# Patient Record
Sex: MALE | Race: WHITE | HISPANIC OR LATINO | Employment: UNEMPLOYED | URBAN - METROPOLITAN AREA
[De-identification: names, ages, dates, MRNs, and addresses within clinical notes are randomized per-mention and may not be internally consistent; named-entity substitution may affect disease eponyms.]

---

## 2017-11-01 ENCOUNTER — APPOINTMENT (EMERGENCY)
Dept: CT IMAGING | Facility: HOSPITAL | Age: 42
End: 2017-11-01
Payer: OTHER GOVERNMENT

## 2017-11-01 ENCOUNTER — APPOINTMENT (EMERGENCY)
Dept: RADIOLOGY | Facility: HOSPITAL | Age: 42
End: 2017-11-01
Payer: OTHER GOVERNMENT

## 2017-11-01 ENCOUNTER — HOSPITAL ENCOUNTER (EMERGENCY)
Facility: HOSPITAL | Age: 42
End: 2017-11-02
Attending: EMERGENCY MEDICINE | Admitting: EMERGENCY MEDICINE
Payer: OTHER GOVERNMENT

## 2017-11-01 DIAGNOSIS — S37.012A HEMATOMA OF LEFT KIDNEY, INITIAL ENCOUNTER: ICD-10-CM

## 2017-11-01 DIAGNOSIS — D64.9 ANEMIA: ICD-10-CM

## 2017-11-01 DIAGNOSIS — R31.9 HEMATURIA: Primary | ICD-10-CM

## 2017-11-01 DIAGNOSIS — I95.9 HYPOTENSION: ICD-10-CM

## 2017-11-01 LAB
ALBUMIN SERPL BCP-MCNC: 3.5 G/DL (ref 3.5–5)
ALP SERPL-CCNC: 71 U/L (ref 46–116)
ALT SERPL W P-5'-P-CCNC: 15 U/L (ref 12–78)
ANION GAP SERPL CALCULATED.3IONS-SCNC: 10 MMOL/L (ref 4–13)
APTT PPP: 25 SECONDS (ref 23–35)
AST SERPL W P-5'-P-CCNC: 14 U/L (ref 5–45)
BACTERIA UR QL AUTO: ABNORMAL /HPF
BASOPHILS # BLD AUTO: 0.02 THOUSANDS/ΜL (ref 0–0.1)
BASOPHILS # BLD MANUAL: 0 THOUSAND/UL (ref 0–0.1)
BASOPHILS NFR BLD AUTO: 0 % (ref 0–1)
BASOPHILS NFR MAR MANUAL: 0 % (ref 0–1)
BILIRUB SERPL-MCNC: 0.64 MG/DL (ref 0.2–1)
BILIRUB UR QL STRIP: NEGATIVE
BUN SERPL-MCNC: 27 MG/DL (ref 5–25)
CALCIUM SERPL-MCNC: 8.5 MG/DL (ref 8.3–10.1)
CHLORIDE SERPL-SCNC: 110 MMOL/L (ref 100–108)
CK SERPL-CCNC: 82 U/L (ref 39–308)
CLARITY UR: ABNORMAL
CO2 SERPL-SCNC: 26 MMOL/L (ref 21–32)
COLOR UR: ABNORMAL
CREAT SERPL-MCNC: 3.02 MG/DL (ref 0.6–1.3)
EOSINOPHIL # BLD AUTO: 0.06 THOUSAND/ΜL (ref 0–0.61)
EOSINOPHIL # BLD MANUAL: 0 THOUSAND/UL (ref 0–0.4)
EOSINOPHIL NFR BLD AUTO: 0 % (ref 0–6)
EOSINOPHIL NFR BLD MANUAL: 0 % (ref 0–6)
ERYTHROCYTE [DISTWIDTH] IN BLOOD BY AUTOMATED COUNT: 13 % (ref 11.6–15.1)
ERYTHROCYTE [DISTWIDTH] IN BLOOD BY AUTOMATED COUNT: 13 % (ref 11.6–15.1)
GFR SERPL CREATININE-BSD FRML MDRD: 24 ML/MIN/1.73SQ M
GLUCOSE SERPL-MCNC: 191 MG/DL (ref 65–140)
GLUCOSE UR STRIP-MCNC: ABNORMAL MG/DL
HCT VFR BLD AUTO: 24.5 % (ref 36.5–49.3)
HCT VFR BLD AUTO: 31 % (ref 36.5–49.3)
HGB BLD-MCNC: 10.3 G/DL (ref 12–17)
HGB BLD-MCNC: 8.1 G/DL (ref 12–17)
HGB UR QL STRIP.AUTO: ABNORMAL
INR PPP: 1.05 (ref 0.86–1.16)
KETONES UR STRIP-MCNC: ABNORMAL MG/DL
LACTATE SERPL-SCNC: 3.1 MMOL/L (ref 0.5–2)
LEUKOCYTE ESTERASE UR QL STRIP: ABNORMAL
LYMPHOCYTES # BLD AUTO: 1.21 THOUSAND/UL (ref 0.6–4.47)
LYMPHOCYTES # BLD AUTO: 1.82 THOUSANDS/ΜL (ref 0.6–4.47)
LYMPHOCYTES # BLD AUTO: 6 % (ref 14–44)
LYMPHOCYTES NFR BLD AUTO: 12 % (ref 14–44)
MCH RBC QN AUTO: 30.3 PG (ref 26.8–34.3)
MCH RBC QN AUTO: 30.6 PG (ref 26.8–34.3)
MCHC RBC AUTO-ENTMCNC: 33.1 G/DL (ref 31.4–37.4)
MCHC RBC AUTO-ENTMCNC: 33.2 G/DL (ref 31.4–37.4)
MCV RBC AUTO: 92 FL (ref 82–98)
MCV RBC AUTO: 92 FL (ref 82–98)
MONOCYTES # BLD AUTO: 0 THOUSAND/UL (ref 0–1.22)
MONOCYTES # BLD AUTO: 0.73 THOUSAND/ΜL (ref 0.17–1.22)
MONOCYTES NFR BLD AUTO: 5 % (ref 4–12)
MONOCYTES NFR BLD: 0 % (ref 4–12)
NEUTROPHILS # BLD AUTO: 12.15 THOUSANDS/ΜL (ref 1.85–7.62)
NEUTROPHILS # BLD MANUAL: 18.97 THOUSAND/UL (ref 1.85–7.62)
NEUTS BAND NFR BLD MANUAL: 8 % (ref 0–8)
NEUTS SEG NFR BLD AUTO: 83 % (ref 43–75)
NEUTS SEG NFR BLD AUTO: 86 % (ref 43–75)
NITRITE UR QL STRIP: POSITIVE
NON-SQ EPI CELLS URNS QL MICRO: ABNORMAL /HPF
NRBC BLD AUTO-RTO: 0 /100 WBCS
NRBC BLD AUTO-RTO: 0 /100 WBCS
PH UR STRIP.AUTO: 8 [PH] (ref 4.5–8)
PLATELET # BLD AUTO: 212 THOUSANDS/UL (ref 149–390)
PLATELET # BLD AUTO: 231 THOUSANDS/UL (ref 149–390)
PLATELET BLD QL SMEAR: ADEQUATE
PMV BLD AUTO: 10.4 FL (ref 8.9–12.7)
PMV BLD AUTO: 10.4 FL (ref 8.9–12.7)
POTASSIUM SERPL-SCNC: 3.4 MMOL/L (ref 3.5–5.3)
PROT SERPL-MCNC: 6.2 G/DL (ref 6.4–8.2)
PROT UR STRIP-MCNC: >=300 MG/DL
PROTHROMBIN TIME: 13.7 SECONDS (ref 12.1–14.4)
RBC # BLD AUTO: 2.67 MILLION/UL (ref 3.88–5.62)
RBC # BLD AUTO: 3.37 MILLION/UL (ref 3.88–5.62)
RBC #/AREA URNS AUTO: ABNORMAL /HPF
RBC MORPH BLD: NORMAL
SODIUM SERPL-SCNC: 146 MMOL/L (ref 136–145)
SP GR UR STRIP.AUTO: 1.01 (ref 1–1.03)
SPECIMEN SOURCE: NORMAL
TOTAL CELLS COUNTED SPEC: 100
TROPONIN I BLD-MCNC: 0 NG/ML (ref 0–0.08)
UROBILINOGEN UR QL STRIP.AUTO: 1 E.U./DL
WBC # BLD AUTO: 14.78 THOUSAND/UL (ref 4.31–10.16)
WBC # BLD AUTO: 20.18 THOUSAND/UL (ref 4.31–10.16)
WBC #/AREA URNS AUTO: ABNORMAL /HPF

## 2017-11-01 PROCEDURE — 93005 ELECTROCARDIOGRAM TRACING: CPT | Performed by: EMERGENCY MEDICINE

## 2017-11-01 PROCEDURE — 85007 BL SMEAR W/DIFF WBC COUNT: CPT | Performed by: EMERGENCY MEDICINE

## 2017-11-01 PROCEDURE — 71250 CT THORAX DX C-: CPT

## 2017-11-01 PROCEDURE — 36415 COLL VENOUS BLD VENIPUNCTURE: CPT

## 2017-11-01 PROCEDURE — 74176 CT ABD & PELVIS W/O CONTRAST: CPT

## 2017-11-01 PROCEDURE — 85610 PROTHROMBIN TIME: CPT | Performed by: EMERGENCY MEDICINE

## 2017-11-01 PROCEDURE — 85027 COMPLETE CBC AUTOMATED: CPT | Performed by: EMERGENCY MEDICINE

## 2017-11-01 PROCEDURE — 96374 THER/PROPH/DIAG INJ IV PUSH: CPT

## 2017-11-01 PROCEDURE — 96365 THER/PROPH/DIAG IV INF INIT: CPT

## 2017-11-01 PROCEDURE — 86900 BLOOD TYPING SEROLOGIC ABO: CPT | Performed by: EMERGENCY MEDICINE

## 2017-11-01 PROCEDURE — 87040 BLOOD CULTURE FOR BACTERIA: CPT | Performed by: EMERGENCY MEDICINE

## 2017-11-01 PROCEDURE — 84484 ASSAY OF TROPONIN QUANT: CPT

## 2017-11-01 PROCEDURE — 86901 BLOOD TYPING SEROLOGIC RH(D): CPT | Performed by: EMERGENCY MEDICINE

## 2017-11-01 PROCEDURE — 83605 ASSAY OF LACTIC ACID: CPT | Performed by: EMERGENCY MEDICINE

## 2017-11-01 PROCEDURE — 96361 HYDRATE IV INFUSION ADD-ON: CPT

## 2017-11-01 PROCEDURE — 81001 URINALYSIS AUTO W/SCOPE: CPT | Performed by: EMERGENCY MEDICINE

## 2017-11-01 PROCEDURE — 80053 COMPREHEN METABOLIC PANEL: CPT | Performed by: EMERGENCY MEDICINE

## 2017-11-01 PROCEDURE — 86850 RBC ANTIBODY SCREEN: CPT | Performed by: EMERGENCY MEDICINE

## 2017-11-01 PROCEDURE — 85730 THROMBOPLASTIN TIME PARTIAL: CPT | Performed by: EMERGENCY MEDICINE

## 2017-11-01 PROCEDURE — 85025 COMPLETE CBC W/AUTO DIFF WBC: CPT | Performed by: EMERGENCY MEDICINE

## 2017-11-01 PROCEDURE — 36415 COLL VENOUS BLD VENIPUNCTURE: CPT | Performed by: EMERGENCY MEDICINE

## 2017-11-01 PROCEDURE — 96375 TX/PRO/DX INJ NEW DRUG ADDON: CPT

## 2017-11-01 PROCEDURE — 82550 ASSAY OF CK (CPK): CPT | Performed by: EMERGENCY MEDICINE

## 2017-11-01 RX ORDER — TAMSULOSIN HYDROCHLORIDE 0.4 MG/1
0.4 CAPSULE ORAL
COMMUNITY
End: 2017-12-22 | Stop reason: HOSPADM

## 2017-11-01 RX ORDER — MORPHINE SULFATE 2 MG/ML
2 INJECTION, SOLUTION INTRAMUSCULAR; INTRAVENOUS ONCE
Status: COMPLETED | OUTPATIENT
Start: 2017-11-01 | End: 2017-11-01

## 2017-11-01 RX ORDER — MORPHINE SULFATE 4 MG/ML
4 INJECTION, SOLUTION INTRAMUSCULAR; INTRAVENOUS ONCE
Status: COMPLETED | OUTPATIENT
Start: 2017-11-01 | End: 2017-11-01

## 2017-11-01 RX ADMIN — SODIUM CHLORIDE 1000 ML: 0.9 INJECTION, SOLUTION INTRAVENOUS at 23:49

## 2017-11-01 RX ADMIN — CEFEPIME 2000 MG: 2 INJECTION, POWDER, FOR SOLUTION INTRAMUSCULAR; INTRAVENOUS at 21:51

## 2017-11-01 RX ADMIN — SODIUM CHLORIDE 1000 ML: 0.9 INJECTION, SOLUTION INTRAVENOUS at 21:04

## 2017-11-01 RX ADMIN — SODIUM CHLORIDE 1000 ML: 0.9 INJECTION, SOLUTION INTRAVENOUS at 21:03

## 2017-11-01 RX ADMIN — MORPHINE SULFATE 4 MG: 4 INJECTION, SOLUTION INTRAMUSCULAR; INTRAVENOUS at 22:43

## 2017-11-01 RX ADMIN — MORPHINE SULFATE 2 MG: 2 INJECTION, SOLUTION INTRAMUSCULAR; INTRAVENOUS at 21:35

## 2017-11-02 ENCOUNTER — APPOINTMENT (INPATIENT)
Dept: RADIOLOGY | Facility: HOSPITAL | Age: 42
DRG: 659 | End: 2017-11-02
Payer: COMMERCIAL

## 2017-11-02 ENCOUNTER — APPOINTMENT (EMERGENCY)
Dept: RADIOLOGY | Facility: HOSPITAL | Age: 42
DRG: 659 | End: 2017-11-02
Payer: COMMERCIAL

## 2017-11-02 ENCOUNTER — ANESTHESIA EVENT (INPATIENT)
Dept: PERIOP | Facility: HOSPITAL | Age: 42
DRG: 659 | End: 2017-11-02
Payer: COMMERCIAL

## 2017-11-02 ENCOUNTER — ANESTHESIA (INPATIENT)
Dept: PERIOP | Facility: HOSPITAL | Age: 42
DRG: 659 | End: 2017-11-02
Payer: COMMERCIAL

## 2017-11-02 ENCOUNTER — HOSPITAL ENCOUNTER (INPATIENT)
Facility: HOSPITAL | Age: 42
LOS: 50 days | Discharge: HOME | DRG: 659 | End: 2017-12-22
Attending: SURGERY | Admitting: SURGERY
Payer: COMMERCIAL

## 2017-11-02 VITALS
DIASTOLIC BLOOD PRESSURE: 66 MMHG | HEART RATE: 92 BPM | TEMPERATURE: 99 F | RESPIRATION RATE: 16 BRPM | WEIGHT: 187.39 LBS | SYSTOLIC BLOOD PRESSURE: 131 MMHG | OXYGEN SATURATION: 96 %

## 2017-11-02 DIAGNOSIS — A41.9 SEVERE SEPSIS (HCC): ICD-10-CM

## 2017-11-02 DIAGNOSIS — H57.10 EYE PAIN: ICD-10-CM

## 2017-11-02 DIAGNOSIS — S37.032A LACERATION OF LEFT KIDNEY, INITIAL ENCOUNTER: Primary | ICD-10-CM

## 2017-11-02 DIAGNOSIS — R65.20 SEVERE SEPSIS (HCC): ICD-10-CM

## 2017-11-02 DIAGNOSIS — J90 PLEURAL EFFUSION ON LEFT: ICD-10-CM

## 2017-11-02 DIAGNOSIS — N13.39 OTHER HYDRONEPHROSIS: Chronic | ICD-10-CM

## 2017-11-02 DIAGNOSIS — Z90.5 HX OF UNILATERAL NEPHRECTOMY: ICD-10-CM

## 2017-11-02 LAB
ABO GROUP BLD: NORMAL
ABO GROUP BLD: NORMAL
ALBUMIN SERPL BCP-MCNC: 2.3 G/DL (ref 3.5–5)
ALBUMIN SERPL BCP-MCNC: 2.9 G/DL (ref 3.5–5)
ALP SERPL-CCNC: 43 U/L (ref 46–116)
ALP SERPL-CCNC: 54 U/L (ref 46–116)
ALT SERPL W P-5'-P-CCNC: 17 U/L (ref 12–78)
ALT SERPL W P-5'-P-CCNC: 23 U/L (ref 12–78)
ANION GAP BLD CALC-SCNC: 20 MMOL/L (ref 4–13)
ANION GAP SERPL CALCULATED.3IONS-SCNC: 12 MMOL/L (ref 4–13)
ANION GAP SERPL CALCULATED.3IONS-SCNC: 5 MMOL/L (ref 4–13)
ANION GAP SERPL CALCULATED.3IONS-SCNC: 6 MMOL/L (ref 4–13)
ANION GAP SERPL CALCULATED.3IONS-SCNC: 9 MMOL/L (ref 4–13)
APTT PPP: 27 SECONDS (ref 23–35)
ARTERIAL PATENCY WRIST A: NO
AST SERPL W P-5'-P-CCNC: 18 U/L (ref 5–45)
AST SERPL W P-5'-P-CCNC: 25 U/L (ref 5–45)
ATRIAL RATE: 68 BPM
BASE EXCESS BLDA CALC-SCNC: -10 MMOL/L (ref -2–3)
BASE EXCESS BLDA CALC-SCNC: -10 MMOL/L (ref -2–3)
BASE EXCESS BLDA CALC-SCNC: -11 MMOL/L (ref -2–3)
BASE EXCESS BLDA CALC-SCNC: -7 MMOL/L (ref -2–3)
BASE EXCESS BLDA CALC-SCNC: -7 MMOL/L (ref -2–3)
BASE EXCESS BLDA CALC-SCNC: -9.7 MMOL/L
BASE EXCESS BLDA CALC-SCNC: 0.9 MMOL/L
BASE EXCESS BLDA CALC-SCNC: 2.7 MMOL/L
BASOPHILS # BLD AUTO: 0 THOUSANDS/ΜL (ref 0–0.1)
BASOPHILS # BLD AUTO: 0.01 THOUSANDS/ΜL (ref 0–0.1)
BASOPHILS # BLD AUTO: 0.02 THOUSANDS/ΜL (ref 0–0.1)
BASOPHILS NFR BLD AUTO: 0 % (ref 0–1)
BILIRUB SERPL-MCNC: 1.3 MG/DL (ref 0.2–1)
BILIRUB SERPL-MCNC: 1.41 MG/DL (ref 0.2–1)
BLD GP AB SCN SERPL QL: NEGATIVE
BLD GP AB SCN SERPL QL: NEGATIVE
BODY TEMPERATURE: 100.4 DEGREES FEHRENHEIT
BODY TEMPERATURE: 98.2 DEGREES FEHRENHEIT
BODY TEMPERATURE: 98.3 DEGREES FEHRENHEIT
BUN BLD-MCNC: 24 MG/DL (ref 5–25)
BUN SERPL-MCNC: 21 MG/DL (ref 5–25)
BUN SERPL-MCNC: 21 MG/DL (ref 5–25)
BUN SERPL-MCNC: 22 MG/DL (ref 5–25)
BUN SERPL-MCNC: 26 MG/DL (ref 5–25)
CA-I BLD-SCNC: 0.97 MMOL/L (ref 1.12–1.32)
CA-I BLD-SCNC: 1.04 MMOL/L (ref 1.12–1.32)
CA-I BLD-SCNC: 1.06 MMOL/L (ref 1.12–1.32)
CA-I BLD-SCNC: 1.08 MMOL/L (ref 1.12–1.32)
CA-I BLD-SCNC: 1.08 MMOL/L (ref 1.12–1.32)
CA-I BLD-SCNC: 1.12 MMOL/L (ref 1.12–1.32)
CA-I BLD-SCNC: 1.14 MMOL/L (ref 1.12–1.32)
CA-I BLD-SCNC: 1.19 MMOL/L (ref 1.12–1.32)
CALCIUM SERPL-MCNC: 6.9 MG/DL (ref 8.3–10.1)
CALCIUM SERPL-MCNC: 7.8 MG/DL (ref 8.3–10.1)
CALCIUM SERPL-MCNC: 8 MG/DL (ref 8.3–10.1)
CALCIUM SERPL-MCNC: 8.7 MG/DL (ref 8.3–10.1)
CHLORIDE BLD-SCNC: 110 MMOL/L (ref 100–108)
CHLORIDE SERPL-SCNC: 114 MMOL/L (ref 100–108)
CHLORIDE SERPL-SCNC: 117 MMOL/L (ref 100–108)
CHLORIDE SERPL-SCNC: 120 MMOL/L (ref 100–108)
CHLORIDE SERPL-SCNC: 120 MMOL/L (ref 100–108)
CO2 SERPL-SCNC: 20 MMOL/L (ref 21–32)
CO2 SERPL-SCNC: 24 MMOL/L (ref 21–32)
CO2 SERPL-SCNC: 26 MMOL/L (ref 21–32)
CO2 SERPL-SCNC: 27 MMOL/L (ref 21–32)
CREAT BLD-MCNC: 2.8 MG/DL (ref 0.6–1.3)
CREAT SERPL-MCNC: 2.12 MG/DL (ref 0.6–1.3)
CREAT SERPL-MCNC: 2.18 MG/DL (ref 0.6–1.3)
CREAT SERPL-MCNC: 2.47 MG/DL (ref 0.6–1.3)
CREAT SERPL-MCNC: 2.91 MG/DL (ref 0.6–1.3)
EOSINOPHIL # BLD AUTO: 0 THOUSAND/ΜL (ref 0–0.61)
EOSINOPHIL # BLD AUTO: 0.01 THOUSAND/ΜL (ref 0–0.61)
EOSINOPHIL # BLD AUTO: 0.04 THOUSAND/ΜL (ref 0–0.61)
EOSINOPHIL NFR BLD AUTO: 0 % (ref 0–6)
EOSINOPHIL NFR BLD AUTO: 0 % (ref 0–6)
EOSINOPHIL NFR BLD AUTO: 1 % (ref 0–6)
ERYTHROCYTE [DISTWIDTH] IN BLOOD BY AUTOMATED COUNT: 12.9 % (ref 11.6–15.1)
ERYTHROCYTE [DISTWIDTH] IN BLOOD BY AUTOMATED COUNT: 13.9 % (ref 11.6–15.1)
ERYTHROCYTE [DISTWIDTH] IN BLOOD BY AUTOMATED COUNT: 14.7 % (ref 11.6–15.1)
ERYTHROCYTE [DISTWIDTH] IN BLOOD BY AUTOMATED COUNT: 14.7 % (ref 11.6–15.1)
GFR SERPL CREATININE-BSD FRML MDRD: 25 ML/MIN/1.73SQ M
GFR SERPL CREATININE-BSD FRML MDRD: 27 ML/MIN/1.73SQ M
GFR SERPL CREATININE-BSD FRML MDRD: 31 ML/MIN/1.73SQ M
GFR SERPL CREATININE-BSD FRML MDRD: 36 ML/MIN/1.73SQ M
GFR SERPL CREATININE-BSD FRML MDRD: 37 ML/MIN/1.73SQ M
GLUCOSE SERPL-MCNC: 168 MG/DL (ref 65–140)
GLUCOSE SERPL-MCNC: 179 MG/DL (ref 65–140)
GLUCOSE SERPL-MCNC: 184 MG/DL (ref 65–140)
GLUCOSE SERPL-MCNC: 197 MG/DL (ref 65–140)
GLUCOSE SERPL-MCNC: 197 MG/DL (ref 65–140)
GLUCOSE SERPL-MCNC: 198 MG/DL (ref 65–140)
GLUCOSE SERPL-MCNC: 203 MG/DL (ref 65–140)
GLUCOSE SERPL-MCNC: 204 MG/DL (ref 65–140)
GLUCOSE SERPL-MCNC: 89 MG/DL (ref 65–140)
GLUCOSE SERPL-MCNC: 97 MG/DL (ref 65–140)
HCO3 BLDA-SCNC: 16.8 MMOL/L (ref 22–28)
HCO3 BLDA-SCNC: 17 MMOL/L (ref 22–28)
HCO3 BLDA-SCNC: 17.1 MMOL/L (ref 22–28)
HCO3 BLDA-SCNC: 17.1 MMOL/L (ref 22–28)
HCO3 BLDA-SCNC: 19.5 MMOL/L (ref 24–30)
HCO3 BLDA-SCNC: 19.9 MMOL/L (ref 22–28)
HCO3 BLDA-SCNC: 25.1 MMOL/L (ref 22–28)
HCO3 BLDA-SCNC: 27 MMOL/L (ref 22–28)
HCT VFR BLD AUTO: 20.7 % (ref 36.5–49.3)
HCT VFR BLD AUTO: 24.9 % (ref 36.5–49.3)
HCT VFR BLD AUTO: 26.6 % (ref 36.5–49.3)
HCT VFR BLD AUTO: 29.9 % (ref 36.5–49.3)
HCT VFR BLD AUTO: 33.4 % (ref 36.5–49.3)
HCT VFR BLD CALC: 17 % (ref 36.5–49.3)
HCT VFR BLD CALC: 18 % (ref 36.5–49.3)
HCT VFR BLD CALC: 19 % (ref 36.5–49.3)
HCT VFR BLD CALC: 25 % (ref 36.5–49.3)
HCT VFR BLD CALC: 26 % (ref 36.5–49.3)
HCT VFR BLD CALC: 26 % (ref 36.5–49.3)
HGB BLD-MCNC: 10.3 G/DL (ref 12–17)
HGB BLD-MCNC: 11.3 G/DL (ref 12–17)
HGB BLD-MCNC: 6.9 G/DL (ref 12–17)
HGB BLD-MCNC: 8.9 G/DL (ref 12–17)
HGB BLD-MCNC: 9.3 G/DL (ref 12–17)
HGB BLDA-MCNC: 5.8 G/DL (ref 12–17)
HGB BLDA-MCNC: 6.1 G/DL (ref 12–17)
HGB BLDA-MCNC: 6.5 G/DL (ref 12–17)
HGB BLDA-MCNC: 8.5 G/DL (ref 12–17)
HGB BLDA-MCNC: 8.8 G/DL (ref 12–17)
HGB BLDA-MCNC: 8.8 G/DL (ref 12–17)
HOROWITZ INDEX BLDA+IHG-RTO: 40 MM[HG]
INR PPP: 1.33 (ref 0.86–1.16)
INR PPP: 1.37 (ref 0.86–1.16)
LACTATE SERPL-SCNC: 1.2 MMOL/L (ref 0.5–2)
LACTATE SERPL-SCNC: 1.3 MMOL/L (ref 0.5–2)
LACTATE SERPL-SCNC: 3.8 MMOL/L (ref 0.5–2)
LACTATE SERPL-SCNC: 7.1 MMOL/L (ref 0.5–2)
LYMPHOCYTES # BLD AUTO: 0.91 THOUSANDS/ΜL (ref 0.6–4.47)
LYMPHOCYTES # BLD AUTO: 1.04 THOUSANDS/ΜL (ref 0.6–4.47)
LYMPHOCYTES # BLD AUTO: 1.08 THOUSANDS/ΜL (ref 0.6–4.47)
LYMPHOCYTES NFR BLD AUTO: 15 % (ref 14–44)
LYMPHOCYTES NFR BLD AUTO: 5 % (ref 14–44)
LYMPHOCYTES NFR BLD AUTO: 9 % (ref 14–44)
MAGNESIUM SERPL-MCNC: 1.7 MG/DL (ref 1.6–2.6)
MCH RBC QN AUTO: 29.4 PG (ref 26.8–34.3)
MCH RBC QN AUTO: 29.6 PG (ref 26.8–34.3)
MCH RBC QN AUTO: 30.1 PG (ref 26.8–34.3)
MCH RBC QN AUTO: 30.7 PG (ref 26.8–34.3)
MCHC RBC AUTO-ENTMCNC: 33.3 G/DL (ref 31.4–37.4)
MCHC RBC AUTO-ENTMCNC: 33.8 G/DL (ref 31.4–37.4)
MCHC RBC AUTO-ENTMCNC: 35 G/DL (ref 31.4–37.4)
MCHC RBC AUTO-ENTMCNC: 35.7 G/DL (ref 31.4–37.4)
MCV RBC AUTO: 84 FL (ref 82–98)
MCV RBC AUTO: 84 FL (ref 82–98)
MCV RBC AUTO: 87 FL (ref 82–98)
MCV RBC AUTO: 92 FL (ref 82–98)
MONOCYTES # BLD AUTO: 0.54 THOUSAND/ΜL (ref 0.17–1.22)
MONOCYTES # BLD AUTO: 1.28 THOUSAND/ΜL (ref 0.17–1.22)
MONOCYTES # BLD AUTO: 1.33 THOUSAND/ΜL (ref 0.17–1.22)
MONOCYTES NFR BLD AUTO: 11 % (ref 4–12)
MONOCYTES NFR BLD AUTO: 7 % (ref 4–12)
MONOCYTES NFR BLD AUTO: 8 % (ref 4–12)
NEUTROPHILS # BLD AUTO: 17.12 THOUSANDS/ΜL (ref 1.85–7.62)
NEUTROPHILS # BLD AUTO: 5.53 THOUSANDS/ΜL (ref 1.85–7.62)
NEUTROPHILS # BLD AUTO: 9.41 THOUSANDS/ΜL (ref 1.85–7.62)
NEUTS SEG NFR BLD AUTO: 76 % (ref 43–75)
NEUTS SEG NFR BLD AUTO: 80 % (ref 43–75)
NEUTS SEG NFR BLD AUTO: 88 % (ref 43–75)
NRBC BLD AUTO-RTO: 0 /100 WBCS
O2 CT BLDA-SCNC: 13 ML/DL (ref 16–23)
O2 CT BLDA-SCNC: 13.8 ML/DL (ref 16–23)
O2 CT BLDA-SCNC: 16.8 ML/DL (ref 16–23)
OXYHGB MFR BLDA: 96.9 % (ref 94–97)
OXYHGB MFR BLDA: 97 % (ref 94–97)
OXYHGB MFR BLDA: 97.4 % (ref 94–97)
P AXIS: 48 DEGREES
PCO2 BLD: 18 MMOL/L (ref 21–32)
PCO2 BLD: 18 MMOL/L (ref 21–32)
PCO2 BLD: 19 MMOL/L (ref 21–32)
PCO2 BLD: 21 MMOL/L (ref 21–32)
PCO2 BLD: 39.9 MM HG (ref 36–44)
PCO2 BLD: 40.3 MM HG (ref 36–44)
PCO2 BLD: 42.2 MM HG (ref 42–50)
PCO2 BLD: 46.7 MM HG (ref 36–44)
PCO2 BLD: 50.2 MM HG (ref 36–44)
PCO2 BLDA: 38.3 MM HG (ref 36–44)
PCO2 BLDA: 39.3 MM HG (ref 36–44)
PCO2 BLDA: 40.8 MM HG (ref 36–44)
PCO2 TEMP ADJ BLDA: 38 MM HG (ref 36–44)
PCO2 TEMP ADJ BLDA: 42.6 MM HG (ref 36–44)
PEEP RESPIRATORY: 5 CM[H2O]
PH BLD: 7.14 [PH] (ref 7.35–7.45)
PH BLD: 7.24 [PH] (ref 7.35–7.45)
PH BLD: 7.27 [PH] (ref 7.3–7.4)
PH BLD: 7.42 [PH] (ref 7.35–7.45)
PH BLD: 7.44 [PH] (ref 7.35–7.45)
PH BLDA: 7.25 [PH] (ref 7.35–7.45)
PH BLDA: 7.43 [PH] (ref 7.35–7.45)
PH BLDA: 7.44 [PH] (ref 7.35–7.45)
PHOSPHATE SERPL-MCNC: 2.9 MG/DL (ref 2.7–4.5)
PHOSPHATE SERPL-MCNC: 4.4 MG/DL (ref 2.7–4.5)
PLATELET # BLD AUTO: 116 THOUSANDS/UL (ref 149–390)
PLATELET # BLD AUTO: 119 THOUSANDS/UL (ref 149–390)
PLATELET # BLD AUTO: 147 THOUSANDS/UL (ref 149–390)
PLATELET # BLD AUTO: 221 THOUSANDS/UL (ref 149–390)
PMV BLD AUTO: 10.1 FL (ref 8.9–12.7)
PMV BLD AUTO: 9.4 FL (ref 8.9–12.7)
PMV BLD AUTO: 9.5 FL (ref 8.9–12.7)
PMV BLD AUTO: 9.8 FL (ref 8.9–12.7)
PO2 BLD: 136.8 MM HG (ref 75–129)
PO2 BLD: 151.8 MM HG (ref 75–129)
PO2 BLD: 214 MM HG (ref 75–129)
PO2 BLD: 223 MM HG (ref 75–129)
PO2 BLD: 274 MM HG (ref 75–129)
PO2 BLD: 35 MM HG (ref 35–45)
PO2 BLD: 469 MM HG (ref 75–129)
PO2 BLDA: 130.5 MM HG (ref 75–129)
PO2 BLDA: 132.2 MM HG (ref 75–129)
PO2 BLDA: 153 MM HG (ref 75–129)
POTASSIUM BLD-SCNC: 3.5 MMOL/L (ref 3.5–5.3)
POTASSIUM BLD-SCNC: 3.7 MMOL/L (ref 3.5–5.3)
POTASSIUM BLD-SCNC: 3.7 MMOL/L (ref 3.5–5.3)
POTASSIUM BLD-SCNC: 3.8 MMOL/L (ref 3.5–5.3)
POTASSIUM BLD-SCNC: 4 MMOL/L (ref 3.5–5.3)
POTASSIUM BLD-SCNC: 4 MMOL/L (ref 3.5–5.3)
POTASSIUM SERPL-SCNC: 3.6 MMOL/L (ref 3.5–5.3)
POTASSIUM SERPL-SCNC: 3.7 MMOL/L (ref 3.5–5.3)
POTASSIUM SERPL-SCNC: 3.8 MMOL/L (ref 3.5–5.3)
POTASSIUM SERPL-SCNC: 3.9 MMOL/L (ref 3.5–5.3)
PR INTERVAL: 134 MS
PROT SERPL-MCNC: 4.6 G/DL (ref 6.4–8.2)
PROT SERPL-MCNC: 5.7 G/DL (ref 6.4–8.2)
PROTHROMBIN TIME: 16.6 SECONDS (ref 12.1–14.4)
PROTHROMBIN TIME: 16.9 SECONDS (ref 12.1–14.4)
QRS AXIS: 33 DEGREES
QRSD INTERVAL: 86 MS
QT INTERVAL: 402 MS
QTC INTERVAL: 427 MS
RBC # BLD AUTO: 2.25 MILLION/UL (ref 3.88–5.62)
RBC # BLD AUTO: 2.96 MILLION/UL (ref 3.88–5.62)
RBC # BLD AUTO: 3.16 MILLION/UL (ref 3.88–5.62)
RBC # BLD AUTO: 3.82 MILLION/UL (ref 3.88–5.62)
RH BLD: POSITIVE
RH BLD: POSITIVE
SAO2 % BLD FROM PO2: 100 % (ref 95–98)
SAO2 % BLD FROM PO2: 59 % (ref 95–98)
SODIUM BLD-SCNC: 146 MMOL/L (ref 136–145)
SODIUM BLD-SCNC: 147 MMOL/L (ref 136–145)
SODIUM BLD-SCNC: 147 MMOL/L (ref 136–145)
SODIUM BLD-SCNC: 148 MMOL/L (ref 136–145)
SODIUM SERPL-SCNC: 147 MMOL/L (ref 136–145)
SODIUM SERPL-SCNC: 149 MMOL/L (ref 136–145)
SODIUM SERPL-SCNC: 152 MMOL/L (ref 136–145)
SODIUM SERPL-SCNC: 152 MMOL/L (ref 136–145)
SPECIMEN EXPIRATION DATE: NORMAL
SPECIMEN EXPIRATION DATE: NORMAL
SPECIMEN SOURCE: ABNORMAL
T WAVE AXIS: 33 DEGREES
VENT AC: 14
VENT- AC: AC
VENTRICULAR RATE: 68 BPM
VT SETTING VENT: 500 ML
WBC # BLD AUTO: 11.8 THOUSAND/UL (ref 4.31–10.16)
WBC # BLD AUTO: 19.45 THOUSAND/UL (ref 4.31–10.16)
WBC # BLD AUTO: 7.18 THOUSAND/UL (ref 4.31–10.16)
WBC # BLD AUTO: 7.77 THOUSAND/UL (ref 4.31–10.16)

## 2017-11-02 PROCEDURE — 82947 ASSAY GLUCOSE BLOOD QUANT: CPT

## 2017-11-02 PROCEDURE — 83735 ASSAY OF MAGNESIUM: CPT | Performed by: SURGERY

## 2017-11-02 PROCEDURE — 82330 ASSAY OF CALCIUM: CPT

## 2017-11-02 PROCEDURE — 85014 HEMATOCRIT: CPT | Performed by: EMERGENCY MEDICINE

## 2017-11-02 PROCEDURE — 84295 ASSAY OF SERUM SODIUM: CPT

## 2017-11-02 PROCEDURE — P9016 RBC LEUKOCYTES REDUCED: HCPCS

## 2017-11-02 PROCEDURE — P9017 PLASMA 1 DONOR FRZ W/IN 8 HR: HCPCS

## 2017-11-02 PROCEDURE — P9035 PLATELET PHERES LEUKOREDUCED: HCPCS

## 2017-11-02 PROCEDURE — 94002 VENT MGMT INPAT INIT DAY: CPT

## 2017-11-02 PROCEDURE — 85025 COMPLETE CBC W/AUTO DIFF WBC: CPT | Performed by: SURGERY

## 2017-11-02 PROCEDURE — P9021 RED BLOOD CELLS UNIT: HCPCS

## 2017-11-02 PROCEDURE — 84100 ASSAY OF PHOSPHORUS: CPT | Performed by: PHYSICIAN ASSISTANT

## 2017-11-02 PROCEDURE — 86850 RBC ANTIBODY SCREEN: CPT | Performed by: SURGERY

## 2017-11-02 PROCEDURE — 86927 PLASMA FRESH FROZEN: CPT

## 2017-11-02 PROCEDURE — 85610 PROTHROMBIN TIME: CPT | Performed by: PHYSICIAN ASSISTANT

## 2017-11-02 PROCEDURE — 82805 BLOOD GASES W/O2 SATURATION: CPT | Performed by: SURGERY

## 2017-11-02 PROCEDURE — 85730 THROMBOPLASTIN TIME PARTIAL: CPT | Performed by: SURGERY

## 2017-11-02 PROCEDURE — 83605 ASSAY OF LACTIC ACID: CPT | Performed by: SURGERY

## 2017-11-02 PROCEDURE — 86920 COMPATIBILITY TEST SPIN: CPT

## 2017-11-02 PROCEDURE — 0TT10ZZ RESECTION OF LEFT KIDNEY, OPEN APPROACH: ICD-10-PCS | Performed by: SURGERY

## 2017-11-02 PROCEDURE — 80053 COMPREHEN METABOLIC PANEL: CPT | Performed by: PHYSICIAN ASSISTANT

## 2017-11-02 PROCEDURE — 85610 PROTHROMBIN TIME: CPT | Performed by: SURGERY

## 2017-11-02 PROCEDURE — 82805 BLOOD GASES W/O2 SATURATION: CPT | Performed by: PHYSICIAN ASSISTANT

## 2017-11-02 PROCEDURE — 36415 COLL VENOUS BLD VENIPUNCTURE: CPT | Performed by: SURGERY

## 2017-11-02 PROCEDURE — 80053 COMPREHEN METABOLIC PANEL: CPT | Performed by: SURGERY

## 2017-11-02 PROCEDURE — 99285 EMERGENCY DEPT VISIT HI MDM: CPT

## 2017-11-02 PROCEDURE — 86901 BLOOD TYPING SEROLOGIC RH(D): CPT | Performed by: SURGERY

## 2017-11-02 PROCEDURE — C9113 INJ PANTOPRAZOLE SODIUM, VIA: HCPCS | Performed by: SURGERY

## 2017-11-02 PROCEDURE — 85014 HEMATOCRIT: CPT

## 2017-11-02 PROCEDURE — 0TB70ZZ EXCISION OF LEFT URETER, OPEN APPROACH: ICD-10-PCS | Performed by: SURGERY

## 2017-11-02 PROCEDURE — 85027 COMPLETE CBC AUTOMATED: CPT | Performed by: PHYSICIAN ASSISTANT

## 2017-11-02 PROCEDURE — P9037 PLATE PHERES LEUKOREDU IRRAD: HCPCS

## 2017-11-02 PROCEDURE — 86900 BLOOD TYPING SEROLOGIC ABO: CPT | Performed by: SURGERY

## 2017-11-02 PROCEDURE — 82803 BLOOD GASES ANY COMBINATION: CPT

## 2017-11-02 PROCEDURE — 82330 ASSAY OF CALCIUM: CPT | Performed by: SURGERY

## 2017-11-02 PROCEDURE — 88307 TISSUE EXAM BY PATHOLOGIST: CPT | Performed by: SURGERY

## 2017-11-02 PROCEDURE — 71010 HB CHEST X-RAY 1 VIEW FRONTAL: CPT

## 2017-11-02 PROCEDURE — 94760 N-INVAS EAR/PLS OXIMETRY 1: CPT

## 2017-11-02 PROCEDURE — 80047 BASIC METABLC PNL IONIZED CA: CPT

## 2017-11-02 PROCEDURE — 80048 BASIC METABOLIC PNL TOTAL CA: CPT | Performed by: SURGERY

## 2017-11-02 PROCEDURE — 96374 THER/PROPH/DIAG INJ IV PUSH: CPT

## 2017-11-02 PROCEDURE — 85018 HEMOGLOBIN: CPT | Performed by: EMERGENCY MEDICINE

## 2017-11-02 PROCEDURE — 0TQ70ZZ REPAIR LEFT URETER, OPEN APPROACH: ICD-10-PCS | Performed by: SURGERY

## 2017-11-02 PROCEDURE — 83735 ASSAY OF MAGNESIUM: CPT | Performed by: PHYSICIAN ASSISTANT

## 2017-11-02 PROCEDURE — 5A1935Z RESPIRATORY VENTILATION, LESS THAN 24 CONSECUTIVE HOURS: ICD-10-PCS | Performed by: SURGERY

## 2017-11-02 PROCEDURE — 30233N1 TRANSFUSION OF NONAUTOLOGOUS RED BLOOD CELLS INTO PERIPHERAL VEIN, PERCUTANEOUS APPROACH: ICD-10-PCS | Performed by: SURGERY

## 2017-11-02 PROCEDURE — 84100 ASSAY OF PHOSPHORUS: CPT | Performed by: SURGERY

## 2017-11-02 PROCEDURE — 82330 ASSAY OF CALCIUM: CPT | Performed by: PHYSICIAN ASSISTANT

## 2017-11-02 PROCEDURE — 84132 ASSAY OF SERUM POTASSIUM: CPT

## 2017-11-02 RX ORDER — MIDAZOLAM HYDROCHLORIDE 1 MG/ML
INJECTION INTRAMUSCULAR; INTRAVENOUS AS NEEDED
Status: DISCONTINUED | OUTPATIENT
Start: 2017-11-02 | End: 2017-11-02 | Stop reason: SURG

## 2017-11-02 RX ORDER — FENTANYL CITRATE 50 UG/ML
INJECTION, SOLUTION INTRAMUSCULAR; INTRAVENOUS AS NEEDED
Status: DISCONTINUED | OUTPATIENT
Start: 2017-11-02 | End: 2017-11-02 | Stop reason: SURG

## 2017-11-02 RX ORDER — HEPARIN SODIUM 5000 [USP'U]/ML
5000 INJECTION, SOLUTION INTRAVENOUS; SUBCUTANEOUS EVERY 8 HOURS SCHEDULED
Status: CANCELLED | OUTPATIENT
Start: 2017-11-02

## 2017-11-02 RX ORDER — CEFAZOLIN SODIUM 1 G/3ML
INJECTION, POWDER, FOR SOLUTION INTRAMUSCULAR; INTRAVENOUS AS NEEDED
Status: DISCONTINUED | OUTPATIENT
Start: 2017-11-02 | End: 2017-11-02 | Stop reason: SURG

## 2017-11-02 RX ORDER — ACETAMINOPHEN 325 MG/1
975 TABLET ORAL EVERY 8 HOURS PRN
Status: DISCONTINUED | OUTPATIENT
Start: 2017-11-02 | End: 2017-11-02

## 2017-11-02 RX ORDER — FENTANYL CITRATE 50 UG/ML
100 INJECTION, SOLUTION INTRAMUSCULAR; INTRAVENOUS ONCE
Status: COMPLETED | OUTPATIENT
Start: 2017-11-02 | End: 2017-11-02

## 2017-11-02 RX ORDER — LABETALOL HYDROCHLORIDE 5 MG/ML
10 INJECTION, SOLUTION INTRAVENOUS EVERY 6 HOURS PRN
Status: DISCONTINUED | OUTPATIENT
Start: 2017-11-02 | End: 2017-11-02

## 2017-11-02 RX ORDER — HYDROMORPHONE HYDROCHLORIDE 2 MG/ML
INJECTION, SOLUTION INTRAMUSCULAR; INTRAVENOUS; SUBCUTANEOUS AS NEEDED
Status: DISCONTINUED | OUTPATIENT
Start: 2017-11-02 | End: 2017-11-02 | Stop reason: SURG

## 2017-11-02 RX ORDER — SODIUM CHLORIDE 9 MG/ML
INJECTION, SOLUTION INTRAVENOUS CONTINUOUS PRN
Status: DISCONTINUED | OUTPATIENT
Start: 2017-11-02 | End: 2017-11-02 | Stop reason: SURG

## 2017-11-02 RX ORDER — MAGNESIUM HYDROXIDE 1200 MG/15ML
LIQUID ORAL AS NEEDED
Status: DISCONTINUED | OUTPATIENT
Start: 2017-11-02 | End: 2017-11-02 | Stop reason: HOSPADM

## 2017-11-02 RX ORDER — PROPOFOL 10 MG/ML
5-50 INJECTION, EMULSION INTRAVENOUS
Status: DISCONTINUED | OUTPATIENT
Start: 2017-11-02 | End: 2017-11-02

## 2017-11-02 RX ORDER — NICOTINE 21 MG/24HR
1 PATCH, TRANSDERMAL 24 HOURS TRANSDERMAL DAILY
Status: DISCONTINUED | OUTPATIENT
Start: 2017-11-02 | End: 2017-11-03

## 2017-11-02 RX ORDER — LABETALOL HYDROCHLORIDE 5 MG/ML
10 INJECTION, SOLUTION INTRAVENOUS EVERY 6 HOURS PRN
Status: DISCONTINUED | OUTPATIENT
Start: 2017-11-02 | End: 2017-11-03

## 2017-11-02 RX ORDER — HYDRALAZINE HYDROCHLORIDE 20 MG/ML
10 INJECTION INTRAMUSCULAR; INTRAVENOUS EVERY 6 HOURS PRN
Status: DISCONTINUED | OUTPATIENT
Start: 2017-11-02 | End: 2017-11-30

## 2017-11-02 RX ORDER — FENTANYL CITRATE 50 UG/ML
INJECTION, SOLUTION INTRAMUSCULAR; INTRAVENOUS
Status: COMPLETED
Start: 2017-11-02 | End: 2017-11-02

## 2017-11-02 RX ORDER — ACETAMINOPHEN 325 MG/1
975 TABLET ORAL EVERY 8 HOURS
Status: DISCONTINUED | OUTPATIENT
Start: 2017-11-03 | End: 2017-12-23 | Stop reason: HOSPADM

## 2017-11-02 RX ORDER — SODIUM CHLORIDE, SODIUM LACTATE, POTASSIUM CHLORIDE, CALCIUM CHLORIDE 600; 310; 30; 20 MG/100ML; MG/100ML; MG/100ML; MG/100ML
125 INJECTION, SOLUTION INTRAVENOUS CONTINUOUS
Status: DISCONTINUED | OUTPATIENT
Start: 2017-11-02 | End: 2017-11-03

## 2017-11-02 RX ORDER — ROCURONIUM BROMIDE 10 MG/ML
INJECTION, SOLUTION INTRAVENOUS AS NEEDED
Status: DISCONTINUED | OUTPATIENT
Start: 2017-11-02 | End: 2017-11-02 | Stop reason: SURG

## 2017-11-02 RX ORDER — CHLORHEXIDINE GLUCONATE 0.12 MG/ML
15 RINSE ORAL EVERY 12 HOURS SCHEDULED
Status: DISCONTINUED | OUTPATIENT
Start: 2017-11-02 | End: 2017-11-03

## 2017-11-02 RX ORDER — SODIUM CHLORIDE, SODIUM GLUCONATE, SODIUM ACETATE, POTASSIUM CHLORIDE, MAGNESIUM CHLORIDE, SODIUM PHOSPHATE, DIBASIC, AND POTASSIUM PHOSPHATE .53; .5; .37; .037; .03; .012; .00082 G/100ML; G/100ML; G/100ML; G/100ML; G/100ML; G/100ML; G/100ML
1000 INJECTION, SOLUTION INTRAVENOUS ONCE
Status: COMPLETED | OUTPATIENT
Start: 2017-11-02 | End: 2017-11-02

## 2017-11-02 RX ORDER — PANTOPRAZOLE SODIUM 40 MG/1
40 INJECTION, POWDER, FOR SOLUTION INTRAVENOUS
Status: DISCONTINUED | OUTPATIENT
Start: 2017-11-02 | End: 2017-11-02

## 2017-11-02 RX ORDER — SODIUM CHLORIDE, SODIUM LACTATE, POTASSIUM CHLORIDE, CALCIUM CHLORIDE 600; 310; 30; 20 MG/100ML; MG/100ML; MG/100ML; MG/100ML
125 INJECTION, SOLUTION INTRAVENOUS CONTINUOUS
Status: DISCONTINUED | OUTPATIENT
Start: 2017-11-02 | End: 2017-11-02

## 2017-11-02 RX ORDER — MAGNESIUM SULFATE HEPTAHYDRATE 40 MG/ML
2 INJECTION, SOLUTION INTRAVENOUS ONCE
Status: COMPLETED | OUTPATIENT
Start: 2017-11-02 | End: 2017-11-02

## 2017-11-02 RX ORDER — HYDRALAZINE HYDROCHLORIDE 20 MG/ML
10 INJECTION INTRAMUSCULAR; INTRAVENOUS EVERY 6 HOURS PRN
Status: DISCONTINUED | OUTPATIENT
Start: 2017-11-02 | End: 2017-11-02

## 2017-11-02 RX ORDER — CHLORHEXIDINE GLUCONATE 0.12 MG/ML
15 RINSE ORAL EVERY 12 HOURS SCHEDULED
Status: DISCONTINUED | OUTPATIENT
Start: 2017-11-02 | End: 2017-11-02

## 2017-11-02 RX ORDER — SODIUM CHLORIDE, SODIUM GLUCONATE, SODIUM ACETATE, POTASSIUM CHLORIDE, MAGNESIUM CHLORIDE, SODIUM PHOSPHATE, DIBASIC, AND POTASSIUM PHOSPHATE .53; .5; .37; .037; .03; .012; .00082 G/100ML; G/100ML; G/100ML; G/100ML; G/100ML; G/100ML; G/100ML
175 INJECTION, SOLUTION INTRAVENOUS CONTINUOUS
Status: DISCONTINUED | OUTPATIENT
Start: 2017-11-02 | End: 2017-11-02

## 2017-11-02 RX ORDER — PROPOFOL 10 MG/ML
INJECTION, EMULSION INTRAVENOUS AS NEEDED
Status: DISCONTINUED | OUTPATIENT
Start: 2017-11-02 | End: 2017-11-02 | Stop reason: SURG

## 2017-11-02 RX ORDER — POTASSIUM CHLORIDE 14.9 MG/ML
20 INJECTION INTRAVENOUS
Status: COMPLETED | OUTPATIENT
Start: 2017-11-02 | End: 2017-11-03

## 2017-11-02 RX ORDER — LIDOCAINE HYDROCHLORIDE 10 MG/ML
INJECTION, SOLUTION INFILTRATION; PERINEURAL AS NEEDED
Status: DISCONTINUED | OUTPATIENT
Start: 2017-11-02 | End: 2017-11-02

## 2017-11-02 RX ORDER — ACETAMINOPHEN 650 MG/1
650 SUPPOSITORY RECTAL EVERY 4 HOURS PRN
Status: DISCONTINUED | OUTPATIENT
Start: 2017-11-02 | End: 2017-11-02

## 2017-11-02 RX ADMIN — HYDROMORPHONE HYDROCHLORIDE 1 MG: 1 INJECTION, SOLUTION INTRAMUSCULAR; INTRAVENOUS; SUBCUTANEOUS at 22:01

## 2017-11-02 RX ADMIN — FENTANYL CITRATE 100 MCG: 50 INJECTION INTRAMUSCULAR; INTRAVENOUS at 06:34

## 2017-11-02 RX ADMIN — PANTOPRAZOLE SODIUM 40 MG: 40 INJECTION, POWDER, FOR SOLUTION INTRAVENOUS at 09:12

## 2017-11-02 RX ADMIN — CEFAZOLIN 2000 MG: 1 INJECTION, POWDER, FOR SOLUTION INTRAVENOUS at 13:24

## 2017-11-02 RX ADMIN — FENTANYL CITRATE 100 MCG: 50 INJECTION, SOLUTION INTRAMUSCULAR; INTRAVENOUS at 07:45

## 2017-11-02 RX ADMIN — FENTANYL CITRATE 100 MCG: 50 INJECTION INTRAMUSCULAR; INTRAVENOUS at 07:45

## 2017-11-02 RX ADMIN — FENTANYL CITRATE 100 MCG: 50 INJECTION, SOLUTION INTRAMUSCULAR; INTRAVENOUS at 11:06

## 2017-11-02 RX ADMIN — MAGNESIUM SULFATE HEPTAHYDRATE 2 G: 40 INJECTION, SOLUTION INTRAVENOUS at 20:35

## 2017-11-02 RX ADMIN — FENTANYL CITRATE 100 MCG/HR: 50 INJECTION, SOLUTION INTRAMUSCULAR; INTRAVENOUS at 16:59

## 2017-11-02 RX ADMIN — SODIUM CHLORIDE: 0.9 INJECTION, SOLUTION INTRAVENOUS at 13:14

## 2017-11-02 RX ADMIN — PROPOFOL 50 MCG/KG/MIN: 10 INJECTION, EMULSION INTRAVENOUS at 07:19

## 2017-11-02 RX ADMIN — HYDROMORPHONE HYDROCHLORIDE 2 MG: 2 INJECTION, SOLUTION INTRAMUSCULAR; INTRAVENOUS; SUBCUTANEOUS at 14:06

## 2017-11-02 RX ADMIN — CHLORHEXIDINE GLUCONATE 15 ML: 1.2 RINSE ORAL at 09:11

## 2017-11-02 RX ADMIN — FENTANYL CITRATE 100 MCG: 50 INJECTION, SOLUTION INTRAMUSCULAR; INTRAVENOUS at 13:17

## 2017-11-02 RX ADMIN — SODIUM CHLORIDE, SODIUM LACTATE, POTASSIUM CHLORIDE, AND CALCIUM CHLORIDE 1000 ML: .6; .31; .03; .02 INJECTION, SOLUTION INTRAVENOUS at 05:57

## 2017-11-02 RX ADMIN — ACETAMINOPHEN 975 MG: 325 TABLET, FILM COATED ORAL at 18:24

## 2017-11-02 RX ADMIN — SODIUM CHLORIDE, SODIUM GLUCONATE, SODIUM ACETATE, POTASSIUM CHLORIDE, MAGNESIUM CHLORIDE, SODIUM PHOSPHATE, DIBASIC, AND POTASSIUM PHOSPHATE 175 ML/HR: .53; .5; .37; .037; .03; .012; .00082 INJECTION, SOLUTION INTRAVENOUS at 09:33

## 2017-11-02 RX ADMIN — PROPOFOL 50 MCG/KG/MIN: 10 INJECTION, EMULSION INTRAVENOUS at 09:53

## 2017-11-02 RX ADMIN — LABETALOL 20 MG/4 ML (5 MG/ML) INTRAVENOUS SYRINGE 10 MG: at 22:23

## 2017-11-02 RX ADMIN — FENTANYL CITRATE 100 MCG: 50 INJECTION, SOLUTION INTRAMUSCULAR; INTRAVENOUS at 06:34

## 2017-11-02 RX ADMIN — SODIUM CHLORIDE 2000 ML: 0.9 INJECTION, SOLUTION INTRAVENOUS at 00:55

## 2017-11-02 RX ADMIN — PROPOFOL 50 MCG/KG/MIN: 10 INJECTION, EMULSION INTRAVENOUS at 11:06

## 2017-11-02 RX ADMIN — SODIUM CHLORIDE, SODIUM LACTATE, POTASSIUM CHLORIDE, AND CALCIUM CHLORIDE 125 ML/HR: .6; .31; .03; .02 INJECTION, SOLUTION INTRAVENOUS at 04:09

## 2017-11-02 RX ADMIN — POTASSIUM CHLORIDE 20 MEQ: 200 INJECTION, SOLUTION INTRAVENOUS at 20:42

## 2017-11-02 RX ADMIN — FENTANYL CITRATE 100 MCG: 50 INJECTION, SOLUTION INTRAMUSCULAR; INTRAVENOUS at 04:26

## 2017-11-02 RX ADMIN — HYDRALAZINE HYDROCHLORIDE 10 MG: 20 INJECTION INTRAMUSCULAR; INTRAVENOUS at 18:24

## 2017-11-02 RX ADMIN — MIDAZOLAM HYDROCHLORIDE 2 MG: 1 INJECTION, SOLUTION INTRAMUSCULAR; INTRAVENOUS at 13:17

## 2017-11-02 RX ADMIN — ROCURONIUM BROMIDE 50 MG: 10 INJECTION, SOLUTION INTRAVENOUS at 13:24

## 2017-11-02 RX ADMIN — SODIUM CHLORIDE, SODIUM LACTATE, POTASSIUM CHLORIDE, AND CALCIUM CHLORIDE 125 ML/HR: .6; .31; .03; .02 INJECTION, SOLUTION INTRAVENOUS at 17:00

## 2017-11-02 RX ADMIN — SODIUM CHLORIDE, SODIUM GLUCONATE, SODIUM ACETATE, POTASSIUM CHLORIDE, MAGNESIUM CHLORIDE, SODIUM PHOSPHATE, DIBASIC, AND POTASSIUM PHOSPHATE 1000 ML: .53; .5; .37; .037; .03; .012; .00082 INJECTION, SOLUTION INTRAVENOUS at 09:33

## 2017-11-02 RX ADMIN — FENTANYL CITRATE 75 MCG/HR: 50 INJECTION, SOLUTION INTRAMUSCULAR; INTRAVENOUS at 04:27

## 2017-11-02 RX ADMIN — FENTANYL CITRATE 100 MCG: 50 INJECTION, SOLUTION INTRAMUSCULAR; INTRAVENOUS at 13:39

## 2017-11-02 RX ADMIN — FENTANYL CITRATE 100 MCG: 50 INJECTION INTRAMUSCULAR; INTRAVENOUS at 04:26

## 2017-11-02 RX ADMIN — PROPOFOL 40 MG: 10 INJECTION, EMULSION INTRAVENOUS at 13:18

## 2017-11-02 RX ADMIN — ROCURONIUM BROMIDE 50 MG: 10 INJECTION, SOLUTION INTRAVENOUS at 14:39

## 2017-11-02 RX ADMIN — CHLORHEXIDINE GLUCONATE 15 ML: 1.2 RINSE ORAL at 22:01

## 2017-11-02 RX ADMIN — POTASSIUM CHLORIDE 20 MEQ: 200 INJECTION, SOLUTION INTRAVENOUS at 22:17

## 2017-11-02 RX ADMIN — Medication 500 MG: at 13:24

## 2017-11-02 RX ADMIN — NICOTINE 1 PATCH: 14 PATCH, EXTENDED RELEASE TRANSDERMAL at 09:11

## 2017-11-02 NOTE — PROGRESS NOTES
Patient [facility] emergency room from [facility] with left flank pain and gross hematuria  Prior urologic history of bladder atony and severe bilateral hydroureteronephrosis, previously seen by our team in 2015  Denies acute trauma  ER CT noncontrast reviewed, official report not in yet  Preliminary review by myself demonstrates bilateral hydroureteronephrosis down to UVJ level, worse than prior 2015 films  Large urinary retention  Left kidney is heterogeneous/mottled and enlarged as compared to contralateral kidney  This is also a change from prior film  Large amount of hyperdense fluid in the collecting system, likely blood and clot given clinical history of hematuria  Unclear if active extravasation or fracture of kidney, given lack of contrast  No renal or adrenal masses on prior film to suggest spontaneous hemorrhage  Patient appears clinically stable with stable hemoglobin  Lab Results   Component Value Date    WBC 14 78 (H) 11/01/2017    HGB 10 3 (L) 11/01/2017    HCT 31 0 (L) 11/01/2017    MCV 92 11/01/2017     11/01/2017     Lab Results   Component Value Date    GLUCOSE 191 (H) 11/01/2017    CALCIUM 8 5 11/01/2017     (H) 11/01/2017    K 3 4 (L) 11/01/2017    CO2 26 11/01/2017     (H) 11/01/2017    BUN 27 (H) 11/01/2017    CREATININE 3 02 (H) 11/01/2017     Concerned about occult or unrecognized trauma and potential renal laceration  Given large amount of hematuria, would be concern for Grade IV laceration  Recommended to ER team transfer to [facility] for formal trauma evaluation  If no other associated injuries, would recommend ICU monitoring with bed rest, serial [id number] H/H, and resuscitative fluids to manage renal injury  Plan would be for IR angiography and possible embolization if patient were to clinically decompensate or require continued transfusions for declining hemoglobin  Would avoid intervention if patient remains stable      While in ER currently, large bore jiménez should be placed for bladder drainage and clearance of hematuria with CBI if necessary  Discussed case with Dr Shu Khan, urologist on-call for [facility]  Also discussed with on-call trauma attending at [facility]

## 2017-11-02 NOTE — OCCUPATIONAL THERAPY NOTE
Occupational Therapy Cancellation    Orders received  Chart reviewed  Pt currently intubated and pending OR today  Will continue to follow pt on caseload in order to initiate formal OT evaluation pending further medical stability       Belkis Beaver MS, OTR/L

## 2017-11-02 NOTE — RESPIRATORY THERAPY NOTE
RT Ventilator Management Note  Seth Jones 43 y o  male MRN: [id number]  Unit/Bed#: ICU 07 Encounter: [id number]             Physical Exam:   Assessment Type: Assess only  General Appearance: Sedated  Respiratory Pattern: Assisted  Chest Assessment: Chest expansion symmetrical  Bilateral Breath Sounds: Clear, Diminished  R Breath Sounds: Clear  L Breath Sounds: Clear  Cough: None  Subjective Data: I noted that the intubation and ETT was not documented by the OR, Pt has size ETT located at 23 at lip on the right      Resp Comments: pt cont to cindy current AC settings no distress noted pt has ascant amount of secretions suctioned no changes at this time will cont to monitor

## 2017-11-02 NOTE — RESPIRATORY THERAPY NOTE
RT Ventilator Management Note  Gisella You 43 y o  male MRN: [id number]  Unit/Bed#: ICU 07 Encounter: [id number]             Physical Exam:   Assessment Type: Assess only  General Appearance: Sedated  Respiratory Pattern: Assisted  Chest Assessment: Chest expansion symmetrical  Bilateral Breath Sounds: Clear, Diminished  R Breath Sounds: Clear  L Breath Sounds: Clear  Subjective Data: I noted that the intubation and ETT was not documented by the OR, Pt has size ETT located at 23 at lip on the right      Resp Comments: rec'd pt from OR and placed on [facility] settings pt cindy well no distress noted no changes at this time wll cont to monitor

## 2017-11-02 NOTE — PHYSICAL THERAPY NOTE
Physical Therapy Cancellation Note    PT CONSULT RECEIVED  PATIENT IS CURRENTLY OFF FLOOR IN OR  PT WILL CONTINUE TO FOLLOW AND PERFORM INITIAL EVALUATION AS APPROPRIATE      Neeraj Dean PT

## 2017-11-02 NOTE — PROGRESS NOTES
Patient has no primary respiratory concern patient was intubated due to OR course  Patient was placed on spontaneous breathing trial with pressure support of 5  Patient tolerated this well for approximately 1 hour  Patient able to take on tidal volumes of greater than 1 L  Patient able to give a thumbs up with both hands and left head up off of bed  Patient able to cough  Patient in the setting of myself and respiratory therapy being in the room was extubated  He was transitioned to nasal cannula and remained 100%  Normal work of breathing    Patient will remain on nasal cannula for now and will transition to room air as able

## 2017-11-02 NOTE — EMTALA/ACUTE CARE TRANSFER
[facility]   [address] [facility] [address]  Dept: [address] [place] [address] 1975                              MRN [id number]    I have been informed of my rights regarding examination, treatment, and transfer   by Dr Caro Crawford MD    Benefits: Other benefits (Include comment)_______________________ (Trauma)    Risks: Potential deterioration of medical condition      Consent for Transfer:  I acknowledge that my medical condition has been evaluated and explained to me by the emergency department physician or other qualified medical person and/or my attending physician, who has recommended that I be transferred to the service of  Accepting Physician: Dr Wolf Tsai at [address] [place] [address] Name, [address] : [facility]  The above potential benefits of such transfer, the potential risks associated with such transfer, and the probable risks of not being transferred have been explained to me, and I fully understand them  The doctor has explained that, in my case, the benefits of transfer outweigh the risks  I agree to be transferred  I authorize the performance of emergency medical procedures and treatments upon me in both transit and upon arrival at the receiving facility  Additionally, I authorize the release of any and all medical records to the receiving facility and request they be transported with me, if possible  I understand that the safest mode of transportation during a medical emergency is an ambulance and that the Hospital advocates the use of this mode of transport  Risks of traveling to the receiving facility by car, including absence of medical control, life sustaining equipment, such as oxygen, and medical personnel has been explained to me and I fully understand them      (MAK CORRECT BOX BELOW)  [ X ]  I consent to the stated transfer and to be transported by ambulance/helicopter  [  ]  I consent to the stated transfer, but refuse transportation by ambulance and accept full responsibility for my transportation by car  I understand the risks of non-ambulance transfers and I exonerate the Hospital and its staff from any deterioration in my condition that results from this refusal     X___________________________________________    DATE  17  TIME________  Signature of patient or legally responsible individual signing on patient behalf           RELATIONSHIP TO PATIENT_________________________          Provider Certification    NAME Stew Coleman                                        [facility] 1975                              MRN [id number]    A medical screening exam was performed on the above named patient  Based on the examination:    Condition Necessitating Transfer The primary encounter diagnosis was Hematuria  A diagnosis of Hematoma of left kidney, initial encounter was also pertinent to this visit      Patient Condition: The patient has been stabilized such that within reasonable medical probability, no material deterioration of the patient condition or the condition of the unborn child(tosin) is likely to result from the transfer    Reason for Transfer: Level of Care needed not available at this facility, Other (Include comment)____________________ (Trauma)    Transfer Requirements: [address] [place] [address]   · Space available and qualified personnel available for treatment as acknowledged by    · Agreed to accept transfer and to provide appropriate medical treatment as acknowledged by       Dr Laina Shaffer  · Appropriate medical records of the examination and treatment of the patient are provided at the time of transfer   [address] _______  · Transfer will be performed by qualified personnel from [facility]  and appropriate transfer equipment as required, including the use of necessary and appropriate life support measures  Provider Certification: I have examined the patient and explained the following risks and benefits of being transferred/refusing transfer to the patient/family:  General risk, such as traffic hazards, adverse weather conditions, rough terrain or turbulence, possible failure of equipment (including vehicle or aircraft), or consequences of actions of persons outside the control of the transport personnel      Based on these reasonable risks and benefits to the patient and/or the unborn child(tosin), and based upon the information available at the time of the patients examination, I certify that the medical benefits reasonably to be expected from the provision of appropriate medical treatments at another medical facility outweigh the increasing risks, if any, to the individuals medical condition, and in the case of labor to the unborn child, from effecting the transfer      X____________________________________________ DATE 11/01/17        TIME_______      ORIGINAL - SEND TO MEDICAL RECORDS   COPY - SEND WITH PATIENT DURING TRANSFER

## 2017-11-02 NOTE — CASE MANAGEMENT
Initial Clinical Review    Admission: Date/Time/Statement: 11/2/17 @ 0343     Orders Placed This Encounter   Procedures    Inpatient Admission     Standing Status:   Standing     Number of Occurrences:   1     Order Specific Question:   Admitting Physician     Answer:   Aundrea Link [[id number]]     Order Specific Question:   Level of Care     Answer:   Critical Care [15]     Order Specific Question:   Estimated length of stay     Answer:   More than 2 Midnights     Order Specific Question:   Certification     Answer:   I certify that inpatient services are medically necessary for this patient for a duration of greater than two midnights  See H&P and MD Progress Notes for additional information about the patient's course of treatment  ED: Date/Time/Mode of Arrival:   ED Arrival Information     Expected Arrival Acuity Means of Arrival Escorted By Service Admission Type    - 11/2/2017 00:43 Immediate Ambulance [facility] Surgery[facility] Tra[facility]    Arrival Complaint    Left flank pain, hematuria, chest pain          Chief Complaint:   Chief Complaint   Patient presents with    Flank Pain       History of Illness:  43 y o  male who presents as a level A trauma alert, transfer from [address] for evaluation of left-sided grade IV kidney injury with hypotension, severe left-sided abd/flank pain/CVA tenderness and pallor   Pt is an inmate, unknown mechanism of injury         ED Vital Signs:   ED Triage Vitals   Temperature Pulse Respirations Blood Pressure SpO2   11/02/17 0048 11/02/17 0048 11/02/17 0048 11/02/17 0048 11/02/17 0048   (!) 100 8 °F (38 2 °C) 102 16 109/60 92 %      Temp Source Heart Rate Source Patient Position - Orthostatic VS BP Location FiO2 (%)   11/02/17 0048 11/02/17 0048 11/02/17 0048 -- 11/02/17 0400   Tympanic Monitor Lying  40      Pain Score       11/02/17 0426       Worst Possible Pain        Wt Readings from Last 1 Encounters:   11/02/17 89 2 kg (196 lb 10 4 oz)       Vital Signs (abnormal): t 100 8---bp  109//88    Abnormal Labs/Diagnostic Test Results: inr 1 37--wbc 19 45--hgb 6 9/20 7--neuts 88  Na 147--cl 114--bun 26--cr 2 91--bs 203--ca 6 9  Ph 7 274--hc03 19 5--be -7  ED Treatment:   Medication Administration from 11/02/2017 0032 to 11/02/2017 0068       Date/Time Order Dose Route Action Action by Comments     11/02/2017 0055 sodium chloride 0 9 % bolus 2,000 mL Intravenous Given Tatum Watsno RN           Past Medical/Surgical History: Active Ambulatory Problems     Diagnosis Date Noted    No Active Ambulatory Problems     Resolved Ambulatory Problems     Diagnosis Date Noted    No Resolved Ambulatory Problems     Past Medical History:   Diagnosis Date    Enlarged prostate     UTI (urinary tract infection)        Admitting Diagnosis: Chest pain [R07 9]  Laceration of left kidney, initial encounter [S37 032A]    Age/Sex: 43 y o  male    Assessment/Plan: is a 42M who is currently incarcerated and with hx of chronic bilateral hydronephrosis who presented to OSH with left flank pain  He denied any trauma but was found to have severe bilateral hydronephrosis with left GIV renal injury with significant hyperdense fluid in the collection system  He was therefore transferred for trauma evaluation  He was hemodynamically stable at OSH but became hypotensive en route (18Q systolic)  His initial Hb was 10 3 at OSH but had declined to 6 5 in the trauma bay with a base deficit of -7  He was pale and diaphoretic with +FAST  Given these acute findings , he was taken emergently to the OR for exploratory laparotomy       Transfused 2U PRBC en route to Or and subsequently underwent ex-lap, L nephrectomy, temporary abdominal closure  See operative report  Attempts to place 3 way jiménez (18Fr) unsuccessful and a 16Fr coude was placed with drainage of mir blood  This began clearing up to clear yellow urine by the end of the case without clots noted   He was transfused a total of 7U PRBC (+2 pre-op), 6U FFP, 2U Plt and 260 mL cellsaver  EBL 2000 mL  He was subsequently admitted to the ICU for post-operative care    grade IV fracture of kidney  2  Hematuria  3  Hypotension  4  History of bladder atony and b/l hydroureteronephrosis  5  Incarcerated     Trauma Plan:  1  To OR for emergent ex-lap   Laceration of left kidney, initial encounter [Y85 061I]   Plan:  Taken to OR for emergent exploratory laparotomy, left nephrectomy, open abdomen, Abthera VAC placement  To OR today for second look        History of Present Illness        HPI:  Chaparrita Anaya is a 43 y o  male who presents as a level A trauma alert, transferred from [facility]  He is an inmate, and complained of left flank pain and hematuria, causing him to be taken to [facility] for evaluation  There he was found to have severe bilateral hydronephrosis and left sided hematoma  He was subsequently transferred to [facility] as a level A trauma  He was hypotensive en route, and hgb was declining  He was taken to the operating room for ex-lap, left nephrectomy, and left with an open abdomen and abthera VAC   He was transferred to the ICU post-operatively           Procedure(s) (LRB):  LAPAROTOMY EXPLORATORY (N/A)     For or 11/2--OR today for second look laparotomy, removal of packs, possible bowel resection, possible closure vs possible ABThera vac      Admission Orders:  Scheduled Meds:   chlorhexidine 15 mL Swish & Spit Q12H [address]   famotidine 20 mg Intravenous Daily   nicotine 1 patch Transdermal Daily     Continuous Infusions:   fentaNYL 100 mcg/hr Last Rate: 100 mcg/hr (11/02/17 0930)   multi-electrolyte 175 mL/hr Last Rate: 175 mL/hr (11/02/17 0933)   propofol 5-50 mcg/kg/min Last Rate: 50 mcg/kg/min (11/02/17 0953)   fentanyl x2      PRN Meds: sodium chloride   sunction  telm  venodynes  Ng to suction   abthera wound vac  Vented  prbc given

## 2017-11-02 NOTE — ED NOTES
Second attempt made to place jiménez catheter with 22Fr 3 way, unable to fully advance catheter, no urine return noted, pt tolerated procedure fairly well  Dr Saturnino Selby made aware        Debra Feliciano, RN  11/01/17 1241

## 2017-11-02 NOTE — PROGRESS NOTES
Progress Note - General Surgery  Myles Mancera 43 y o  male MRN: [id number]  Unit/Bed#: ICU 7-01 Encounter: [id number]    Assessment:  43y o -year-old male with chronic bilateral hydronephrosis w/ presented to OSH with flank pain  He is incarcerated  Patient denied trauma but was found to have severe bilateral hydronephrosis with left-sided intrarenal hematoma vs neoplasm  Sent to [facility] as trauma patient and became hypotensive en route to 42W systolic  Hgb was 10 3 but declined to 6 5 in trauma bay with a base deficit -7  Positive FAST  Taken to OR for ex lap and left nephrectomy with Emma Trevino after being found to have spontaneous rupture of left kidney and massive retroperitoneal hemorrhage  Plan:  - continue ICU  - OR today for second look laparotomy, removal of packs, possible bowel resection, possible closure vs possible ABThera ronit Cid MD PGY-4  6:20 AM  11/02/17      Subjective:  No acute events overnight  Objective:  Patient Vitals for the past 24 hrs:   BP Temp Temp src Pulse Resp SpO2 Height Weight   11/02/17 0530 - - - 74 14 100 % - -   11/02/17 0513 (!) 189/88 98 6 °F (37 °C) Oral 85 (!) 33 98 % - -   11/02/17 0500 - - - 66 17 100 % - -   11/02/17 0445 - - - 70 18 100 % - -   11/02/17 0430 - - - 70 20 100 % - -   11/02/17 0415 - - - 102 (!) 23 100 % - -   11/02/17 0400 - 98 2 °F (36 8 °C) Rectal 92 19 100 % 5' 6" (1 676 m) 89 2 kg (196 lb 10 4 oz)   11/02/17 0052 114/65 - - 98 16 100 % - -   11/02/17 0048 109/60 (!) 100 8 °F (38 2 °C) Tympanic 102 16 92 % - 85 kg (187 lb 6 3 oz)          Diet Orders            Start     Ordered    11/02/17 0403  Diet NPO  Diet effective now     Question Answer Comment   Diet Type NPO    RD to adjust diet per protocol?  No        11/02/17 0402        Intake/Output Summary (Last 24 hours) at 11/02/17 0620  Last data filed at 11/02/17 0550   Gross per 24 hour   Intake              700 ml   Output             2225 ml   Net            -1525 ml   UOP 2 3     Physical Exam:  General: intubated and sedated  Cardiovascular: RRR  Respiratory: breath sounds b/l a/c 14 500 40%  +5  Abdomen: soft, NT, ABThera Vac in place  : Thakur w/ bloody drainage  Extremities: no edema    Medications:    chlorhexidine 15 mL Swish & Spit Q12H [address]   fentanyl citrate (PF)      fentanyl citrate (PF) 100 mcg Intravenous Once   lactated ringers 1,000 mL Intravenous Once   nicotine 1 patch Transdermal Daily   pantoprazole 40 mg Intravenous Q24H KIKI     fentaNYL 75 mcg/hr Last Rate: 75 mcg/hr (11/02/17 0427)   lactated ringers 125 mL/hr Last Rate: 125 mL/hr (11/02/17 0409)   propofol 5-50 mcg/kg/min      sodium chloride  Code/Trauma/Sedation Med     Laboratory results:   CBC:   Lab Results   Component Value Date    WBC 11 80 (H) 11/02/2017    HGB 11 3 (L) 11/02/2017    HCT 33 4 (L) 11/02/2017    MCV 87 11/02/2017     (L) 11/02/2017    MCH 29 6 11/02/2017    MCHC 33 8 11/02/2017    RDW 13 9 11/02/2017    MPV 9 8 11/02/2017    NRBC 0 11/02/2017   , CMP:   Lab Results   Component Value Date     (H) 11/02/2017    K 3 9 11/02/2017     (H) 11/02/2017    CO2 20 (L) 11/02/2017    ANIONGAP 12 11/02/2017    BUN 21 11/02/2017    CREATININE 2 47 (H) 11/02/2017    GLUCOSE 168 (H) 11/02/2017    GLUCOSE 197 (H) 11/02/2017    GLUCOSE 198 (H) 11/02/2017    CALCIUM 8 7 11/02/2017    AST 25 11/02/2017    ALT 23 11/02/2017    ALKPHOS 54 11/02/2017    PROT 5 7 (L) 11/02/2017    ALBUMIN 2 9 (L) 11/02/2017    BILITOT 1 30 (H) 11/02/2017    EGFR 31 11/02/2017    EGFR 27 11/02/2017   , Coagulation:   Lab Results   Component Value Date    INR 1 37 (H) 11/02/2017   , Urinalysis:   Lab Results   Component Value Date    COLORU Red 11/01/2017    CLARITYU Turbid 11/01/2017    SPECGRAV 1 015 11/01/2017    PHUR 8 0 11/01/2017    LEUKOCYTESUR Small (A) 11/01/2017    NITRITE Positive (A) 11/01/2017    PROTEINUA >=300 (A) 11/01/2017    GLUCOSEU 250 (1/4%) (A) 11/01/2017    KETONESU Trace (A) 11/01/2017 BILIRUBINUR Negative 11/01/2017    BLOODU Large (A) 11/01/2017   , Amylase: No results found for: AMYLASE, Lipase: No results found for: LIPASE    VTE Pharmacologic Prophylaxis: Reason for no pharmacologic prophylaxis intra-abdominal bleed  VTE Mechanical Prophylaxis: sequential compression device

## 2017-11-02 NOTE — H&P
H&P Exam - [address]AV[address]A 43 y o  male MRN: [id number]  Unit/Bed#: [facility] Encounter: [id number]    Assessment/Plan   Trauma Alert: Level A trauma transfer from [address] [place] [address] St: Ambulance  Trauma Team: Attending Leena Cortez, Residents Guille and Fellow Ginger Elizabeth  Consultants: Urology    Trauma Active Problems:   1  Left-sided grade IV fracture of kidney  2  Hematuria  3  Hypotension  4  History of bladder atony and b/l hydroureteronephrosis  5  Incarcerated    Trauma Plan:  1  To OR for emergent ex-lap    Chief Complaint: left-sided flank pain    History of Present Illness   HPI:  Aleisha Rice is a 43 y o  male who presents as a level A trauma alert, transfer from [facility] for evaluation of left-sided grade IV kidney injury with hypotension, severe left-sided abd/flank pain/CVA tenderness and pallor  Pt is an inmate, unknown mechanism of injury  Mechanism:Unknown    Review of Systems   Constitutional: Negative for chills, fatigue and fever  HENT: Negative for congestion, rhinorrhea and sore throat  Eyes: Negative for pain, redness and visual disturbance  Respiratory: Negative for cough, chest tightness, shortness of breath, wheezing and stridor  Cardiovascular: Negative for chest pain, palpitations and leg swelling  Gastrointestinal: Positive for abdominal pain  Negative for blood in stool, constipation, diarrhea, nausea and vomiting  Genitourinary: Positive for flank pain and hematuria  Negative for dysuria, frequency, penile swelling, scrotal swelling, testicular pain and urgency  Musculoskeletal: Positive for back pain  Negative for neck pain  Skin: Positive for pallor  Negative for rash  Neurological: Negative for dizziness, seizures, syncope, weakness, light-headedness and headaches  Psychiatric/Behavioral: Negative for agitation and confusion  Historical Information   History is unobtainable from the patient due to acuity of condition    Efforts to obtain history included the following sources: obtained from other records    Past Medical History:   Diagnosis Date    UTI (urinary tract infection)      No past surgical history on file  Social History   History   Alcohol Use    Yes     Comment: "socially"     History   Drug Use No     History   Smoking Status    Current Every Day Smoker   Smokeless Tobacco    Never Used       There is no immunization history on file for this patient  Last Tetanus: Unknown      Meds/Allergies   NKDA    No Known Allergies      PHYSICAL EXAM    Objective   Vitals:   First set: Temperature: (!) 100 8 °F (38 2 °C) (11/02/17 0048)  Pulse: 102 (11/02/17 0048)  Respirations: 16 (11/02/17 0048)  Blood Pressure: 109/60 (11/02/17 0048)    Primary Survey:   (A) Airway: intact  (B) Breathing: poor inspiratory effort secondary to pain, BBS CTA  (C) Circulation: Pulses:   carotid  2/4, pedal  2/4, radial  2/4 and femoral  2/4  (D) Disabliity:  GCS Total:  15, Eye Opening:   Spontaneous = 4, Motor Response: Obeys commands = 6 and Verbal Response:  Oriented = 5  (E) Expose:  Completed    Secondary Survey: (Click on Physical Exam tab above)  Physical Exam   Constitutional: He is oriented to person, place, and time  He appears well-developed and well-nourished  He appears distressed (moderate distress secondary to acuity of condition)  HENT:   Head: Normocephalic and atraumatic  Right Ear: External ear normal    Left Ear: External ear normal    Nose: Nose normal    Mouth/Throat: Oropharynx is clear and moist  No oropharyngeal exudate  Eyes: Conjunctivae and EOM are normal  Pupils are equal, round, and reactive to light  Right eye exhibits no discharge  Left eye exhibits no discharge  Neck: Normal range of motion  Neck supple  No JVD present  Cardiovascular: Regular rhythm, normal heart sounds and intact distal pulses  Exam reveals no gallop and no friction rub  No murmur heard  tachycardic   Pulmonary/Chest: Breath sounds normal  No stridor   No respiratory distress  He has no wheezes  He has no rales  He exhibits no tenderness  Poor inspiratory effort secondary to pain   Abdominal: Soft  He exhibits distension  There is tenderness  There is rebound and guarding  Genitourinary: Penis normal    Musculoskeletal: Normal range of motion  He exhibits no edema, tenderness or deformity  Neurological: He is alert and oriented to person, place, and time  No cranial nerve deficit  Skin: There is pallor  Cool and pale   Psychiatric:   anxious   Nursing note and vitals reviewed  Invasive Devices     Peripheral Intravenous Line            Peripheral IV 11/01/17 Left Antecubital 1 day    Peripheral IV 11/01/17 Right Antecubital less than 1 day                Lab Results:   BMP/CMP:   Lab Results   Component Value Date     (H) 11/02/2017    K 3 8 11/02/2017     (H) 11/02/2017    CO2 24 11/02/2017    ANIONGAP 9 11/02/2017    BUN 26 (H) 11/02/2017    CREATININE 2 91 (H) 11/02/2017    GLUCOSE 197 (H) 11/02/2017    GLUCOSE 198 (H) 11/02/2017    CALCIUM 6 9 (L) 11/02/2017    AST 14 11/01/2017    ALT 15 11/01/2017    ALKPHOS 71 11/01/2017    PROT 6 2 (L) 11/01/2017    ALBUMIN 3 5 11/01/2017    BILITOT 0 64 11/01/2017    EGFR 27 11/02/2017   , CBC:   Lab Results   Component Value Date    WBC 19 45 (H) 11/02/2017    HGB 6 1 (LL) 11/02/2017    HGB 6 5 (LL) 11/02/2017    HCT 20 7 (L) 11/02/2017    MCV 92 11/02/2017     11/02/2017    MCH 30 7 11/02/2017    MCHC 33 3 11/02/2017    RDW 12 9 11/02/2017    MPV 10 1 11/02/2017    NRBC 0 11/02/2017   , Coagulation:   Lab Results   Component Value Date    INR 1 37 (H) 11/02/2017   , Lipase: No results found for: LIPASE, AST:   Lab Results   Component Value Date    AST 14 11/01/2017   , ALT:   Lab Results   Component Value Date    ALT 15 11/01/2017   Imaging/EKG Studies:   11/1 CT chest without  1  Small bilateral pleural effusions   2  Please refer to CT abdomen pelvis for significant abdomen and pelvis findings  3  Small 1 cm osseous density at the proximal aspect of the left clavicle, correlate for any history of trauma to this region  Old appearing left coracoid fracture  11/1 CT renal stone abdomen/pelvis without  Severe left hydroureteronephrosis with hyperdense material in the left urinary collecting system  The left kidney is massively enlarged measuring approximately 17 cm in craniocaudal dimension and contains hyperdense material throughout the cortex and medullary pyramids which could suggest blood (intrarenal hematoma) although I cannot exclude a superimposed transitional cell carcinoma  There is left perinephric fluid/blood extending to the left pelvis anterior to the left psoas muscle extraperitoneal   region  There is high density material throughout the left massively dilated collecting system which could represent blood although superimposed urothelial neoplasm cannot be excluded  Constellation of findings suggest urothelial/urinary collecting system hemorrhage of indeterminate cause  Please proceed with urology consult for possible percutaneous drainage if warranted by urology  Right hydroureteronephrosis extending all the way to the bladder without a obstructing stone identified  Diffuse bladder wall thickening, consider bladder ultrasound  There is a small amount of left posterior splenic fluid/blood  Small bilateral pleural effusions      Code Status: Full Code  Advance Directive and Living Will:      Power of :    POLST:

## 2017-11-02 NOTE — INTERIM OP NOTE
LAPAROTOMY EXPLORATORY  Postoperative Note  PATIENT NAME: Aleksandra May  : 1975  MRN: [id number]  BE OR ROOM 09    Surgery Date: 2017    Preop Diagnosis:  Laceration of left kidney, initial encounter [S37 032A]    * No Diagnosis Codes entered *    Procedure(s) (LRB):  LAPAROTOMY EXPLORATORY (N/A)    Surgeon(s) and Role:     * Love Reich MD - Primary    Specimens:  * No specimens in log *    Estimated Blood Loss:   Minimal    Anesthesia Type:   General     Findings:   Spontaneous rupture of left kidney  Massive retroperitoneal hemorrhage    Complications:   None    SIGNATURE: Seun Loera MD   DATE: 2017   TIME: 3:23 AM

## 2017-11-02 NOTE — ED PROVIDER NOTES
Emergency Department Airway Evaluation and Management Form    History  Obtained from:medics  Patient has no known allergies  No chief complaint on file  HPI     Pt trauma transfer from [facility] with left renal rupture  Pressure dropped PTA  Past Medical History:   Diagnosis Date    UTI (urinary tract infection)      No past surgical history on file  No family history on file  Social History   Substance Use Topics    Smoking status: Current Every Day Smoker    Smokeless tobacco: Never Used    Alcohol use Yes      Comment: "socially"     I have reviewed and agree with the history as documented      Review of Systems    Physical Exam  /60   Pulse 102   Temp (!) 100 8 °F (38 2 °C) (Tympanic)   Resp 16   Wt 85 kg (187 lb 6 3 oz)   SpO2 92% Comment: 2L    Physical Exam     Heart: rrr;  Lungs: CTA with BS equal bilaterally; very pale; pressure back up    ED Medications  Medications - No data to display    Intubation  Procedures     Notes  No airway intervention    CritCare Time    Final Diagnosis  Final diagnoses:   None       ED Provider  Electronically Signed by     Baljit Greco DO  11/02/17 7037

## 2017-11-02 NOTE — CONSULTS
Consultation - [address] [facility] 43 y o  male MRN: [id number]  Unit/Bed#: ICU 07 Encounter: [id number]        Assessment/Plan     Assessment:  43 M with chronic hydronephrosis, left grade IV kidney injury (spontaneous?), hypotension    Plan:  Taken to OR for emergent exploratory laparotomy, left nephrectomy, open abdomen, Abthera VAC placement  To OR today for second look    History of Present Illness     HPI:  Teresa Stephen is a 43 y o  male who presents as a level A trauma alert, transferred from [facility]  He is an inmate, and complained of left flank pain and hematuria, causing him to be taken to [facility] for evaluation  There he was found to have severe bilateral hydronephrosis and left sided hematoma  He was subsequently transferred to [facility] as a level A trauma  He was hypotensive en route, and hgb was declining  He was taken to the operating room for ex-lap, left nephrectomy, and left with an open abdomen and abthera VAC  He was transferred to the ICU post-operatively  Review of Systems   Unable to perform ROS: Acuity of condition       Historical Information   Past Medical History:   Diagnosis Date    Enlarged prostate     UTI (urinary tract infection)      No past surgical history on file  Social History   History   Alcohol Use    Yes     Comment: "socially"     History   Drug Use No     History   Smoking Status    Current Every Day Smoker   Smokeless Tobacco    Never Used     Family History: No family history on file  Meds/Allergies   PTA meds:   Prior to Admission Medications   Prescriptions Last Dose Informant Patient Reported?  Taking?   tamsulosin (FLOMAX) 0 4 mg   Yes Yes   Sig: Take 0 4 mg by mouth daily with dinner      Facility-Administered Medications: None     No Known Allergies    Objective   First Vitals:   Blood Pressure: 109/60 (11/02/17 0048)  Pulse: 102 (11/02/17 0048)  Temperature: (!) 100 8 °F (38 2 °C) (11/02/17 0048)  Temp Source: Tympanic (11/02/17 0208)  Respirations: 16 (11/02/17 0048)  Height: 5' 6" (167 6 cm) (11/02/17 0400)  Weight - Scale: 85 kg (187 lb 6 3 oz) (11/02/17 0048)  SpO2: 92 % (2L) (11/02/17 0048)    Current Vitals:   Blood Pressure: (!) 189/88 (11/02/17 0513)  Pulse: 74 (11/02/17 0530)  Temperature: 98 6 °F (37 °C) (11/02/17 0513)  Temp Source: Oral (11/02/17 0513)  Respirations: 14 (11/02/17 0530)  Height: 5' 6" (167 6 cm) (11/02/17 0400)  Weight - Scale: 89 2 kg (196 lb 10 4 oz) (11/02/17 0400)  SpO2: 100 % (11/02/17 0530)      Intake/Output Summary (Last 24 hours) at 11/02/17 4070  Last data filed at 11/02/17 0550   Gross per 24 hour   Intake              700 ml   Output             2225 ml   Net            -1525 ml       Invasive Devices     Peripheral Intravenous Line            Peripheral IV 11/01/17 Left Antecubital 1 day    Peripheral IV 11/01/17 Right Antecubital less than 1 day          Drain            Negative Pressure Wound Therapy (V A C ) Abdomen Anterior less than 1 day    Urethral Catheter Coude 16 Fr  less than 1 day                Physical Exam   Constitutional: He appears well-developed and well-nourished  HENT:   Head: Normocephalic  Eyes: Pupils are equal, round, and reactive to light  Cardiovascular:   Tachycardic   Pulmonary/Chest: Effort normal  No respiratory distress  Abdominal: Soft     Neurological:   Intubated sedated       Lab Results:   CBC:   Lab Results   Component Value Date    WBC 11 80 (H) 11/02/2017    HGB 11 3 (L) 11/02/2017    HCT 33 4 (L) 11/02/2017    MCV 87 11/02/2017     (L) 11/02/2017    MCH 29 6 11/02/2017    MCHC 33 8 11/02/2017    RDW 13 9 11/02/2017    MPV 9 8 11/02/2017    NRBC 0 11/02/2017   , CMP:   Lab Results   Component Value Date     (H) 11/02/2017    K 3 9 11/02/2017     (H) 11/02/2017    CO2 20 (L) 11/02/2017    ANIONGAP 12 11/02/2017    BUN 21 11/02/2017    CREATININE 2 47 (H) 11/02/2017    GLUCOSE 168 (H) 11/02/2017    GLUCOSE 197 (H) 11/02/2017    GLUCOSE 198 (H) 11/02/2017    CALCIUM 8 7 11/02/2017    AST 25 11/02/2017    ALT 23 11/02/2017    ALKPHOS 54 11/02/2017    PROT 5 7 (L) 11/02/2017    ALBUMIN 2 9 (L) 11/02/2017    BILITOT 1 30 (H) 11/02/2017    EGFR 31 11/02/2017    EGFR 27 11/02/2017     Imaging: I have personally reviewed pertinent reports  EKG, Pathology, and Other Studies: I have personally reviewed pertinent reports        Code Status: Level 1 - Full Code  Advance Directive and Living Will:      Power of :    POLST:

## 2017-11-02 NOTE — OR NURSING
Trauma pt went directly to OR 9  Cell saver initiated        2516 ml; processed volume      261 ml reinfused volume      1885 ml  wash volume

## 2017-11-02 NOTE — ANESTHESIA PREPROCEDURE EVALUATION
Review of Systems/Medical History  Patient summary reviewed  Chart reviewed      Cardiovascular   Pulmonary  Ventilator dependent respiratory failure, ,        GI/Hepatic            Endo/Other     GYN       Hematology  Anemia acute blood loss anemia,     Musculoskeletal       Neurology   Psychology           Physical Exam    Airway       Dental       Cardiovascular      Pulmonary      Other Findings  Patient was intubated last pm and is currently ventilator dependant      Anesthesia Plan  ASA Score- 4 Emergent      Anesthesia Type- general with ASA Monitors  Additional Monitors: arterial line  Airway Plan: ETT  Induction- intravenous  Informed Consent- Anesthetic plan and risks discussed with patient  I personally reviewed this patient with the CRNA  Discussed and agreed on the Anesthesia Plan with the RICHMOND Rush

## 2017-11-02 NOTE — INTERIM OP NOTE
LAPAROTOMY EXPLORATORY ;LEFT NEPHRECTOMY; LEFT URETERECTOMY; APPLICATION OF ABTHERA VAC  Postoperative Note  PATIENT NAME: Suzy Mayo  : 1975  MRN: [id number]  BE OR ROOM 09    Surgery Date: 2017    Preop Diagnosis:  Laceration of left kidney, initial encounter [S37 032A]    Post-Op Diagnosis Codes:     * Laceration of left kidney, initial encounter [S37 032A]    Procedure(s) (LRB):  LAPAROTOMY EXPLORATORY ;LEFT NEPHRECTOMY; LEFT URETERECTOMY; APPLICATION OF ABTHERA VAC (N/A)    Surgeon(s) and Role:     * Luigi Jarrell MD - Primary     * Regla Mejia DO - Fellow     * Roxanne Mejia MD - Assisting     * Mikel Souza MD - Assisting    Specimens:  ID Type Source Tests Collected by Time Destination   1 : left kidney and left ureter Tissue Kidney, Left TISSUE EXAM Luigi Jarrell MD 2017 0334        Estimated Blood Loss:   2L    Anesthesia Type:   General     7u PRBC, 6 FFP, 1 PLT  UOP 1500 intraop    Findings:   Spontaneous left kidney rupture  Massive retroperitoneal hemorrhage    Complications:   No immediate complications  Abthera vac dressing placed and patient transported to ICU in guarded, but stable condition    SIGNATURE: Mikel Souza MD   DATE: 2017   TIME: 8:33 AM

## 2017-11-02 NOTE — ANESTHESIA POSTPROCEDURE EVALUATION
Post-Op Assessment Note      CV Status:  Stable    Mental Status:  Lethargic (pt sedated)    Hydration Status:  Stable    PONV Controlled:  Controlled    Airway Patency:  Patent (ETT in situ)  Airway: intubated    Post Op Vitals Reviewed: Yes          Staff: Anesthesiologist, CRNA           BP  117/82   Temp      Pulse  75   Resp   14   SpO2   98

## 2017-11-02 NOTE — PROGRESS NOTES
Progress Note - Critical Care Acceptance Note   Cassandra Galvez 43 y o  male MRN: [id number]  Unit/Bed#: ICU 07 Encounter: [id number]    Assessment: 37yo male with h/o bladder atony with b/l hydroureteronephrosis presents with unclear etiology grade IV L kidney injury with massive hemorrhage  S/p ex-lap, L nephrectomy, abthera VAC on 11/2  Plan:     Neuro: Sedation with propofol and analgesia with fentanyl gtt  CV: Patient currently hypertensive to systolic 453C  Likely related to pain  Will start fentanyl gtt and monitor for improvement     Lung: Currently on AC 14/500/40%/5  Daily SBTs  Maintain on vent overnight - plan for second look laparotomy later today      GI: NPO/OGT  Monitor output from abthera VAC  Plan for take back later today and possible closure of abdomen  Protonix for ppx     FEN: F/u post op electrolytes and replete accordingly  Trend endpoints of resuscitation  LR @ 125 for fluid resuscitation  IVF bolus prn     : Monitor urine output  Keep jiménez catheter  Urology follow up for h/o bladder atony and bilateral hydronephrosis  ID: No acute issues  Heme: About 2L blood loss intraop  S/p 9U PRBC, 2 platelets, 6 FFP, 265IS cellsaver  F/u post op labs  Monitor Hgb and transfuse if Hgb less than 7  Hold chemical DVT ppx for now     Endo: No acute issues                Msk/Skin: Bedrest for now  Resume PT/OT when stable     Disposition: ICU level of care  Chief Complaint: Unable to obtain 2/2 patient intubated/sedated    HPI/24hr events: Patient presents to [facility] as transfer from [facility] for grade IV kidney injury with massive hemorrhage  He was taken to the OR emergently for ex-lap, L nephrectomy  2L blood loss intraop and patient received 9U PRBC, 2 platelets, 6 FFP, 214AM cellsaver  Abdomen left open with abthera VAC and patient brought to ICU for resuscitation  Hypertensive to 200s on arrival to floor  Awaiting fentanyl bag from pharmacy      Physical Exam:   General: Intubated, sedated  Neuro: GCS 9T  CV: RRR, no MRG  Pulm: lungs clear bilaterally  Symmetric chest rise  Abdomen: soft, abthera VAC in place with good seal - small amount blood pooling on L side of VAC  Abthera VAC draining sanguinous  : Thakur in place with blood tinged urine  Ext: No edema     Vitals:    17 0048 17 0052 17 0400   BP: 109/60 114/65    Pulse: 102 98    Resp: 16 16    Temp: (!) 100 8 °F (38 2 °C)     TempSrc: Tympanic     SpO2: 92% 100% 100%   Weight: 85 kg (187 lb 6 3 oz)               Temperature:   Temp (24hrs), Av 9 °F (37 7 °C), Min:99 °F (37 2 °C), Max:100 8 °F (38 2 °C)    Current: Temperature: (!) 100 8 °F (38 2 °C)    Weights:   IBW: -88 kg    There is no height or weight on file to calculate BMI  Weight (last 2 days)     Date/Time   Weight    17 00:48:12  85 (187 39)              Hemodynamic Monitoring:  SvO2:   , CO:       Non-Invasive/Invasive Ventilation Settings:  Respiratory    Lab Data (Last 4 hours)    None         O2/Vent Data (Last 4 hours)      400           Vent Mode AC/VC       Resp Rate (BPM) (BPM) 14       Vt (mL) (mL) 500       FIO2 (%) (%) 40       PEEP (cmH2O) (cmH2O) 5       MV 11 3                 No results found for: PHART, OZL6DQR, PO2ART, VOR8OCR, S6OWHOIX, BEART, SOURCE  SpO2: SpO2: 100 %, SpO2 Activity:  , SpO2 Device: O2 Device: Other (comment), Capnography:      Intake and Outputs:  I/O     None        UOP: 100/hour   Nutrition:        Diet Orders            Start     Ordered    17  Diet NPO  Diet effective now     Question Answer Comment   Diet Type NPO    RD to adjust diet per protocol?  No        17 040          Labs:     Results from last 7 days  Lab Units 17  0054 17  0052 17   WBC Thousand/uL  --  19 45* 20 18* 14 78*   HEMOGLOBIN g/dL  --  6 9* 8 1* 10 3*   I STAT HEMOGLOBIN g/dl 6 5*  6 1*  --   --   --    HEMATOCRIT %  --  20 7* 24 5* 31 0*   PLATELETS Thousands/uL --  221 212 231   NEUTROS PCT %  --  88*  --  83*   MONOS PCT %  --  7  --  5   MONO PCT MAN %  --   --  0*  --       Results from last 7 days  Lab Units 17   SODIUM mmol/L  --  147* 146*   POTASSIUM mmol/L  --  3 8 3 4*   CHLORIDE mmol/L  --  114* 110*   CO2 mmol/L  --  24 26   BUN mg/dL  --  26* 27*   CREATININE mg/dL  --  2 91* 3 02*   CALCIUM mg/dL  --  6 9* 8 5   TOTAL PROTEIN g/dL  --   --  6 2*   BILIRUBIN TOTAL mg/dL  --   --  0 64   ALK PHOS U/L  --   --  71   ALT U/L  --   --  15   AST U/L  --   --  14   GLUCOSE RANDOM mg/dL  --  203* 191*   GLUCOSE, ISTAT mg/dl 198*  197*  --   --                 Results from last 7 days  Lab Units 17  2205   INR  1 37* 1 05   PTT seconds 27 25       Results from last 7 days  Lab Units 17  2346   LACTIC ACID mmol/L 3 8*       0  Lab Value Date/Time   TROPONINI <0 04 2015 2325       Imagin/1 CT Abd/Pelvis:  IMPRESSION:  Severe left hydroureteronephrosis with hyperdense material in the left urinary collecting system  The left kidney is massively enlarged measuring approximately 17 cm in craniocaudal dimension and contains hyperdense material throughout the cortex and   medullary pyramids which could suggest blood (intrarenal hematoma) although I cannot exclude a superimposed transitional cell carcinoma  There is left perinephric fluid/blood extending to the left pelvis anterior to the left psoas muscle extraperitoneal   region  There is high density material throughout the left massively dilated collecting system which could represent blood although superimposed urothelial neoplasm cannot be excluded  Constellation of findings suggest urothelial/urinary collecting   system hemorrhage of indeterminate cause   Please proceed with urology consult for possible percutaneous drainage if warranted by urology      Right hydroureteronephrosis extending all the way to the bladder without a obstructing stone identified       Diffuse bladder wall thickening, consider bladder ultrasound      There is a small amount of left posterior splenic fluid/blood      Small bilateral pleural effusions  I have personally reviewed pertinent films in PACS    EKG:     Micro:  Lab Results   Component Value Date    URINECX >100,000 cfu/ml Mixed Contaminants X3 02/15/2016       Allergies: No Known Allergies    Medications:   Scheduled Meds:  chlorhexidine 15 mL Swish & Spit Q12H [address]   chlorhexidine 15 mL Swish & Spit Q12H [address]   fentaNYL      fentanyl citrate (PF)      fentanyl citrate (PF) 100 mcg Intravenous Once   nicotine 1 patch Transdermal Daily   pantoprazole 40 mg Intravenous Q24H [address]     Continuous Infusions:  fentaNYL 75 mcg/hr    lactated ringers 125 mL/hr Last Rate: 125 mL/hr (11/02/17 0409)   propofol 5-50 mcg/kg/min      PRN Meds:       VTE Pharmacologic Prophylaxis: Sequential compression device (Venodyne)   VTE Mechanical Prophylaxis: sequential compression device    Invasive lines and devices: Invasive Devices     Peripheral Intravenous Line            Peripheral IV 11/01/17 Left Antecubital 1 day    Peripheral IV 11/01/17 Right Antecubital less than 1 day          Drain            Negative Pressure Wound Therapy (V A C ) Abdomen Anterior less than 1 day    Urethral Catheter Coude 16 Fr  less than 1 day                   Counseling / Coordination of Care       Code Status: Level 1 - Full Code     Portions of the record may have been created with voice recognition software  Occasional wrong word or "sound a like" substitutions may have occurred due to the inherent limitations of voice recognition software  Read the chart carefully and recognize, using context, where substitutions have occurred       Marc Rodriguez MD

## 2017-11-02 NOTE — RESPIRATORY THERAPY NOTE
RT Ventilator Management Note  Abby Serrato 43 y o  male MRN: [id number]  Unit/Bed#: ICU 07 Encounter: [id number]      Daily Screen       11/2/2017 0733             Spont breathing trial outcome[de-identified] -            Physical Exam:   Assessment Type: Assess only  General Appearance: Sedated  Respiratory Pattern: Assisted  Chest Assessment: Chest expansion symmetrical  Bilateral Breath Sounds: Clear, Diminished  Cough: None  Subjective Data: I noted that the intubation and ETT was not documented by the OR, Pt has size ETT located at 23 at lip on the right      Resp Comments: (P) as ordered by Dr Catherine Virk I pulled ETT out by 2 cm it is now 21 at the lip

## 2017-11-02 NOTE — PROGRESS NOTES
Progress Note - Cassandra Galvez 43 y o  male MRN: [id number]    Unit/Bed#: ICU 07 Encounter: [id number]      51-year-old incarcerated male known to our service with a history of atonic bladder and bilateral hydronephrosis presented to [facility] with left flank pain and gross hematuria  Our Urology service was contacted overnight regarding grade 4 renal laceration  Patient was transferred to [facility] per recommendation for trauma service admission  Shortly after, patient was found to be hemodynamically unstable and  taken to OR emergently  for exploratory lap, left nephrectomy and splenic flexure mobilization by trauma surgical team   Second exploration is scheduled for this afternoon  We saw pt briefly today in [place] and are available in consultation should our services be required

## 2017-11-02 NOTE — SEPSIS NOTE
Sepsis Note   Mattie James 43 y o  male MRN: [id number]  Unit/Bed#: ED 23 Encounter: [id number]            Initial Sepsis Screening     [address] Name 11/01/17 2239                Is the patient's history suggestive of a new or worsening infection? (!)  Yes (Proceed)  -SA        Suspected source of infection urinary tract infection  -SA        Are two or more of the following signs & symptoms of infection both present and new to the patient?         Indicate SIRS criteria Tachycardia > 90 bpm;Leukocytosis (WBC > 23013 IJL)  -SA        If the answer is yes to both questions, suspicion of sepsis is present          If severe sepsis is present AND tissue hypoperfusion perists in the hour after fluid resuscitation or lactate > 4, the patient meets criteria for SEPTIC SHOCK          Are any of the following organ dysfunction criteria present within 6 hours of suspected infection and SIRS criteria that are NOT considered to be chronic conditions? (!)  Yes  -SA        Organ dysfunction Lactate > 2 0 mmol/L  -SA        Date of presentation of severe sepsis 11/01/17  -SA        Time of presentation of severe sepsis 2239  -SA        Tissue hypoperfusion persists in the hour after crystalloid fluid administration, evidenced, by either:          Was hypotension present within one hour of the conclusion of crystalloid fluid administration?           Date of presentation of septic shock          Time of presentation of septic shock            User Key  (r) = Recorded By, (t) = Taken By, (c) = Cosigned By    [address] Name Provider Zarina Mcfarland MD Physician

## 2017-11-02 NOTE — PROGRESS NOTES
11/02/17 1100   Clinical Encounter Type   Visited With Patient   Routine Visit Introduction   Mosque Encounters   Mosque Needs Prayer   Patient Spiritual Encounters   Spiritual Encounter Notes PT blinked for prayer       [address] [place] [address]

## 2017-11-02 NOTE — OP NOTE
OPERATIVE REPORT  PATIENT NAME: Joselito Shoemaker    :  1975  MRN: [id number]  Pt Location: BE OR ROOM 09    SURGERY DATE: 2017    Surgeon(s) and Role:     * Ya Alvarado MD - Primary    Preop Diagnosis:  Laceration of left kidney, initial encounter [S37 032A]    * No Diagnosis Codes entered *    Procedure(s) (LRB):  LAPAROTOMY EXPLORATORY, possible colon resection, possible abthera vac, procedures as indicated (N/A)    Specimen(s):  * No specimens in log *    Estimated Blood Loss:   Minimal    Drains:  Negative Pressure Wound Therapy (V A C ) Abdomen Anterior (Active)   Unit Type Abthera 2017  4:11 AM   Other- # applied 2 2017  4:11 AM   Cycle Continuous 2017 12:00 PM   Target Pressure (mmHg) 125 2017 12:00 PM   Canister Changed Yes 2017  9:00 AM   Dressing Status Clean;Dry; Intact 2017 12:00 PM   Output (mL) 200 mL 2017 12:00 PM   Number of days: 0       NG/OG/Enteral Tube Orogastric (Active)   Placement Reverification Auscultation 2017 12:00 PM   Site Assessment Clean;Dry; Intact 2017 12:00 PM   Status Suction-low continuous 2017 12:00 PM   Output (mL) 0 mL 2017  5:50 AM   Number of days:        Urethral Catheter Coude 16 Fr  (Active)   Reasons to continue Urinary Catheter  Accurate I&O assessment in critically ill patients (48 hr  max) 2017  9:31 AM   Site Assessment Clean;Skin intact 2017  8:00 AM   Collection Container Standard drainage bag 2017  8:00 AM   Securement Method Securing device (Describe) 2017  8:00 AM   Output (mL) 475 mL 2017 12:00 PM   Number of days: 0       Anesthesia Type:   General    Operative Indications:  Laceration of left kidney, initial encounter [T46 054U]      Operative Findings:  Spontaneous left renal hilar avulsion due to significant hydronephrosis    Complications:   None    Procedure and Technique:  The patient was identified in the emergency room and was taken to the holding area   Risks benefits and alternatives  Patient was taken to the operating room  Using and then consent  The patient underwent general team and [facility] anesthesia  Preoperative pause was therefore performed to identify patient and procedure  Then using a 10 blade a vertical midline incision was created encompassing the umbilicus 10 cm in length  This was carried down to the fascia  Entry to the abdomen was obtained  It was noted that the patient had 2 L of blood in the abdomen  We then proceeded by putting in laparotomy packs  In the left upper quadrant and left lower quadrant  Then we proceeded by putting in the [address]  It was noted that the retroperitoneum around the left kidney was severely distended  Full of hematoma  We entered this seem a [place]  Chunks of clotted blood was to on the retroperitoneum  We finally got a hold of the left kidney  And lifted up from this pedicle  It was noted that is very difficult to isolate the pedicle  There was significant avulsion of left kidney at the hilum  The proceeded by performing a modified Baptiste maneuver  Upon further exploration it was noted that the patient had dissected his mesentery of his splenic flexure  Concerning for possible colonic ischemia  However the colon looked viable  The hilum was also taking using a combination off the [employer] and 0 silk  Sutures  Those noted that there was persistent bleeding around the hilum  This was addressed using a combination of jet atlass and 3 0 Prolene in in a running fashion  Irrigation was just performed  It was noted of the that the patient was hypothermic  Thus the decision was made to damage control the patient and come back in 24 hours for a second look  4 lap pads were placed along the left kidney beg  Ab abthera sponge was cut to size and tegoderms were used to secure abthera vac  The patient was thus taken to the ICU for further resuscitation    Dr Jonh Lechuga was present throughout entire portion of the case       Patient Disposition:  ICU    SIGNATURE: Kiersten Cuevas MD  DATE: November 2, 2017  TIME: 12:49 PM

## 2017-11-02 NOTE — PLAN OF CARE
DISCHARGE PLANNING     Discharge to home or other facility with appropriate resources Progressing        INFECTION - ADULT     Absence or prevention of progression during hospitalization Progressing     Absence of fever/infection during neutropenic period Progressing        Knowledge Deficit     Patient/family/caregiver demonstrates understanding of disease process, treatment plan, medications, and discharge instructions Progressing        PAIN - ADULT     Verbalizes/displays adequate comfort level or baseline comfort level Progressing        SAFETY ADULT     Patient will remain free of falls Progressing     Maintain or return to baseline ADL function Progressing     Maintain or return mobility status to optimal level Progressing        SAFETY,RESTRAINT: NV/NON-SELF DESTRUCTIVE BEHAVIOR     Remains free of harm/injury (restraint for non violent/non self-detsructive behavior) Progressing     Returns to optimal restraint-free functioning Progressing

## 2017-11-02 NOTE — OP NOTE
OPERATIVE REPORT  PATIENT NAME: Cj Zeng    :  1975  MRN: [id number]  Pt Location: BE OR ROOM 09    SURGERY DATE: 2017    Surgeon(s) and Role:     * Cindy Mendoza MD - Primary     * Elizabeth Albert MD - Assisting    Preop Diagnosis:  Laceration of left kidney, initial encounter [S37 032A]    Post-Op Diagnosis Codes:     * Laceration of left kidney, initial encounter [N30 125S]    Procedure(s) (LRB):  LAPAROTOMY EXPLORATORY 2nd LOOK, ligation of left ureter, I/D with closure (N/A)    Specimen(s):  * No specimens in log *    Estimated Blood Loss:   100 mL    Drains:  NG/OG/Enteral Tube Orogastric (Active)   Placement Reverification Auscultation 2017 12:00 PM   Site Assessment Clean;Dry; Intact 2017 12:00 PM   Status Suction-low continuous 2017 12:00 PM   Output (mL) 0 mL 2017  5:50 AM   Number of days:        Urethral Catheter Coude 16 Fr  (Active)   Reasons to continue Urinary Catheter  Accurate I&O assessment in critically ill patients (48 hr  max) 2017  9:31 AM   Site Assessment Clean;Skin intact 2017  8:00 AM   Collection Container Standard drainage bag 2017  8:00 AM   Securement Method Securing device (Describe) 2017  8:00 AM   Output (mL) 475 mL 2017 12:00 PM   Number of days: 0       [REMOVED] Negative Pressure Wound Therapy (V A C ) Abdomen Anterior (Removed)   Removed 17 1420   Unit Type Abthera 2017  4:11 AM   Other- # applied 2 2017  4:11 AM   Cycle Continuous 2017 12:00 PM   Target Pressure (mmHg) 125 2017 12:00 PM   Canister Changed Yes 2017  9:00 AM   Dressing Status Clean;Dry; Intact 2017 12:00 PM   Other- # removed 2 2017  2:20 PM   Output (mL) 200 mL 2017 12:00 PM   Number of days: 0       Anesthesia Type:   General    Operative Indications:  Laceration of left kidney, initial encounter [S37 032A]    Operative Findings:  L ureter identified   All small bowel and colon pink and viable with bounding blood supply    Complications:   None    Procedure and Technique:  Patient was identified by name and armband in the operating room hallway  He was then taken to the operating room, where general anesthesia was induced by the anesthesiology team   His ABThera VAC was intact and removed  His abdomen was prepped with Betadine and draped in the usual sterile fashion  A brief time-out was called, and all in the room were in agreement  A total of 4 lap pads were removed from the abdomen  A small amount of bleeding was noted in the left upper quadrant, with no focal area that was able to be ligated  This area was packed for the time being  Attention was turned then to the left lower quadrant, where the left ureter was identified lateral to the left colon  This was sharply transected, and the distal end was ligated with a 3 0 silk suture  The colon was then inspected, and noted to be pink and viable  The small bowel was then run from the ligament of Treitz to the ileocecal valve, and no further injury was identified  The small bowel was noted to be viable and peristalsing  There was palpable pulses in the mesentery leading to the left colon  The packs were then removed from the left upper quadrant, and Gel-Foam with Fibrillar snow was placed in the left upper quadrant  Gentle pressure was higher held over the area  After several minutes, no further bleeding was noted in the left upper quadrant  The abdomen was irrigated with a total of 3 L warm saline solution  The fascia was closed with 0 PDS in a running fashion  Skin was closed with staples  The abdomen was dressed with 4x4s and Tegaderm  All sponge, instrument, and needle counts were correct x2  RF wanding x2 was negative for retained sponges  Dr Sudhir Sampson was present for the entire procedure      Patient Disposition:  Critical Care Unit    SIGNATURE: Cydney Khan  DATE: November 2, 2017  TIME: 3:06 PM

## 2017-11-02 NOTE — SOCIAL WORK
CM reviewed chart, pt admitted from [facility] intubated and sedated, CM spoke with Mobile guard at bedside  AS per guard, Cm would need to contact [address] [place] [address] [phone] with any needs prior to d/c  [facility] of nursing Rafaela Fraire [phone] can be contacted with any status updates and show would need to provide the password prior  Password "[email or web address]" CM to follow        Reviewed and agreed with above documentation- [facility]

## 2017-11-02 NOTE — ED PROVIDER NOTES
History  Chief Complaint   Patient presents with    Blood in Urine     Blood in urine started today  Patient had episode of incontinence, bright red urine noted on stretcher and sheets    Chest Pain     Chest pain started an hour ago, given dilaudid and two sublingual nitroglycerin at prision  History provided by:  Patient   used: No    Blood in Urine   This is a new problem  The current episode started yesterday  The problem is unchanged  He describes the hematuria as gross hematuria  The hematuria occurs throughout his entire urinary stream  He reports no clotting in his urine stream  Pain scale: unable to specify  The pain is moderate  He describes his urine color as dark red  Irritative symptoms include frequency  Obstructive symptoms include an intermittent stream  Associated symptoms include abdominal pain  Pertinent negatives include no chills, dysuria, facial swelling, fever, flank pain, nausea or vomiting  His sexual activity is non-contributory to the current illness  (Unable to specify)   Chest Pain   Pain location:  L chest  Pain quality: aching    Radiates to: left flank  Pain radiates to the back: no    Pain severity:  Moderate  Onset quality:  Gradual  Duration:  2 days  Timing:  Constant  Progression:  Worsening  Chronicity:  New  Relieved by:  Nothing  Worsened by:  Nothing tried  Ineffective treatments:  None tried  Associated symptoms: abdominal pain    Associated symptoms: no back pain, no cough, no dizziness, no dysphagia, no fever, no headache, no nausea, no shortness of breath and not vomiting    Abdominal pain:     Location:  L flank    Quality:  Aching    Severity:  Moderate    Onset quality:  Gradual    Duration:  2 days    Timing:  Intermittent    Progression:  Waxing and waning    Chronicity:  New  Risk factors comment:  Unable to specify      Prior to Admission Medications   Prescriptions Last Dose Informant Patient Reported?  Taking?   tamsulosin ([facility]) 0 4 mg   Yes Yes   Sig: Take 0 4 mg by mouth daily with dinner      Facility-Administered Medications: None       Past Medical History:   Diagnosis Date    Enlarged prostate     UTI (urinary tract infection)        History reviewed  No pertinent surgical history  History reviewed  No pertinent family history  I have reviewed and agree with the history as documented  Social History   Substance Use Topics    Smoking status: Current Every Day Smoker    Smokeless tobacco: Never Used    Alcohol use Yes      Comment: "socially"        Review of Systems   Constitutional: Negative for activity change, chills and fever  HENT: Negative for facial swelling, sore throat and trouble swallowing  Eyes: Negative for pain and visual disturbance  Respiratory: Negative for cough, chest tightness and shortness of breath  Cardiovascular: Positive for chest pain  Negative for leg swelling  Gastrointestinal: Positive for abdominal pain  Negative for blood in stool, diarrhea, nausea and vomiting  Genitourinary: Positive for frequency and hematuria  Negative for dysuria and flank pain  Musculoskeletal: Negative for back pain, neck pain and neck stiffness  Skin: Negative for pallor and rash  Allergic/Immunologic: Negative for environmental allergies and immunocompromised state  Neurological: Negative for dizziness and headaches  Hematological: Negative for adenopathy  Does not bruise/bleed easily  Psychiatric/Behavioral: Negative for agitation and behavioral problems  All other systems reviewed and are negative        Physical Exam  ED Triage Vitals   Temperature Pulse Respirations Blood Pressure SpO2   11/01/17 2016 11/01/17 2016 11/01/17 2016 11/01/17 2016 11/01/17 2016   99 °F (37 2 °C) 61 16 153/92 98 %      Temp Source Heart Rate Source Patient Position - Orthostatic VS BP Location FiO2 (%)   11/01/17 2016 11/01/17 2016 11/01/17 2016 11/01/17 2016 --   Oral Monitor Lying Right arm       Pain Score 11/01/17 2135       Worst Possible Pain           Orthostatic Vital Signs  Vitals:    11/01/17 2245 11/01/17 2339 11/01/17 2349 11/01/17 2353   BP: 146/69 92/58 94/51 131/66   Pulse: 93 86 75 92   Patient Position - Orthostatic VS: Lying Lying Lying Lying       Physical Exam   Constitutional: He is oriented to person, place, and time  He appears well-developed and well-nourished  No distress  HENT:   Head: Normocephalic and atraumatic  Eyes: EOM are normal    Neck: Normal range of motion  Neck supple  Cardiovascular: Normal rate, regular rhythm, normal heart sounds and intact distal pulses  Pulmonary/Chest: Effort normal and breath sounds normal    Abdominal: Soft  Bowel sounds are normal  There is tenderness (left flank)  There is no rebound and no guarding  Musculoskeletal: Normal range of motion  Neurological: He is alert and oriented to person, place, and time  Skin: Skin is warm and dry  Psychiatric: He has a normal mood and affect  Nursing note and vitals reviewed  ED Medications  Medications   sodium chloride 0 9 % bolus 1,000 mL (0 mL Intravenous Stopped 11/1/17 2304)   sodium chloride 0 9 % bolus 1,000 mL (0 mL Intravenous Stopped 11/1/17 2156)   morphine injection 2 mg (2 mg Intravenous Given 11/1/17 2135)   cefepime (MAXIPIME) 2 g/50 mL dextrose IVPB (0 mg Intravenous Stopped 11/1/17 2234)   morphine (PF) 4 mg/mL injection 4 mg (4 mg Intravenous Given 11/1/17 2243)       Diagnostic Studies  Results Reviewed     Procedure Component Value Units Date/Time    Lactic acid x2 Q2H [[id number]]  (Abnormal) Collected:  11/01/17 2346    Lab Status:  Final result Specimen:  Blood from Arm, Right Updated:  11/02/17 0018     LACTIC ACID 3 8 (HH) mmol/L     Narrative:         Result may be elevated if tourniquet was used during collection      CBC and differential [[id number]]  (Abnormal) Collected:  11/01/17 2239    Lab Status:  Final result Specimen:  Blood from Arm, Right Updated:  11/01/17 2320     WBC 20 18 (H) Thousand/uL      RBC 2 67 (L) Million/uL      Hemoglobin 8 1 (L) g/dL      Hematocrit 24 5 (L) %      MCV 92 fL      MCH 30 3 pg      MCHC 33 1 g/dL      RDW 13 0 %      MPV 10 4 fL      Platelets 754 Thousands/uL      nRBC 0 /100 WBCs     Narrative: This is an appended report  These results have been appended to a previously verified report  Lactic acid, plasma [[id number]]  (Abnormal) Collected:  11/01/17 2150    Lab Status:  Final result Specimen:  Blood from Arm, Right Updated:  11/01/17 2225     LACTIC ACID 3 1 (HH) mmol/L     Narrative:         Result may be elevated if tourniquet was used during collection  Protime-INR [[id number]]  (Normal) Resulted:  11/01/17 2205    Lab Status:  Final result Specimen:  Blood Updated:  11/01/17 2205     Protime 13 7 seconds      INR 1 05    APTT [[id number]]  (Normal) Resulted:  11/01/17 2205    Lab Status:  Final result Specimen:  Blood Updated:  11/01/17 2205     PTT 25 seconds     Narrative: Therapeutic Heparin Range = 60-90 seconds    CK Total with Reflex CKMB [[id number]]  (Normal) Collected:  11/01/17 2022    Lab Status:  Final result Specimen:  Blood from Arm, Right Updated:  11/01/17 2154     Total CK 82 U/L     Blood culture #1 [[id number]] Collected:  11/01/17 2150    Lab Status: In process Specimen:  Blood from Arm, Right Updated:  11/01/17 2154    Blood culture #2 [[id number]] Collected:  11/01/17 2150    Lab Status:   In process Specimen:  Blood from Arm, Left Updated:  11/01/17 2154    Urinalysis with microscopic [[id number]]  (Abnormal) Collected:  11/01/17 2037    Lab Status:  Final result Specimen:  Urine, Clean Catch Updated:  11/01/17 2106     Clarity, UA Turbid     Color, UA Red     Specific Gravity, UA 1 015     pH, UA 8 0     Glucose,  (1/4%) (A) mg/dl      Ketones, UA Trace (A) mg/dl      Blood, UA Large (A)     Protein, UA >=300 (A) mg/dl      Nitrite, UA Positive (A)     Bilirubin, UA Negative     Urobilinogen, UA 1 0 E U /dl      Leukocytes, UA Small (A)     WBC, UA 2-4 (A) /hpf      RBC, UA Innumerable (A) /hpf      Bacteria, UA Moderate (A) /hpf      Epithelial Cells Occasional /hpf     Comprehensive metabolic panel [[id number]]  (Abnormal) Collected:  11/01/17 2022    Lab Status:  Final result Specimen:  Blood from Arm, Right Updated:  11/01/17 2050     Sodium 146 (H) mmol/L      Potassium 3 4 (L) mmol/L      Chloride 110 (H) mmol/L      CO2 26 mmol/L      Anion Gap 10 mmol/L      BUN 27 (H) mg/dL      Creatinine 3 02 (H) mg/dL      Glucose 191 (H) mg/dL      Calcium 8 5 mg/dL      AST 14 U/L      ALT 15 U/L      Alkaline Phosphatase 71 U/L      Total Protein 6 2 (L) g/dL      Albumin 3 5 g/dL      Total Bilirubin 0 64 mg/dL      eGFR 24 ml/min/1 73sq m     Narrative:         National Kidney Disease Education Program recommendations are as follows:  GFR calculation is accurate only with a steady state creatinine  Chronic Kidney disease less than 60 ml/min/1 73 sq  meters  Kidney failure less than 15 ml/min/1 73 sq  meters  POCT troponin [[id number]]  (Normal) Collected:  11/01/17 2028    Lab Status:  Final result Updated:  11/01/17 2042     POC Troponin I 0 00 ng/ml      Specimen Type VENOUS    Narrative:         [employer] i-Stat handheld analyzer 99% cutoff is > 0 08ng/mL in [facility] Emergency Departments    o cTnI 99% cutoff is useful only when applied to patients in the clinical setting of myocardial ischemia  o cTnI 99% cutoff should be interpreted in the context of clinical history, ECG findings and possibly cardiac imaging to establish correct diagnosis  o cTnI 99% cutoff may be suggestive but clearly not indicative of a coronary event without the clinical setting of myocardial ischemia      CBC and differential [[id number]]  (Abnormal) Collected:  11/01/17 2022    Lab Status:  Final result Specimen:  Blood from Arm, Right Updated:  11/01/17 2027     WBC 14 78 (H) Thousand/uL      RBC 3 37 (L) Million/uL      Hemoglobin 10 3 (L) g/dL      Hematocrit 31 0 (L) %      MCV 92 fL      MCH 30 6 pg      MCHC 33 2 g/dL      RDW 13 0 %      MPV 10 4 fL      Platelets 381 Thousands/uL      nRBC 0 /100 WBCs      Neutrophils Relative 83 (H) %      Lymphocytes Relative 12 (L) %      Monocytes Relative 5 %      Eosinophils Relative 0 %      Basophils Relative 0 %      Neutrophils Absolute 12 15 (H) Thousands/µL      Lymphocytes Absolute 1 82 Thousands/µL      Monocytes Absolute 0 73 Thousand/µL      Eosinophils Absolute 0 06 Thousand/µL      Basophils Absolute 0 02 Thousands/µL                  CT chest without contrast   Final Result by Rani Bartlett MD (11/01 2211)   1  Small bilateral pleural effusions  2  Please refer to CT abdomen pelvis for significant abdomen and pelvis findings  3  Small 1 cm osseous density at the proximal aspect of the left clavicle, correlate for any history of trauma to this region  Old appearing left coracoid fracture  Workstation performed: [id number]         CT renal stone study abdomen pelvis without contrast   Final Result by Rani Bartlett MD (11/01 2201)   Severe left hydroureteronephrosis with hyperdense material in the left urinary collecting system  The left kidney is massively enlarged measuring approximately 17 cm in craniocaudal dimension and contains hyperdense material throughout the cortex and    medullary pyramids which could suggest blood (intrarenal hematoma) although I cannot exclude a superimposed transitional cell carcinoma  There is left perinephric fluid/blood extending to the left pelvis anterior to the left psoas muscle extraperitoneal    region  There is high density material throughout the left massively dilated collecting system which could represent blood although superimposed urothelial neoplasm cannot be excluded  Constellation of findings suggest urothelial/urinary collecting    system hemorrhage of indeterminate cause   Please proceed with urology consult for possible percutaneous drainage if warranted by urology  Right hydroureteronephrosis extending all the way to the bladder without a obstructing stone identified  Diffuse bladder wall thickening, consider bladder ultrasound  There is a small amount of left posterior splenic fluid/blood  Small bilateral pleural effusions        ##cfslh   I personally discussed this result with Billy Esqueda on 11/1/2017 9:49 PM    ##         Workstation performed: [id number]                    Procedures  ECG 12 Lead Documentation  Date/Time: 11/1/2017 9:25 PM  Performed by: Siva Garcia  Authorized by: Siva Garcia     Indications / Diagnosis:  Chest pain  ECG reviewed by me, the ED Provider: yes    Patient location:  ED  Interpretation:     Interpretation: normal    Rate:     ECG rate:  68    ECG rate assessment: normal    Rhythm:     Rhythm: sinus rhythm    Ectopy:     Ectopy: none    QRS:     QRS axis:  Normal  Conduction:     Conduction: normal    ST segments:     ST segments:  Normal  T waves:     T waves: normal    CriticalCare Time  Performed by: Roderick Pickard by: Siva Garcia     Critical care provider statement:     Critical care time (minutes):  30    Critical care time was exclusive of:  Separately billable procedures and treating other patients and teaching time    Critical care was necessary to treat or prevent imminent or life-threatening deterioration of the following conditions:  Renal failure, sepsis and circulatory failure    Critical care was time spent personally by me on the following activities:  Blood draw for specimens, obtaining history from patient or surrogate, development of treatment plan with patient or surrogate, discussions with consultants, evaluation of patient's response to treatment, examination of patient, interpretation of cardiac output measurements, ordering and performing treatments and interventions, ordering and review of laboratory studies, ordering and review of radiographic studies, re-evaluation of patient's condition and review of old charts    I assumed direction of critical care for this patient from another provider in my specialty: no             Phone Contacts  ED Phone Contact    ED Course  ED Course as of Nov 02 0850 Wed Nov 01, 2017 2141 Nitrite, UA: (!) Positive   2143 Creatinine: (!) 3 02   2143 Abnormal blood tests include noted, leukocytosis, acute renal failure, positive nitrites and blood patient is urinating mir blood  2 L normal saline started, blood cultures and lactic acid ordered, IV antibiotics ordered, CT scan chest abdomen pelvis without contrast ordered  Bacteria, UA: (!) Moderate   2148 Radiologist called ER for abnormal CT results, large left renal hematoma causing hydronephrosis, no obstructive give stone  We will touch base with Urology  2157 Case discussed with Leonela Naranjo, Physician assistant,  Urology; case discussed in detail, will look at the chart, CT images and call back  2157 Case discussed with Navjot Green, ICU nurse practitioner at [facility], will come see the patient  2221 Case discussed with Dr Diego Ballard, Urology, possible renal trauma, advised to transfer to [facility] under Trauma Service, advised to large bore put 3 way Cath     2228 Case discussed in detail with Dr Sunil Newsome, Trauma attending, [facility]; accepted for transfer to [facility]  2233 Elevated lactic acidNoted, sepsis alert called, IV fluids and antibiotics ready ordered, rapid response nurse practitioner evaluating the patient  Repeat lactic acid, sepsis screen done  LACTIC ACID: (!!) 3 1   2342 Repeat CBC shows hemoglobin 8 1, blood pressure 92/58, no tachycardia, normal oxygen saturation, patient alert and oriented  Type and screen ordered  The case discussed again with Dr Sunil Newsome, Trauma Surgeon, agree with IVF Resuscitation, said to transfer as Trauma B, will take care of Cross match orders at [facility]                            Initial Sepsis Screening     Row Name 11/01/17 2239                Is the patient's history suggestive of a new or worsening infection? (!)  Yes (Proceed)  -SA        Suspected source of infection urinary tract infection  -SA        Are two or more of the following signs & symptoms of infection both present and new to the patient?         Indicate SIRS criteria Tachycardia > 90 bpm;Leukocytosis (WBC > 85536 IJL)  -SA        If the answer is yes to both questions, suspicion of sepsis is present          If severe sepsis is present AND tissue hypoperfusion perists in the hour after fluid resuscitation or lactate > 4, the patient meets criteria for SEPTIC SHOCK          Are any of the following organ dysfunction criteria present within 6 hours of suspected infection and SIRS criteria that are NOT considered to be chronic conditions? (!)  Yes  -SA        Organ dysfunction Lactate > 2 0 mmol/L  -SA        Date of presentation of severe sepsis 11/01/17  -SA        Time of presentation of severe sepsis 2239  -SA        Tissue hypoperfusion persists in the hour after crystalloid fluid administration, evidenced, by either:          Was hypotension present within one hour of the conclusion of crystalloid fluid administration?         Date of presentation of septic shock          Time of presentation of septic shock            User Key  (r) = Recorded By, (t) = Taken By, (c) = Cosigned By    [address] Name Provider Seema Hubbard MD Physician                  MDM  Number of Diagnoses or Management Options  Anemia: new and requires workup  Hematoma of left kidney, initial encounter: new and requires workup  Hematuria: new and requires workup  Hypotension: new and requires workup  Diagnosis management comments: Hemeturia, left chest and flank pain, no hx of trauma; hx limited as patient does not remember details but says he has seen Urology in past   D/D: Renal stone, Pyelonephritis, Bladder mass    We will check labs, give IVF, pain meds and get CT Chest & A/P  Amount and/or Complexity of Data Reviewed  Clinical lab tests: reviewed and ordered  Tests in the radiology section of CPT®: ordered and reviewed  Tests in the medicine section of CPT®: ordered and reviewed  Discuss the patient with other providers: yes  Independent visualization of images, tracings, or specimens: yes      The patient presented with a condition in which there was a high probability of imminent or life-threatening deterioration, and critical care services (excluding separately billable procedures) totalled 30-74 minutes  Disposition  Final diagnoses:   Hematuria   Hematoma of left kidney, initial encounter   Anemia   Hypotension     Time reflects when diagnosis was documented in both MDM as applicable and the Disposition within this note     Time User Action Codes Description Comment    11/1/2017 10:32 PM [place] Add [R31 9] Hematuria     11/1/2017 10:32 PM [place] Add [T84 788G] Hematoma of left kidney, initial encounter     11/1/2017 11:53 PM [place] Add [D64 9] Anemia     11/1/2017 11:54 PM [place] Add [I95 9] Hypotension       ED Disposition     ED Disposition Condition Comment    Transfer to Another [address] should be transferred out to [address] [place] Perdomo MD Documentation    [employer] Most Recent Value   Patient Condition  The patient has been stabilized such that within reasonable medical probability, no material deterioration of the patient condition or the condition of the unborn child(tosin) is likely to result from the transfer   Reason for Transfer  Level of Care needed not available at this facility, Other (Include comment)____________________ [Trauma]   Benefits of Transfer  Other benefits (Include comment)_______________________ [Trauma]   Risks of Transfer  Potential deterioration of medical condition   Accepting Physician  Dr Mey Easley Name, Veronica Irene [address] [facility] and [facility])  Romel Presume   Sending MD Dr Jaci Grullon   Provider Certification  General risk, such as traffic hazards, adverse weather conditions, rough terrain or turbulence, possible failure of equipment (including vehicle or aircraft), or consequences of actions of persons outside the control of the transport personnel      RN Documentation    [address] Name, [address] (Company and Unit #)  LEROY      Follow-up Information    None       Discharge Medication List as of 11/2/2017 12:23 AM      CONTINUE these medications which have NOT CHANGED    Details   tamsulosin (FLOMAX) 0 4 mg Take 0 4 mg by mouth daily with dinner, Historical Med           No discharge procedures on file      ED Provider  Electronically Signed by           Kelsea Ha MD  11/02/17 2357

## 2017-11-03 ENCOUNTER — APPOINTMENT (INPATIENT)
Dept: RADIOLOGY | Facility: HOSPITAL | Age: 42
DRG: 659 | End: 2017-11-03
Payer: COMMERCIAL

## 2017-11-03 LAB
ABO GROUP BLD BPU: NORMAL
ALBUMIN SERPL BCP-MCNC: 2.4 G/DL (ref 3.5–5)
ALP SERPL-CCNC: 47 U/L (ref 46–116)
ALT SERPL W P-5'-P-CCNC: 13 U/L (ref 12–78)
ANION GAP SERPL CALCULATED.3IONS-SCNC: 7 MMOL/L (ref 4–13)
AST SERPL W P-5'-P-CCNC: 20 U/L (ref 5–45)
BILIRUB SERPL-MCNC: 0.84 MG/DL (ref 0.2–1)
BPU ID: NORMAL
BUN SERPL-MCNC: 20 MG/DL (ref 5–25)
CA-I BLD-SCNC: 1.07 MMOL/L (ref 1.12–1.32)
CALCIUM SERPL-MCNC: 8.1 MG/DL (ref 8.3–10.1)
CHLORIDE SERPL-SCNC: 119 MMOL/L (ref 100–108)
CO2 SERPL-SCNC: 26 MMOL/L (ref 21–32)
CREAT SERPL-MCNC: 2.17 MG/DL (ref 0.6–1.3)
CREAT UR-MCNC: 34.8 MG/DL
CROSSMATCH: NORMAL
ERYTHROCYTE [DISTWIDTH] IN BLOOD BY AUTOMATED COUNT: 15 % (ref 11.6–15.1)
GFR SERPL CREATININE-BSD FRML MDRD: 36 ML/MIN/1.73SQ M
GLUCOSE SERPL-MCNC: 103 MG/DL (ref 65–140)
GLUCOSE SERPL-MCNC: 72 MG/DL (ref 65–140)
GLUCOSE SERPL-MCNC: 76 MG/DL (ref 65–140)
GLUCOSE SERPL-MCNC: 96 MG/DL (ref 65–140)
HCT VFR BLD AUTO: 27.6 % (ref 36.5–49.3)
HGB BLD-MCNC: 9.7 G/DL (ref 12–17)
MAGNESIUM SERPL-MCNC: 2.2 MG/DL (ref 1.6–2.6)
MCH RBC QN AUTO: 30.1 PG (ref 26.8–34.3)
MCHC RBC AUTO-ENTMCNC: 35.1 G/DL (ref 31.4–37.4)
MCV RBC AUTO: 86 FL (ref 82–98)
OSMOLALITY UR/SERPL-RTO: 303 MMOL/KG (ref 282–298)
OSMOLALITY UR: 346 MMOL/KG
PHOSPHATE SERPL-MCNC: 3.1 MG/DL (ref 2.7–4.5)
PLATELET # BLD AUTO: 128 THOUSANDS/UL (ref 149–390)
PMV BLD AUTO: 9.5 FL (ref 8.9–12.7)
POTASSIUM SERPL-SCNC: 3.8 MMOL/L (ref 3.5–5.3)
PROT SERPL-MCNC: 5.1 G/DL (ref 6.4–8.2)
RBC # BLD AUTO: 3.22 MILLION/UL (ref 3.88–5.62)
SODIUM 24H UR-SCNC: 113 MOL/L
SODIUM SERPL-SCNC: 148 MMOL/L (ref 136–145)
SODIUM SERPL-SCNC: 152 MMOL/L (ref 136–145)
UNIT DISPENSE STATUS: NORMAL
UNIT PRODUCT CODE: NORMAL
UNIT RH: NORMAL
WBC # BLD AUTO: 11.41 THOUSAND/UL (ref 4.31–10.16)

## 2017-11-03 PROCEDURE — 84100 ASSAY OF PHOSPHORUS: CPT | Performed by: PHYSICIAN ASSISTANT

## 2017-11-03 PROCEDURE — 82570 ASSAY OF URINE CREATININE: CPT | Performed by: PHYSICIAN ASSISTANT

## 2017-11-03 PROCEDURE — 85027 COMPLETE CBC AUTOMATED: CPT | Performed by: PHYSICIAN ASSISTANT

## 2017-11-03 PROCEDURE — 83935 ASSAY OF URINE OSMOLALITY: CPT | Performed by: PHYSICIAN ASSISTANT

## 2017-11-03 PROCEDURE — 83735 ASSAY OF MAGNESIUM: CPT | Performed by: PHYSICIAN ASSISTANT

## 2017-11-03 PROCEDURE — 82330 ASSAY OF CALCIUM: CPT | Performed by: PHYSICIAN ASSISTANT

## 2017-11-03 PROCEDURE — 84295 ASSAY OF SERUM SODIUM: CPT | Performed by: PHYSICIAN ASSISTANT

## 2017-11-03 PROCEDURE — 71010 HB CHEST X-RAY 1 VIEW FRONTAL (PORTABLE): CPT

## 2017-11-03 PROCEDURE — 82948 REAGENT STRIP/BLOOD GLUCOSE: CPT

## 2017-11-03 PROCEDURE — 84300 ASSAY OF URINE SODIUM: CPT | Performed by: PHYSICIAN ASSISTANT

## 2017-11-03 PROCEDURE — 80053 COMPREHEN METABOLIC PANEL: CPT | Performed by: PHYSICIAN ASSISTANT

## 2017-11-03 PROCEDURE — 83930 ASSAY OF BLOOD OSMOLALITY: CPT | Performed by: PHYSICIAN ASSISTANT

## 2017-11-03 RX ORDER — TAMSULOSIN HYDROCHLORIDE 0.4 MG/1
0.4 CAPSULE ORAL
Status: DISCONTINUED | OUTPATIENT
Start: 2017-11-03 | End: 2017-12-18

## 2017-11-03 RX ORDER — HEPARIN SODIUM 5000 [USP'U]/ML
5000 INJECTION, SOLUTION INTRAVENOUS; SUBCUTANEOUS EVERY 8 HOURS SCHEDULED
Status: DISCONTINUED | OUTPATIENT
Start: 2017-11-03 | End: 2017-12-23 | Stop reason: HOSPADM

## 2017-11-03 RX ORDER — FAMOTIDINE 20 MG/1
20 TABLET, FILM COATED ORAL DAILY
Status: DISCONTINUED | OUTPATIENT
Start: 2017-11-04 | End: 2017-11-03

## 2017-11-03 RX ORDER — AMLODIPINE BESYLATE 5 MG/1
5 TABLET ORAL DAILY
Status: DISCONTINUED | OUTPATIENT
Start: 2017-11-03 | End: 2017-11-30

## 2017-11-03 RX ORDER — AMOXICILLIN 250 MG
1 CAPSULE ORAL 2 TIMES DAILY
Status: DISCONTINUED | OUTPATIENT
Start: 2017-11-03 | End: 2017-12-04

## 2017-11-03 RX ORDER — OXYCODONE HYDROCHLORIDE 5 MG/1
5 TABLET ORAL EVERY 8 HOURS
Status: DISCONTINUED | OUTPATIENT
Start: 2017-11-03 | End: 2017-11-06

## 2017-11-03 RX ADMIN — SODIUM CHLORIDE, SODIUM LACTATE, POTASSIUM CHLORIDE, AND CALCIUM CHLORIDE 125 ML/HR: .6; .31; .03; .02 INJECTION, SOLUTION INTRAVENOUS at 10:07

## 2017-11-03 RX ADMIN — HYDROMORPHONE HYDROCHLORIDE 1 MG: 1 INJECTION, SOLUTION INTRAMUSCULAR; INTRAVENOUS; SUBCUTANEOUS at 13:47

## 2017-11-03 RX ADMIN — HYDROMORPHONE HYDROCHLORIDE 1 MG: 1 INJECTION, SOLUTION INTRAMUSCULAR; INTRAVENOUS; SUBCUTANEOUS at 06:00

## 2017-11-03 RX ADMIN — ACETAMINOPHEN 975 MG: 325 TABLET ORAL at 09:33

## 2017-11-03 RX ADMIN — NICOTINE 1 PATCH: 14 PATCH, EXTENDED RELEASE TRANSDERMAL at 09:34

## 2017-11-03 RX ADMIN — HEPARIN SODIUM 5000 UNITS: 5000 INJECTION, SOLUTION INTRAVENOUS; SUBCUTANEOUS at 22:22

## 2017-11-03 RX ADMIN — SODIUM CHLORIDE, SODIUM LACTATE, POTASSIUM CHLORIDE, AND CALCIUM CHLORIDE 125 ML/HR: .6; .31; .03; .02 INJECTION, SOLUTION INTRAVENOUS at 01:16

## 2017-11-03 RX ADMIN — ACETAMINOPHEN 975 MG: 325 TABLET ORAL at 01:21

## 2017-11-03 RX ADMIN — HYDROMORPHONE HYDROCHLORIDE 1 MG: 1 INJECTION, SOLUTION INTRAMUSCULAR; INTRAVENOUS; SUBCUTANEOUS at 00:16

## 2017-11-03 RX ADMIN — OXYCODONE HYDROCHLORIDE 5 MG: 5 TABLET ORAL at 13:51

## 2017-11-03 RX ADMIN — OXYCODONE HYDROCHLORIDE 5 MG: 5 TABLET ORAL at 06:50

## 2017-11-03 RX ADMIN — HEPARIN SODIUM 5000 UNITS: 5000 INJECTION, SOLUTION INTRAVENOUS; SUBCUTANEOUS at 13:52

## 2017-11-03 RX ADMIN — Medication 1 TABLET: at 11:21

## 2017-11-03 RX ADMIN — FAMOTIDINE 20 MG: 10 INJECTION, SOLUTION INTRAVENOUS at 09:33

## 2017-11-03 RX ADMIN — Medication 1 TABLET: at 18:26

## 2017-11-03 RX ADMIN — HYDRALAZINE HYDROCHLORIDE 10 MG: 20 INJECTION INTRAMUSCULAR; INTRAVENOUS at 09:52

## 2017-11-03 RX ADMIN — OXYCODONE HYDROCHLORIDE 5 MG: 5 TABLET ORAL at 22:21

## 2017-11-03 RX ADMIN — CALCIUM GLUCONATE 1 G: 94 INJECTION, SOLUTION INTRAVENOUS at 06:00

## 2017-11-03 RX ADMIN — AMLODIPINE BESYLATE 5 MG: 5 TABLET ORAL at 11:21

## 2017-11-03 RX ADMIN — TAMSULOSIN HYDROCHLORIDE 0.4 MG: 0.4 CAPSULE ORAL at 17:11

## 2017-11-03 RX ADMIN — HYDROMORPHONE HYDROCHLORIDE 1 MG: 1 INJECTION, SOLUTION INTRAMUSCULAR; INTRAVENOUS; SUBCUTANEOUS at 09:32

## 2017-11-03 RX ADMIN — ACETAMINOPHEN 975 MG: 325 TABLET ORAL at 18:26

## 2017-11-03 RX ADMIN — HYDROMORPHONE HYDROCHLORIDE 1 MG: 1 INJECTION, SOLUTION INTRAMUSCULAR; INTRAVENOUS; SUBCUTANEOUS at 03:49

## 2017-11-03 NOTE — PROGRESS NOTES
11/03/17 1800   Clinical Encounter Type   Visited With Patient   Routine Visit Follow-up   Congregational Encounters   Congregational Needs Prayer

## 2017-11-03 NOTE — PROGRESS NOTES
Progress Note - [address] 43 y o  male MRN: [id number]  Unit/Bed#: ICU 07 Encounter: [id number]    Brief Overview: Patient presents from [facility] with left flank pain  Denied trauma  Left sided renal injury with hematoma  From SLA became hypotensive went to OR POD #2 from ex lap w/ nephrectomy (Left) where found to have ruptured kidney w/ hemorrhage 2/2 look yesterday in OR  Plan:     Neuro:   - Post Operative pain currently has Dilaudid PRN, will institute scheduled oxycodone 5mg q 8hrs  - GCS 15, had vague "blurry vision" but normal exam, normal finger counting, motor/sensory exam  - Extubated O/N; therefore fentanyl/propofol gtt is off  - Nicotine patch  - Dilaudid 1 mg q 2 hrs has used x 3  - Tylenol 975 mg q 8 hrs     CV: Has been HTNsive goal per Op notes is <160, has been needing PRN hydralazine x 1/Labetalol x 1  - States has been told he has HTN; not on any meds controlled by watching "Salt"     Lung:   - Extubated overnight - doing well, on NC will transition to RA  - CXR in AM as crackles in lungs - IS (Poor Insp film but possibly increased congestion/infiltrate/atelectasis LLL)     GI:   - Per Surgery allowed to have sips with meds  - Ex lap with nephrectomy second look yesterday  - Stress ulcer ppx till allowed sig PO - Pepcid     FEN:   - End pts have cleared  - Is hyperchloremic and Hypernatremic is on  ml/hr until cleared for PO  - Calcium replete    :   - S/P left nephrectomy has underlying right hydronephrosis  - Thakur present ---> can remove today  - Renal fxn 2 47----->2 12---->2 18---->2 17 (Baseline 1 13)  - Takes flomax at home chronically will restart when able to have PO  - Sig urination 3-4 ml/kg/hr  - Overall -15 mL likely negative much more due to OR and open abd     ID:   - Has been persistently febrile overnight started scheduled tylenol fevers from Tmax 102 9 now to 100 8  Etiology possibly SIRS vs transfusion related vs infectious    Had contaminated urine but appears chronic in samples  CXR initially unremarkable  - WBC 7 18------>11 41  - Will hold off on antibiotics and monitor fever curve     Heme:   - ABLA has now stabilized received multiple blood products in OR (See prior notes)  - HGB 8 9----->9 3----->10 3  - Restart anti-coagulation will need heparin has WOLF     Endo: Glucose controlled, no active issues                Msk/Skin:   - OOB today per Surgery  - PT/OT     Disposition: Possibly transfer out of ICU today if remains HD stable    Chief Complaint: None    HPI/24hr events: Extubated, doing well NC; states "Im thirsty " Denies CP, SOB  Has abd pain greatest RLQ    Physical Exam:   Gen: AAO x 3; slightly diaphoretic, not distressed  HEENT: Moist oral mucosa, no epistaxis, Trachea midline  Cardiac: RRR, no murmurs  Pulm: Coarse sounds b/l bases  Abd: Midline incision, no drainage, TTP with mild pressure RLQ> LLQ  Lower ext: Cuffs; SCD's;   Neuro: GCS 15, CN 2-12 intact, Motor 5/5 and symmetrical although pain with resisting right arm  Derm: No flank ecchymosis or rash    Vitals:    17 0300 17 0400 17 0500 17 0600   BP:       Pulse: 90 92 84 (!) 110   Resp: (!) 25 21 20 (!) 34   Temp: (!) 100 8 °F (38 2 °C) (!) 100 8 °F (38 2 °C) (!) 100 8 °F (38 2 °C) 100 4 °F (38 °C)   TempSrc:  Rectal     SpO2: 98% 96% 97% 96%   Weight:       Height:         Arterial Line BP: 152/72  Arterial Line MAP (mmHg): 100 mmHg    Temperature:   Temp (24hrs), Av °F (38 3 °C), Min:98 3 °F (36 8 °C), Max:102 9 °F (39 4 °C)    Current: Temperature: 100 4 °F (38 °C)    Weights:   IBW: 63 8 kg    Body mass index is 31 74 kg/m²    Weight (last 2 days)     Date/Time   Weight    17 0400  89 2 (196 65)    17 00:48:12  85 (187 39)              Hemodynamic Monitoring:  N/A     Non-Invasive/Invasive Ventilation Settings:  Respiratory    Lab Data (Last 4 hours)    None         O2/Vent Data (Last 4 hours)    None              Lab Results Component Value Date    PHART 7 439 11/02/2017    EFP7XXM 40 8 11/02/2017    PO2ART 130 5 (H) 11/02/2017    KLL0GFO 27 0 11/02/2017    BEART 2 7 11/02/2017    SOURCE Line, Arterial 11/02/2017     SpO2: SpO2: 96 %    Intake and Outputs:  I/O       11/01 0701 - 11/02 0700 11/02 0701 - 11/03 0700    I V  (mL/kg) 242 9 (2 7) 5484 7 (61 5)    Blood 700     IV Piggyback 1000 2250    Total Intake(mL/kg) 1942 9 (21 8) 7734 7 (86 7)    Urine (mL/kg/hr) 2225 6900 (3 2)    Emesis/NG output 0     Drains 550 750 (0 4)    Blood  100 (0)    Total Output 2775 7750    Net -832 1 -15 3              UOP: 3-4 ml/kg/hr/hour     Nutrition:        Diet Orders            Start     Ordered    11/03/17 0556  Diet NPO; Sips with meds  Diet effective now     Question Answer Comment   Diet Type NPO    NPO Except: Sips with meds    RD to adjust diet per protocol? No        11/03/17 0555        TF currently running at: No TF    Labs:     Results from last 7 days  Lab Units 11/03/17 0438 11/02/17 2233 11/02/17  1519 11/02/17  1047 11/02/17  0458  11/02/17  0052   WBC Thousand/uL 11 41*  --  7 18 7 77 11 80*  --  19 45*   HEMOGLOBIN g/dL 9 7* 10 3* 9 3* 8 9* 11 3*  --  6 9*   I STAT HEMOGLOBIN   --   --   --   --   --   < >  --    HEMATOCRIT % 27 6* 29 9* 26 6* 24 9* 33 4*  --  20 7*   PLATELETS Thousands/uL 128*  --  119* 116* 147*  --  221   NEUTROS PCT %  --   --  76*  --  80*  --  88*   MONOS PCT %  --   --  8  --  11  --  7   < > = values in this interval not displayed       Results from last 7 days  Lab Units 11/03/17  0438 11/02/17  1519 11/02/17  1047 11/02/17  0507   SODIUM mmol/L 152* 152* 152* 149*   POTASSIUM mmol/L 3 8 3 7 3 6 3 9   CHLORIDE mmol/L 119* 120* 120* 117*   CO2 mmol/L 26 26 27 20*   BUN mg/dL 20 21 22 21   CREATININE mg/dL 2 17* 2 18* 2 12* 2 47*   CALCIUM mg/dL 8 1* 7 8* 8 0* 8 7   TOTAL PROTEIN g/dL 5 1*  --  4 6* 5 7*   BILIRUBIN TOTAL mg/dL 0 84  --  1 41* 1 30*   ALK PHOS U/L 47  --  43* 54   ALT U/L 13  --  17 23 AST U/L 20  --  18 25   GLUCOSE RANDOM mg/dL 96 97 89 168*       Results from last 7 days  Lab Units 11/03/17  0438 11/02/17  1519 11/02/17  1047   MAGNESIUM mg/dL 2 2 1 7 1 7       Results from last 7 days  Lab Units 11/03/17  0438 11/02/17  1047 11/02/17  0507   PHOSPHORUS mg/dL 3 1 2 9 4 4        Results from last 7 days  Lab Units 11/02/17  1047 11/02/17  0052 11/01/17  2205   INR  1 33* 1 37* 1 05   PTT seconds  --  27 25       Results from last 7 days  Lab Units 11/02/17  1519   LACTIC ACID mmol/L 1 3       0  Lab Value Date/Time   TROPONINI <0 04 04/23/2015 2325       Imaging: Repeat CXR possible fullness LLL per my read    Micro:  Lab Results   Component Value Date    URINECX >100,000 cfu/ml Mixed Contaminants X3 02/15/2016       Allergies: No Known Allergies    Medications:   Scheduled Meds:    acetaminophen 975 mg Oral Q8H   famotidine 20 mg Intravenous Daily   nicotine 1 patch Transdermal Daily   oxyCODONE 5 mg Oral Q8H   tamsulosin 0 4 mg Oral Daily With Dinner     Continuous Infusions:    lactated ringers 125 mL/hr Last Rate: 125 mL/hr (11/03/17 0116)     PRN Meds:    hydrALAZINE 10 mg Q6H PRN   HYDROmorphone 1 mg Q2H PRN   labetalol 10 mg Q6H PRN   sodium chloride  Code/Trauma/Sedation Med       VTE Pharmacologic Prophylaxis: Sequential compression device (Venodyne)   VTE Mechanical Prophylaxis: sequential compression device    Invasive lines and devices: Invasive Devices     Peripheral Intravenous Line            Peripheral IV Left Wrist -- days    Peripheral IV 11/01/17 Right Antecubital 1 day    Peripheral IV 11/02/17 Left;Upper Arm less than 1 day          Drain            Urethral Catheter Coude 16 Fr  1 day                 Code Status: Level 1 - Full Code     Portions of the record may have been created with voice recognition software  Occasional wrong word or "sound a like" substitutions may have occurred due to the inherent limitations of voice recognition software    Read the chart carefully and recognize, using context, where substitutions have occurred       Fiorella Million, DO

## 2017-11-03 NOTE — PROGRESS NOTES
POST OP CHECK     Procedure: 2nd look laparotomy, ligation of left ureter, I/D with closure    Patient seen in ICU post procedure, extubated and awake  Complains of abdominal pain and feeling like he has "fluid" in his lungs that he needs to cough up, but it hurts his abdomen to cough  No N/V  Vitals:    11/02/17 1824   BP: (!) 185/91   Pulse:    Resp:    Temp:    SpO2:        Physical Exam:   Gen: A&O, NAD  Cardio: RRR  Lungs: CTAB  Abd: Soft, non distended  Voluntary guarding   Dressing to midline c/d/i  Ext: Warm, dry    Post op labs:  WBC: 7 18  Hb: 9 3  LA: 1 3   Cr: 2 19    Plan:  - Continue fentanyl gtt for pain control  - NPO, mouth swabs for comfort  - IVF  - Electrolytes repleted  - OOB tomorrow  - Encourage IS, pulm toilet

## 2017-11-03 NOTE — PROGRESS NOTES
11/03/17 0900   [employer] [address] [place] Involvement Patient not active with [employer];Jew active in support   Spiritual Beliefs/Perceptions   Concept of God Accepting   God's Role in Disease Natural   Relationship with God Close   Psychosocial   Psychosocial (WDL) WDL   Patient Behaviors/Mood Appropriate for situation   Length of Time/Family Visitation 16-30 min   Stress Factors   Patient Stress Factors Health changes   Coping Responses   Patient Coping Accepting;Open/discussion   Plan of Care   Comments PT spoke of being [place] and accepted prayer and spiritual comfort     Assessment Completed by: Unit visit     Gavi Pablo

## 2017-11-03 NOTE — CONSULTS
CONSULT    Patient Name: Fernandez Bryant  Patient MRN: [id number]  Date of Service: 11/3/2017   Date / Time Note Created: 11/3/2017 4:47 PM   Referring Provider: Dr Salazar Apodaca  Provider Creating Note: JANICE Soni    PCP: No primary care provider on file  Attending Provider:  Dereck Almanzar MD    Reason for Consult: Flank Pain    HPI-- Mr Lynnette Rubio is a 80-year-old incarcerated male known to our service of bilateral hydronephrosis and intermittent chronic urinary retention transferred [facility] and then [facility] secondary to left flank pain  CT revealed severe left hydronephrosis and large 17 cm collection of perinephric fluid/blood extending to left pelvis and anterior to left psoas  Patient became hemodynamically unstable required ICU admission  Patient was taken to the admitting trauma team now status post exploratory laparotomy left nephro ureterectomy and subsequent second-look exploratory laparotomy, ureteral ligation and closure  Mr Lynnette Rubio remains in critical condition  Source:the chart         Active Problems:    Patient Active Problem List   Diagnosis    Kidney laceration, left       Impressions  Spontaneous left renal fracture and subsequent hemorrhage     Recommendations  Appreciate trauma team management  Patient remains critically ill and requires medical stabilization  Will follow peripherally  Please contact us with any urologic surgical needs  Past Medical History:   Diagnosis Date    Enlarged prostate     UTI (urinary tract infection)        Past Surgical History:   Procedure Laterality Date    LAPAROTOMY N/A 11/2/2017    Procedure: LAPAROTOMY EXPLORATORY 2nd LOOK, ligation of left ureter, I/D with closure;  Surgeon: Chico Hines MD;  Location: [facility];  Service: General       No family history on file      Social History     Social History    Marital status: Single     Spouse name: N/A    Number of children: N/A    Years of education: N/A Occupational History    Not on file       Social History Main Topics    Smoking status: Current Every Day Smoker    Smokeless tobacco: Never Used    Alcohol use Yes      Comment: "socially"    Drug use: No    Sexual activity: Not on file     Other Topics Concern    Not on file     Social History Narrative    No narrative on file       No Known Allergies    Review of Systems  10 point review of systems negative except as noted in HPI  Review of Systems - History obtained from unobtainable from patient due to acuity    Chart Review   Allergies, current medications, history, problem list    Vital Signs  BP (!) 180/85   Pulse (!) 114   Temp (!) 101 5 °F (38 6 °C)   Resp (!) 28   Ht 5' 6" (1 676 m)   Wt 89 2 kg (196 lb 10 4 oz)   SpO2 100%   BMI 31 74 kg/m²     Physical Exam  General appearance: syndromic appearance - Critically ill  Head: Normocephalic, without obvious abnormality, atraumatic  Neck: no adenopathy, no carotid bruit, no JVD, supple, symmetrical, trachea midline and thyroid not enlarged, symmetric, no tenderness/mass/nodules  Lungs: diminished breath sounds bilaterally and Mechanically ventilated  Heart: regular rate and rhythm, S1, S2 normal, no murmur, click, rub or gallop  Abdomen: abnormal findings:  Abdominal incision  Extremities: extremities normal, atraumatic, no cyanosis or edema  Pulses: 2+ and symmetric  Neurologic: Mental status: alertness: stuperous  Thakur secured patent for clear yellow urine     Laboratory Studies  Lab Results   Component Value Date     (H) 11/03/2017     05/05/2015    K 3 8 11/03/2017    K 4 2 05/05/2015     (H) 11/03/2017     05/05/2015    CO2 26 11/03/2017    CO2 30 05/05/2015    GLUCOSE 96 11/03/2017    GLUCOSE 184 (H) 11/02/2017    GLUCOSE 92 05/05/2015    CREATININE 2 17 (H) 11/03/2017    CREATININE 1 27 05/05/2015    BUN 20 11/03/2017    BUN 15 05/05/2015    MG 2 2 11/03/2017    MG 2 1 04/24/2015    PHOS 3 1 11/03/2017     Lab Results   Component Value Date    WBC 11 41 (H) 11/03/2017    WBC 10 60 (H) 05/05/2015    RBC 3 22 (L) 11/03/2017    RBC 4 07 05/05/2015    HGB 9 7 (L) 11/03/2017    HGB 11 6 (L) 05/05/2015    HCT 27 6 (L) 11/03/2017    HCT 36 7 05/05/2015    MCV 86 11/03/2017    MCV 90 05/05/2015    MCH 30 1 11/03/2017    MCH 28 5 05/05/2015    RDW 15 0 11/03/2017    RDW 14 7 05/05/2015     (L) 11/03/2017     (H) 05/05/2015       Imaging and Other Studies  )  Xr Chest Portable    Result Date: 11/3/2017  Narrative: CHEST INDICATION:  Endotracheal tube COMPARISON:  11/2/2017 at 4:58 AM VIEWS:   AP frontal IMAGES:  1 FINDINGS:  Endotracheal tube and nasogastric tube have been removed  Mild cardiomegaly is stable  Mild pulmonary vascular congestion is stable  There are no focal consolidations, pleural effusions, or pneumothorax  Visualized osseous structures appear within normal limits for the patient's age  Impression: 1  Status post removal of endotracheal tube and nasogastric tube  2   Unchanged mild pulmonary vascular congestion and mild cardiomegaly  Workstation performed: [id number]     Ct Chest Without Contrast    Result Date: 11/1/2017  Narrative: CT CHEST WITHOUT IV CONTRAST INDICATION:  Left flank pain COMPARISON: None  TECHNIQUE: CT examination of the chest was performed without intravenous contrast   Reformatted images were created in axial, sagittal, and coronal planes  Radiation dose length product (DLP) for this visit:  326 mGy-cm   This examination, like all CT scans performed in the [facility], was performed utilizing techniques to minimize radiation dose exposure, including the use of iterative reconstruction and automated exposure control  FINDINGS: LUNGS:  Right middle lobe subsegmental atelectasis  There is no tracheal or endobronchial lesion  PLEURA:  Small bilateral pleural effusions  HEART/GREAT VESSELS:  Unremarkable for patient's age   MEDIASTINUM AND CESAR: Unremarkable  CHEST WALL AND LOWER NECK:       Normal  VISUALIZED STRUCTURES IN THE UPPER ABDOMEN:  With refer to CT abdomen pelvis  OSSEOUS STRUCTURES:  Small 1 cm osseous density at the proximal aspect of the left clavicle, correlate for any history of trauma to this region  Old appearing left coracoid fracture  No destructive osseous lesion  Impression: 1  Small bilateral pleural effusions  2  Please refer to CT abdomen pelvis for significant abdomen and pelvis findings  3  Small 1 cm osseous density at the proximal aspect of the left clavicle, correlate for any history of trauma to this region  Old appearing left coracoid fracture  Workstation performed: [id number]     X-ray Chest 1 View    Result Date: 11/2/2017  Narrative: CHEST  INDICATION: Postop exam COMPARISON: May 5, 2015 VIEWS:  AP frontal IMAGES:  1 FINDINGS: Lungs are adequately aerated  Slight elevation right hemidiaphragm  Cardiomegaly with pulmonary edema and scattered atelectatic changes  Endotracheal tube tip 1 cm above lauren, this could be retracted 2 cm for more optimal position  NG tube in the stomach  Sponge markers or drains in the left upper abdomen  Visualized osseous structures appear within normal limits for the patient's age  Impression: Diminished lung volumes with vascular crowding  Mild pulmonary edema  Endotracheal tube just above lauren, suggest retraction x 2 cm for more optimal position  ##cfslh I personally discussed this result with Wilda Michelle on 11/2/2017 9:24 AM  ##  Workstation performed: [id number]     Ct Renal Stone Study Abdomen Pelvis Without Contrast    Result Date: 11/1/2017  Narrative: CT ABDOMEN AND PELVIS WITHOUT IV CONTRAST - LOW DOSE RENAL STONE INDICATION: Acute renal failure COMPARISON: None   TECHNIQUE:  Low dose thin section CT examination of the abdomen and pelvis was performed without intravenous or oral contrast according to a protocol specifically designed to evaluate for urinary tract calculus  Reformatted images were created in axial,  sagittal, and coronal planes  Evaluation for pathology in the abdomen and pelvis that is unrelated to urinary tract calculi is limited  Radiation dose length product (DLP) for this visit:  257 mGy-cm   This examination, like all CT scans performed in the [facility], was performed utilizing techniques to minimize radiation dose exposure, including the use of iterative reconstruction and automated exposure control  FINDINGS: RIGHT KIDNEY AND URETER: No urinary tract calculi  Right hydroureteronephrosis extending all the way to the bladder without a obstructing stone identified  No perinephric collection  LEFT KIDNEY AND URETER: No urinary tract calculi  Severe left hydroureteronephrosis with hyperdense material in the left urinary collecting system  The left kidney is massively enlarged measuring approximately 17 cm in cranial caudal dimension and contains hyperdense material throughout the cortex and medullary pyramids which could suggest blood although I cannot exclude a superimposed transitional cell carcinoma  There is left perinephric fluid/blood extending to the left pelvis anterior to the left psoas muscle extraperitoneal region  There is high density material throughout the left massively dilated collecting system which could represent blood  I can't exclude a left subcapsular perirenal hematoma  Constellation of findings suggest urothelial/urinary collecting system hemorrhage of indeterminate cause  URINARY BLADDER: Bladder wall thickening  Small bilateral pleural effusions  No significant abnormality in the visualized lung bases  Limited low radiation dose noncontrast CT evaluation demonstrates no clinically significant abnormality of liver, spleen, pancreas, or adrenal glands  No calcified gallstones or gallbladder wall thickening noted  No lymphadenopathy  Small amount of left posterior splenic fluid/blood   Bowel loops appear unremarkable  Limited evaluation demonstrates no evidence to suggest acute appendicitis  No acute fracture or destructive osseous lesion is identified  Impression: Severe left hydroureteronephrosis with hyperdense material in the left urinary collecting system  The left kidney is massively enlarged measuring approximately 17 cm in craniocaudal dimension and contains hyperdense material throughout the cortex and medullary pyramids which could suggest blood (intrarenal hematoma) although I cannot exclude a superimposed transitional cell carcinoma  There is left perinephric fluid/blood extending to the left pelvis anterior to the left psoas muscle extraperitoneal region  There is high density material throughout the left massively dilated collecting system which could represent blood although superimposed urothelial neoplasm cannot be excluded  Constellation of findings suggest urothelial/urinary collecting system hemorrhage of indeterminate cause  Please proceed with urology consult for possible percutaneous drainage if warranted by urology  Right hydroureteronephrosis extending all the way to the bladder without a obstructing stone identified  Diffuse bladder wall thickening, consider bladder ultrasound  There is a small amount of left posterior splenic fluid/blood  Small bilateral pleural effusions  ##cfslh I personally discussed this result with Ivett Syed on 11/1/2017 9:49 PM  ## Workstation performed: [id number]       Medications     acetaminophen 975 mg Oral Q8H   amLODIPine 5 mg Oral Daily   heparin (porcine) 5,000 Units Subcutaneous Q8H [address]   oxyCODONE 5 mg Oral Q8H   senna-docusate sodium 1 tablet Oral BID   tamsulosin 0 4 mg Oral Daily With Dinner         Total time spent with patient 20 minutes, >50% spent counseling and/or coordination of care           JANICE [place]

## 2017-11-03 NOTE — CASE MANAGEMENT
[address] [place] [address] in the [facility] by Parrish Barry for 2017  Network Utilization Review Department  Phone: [phone]; Fax [phone]  ATTENTION: The Network Utilization Review Department is now centralized for our 7 Facilities  Please call with any questions or concerns to [phone] and carefully follow the prompts so that you are directed to the right person  All voicemails are confidential  Fax any determinations, approvals, denials, and requests for initial or continue stay review clinical to [phone]  Due to HIGH CALL volume, it would be easier if you could please send faxed requests to expedite your requests and in part, help us provide discharge notifications faster   /////////////////////////////////////////////////////////////////////////////////////////////////////////////////    Initial Clinical Review    Admission: Date/Time/Statement: 11/2/17 @ 0343     Orders Placed This Encounter   Procedures    Inpatient Admission     Standing Status:   Standing     Number of Occurrences:   1     Order Specific Question:   Admitting Physician     Answer:   Meenu Ham [[id number]]     Order Specific Question:   Level of Care     Answer:   Critical Care [15]     Order Specific Question:   Estimated length of stay     Answer:   More than 2 Midnights     Order Specific Question:   Certification     Answer:   I certify that inpatient services are medically necessary for this patient for a duration of greater than two midnights  See H&P and MD Progress Notes for additional information about the patient's course of treatment           ED: Date/Time/Mode of Arrival:   ED Arrival Information     Expected Arrival Acuity Means of Arrival Escorted By Service Admission Type    - 11/2/2017 00:43 Immediate Ambulance SLETS DEPARTMENT [facility] Surgery-[address] Complaint    Left flank pain, hematuria, chest pain          Chief Complaint:   Chief Complaint   Patient presents with    Flank Pain       History of Illness: 42M who is currently incarcerated and with hx of chronic bilateral hydronephrosis who presented to OSH with left flank pain  He denied any trauma but was found to have severe bilateral hydronephrosis with left GIV renal injury with significant hyperdense fluid in the collection system  He was hemodynamically stable at OSH but became hypotensive en route (31N systolic)  His initial Hb was 10 3 at OSH but had declined to 6 5 in the trauma bay with a base deficit of -7  He was pale and diaphoretic with +FAST  Given these acute findings , he was taken emergently to the OR for exploratory laparotomy       Transfused 2U PRBC en route to Or and subsequently underwent ex-lap, L nephrectomy, temporary abdominal closure  See operative report  Attempts to place 3 way jiménez (18Fr) unsuccessful and a 16Fr coude was placed with drainage of mir blood  This began clearing up to clear yellow urine by the end of the case without clots noted  He was transfused a total of 7U PRBC (+2 pre-op), 6U FFP, 2U Plt and 260 mL cellsaver  EBL 2000 mL  He was subsequently admitted to the ICU for post-operative care      ED Vital Signs:   ED Triage Vitals   Temperature Pulse Respirations Blood Pressure SpO2   11/02/17 0048 11/02/17 0048 11/02/17 0048 11/02/17 0048 11/02/17 0048   (!) 100 8 °F (38 2 °C) 102 16 109/60 92 %      Temp Source Heart Rate Source Patient Position - Orthostatic VS BP Location FiO2 (%)   11/02/17 0048 11/02/17 0048 11/02/17 0048 -- 11/02/17 0400   Tympanic Monitor Lying  40      Pain Score       11/02/17 0426       Worst Possible Pain        Wt Readings from Last 1 Encounters:   11/02/17 89 2 kg (196 lb 10 4 oz)     ED Treatment:   Medication Administration from 11/02/2017 0032 to 11/02/2017 8073       Date/Time Order Dose Route Action Action by Comments     11/02/2017 0055 sodium chloride 0 9 % bolus 2,000 mL Intravenous Given Xu Ramos RN Past Medical/Surgical History: Active Ambulatory Problems     Diagnosis Date Noted    No Active Ambulatory Problems     Resolved Ambulatory Problems     Diagnosis Date Noted    No Resolved Ambulatory Problems     Past Medical History:   Diagnosis Date    Enlarged prostate     UTI (urinary tract infection)        Admitting Diagnosis: Chest pain [R07 9]  Laceration of left kidney, initial encounter [M18 331R]      Assessment/Plan   Trauma Alert: Level A trauma transfer from [facility]     1  Left-sided grade IV fracture of kidney  2  Hematuria  3  Hypotension  4  History of bladder atony and b/l hydroureteronephrosis  5  Incarcerated     Trauma Plan:  1  To OR for emergent ex-lap     Admission Orders:  Scheduled Meds:   acetaminophen 975 mg Oral Q8H   amLODIPine 5 mg Oral Daily   [START ON 11/4/2017] famotidine 20 mg Oral Daily   heparin (porcine) 5,000 Units Subcutaneous Q8H [facility]   oxyCODONE 5 mg Oral Q8H   senna-docusate sodium 1 tablet Oral BID   tamsulosin 0 4 mg Oral Daily With Dinner     11/2  Taken to OR for ex lap and left nephrectomy with ABTHera after being found to have spontaneous rupture of left kidney and massive retroperitoneal hemorrhage  LAPAROTOMY EXPLORATORY ;LEFT NEPHRECTOMY; LEFT URETERECTOMY; APPLICATION OF ABTHERA VAC      Findings:   Spontaneous left kidney rupture  Massive retroperitoneal hemorrhage     Estimated Blood Loss: 2L  7u PRBC, 6 FFP, 1 PLT  Abthera vac dressing placed and patient transported to ICU in guarded, but stable condition    11/3  Surgery  Assessment:  43y o -year-old male with spontaneous left kidney rupture   - s/p ex lap, left nephrectomy, abthera 11/1 Danae Connor)  - s/p ex lap, removal of packs, ligation of left ureter, abdominal closure 11/2 Justyna Lester   Plan:  - ok for sips  - f/u with urology regarding hydronephrosis of left kidney-->may need stent?  Creatinine is stable right now 2 2  - OOB/ambulate  - ok for oral pain meds    11/3  Critical Care     Neuro:   - Post Operative pain currently has Dilaudid PRN, will institute scheduled oxycodone 5mg q 8hrs  - GCS 15, had vague "blurry vision" but normal exam, normal finger counting, motor/sensory exam  - Extubated O/N; therefore fentanyl/propofol gtt is off  - Nicotine patch  - Dilaudid 1 mg q 2 hrs has used x 3  - Tylenol 975 mg q 8 hrs     CV: Has been HTNsive goal per Op notes is <160, has been needing PRN hydralazine x 1/Labetalol x 1  - States has been told he has HTN; not on any meds controlled by watching "Salt"     Lung:   - Extubated overnight - doing well, on NC will transition to RA  - CXR in AM as crackles in lungs - IS (Poor Insp film but possibly increased congestion/infiltrate/atelectasis LLL)     GI:   - Per Surgery allowed to have sips with meds  - Ex lap with nephrectomy second look yesterday  - Stress ulcer ppx till allowed sig PO - Pepcid     FEN:   - End pts have cleared  - Is hyperchloremic and Hypernatremic is on  ml/hr until cleared for PO  - Calcium replete     :   - S/P left nephrectomy has underlying right hydronephrosis  - Thakur present ---> can remove today  - Renal fxn 2 47----->2 12---->2 18---->2 17 (Baseline 1 13)  - Takes flomax at home chronically will restart when able to have PO  - Sig urination 3-4 ml/kg/hr  - Overall -15 mL likely negative much more due to OR and open abd     ID:   - Has been persistently febrile overnight started scheduled tylenol fevers from Tmax 102 9 now to 100 8  Etiology possibly SIRS vs transfusion related vs infectious  Had contaminated urine but appears chronic in samples  CXR initially unremarkable      - WBC 7 18------>11 41  - Will hold off on antibiotics and monitor fever curve     Heme:   - ABLA has now stabilized received multiple blood products in OR (See prior notes)  - HGB 8 9----->9 3----->10 3  - Restart anti-coagulation will need heparin has WOLF     Endo: Glucose controlled, no active issues                Msk/Skin:   - OOB today per Surgery  - PT/OT     Disposition: Possibly transfer out of ICU today if remains HD stable

## 2017-11-03 NOTE — PROGRESS NOTES
11/03/17 1800   Satish Hickey 1634   Spiritual Beliefs/Perceptions   Concept of God Accepting   Plan of Care   Comments Pt  visited supported pt  by listening and payer support     Assessment Completed by: Unit visit

## 2017-11-03 NOTE — RESTORATIVE TECHNICIAN NOTE
Restorative Specialist Mobility Note       Activity: Ambulate in room, Chair (took steps from bed to chair  )           Ambulation Response: Tolerated well  Repositioned: Sitting, Up in chair           Range of Motion: Active, All extremities  Anti-Embolism Device On:  Bilateral, Sequential compression devices, below knee

## 2017-11-03 NOTE — PROGRESS NOTES
Progress Note -   ICU Transfer to Step down/med  surg  - Deneen Ezio 43 y o  male MRN: [id number]    Unit/Bed#: ICU 07 Encounter: [id number]      Code Status: Level 1 - Full Code    Date of ICU admission: 11/2/2017    Reason for ICU adm: Chest pain [R07 9]  Laceration of left kidney, initial encounter [R51 968T]    Active problems:   Patient Active Problem List   Diagnosis    Kidney laceration, left       Summary of clinical course: 35yo male who presented 11/2 as transfer from [facility] with massive L renal injury - unclear if traumatic etiology  Was taken to OR on 11/2 for ex-lap, L nephrectomy and received multiple units of blood products  His abdomen was left open with abdominal packs and an abthera VAC and he was taken back to the OR later in the day on 11/2  His abdomen was closed after the second look laparotomy and he was returned to the ICU for resuscitation  He was successfully extubated on 11/2 after his second surgery  He did well and on 11/3 his diet was advanced to regular  Pain was well controlled but he had issues with hypertension post operatively with systolics in the 072N and tachycardic to 110s  This was deemed to be from his recent nephrectomy  He was also intermittently febrile post operatively  Recent or scheduled procedures:   11/2/17: LAPAROTOMY EXPLORATORY ;LEFT NEPHRECTOMY; LEFT URETERECTOMY; APPLICATION OF ABTHERA VAC (N/A)  11/2/17: LAPAROTOMY EXPLORATORY 2nd LOOK, ligation of left ureter, I/D with closure (N/A)    Recent diagnostics:   11/3 CXR:   IMPRESSION:   1   Status post removal of endotracheal tube and nasogastric tube  2   Unchanged mild pulmonary vascular congestion and mild cardiomegaly  11/2 CXR:  IMPRESSION:  Diminished lung volumes with vascular crowding  Mild pulmonary edema  Endotracheal tube just above lauren, suggest retraction x 2 cm for more optimal position  11/1 CT chest  IMPRESSION:  1  Small bilateral pleural effusions    2  Please refer to CT abdomen pelvis for significant abdomen and pelvis findings  3  Small 1 cm osseous density at the proximal aspect of the left clavicle, correlate for any history of trauma to this region  Old appearing left coracoid fracture  11/1 CTAP  IMPRESSION:  Severe left hydroureteronephrosis with hyperdense material in the left urinary collecting system  The left kidney is massively enlarged measuring approximately 17 cm in craniocaudal dimension and contains hyperdense material throughout the cortex and   medullary pyramids which could suggest blood (intrarenal hematoma) although I cannot exclude a superimposed transitional cell carcinoma  There is left perinephric fluid/blood extending to the left pelvis anterior to the left psoas muscle extraperitoneal   region  There is high density material throughout the left massively dilated collecting system which could represent blood although superimposed urothelial neoplasm cannot be excluded  Constellation of findings suggest urothelial/urinary collecting   system hemorrhage of indeterminate cause  Please proceed with urology consult for possible percutaneous drainage if warranted by urology      Right hydroureteronephrosis extending all the way to the bladder without a obstructing stone identified       Diffuse bladder wall thickening, consider bladder ultrasound      There is a small amount of left posterior splenic fluid/blood      Small bilateral pleural effusions  Neuro: GCS 15  Pain well controlled  Continue scheduled tylenol and oxycodone and prn dilaudid  CV:Hypertensive  Continue norvasc and prn hydralazine  Pulm: Encourage Incentive Spirometry  Titrate O2 to 92-94%  GI:  Diet as tolerated  Senokot for bowel regimen  : Monitor Intake and Output  Continue flomax  Skin: No acute issues  F/E/N: Monitor and replete lytes  FeNa suggests post renal azotemia   Monitor serum creatinine  Activity: OOB/PT    Hospital discharge planning:  Anticipate discharge to home when acute issues resolved    Transfer discussed with:  Paul Drummond MD

## 2017-11-03 NOTE — RESPIRATORY THERAPY NOTE
RT Ventilator Management Note  Deneen Holguin 43 y o  male MRN: [id number]  Unit/Bed#: ICU 07 Encounter: [id number]      Daily Screen       11/2/2017 0733 11/2/2017 6515          Patient safety screen outcome[de-identified] - Passed      Spont breathing trial % for 30 min: - No      Spont breathing trial outcome[de-identified] - Failed      Spont breathing trial reason failed: - Hemodynamic unstable              Physical Exam:   Assessment Type: Assess only  General Appearance: Alert, Awake  Respiratory Pattern: Normal, Spontaneous  Chest Assessment: Chest expansion symmetrical  Bilateral Breath Sounds: Clear, Diminished  R Breath Sounds: Clear  L Breath Sounds: Clear  Cough: None  Suction: ET Tube  O2 Device: nasal cannula      Resp Comments: Pt extubated to 2 lpm nasal cannula without difficulty  Scant amount of secretions suctioned orally and tracheal  Prior to extubation he was following commands and performed a vital capacity of 1000ml  Post extubation, pt able to verbalize and cough per instructions, no stridor noted  Will continue to assess and monitor per respiratory protocol

## 2017-11-03 NOTE — PROGRESS NOTES
Progress Note - General Surgery  Angelito Salinas 43 y o  male MRN: [id number]  Unit/Bed#: ICU 7-01 Encounter: [id number]    Assessment:  43y o -year-old male with spontaneous left kidney rupture    - s/p ex lap, left nephrectomy, abthera 11/1 Nazario Thorne)  - s/p ex lap, removal of packs, ligation of left ureter, abdominal closure 11/2 Suhail Huang)    Plan:  - ok for sips  - f/u with urology regarding hydronephrosis of left kidney-->may need stent? Creatinine is stable right now 2 2  - OOB/ambulate  - ok for oral pain meds    Tanner Saucedo MD PGY-4  5:43 AM  11/03/17      Subjective:  Pain present  No flatus yet       Objective:  Patient Vitals for the past 24 hrs:   BP Temp Temp src Pulse Resp SpO2   11/03/17 0500 - (!) 100 8 °F (38 2 °C) - 84 20 97 %   11/03/17 0400 - (!) 100 8 °F (38 2 °C) Rectal 92 21 96 %   11/03/17 0300 - (!) 100 8 °F (38 2 °C) - 90 (!) 25 98 %   11/03/17 0200 - (!) 101 8 °F (38 8 °C) - 84 20 100 %   11/03/17 0100 - (!) 102 2 °F (39 °C) - 86 (!) 0 100 %   11/03/17 0016 - (!) 100 6 °F (38 1 °C) Oral - - -   11/03/17 0000 - (!) 101 8 °F (38 8 °C) Rectal 80 (!) 24 100 %   11/02/17 2300 - (!) 102 2 °F (39 °C) - 78 18 100 %   11/02/17 2200 - (!) 101 1 °F (38 4 °C) - 80 19 97 %   11/02/17 2100 - (!) 101 8 °F (38 8 °C) - 82 21 100 %   11/02/17 2000 - (!) 101 8 °F (38 8 °C) - 100 22 100 %   11/02/17 1900 - (!) 102 9 °F (39 4 °C) - 100 19 100 %   11/02/17 1824 (!) 185/91 - - - - -   11/02/17 1800 - (!) 101 8 °F (38 8 °C) - 104 (!) 24 100 %   11/02/17 1700 - (!) 100 8 °F (38 2 °C) - 72 15 100 %   11/02/17 1600 - 100 4 °F (38 °C) - 66 14 100 %   11/02/17 1515 - - - 74 18 96 %   11/02/17 1507 - - - - - 97 %   11/02/17 1321 - 99 °F (37 2 °C) - - - -   11/02/17 1300 - 99 °F (37 2 °C) Tympanic 64 16 98 %   11/02/17 1200 - - - 78 17 100 %   11/02/17 1153 - - - 77 - 100 %   11/02/17 1100 - - - 66 20 100 %   11/02/17 1000 - - - (!) 50 13 100 %   11/02/17 0925 - - - - - 100 %   11/02/17 0900 - - - 58 14 100 % 11/02/17 0800 - 98 3 °F (36 8 °C) Tympanic 82 15 100 %   11/02/17 0733 - - - - - 100 %   11/02/17 0730 - - - 84 17 100 %   11/02/17 0700 - - - (!) 52 15 100 %   11/02/17 0630 - - - 56 18 100 %   11/02/17 0600 - - - 64 16 100 %          Diet Orders            Start     Ordered    11/02/17 0403  Diet NPO  Diet effective now     Question Answer Comment   Diet Type NPO    RD to adjust diet per protocol?  No        11/02/17 0402        Intake/Output Summary (Last 24 hours) at 11/03/17 0543  Last data filed at 11/03/17 0400   Gross per 24 hour   Intake          8977 59 ml   Output            73911 ml   Net         -1547 41 ml   UOP 6 9     Physical Exam:  General: NAD  Cardiovascular: RRR  Respiratory: breath sounds b/l  Abdomen: soft, mild distension, dressing midline clean/dry  Extremities: no edema    Medications:    acetaminophen 975 mg Oral Q8H   calcium gluconate 1 g Intravenous Once   famotidine 20 mg Intravenous Daily   nicotine 1 patch Transdermal Daily     lactated ringers 125 mL/hr Last Rate: 125 mL/hr (11/03/17 0116)     hydrALAZINE 10 mg Q6H PRN   HYDROmorphone 1 mg Q2H PRN   labetalol 10 mg Q6H PRN   sodium chloride  Code/Trauma/Sedation Med     Laboratory results:   CBC:   Lab Results   Component Value Date    WBC 11 41 (H) 11/03/2017    HGB 9 7 (L) 11/03/2017    HCT 27 6 (L) 11/03/2017    MCV 86 11/03/2017     (L) 11/03/2017    MCH 30 1 11/03/2017    MCHC 35 1 11/03/2017    RDW 15 0 11/03/2017    MPV 9 5 11/03/2017    NRBC 0 11/02/2017   , CMP:   Lab Results   Component Value Date     (H) 11/03/2017    K 3 8 11/03/2017     (H) 11/03/2017    CO2 26 11/03/2017    ANIONGAP 7 11/03/2017    BUN 20 11/03/2017    CREATININE 2 17 (H) 11/03/2017    GLUCOSE 96 11/03/2017    CALCIUM 8 1 (L) 11/03/2017    AST 20 11/03/2017    ALT 13 11/03/2017    ALKPHOS 47 11/03/2017    PROT 5 1 (L) 11/03/2017    ALBUMIN 2 4 (L) 11/03/2017    BILITOT 0 84 11/03/2017    EGFR 36 11/03/2017   , Coagulation:   Lab Results   Component Value Date    INR 1 33 (H) 11/02/2017   , Urinalysis: No results found for: Elesa Dorchester, SPECGRAV, PHUR, LEUKOCYTESUR, NITRITE, PROTEINUA, GLUCOSEU, KETONESU, BILIRUBINUR, BLOODU, Amylase: No results found for: AMYLASE, Lipase: No results found for: LIPASE    VTE Pharmacologic Prophylaxis: Reason for no pharmacologic prophylaxis retroperitoneal bleed  VTE Mechanical Prophylaxis: sequential compression device

## 2017-11-04 LAB
ANION GAP SERPL CALCULATED.3IONS-SCNC: 7 MMOL/L (ref 4–13)
BASOPHILS # BLD AUTO: 0.02 THOUSANDS/ΜL (ref 0–0.1)
BASOPHILS NFR BLD AUTO: 0 % (ref 0–1)
BUN SERPL-MCNC: 20 MG/DL (ref 5–25)
CALCIUM SERPL-MCNC: 8.2 MG/DL (ref 8.3–10.1)
CHLORIDE SERPL-SCNC: 111 MMOL/L (ref 100–108)
CO2 SERPL-SCNC: 26 MMOL/L (ref 21–32)
CREAT SERPL-MCNC: 2.02 MG/DL (ref 0.6–1.3)
EOSINOPHIL # BLD AUTO: 0.32 THOUSAND/ΜL (ref 0–0.61)
EOSINOPHIL NFR BLD AUTO: 3 % (ref 0–6)
ERYTHROCYTE [DISTWIDTH] IN BLOOD BY AUTOMATED COUNT: 14.4 % (ref 11.6–15.1)
GFR SERPL CREATININE-BSD FRML MDRD: 39 ML/MIN/1.73SQ M
GLUCOSE SERPL-MCNC: 97 MG/DL (ref 65–140)
HCT VFR BLD AUTO: 25.3 % (ref 36.5–49.3)
HGB BLD-MCNC: 8.7 G/DL (ref 12–17)
LYMPHOCYTES # BLD AUTO: 0.96 THOUSANDS/ΜL (ref 0.6–4.47)
LYMPHOCYTES NFR BLD AUTO: 8 % (ref 14–44)
MAGNESIUM SERPL-MCNC: 2 MG/DL (ref 1.6–2.6)
MCH RBC QN AUTO: 29.8 PG (ref 26.8–34.3)
MCHC RBC AUTO-ENTMCNC: 34.4 G/DL (ref 31.4–37.4)
MCV RBC AUTO: 87 FL (ref 82–98)
MONOCYTES # BLD AUTO: 0.83 THOUSAND/ΜL (ref 0.17–1.22)
MONOCYTES NFR BLD AUTO: 7 % (ref 4–12)
NEUTROPHILS # BLD AUTO: 9.36 THOUSANDS/ΜL (ref 1.85–7.62)
NEUTS SEG NFR BLD AUTO: 82 % (ref 43–75)
NRBC BLD AUTO-RTO: 0 /100 WBCS
PHOSPHATE SERPL-MCNC: 2 MG/DL (ref 2.7–4.5)
PLATELET # BLD AUTO: 141 THOUSANDS/UL (ref 149–390)
PMV BLD AUTO: 9.7 FL (ref 8.9–12.7)
POTASSIUM SERPL-SCNC: 3.4 MMOL/L (ref 3.5–5.3)
RBC # BLD AUTO: 2.92 MILLION/UL (ref 3.88–5.62)
SODIUM SERPL-SCNC: 144 MMOL/L (ref 136–145)
WBC # BLD AUTO: 11.51 THOUSAND/UL (ref 4.31–10.16)

## 2017-11-04 PROCEDURE — 83735 ASSAY OF MAGNESIUM: CPT | Performed by: SURGERY

## 2017-11-04 PROCEDURE — G8987 SELF CARE CURRENT STATUS: HCPCS

## 2017-11-04 PROCEDURE — G8979 MOBILITY GOAL STATUS: HCPCS

## 2017-11-04 PROCEDURE — 80048 BASIC METABOLIC PNL TOTAL CA: CPT | Performed by: SURGERY

## 2017-11-04 PROCEDURE — 85025 COMPLETE CBC W/AUTO DIFF WBC: CPT | Performed by: SURGERY

## 2017-11-04 PROCEDURE — G8978 MOBILITY CURRENT STATUS: HCPCS

## 2017-11-04 PROCEDURE — 97163 PT EVAL HIGH COMPLEX 45 MIN: CPT

## 2017-11-04 PROCEDURE — 84100 ASSAY OF PHOSPHORUS: CPT | Performed by: SURGERY

## 2017-11-04 PROCEDURE — G8988 SELF CARE GOAL STATUS: HCPCS

## 2017-11-04 PROCEDURE — 97167 OT EVAL HIGH COMPLEX 60 MIN: CPT

## 2017-11-04 RX ORDER — POTASSIUM CHLORIDE 20MEQ/15ML
40 LIQUID (ML) ORAL ONCE
Status: COMPLETED | OUTPATIENT
Start: 2017-11-04 | End: 2017-11-04

## 2017-11-04 RX ADMIN — Medication 1 TABLET: at 09:11

## 2017-11-04 RX ADMIN — HEPARIN SODIUM 5000 UNITS: 5000 INJECTION, SOLUTION INTRAVENOUS; SUBCUTANEOUS at 13:52

## 2017-11-04 RX ADMIN — Medication 1 TABLET: at 17:57

## 2017-11-04 RX ADMIN — TAMSULOSIN HYDROCHLORIDE 0.4 MG: 0.4 CAPSULE ORAL at 16:04

## 2017-11-04 RX ADMIN — OXYCODONE HYDROCHLORIDE 5 MG: 5 TABLET ORAL at 22:16

## 2017-11-04 RX ADMIN — AMLODIPINE BESYLATE 5 MG: 5 TABLET ORAL at 09:11

## 2017-11-04 RX ADMIN — HYDRALAZINE HYDROCHLORIDE 10 MG: 20 INJECTION INTRAMUSCULAR; INTRAVENOUS at 03:13

## 2017-11-04 RX ADMIN — HEPARIN SODIUM 5000 UNITS: 5000 INJECTION, SOLUTION INTRAVENOUS; SUBCUTANEOUS at 05:54

## 2017-11-04 RX ADMIN — HEPARIN SODIUM 5000 UNITS: 5000 INJECTION, SOLUTION INTRAVENOUS; SUBCUTANEOUS at 22:17

## 2017-11-04 RX ADMIN — ACETAMINOPHEN 975 MG: 325 TABLET ORAL at 09:12

## 2017-11-04 RX ADMIN — OXYCODONE HYDROCHLORIDE 5 MG: 5 TABLET ORAL at 13:52

## 2017-11-04 RX ADMIN — POTASSIUM CHLORIDE 40 MEQ: 20 SOLUTION ORAL at 09:12

## 2017-11-04 RX ADMIN — ACETAMINOPHEN 975 MG: 325 TABLET ORAL at 17:57

## 2017-11-04 RX ADMIN — ACETAMINOPHEN 975 MG: 325 TABLET ORAL at 02:12

## 2017-11-04 RX ADMIN — OXYCODONE HYDROCHLORIDE 5 MG: 5 TABLET ORAL at 05:53

## 2017-11-04 NOTE — PLAN OF CARE
Problem: OCCUPATIONAL THERAPY ADULT  Goal: Performs self-care activities at highest level of function for planned discharge setting  See evaluation for individualized goals  Treatment Interventions: ADL retraining, Functional transfer training, Endurance training, Patient/family training, Equipment evaluation/education, Compensatory technique education, Continued evaluation, Energy conservation, Activityengagement          See flowsheet documentation for full assessment, interventions and recommendations  Limitation: Decreased ADL status, Decreased endurance, Decreased self-care trans, Decreased high-level ADLs  Prognosis: Good  Assessment: Pt is a 44 y/o male seen for OT eval s/p adm to B as a transfer from [facility] w/ left-sided grade IV kidney injury w/ hypotension, severe left sided abd/flank pain dx'd w/ laceration of kidney from spontaneous kidney rupture  Pt received the following procedures: LAPAROTOMY EXPLORATORY on 11/2/2017 and  LAPAROTOMY EXPLORATORY 2nd LOOK, ligation of left ureter, I/D with closure  On 11/2/2017  Comorbidities include a h/o UTI and B/L hydroenphoresis  Pt with active OT orders and ambulate orders  Pt [facility] from [facility] where he reports no CIRA and elevator access t/o  Pt was I w/  ADLS & required no use of DME PTA  Pt is currently demonstrating the following occupational deficits: min A UB ADLS, max A LB ADLS, mod A x2 functional transfers and min A functional mobility using rw  Pt is limited 2* significant pain, decreased functional forward reach, decreased ADL Status, decreased mobility status, impaired balance, decreased endurance/activity tolerance and decreased caregiver support ? Rosella Goldmann The following Occupational Performance Areas to address include: bathing/shower, toilet hygiene, dressing, functional mobility, community mobility and clothing management  Pt scored overall 40/100 on the Barthel Index   Based on the aforementioned OT evaluation, functional performance deficits, and assessments, pt has been identified as a high complexity evaluation  Anticipate pending progress, pt may be able to return to [facility] with increased assistance for ADLs (if available) and home OT   Pt to continue to benefit from acute immediate OT services to address the following goals 3-5x/wk to  w/in 7-10 days:     OT Discharge Recommendation:  (please see assessment section )  OT - OK to Discharge: Kathleen Lerma MS, OTR/L

## 2017-11-04 NOTE — PROGRESS NOTES
Progress Note - Urology Progress  Seth Jones 43 y o  male MRN: [id number]  Unit/Bed#: [facility] 916-01 Encounter: [id number]    Assessment:  Stable urologically- urine is clearing  WOLF  Pt reports that he has a lot of difficulty voiding and relates that straining  to void caused his kidney injury  Plan:  As per trauma surgery  Would maintain jiménez and check renal ultrasound with jiménez in place for resolution of right hydro  Follow BUN/CR        Subjective: No complaints  Objective:     Blood pressure 148/80, pulse 82, temperature 99 2 °F (37 3 °C), temperature source Oral, resp  rate 18, height 5' 6" (1 676 m), weight 89 2 kg (196 lb 10 4 oz), SpO2 99 %  ,Body mass index is 31 74 kg/m²        Intake/Output Summary (Last 24 hours) at 11/04/17 1149  Last data filed at 11/04/17 0710   Gross per 24 hour   Intake          2458 34 ml   Output             3235 ml   Net          -776 66 ml       Invasive Devices     Peripheral Intravenous Line            Peripheral IV 11/01/17 Right Antecubital 2 days    Peripheral IV 11/02/17 Left;Upper Arm 1 day          Drain            Urethral Catheter Coude 16 Fr  2 days                Physical Exam: General appearance: alert, appears stated age, cooperative and no distress  Lungs: normal effort  Abdomen: mild distension, incision without erythema  Jiménez in place- urine clearing    Lab, Imaging and other studies: Preop CT reviewed on PACS  CBC:   Lab Results   Component Value Date    WBC 11 51 (H) 11/04/2017    HGB 8 7 (L) 11/04/2017    HCT 25 3 (L) 11/04/2017    MCV 87 11/04/2017     (L) 11/04/2017    MCH 29 8 11/04/2017    MCHC 34 4 11/04/2017    RDW 14 4 11/04/2017    MPV 9 7 11/04/2017    NRBC 0 11/04/2017   , CMP:   Lab Results   Component Value Date     11/04/2017    K 3 4 (L) 11/04/2017     (H) 11/04/2017    CO2 26 11/04/2017    ANIONGAP 7 11/04/2017    BUN 20 11/04/2017    CREATININE 2 02 (H) 11/04/2017    GLUCOSE 97 11/04/2017    CALCIUM 8 2 (L) 11/04/2017    EGFR 39 11/04/2017

## 2017-11-04 NOTE — OCCUPATIONAL THERAPY NOTE
[address]  Evaluation     Patient Name: Yvette SMITH Date: 11/4/2017  Problem List  Patient Active Problem List   Diagnosis    Kidney laceration, left     Past Medical History  Past Medical History:   Diagnosis Date    Enlarged prostate     UTI (urinary tract infection)      Past Surgical History  Past Surgical History:   Procedure Laterality Date    LAPAROTOMY N/A 11/2/2017    Procedure: LAPAROTOMY EXPLORATORY 2nd LOOK, ligation of left ureter, I/D with closure;  Surgeon: Kylah Cruz MD;  [facility];  Service: General           11/04/17 1105   Note Type   Note type Eval/Treat   Restrictions/Precautions   Weight Bearing Precautions Per Order No   Other Precautions Bed Alarm; Restraints;Multiple lines;1:1;Fall Risk;Pain;Telemetry  (1:1  present and L ankle shackle )   Pain Assessment   Pain Assessment 0-10   Pain Score 4   Pain Type Acute pain   Pain Location Abdomen   Pain Orientation Bilateral   Hospital Pain Intervention(s) Ambulation/increased activity;Repositioned   Response to Interventions tolerated   Home Living   Additional Comments Pt presents from [facility] where he reports no CIRA and elevator access t/o     Prior Function   Level of [place] Independent with ADLs and functional mobility   ADL Assistance Independent   Falls in the last 6 months 0   Comments Pt was I w/  ADLS & required no use of DME PTA   Lifestyle   Autonomy Pt was I w/ ADLS   Reciprocal Relationships Pt presents from long-term    Intrinsic Gratification Pt enjoys handball and chess   Psychosocial   Psychosocial (WDL) WDL   ADL   Eating Assistance 5  Supervision/Setup   Grooming Assistance 5  Supervision/Setup   UB Bathing Assistance 4  Minimal Assistance   LB Bathing Assistance 3  Moderate Assistance   UB Dressing Assistance 4  Minimal Assistance   LB Dressing Assistance 2  Maximal [address]  4  Minimal Assistance   Bed Mobility   Supine to Sit 3 Moderate assistance   Additional items Assist x 2;HOB elevated; Increased time required;Verbal cues;LE management   Sit to Supine 3  Moderate assistance   Additional items Assist x 2;HOB elevated; Increased time required;Verbal cues;LE management   Transfers   Sit to Stand 3  Moderate assistance   Additional items Assist x 1; Increased time required;Verbal cues   Stand to Sit 3  Moderate assistance   Additional items Assist x 1; Increased time required;Verbal cues   Additional Comments Pt performs functional transfers with mod A for force production, steadying/balancce and increased time to complete 2* pain    Functional Mobility   Functional Mobility 4  Minimal assistance   Additional Comments Pt performs functional mobility using rw for short distances requiring increased time and rest breakas 2* pain    Additional items Rolling walker   Balance   Static Sitting Fair +   Static Standing Fair   Dynamic Standing Fair -   Activity Tolerance   Activity Tolerance Patient limited by fatigue;Patient limited by pain   RUE Assessment   RUE Assessment WFL   LUE Assessment   LUE Assessment WFL   Hand Function   Gross Motor Coordination Functional   Fine Motor Coordination Functional   Cognition   Overall Cognitive Status WFL   Arousal/Participation Alert; Responsive; Cooperative   Attention Within functional limits   Memory Within functional limits   Following Commands Follows all commands and directions without difficulty   Assessment   Limitation Decreased ADL status; Decreased endurance;Decreased self-care trans;Decreased high-level ADLs   Prognosis Good   Assessment Pt is a 44 y/o male seen for OT eval s/p adm to B as a transfer from [facility] w/ left-sided grade IV kidney injury w/ hypotension, severe left sided abd/flank pain dx'd w/ laceration of kidney from spontaneous kidney rupture   Pt received the following procedures: LAPAROTOMY EXPLORATORY on 11/2/2017 and  LAPAROTOMY EXPLORATORY 2nd LOOK, ligation of left ureter, I/D with closure  On 2017  Comorbidities include a h/o UTI and B/L hydroenphoresis  Pt with active OT orders and ambulate orders  Pt presents from [facility] where he reports no CIRA and elevator access t/o  Pt was I w/  ADLS & required no use of DME PTA  Pt is currently demonstrating the following occupational deficits: min A UB ADLS, max A LB ADLS, mod A x2 functional transfers and min A functional mobility using rw  Pt is limited 2* significant pain, decreased functional forward reach, decreased ADL Status, decreased mobility status, impaired balance, decreased endurance/activity tolerance and decreased caregiver support ? Sherre Soulier The following Occupational Performance Areas to address include: bathing/shower, toilet hygiene, dressing, functional mobility, community mobility and clothing management  Pt scored overall 40/100 on the Barthel Index  Based on the aforementioned OT evaluation, functional performance deficits, and assessments, pt has been identified as a high complexity evaluation  Anticipate pending progress, pt may be able to return to [facility] with increased assistance for ADLs (if available) and home OT  Pt to continue to benefit from acute immediate OT services to address the following goals 3-5x/wk to  w/in 7-10 days:   Goals   Patient Goals none expressed   Plan   Treatment Interventions ADL retraining;Functional transfer training; Endurance training;Patient/family training;Equipment evaluation/education; Compensatory technique education;Continued evaluation; Energy conservation; Activityengagement   Goal Expiration Date 17   OT Frequency 3-5x/wk   Recommendation   OT Discharge Recommendation (please see assessment section )   OT - OK to Discharge No   Barthel Index   Feeding 10   Bathing 0   Grooming Score 5   Dressing Score 5   Bladder Score 0  (jiménez)   Bowels Score 10   Toilet Use Score 5   Transfers (Bed/Chair) Score 5   Mobility (Level Surface) Score 0  (less than 50 yards)   Stairs Score 0   Barthel Index Score 40         GOALS:    1) Pt will improve activity tolerance to G for min 30 min txment sessions  2) Pt will complete ADLs/self care w/ mod I w/ G hyiene/thoroughness w/ min cues fro cog support  3) Pt will complete toileting w/ mod I w/ G hygiene/thoroughness using DME as needed  4) Pt will improve functional transfers on/off all surfaces using DME as needed w/ G balance/safety including toileting w/ mod I  5) Pt will improve functional mobility during ADL/IADL/leisure tasks using DME as needed w/ g balance/safety w/ mod I  6) Pt will demonstrate G carryover of pt/caregiver education and training as appropriate w/ mod I w/o cues w/ G tolerance  7) Pt will demonstrate 100% carryover of learned E C  techniques s/p skilled education w/o cues t/o functional ADL/ IADL/leisure interest tasks w/ mod I        Darby Llanes MS, OTR/L

## 2017-11-04 NOTE — PLAN OF CARE
Problem: PHYSICAL THERAPY ADULT  Goal: Performs mobility at highest level of function for planned discharge setting  See evaluation for individualized goals  Treatment/Interventions: Functional transfer training, LE strengthening/ROM, Therapeutic exercise, Endurance training, Equipment eval/education, Bed mobility, Gait training, Spoke to nursing, OT  Equipment Recommended: Jessee Diallo (at this time)       See flowsheet documentation for full assessment, interventions and recommendations  Prognosis: Good  Problem List: Decreased strength, Decreased endurance, Impaired balance, Decreased mobility, Pain  Assessment: Pt is 43 y o  admitted with hx of left-sided grade IV kidney injury with hypotension, severe left-sided abd/flank pain; pt underwent EXPLORATORY LAPAROTOMY on 11/2/2017 and LAPAROTOMY EXPLORATORY 2nd LOOK, ligation of left ureter, I/D with closure on 11/2/2017  Pt 's comorbidities affecting POC include: UTI and (B) hydronephrosis and personal factors of: current incarceration in the correction  Pt's clinical presentation is currently unstable/unpredictable which is evident in ongoing telem monitoring, postop pain and guarding limiting his mobility skills, and abn lab values being monitored  Pt presents w/ pain, generalized weakness, incl decreased LE strength, decreased functional endurance and activity tolerance, gait deviations and fall risk  Will cont to follow pt in PT for progressive mobilization to address above functional deficits and to max level of (I), endurance, and safety  Currently, anticipate pt will return back to prior facility provided he cont improving w/ mobility skills, safety and endurance; PT follow up at the facility may be considered depending on the progress; will follow  Recommendation: Other (Comment) (r/o PT follow up at the facility upon D/C)          See flowsheet documentation for full assessment

## 2017-11-04 NOTE — PHYSICAL THERAPY NOTE
Physical Therapy Evaluation    Patient's Name: Joselito Shoemaker    Admitting Diagnosis  Chest pain [R07 9]  Laceration of left kidney, initial encounter [Q29 [id number]]    Problem List  Patient Active Problem List   Diagnosis    Kidney laceration, left       Past Medical History  Past Medical History:   Diagnosis Date    Enlarged prostate     UTI (urinary tract infection)        Past Surgical History  Past Surgical History:   Procedure Laterality Date    LAPAROTOMY N/A 11/2/2017    Procedure: LAPAROTOMY EXPLORATORY 2nd LOOK, ligation of left ureter, I/D with closure;  Surgeon: Ya Alvarado MD;  [facility];  Service: General      11/04/17 1106   Note Type   Note type Eval only   Pain Assessment   Pain Assessment 0-10   Pain Score 4   Pain Type Acute pain;Surgical pain   Pain Location Abdomen; Incision   Pain Descriptors Aching   Pain Frequency Intermittent   Pain Onset Ongoing   Clinical Progression Gradually improving   Effect of Pain on Daily Activities guarding w/ mobility   Patient's Stated Pain Goal No pain   Hospital Pain Intervention(s) Medication (See MAR); Repositioned; Ambulation/increased activity; Distraction; Emotional support   Response to Interventions comfortable post session   Home Living   Type of Home Other (Comment)  (currently incarcerated at [facility])   Home Layout Multi-level;Elevator  (no CIRA; )   Prior Function   Level of [place] Independent with ADLs and functional mobility  (amb w/o AD)   Lives With Facility staff   Restrictions/Precautions   Braces or Orthoses (denies)   Other Precautions 1:1;Multiple lines;Telemetry; Fall Risk;Pain;Restraints  (  managed pt's LE shackle; bed alarm on )   General   Additional Pertinent History pt is cleared for assessment (spoke to nsg)   Cognition   Overall Cognitive Status [facility]   Arousal/Participation Alert   Attention Within functional limits   Orientation Level Oriented to person;Oriented to place;Oriented to situation   Memory Within functional limits   Following Commands Follows one step commands without difficulty   Comments  Alert; in bed; responds to questions appropriately; cooperative; agreeable to try mobilization;   RUE Assessment   RUE Assessment WFL  (AROM)   LUE Assessment   LUE Assessment WFL  (AROM)   RLE Assessment   RLE Assessment WFL  (AROM)   Strength RLE   RLE Overall Strength 3/5   LLE Assessment   LLE Assessment WFL  (AROM)   Strength LLE   LLE Overall Strength 3/5   Bed Mobility   Supine to Sit 3  Moderate assistance   Additional items Assist x 2; Increased time required;Verbal cues;LE management;HOB elevated   Sit to Supine 3  Moderate assistance   Additional items Assist x 2; Increased time required;Verbal cues;LE management  (repositioned higher in bed w/ (A)x2)   Transfers   Sit to Stand 3  Moderate assistance   Additional items Assist x 1;Verbal cues  (stand by (A) of 2nd)   Stand to Sit 3  Moderate assistance   Additional items Assist x 1;Verbal cues  (stand by (A) of 2nd)   Ambulation/Elevation   Gait pattern Excessively slow; Short stride; Inconsistent cicso   Gait Assistance 4  Minimal assist   Additional items Assist x 1   Assistive Device Rolling walker   Distance 35 ft   Balance   Static Sitting Fair   Static Standing Fair -   Ambulatory Poor +   Activity Tolerance   Activity Tolerance Patient limited by fatigue;Patient limited by pain   Nurse Made Aware spoke to DIPESH Phillips   Assessment   Prognosis Good   Problem List Decreased strength;Decreased endurance; Impaired balance;Decreased mobility;Pain   Assessment Pt is 43 y o  admitted with hx of left-sided grade IV kidney injury with hypotension, severe left-sided abd/flank pain; pt underwent EXPLORATORY LAPAROTOMY on 11/2/2017 and LAPAROTOMY EXPLORATORY 2nd LOOK, ligation of left ureter, I/D with closure on 11/2/2017   Pt 's comorbidities affecting POC include: UTI and (B) hydronephrosis and personal factors of: current incarceration in the [facility]  Pt's clinical presentation is currently unstable/unpredictable which is evident in ongoing telem monitoring, postop pain and guarding limiting his mobility skills, and abn lab values being monitored  Pt presents w/ pain, generalized weakness, incl decreased LE strength, decreased functional endurance and activity tolerance, gait deviations and fall risk  Will cont to follow pt in PT for progressive mobilization to address above functional deficits and to max level of (I), endurance, and safety  Currently, anticipate pt will return back to prior facility provided he cont improving w/ mobility skills, safety and endurance; PT follow up at the facility may be considered depending on the progress; will follow  Goals   Patient Goals none expressed   STG Expiration Date 11/14/17   Short Term Goal #1 7-10 days  7-10 days  Pt will amb 2 x 150 ft w/ least restrictive assistive device PRN, mod (I)  Pt will achieve (I) level w/ bed mob  Pt will perform transfers w/ mod (I)  Pt will be (I) w/ HEP  Plan   Treatment/Interventions Functional transfer training;LE strengthening/ROM; Therapeutic exercise; Endurance training;Equipment eval/education; Bed mobility;Gait training;Spoke to nursing;OT   PT Frequency 5x/wk; Weekend   Recommendation   Recommendation Other (Comment)  (r/o PT follow up at the facility upon D/C)   Equipment Recommended Walker  (at this time)   Modified Doddridge Scale   Modified Doddridge Scale 4   Barthel Index   Feeding 5   Bathing 0   Grooming Score 5   Dressing Score 5   Bladder Score 0  (jiménez in place)   Bowels Score 10   Toilet Use Score 5   Transfers (Bed/Chair) Score 5   Mobility (Level Surface) Score 0   Stairs Score 0   Barthel Index Score 35           Damien Peters, PT

## 2017-11-04 NOTE — PROGRESS NOTES
Progress Note - General Surgery  Chito Joseph 43 y o  male MRN: [id number]  Unit/Bed#: [facility] 916-01 Encounter: [id number]    Assessment:  43y o -year-old male with spontaneous left kidney rupture s/p ex lap, left nephrectomy, abthera 11/1 w/ ureter ligation and abdominal closure 11/2    Abd distended, no n/v    Plan:  - NPO if nauseated  - OOB/ambulate  - PRN pain medications  - Trend Cr  - appreciate urology input     Subjective:  Pain present  No flatus or BM, no nausea    Objective:  Patient Vitals for the past 24 hrs:   BP Temp Temp src Pulse Resp SpO2 Height   11/04/17 0553 147/79 99 6 °F (37 6 °C) Oral 92 18 - -   11/04/17 0312 (!) 180/95 - - - - - -   11/04/17 0301 (!) 185/104 (!) 101 6 °F (38 7 °C) Oral 103 18 96 % -   11/03/17 2321 154/89 100 °F (37 8 °C) Oral 102 20 97 % -   11/03/17 2100 160/88 100 5 °F (38 1 °C) Probe 103 22 96 % -   11/03/17 1941 - (!) 103 1 °F (39 5 °C) Oral - - - -   11/03/17 1827 170/60 - - 98 (!) 29 100 % -   11/03/17 1636 - - - - - - 5' 6" (1 676 m)   11/03/17 1600 - (!) 101 8 °F (38 8 °C) Rectal 96 (!) 25 100 % -   11/03/17 1500 - (!) 101 5 °F (38 6 °C) - (!) 114 (!) 28 - -   11/03/17 1400 - (!) 101 1 °F (38 4 °C) - (!) 116 (!) 33 - -   11/03/17 1300 - 100 4 °F (38 °C) - 82 20 100 % -   11/03/17 1200 - (!) 100 8 °F (38 2 °C) Rectal 92 22 100 % -   11/03/17 1100 - (!) 101 5 °F (38 6 °C) - 92 21 100 % -   11/03/17 1000 (!) 180/85 (!) 102 2 °F (39 °C) - 98 (!) 24 100 % -   11/03/17 0900 - (!) 101 5 °F (38 6 °C) - 84 (!) 24 100 % -   11/03/17 0800 - (!) 101 5 °F (38 6 °C) Rectal 86 22 100 % -   11/03/17 0700 - (!) 101 5 °F (38 6 °C) - 86 21 99 % -          Diet Orders            Start     Ordered    11/03/17 1237  Diet Regular; Regular House  Diet effective now     Question Answer Comment   Diet Type Regular    Regular Regular House    RD to adjust diet per protocol?  Yes        11/03/17 1236          Intake/Output Summary (Last 24 hours) at 11/04/17 0653  Last data filed at 11/04/17 0630   Gross per 24 hour   Intake          3342 92 ml   Output             3745 ml   Net          -402 08 ml   UOP 6 9     Physical Exam:  General: NAD  Cardiovascular: RRR  Respiratory: breath sounds b/l  Abdomen: soft, mild distension, midline clean/dry  Extremities: no edema    Medications:    acetaminophen 975 mg Oral Q8H   amLODIPine 5 mg Oral Daily   heparin (porcine) 5,000 Units Subcutaneous Q8H [facility]   oxyCODONE 5 mg Oral Q8H   senna-docusate sodium 1 tablet Oral BID   tamsulosin 0 4 mg Oral Daily With Dinner        hydrALAZINE 10 mg Q6H PRN   HYDROmorphone 1 mg Q2H PRN   influenza vaccine 0 5 mL Prior to discharge   sodium chloride  Code/Trauma/Sedation Med     Laboratory results:   CBC:   No results found for: WBC, HGB, HCT, MCV, PLT, ADJUSTEDWBC, MCH, MCHC, RDW, MPV, NRBC, CMP:   Lab Results   Component Value Date     11/04/2017    K 3 4 (L) 11/04/2017     (H) 11/04/2017    CO2 26 11/04/2017    ANIONGAP 7 11/04/2017    BUN 20 11/04/2017    CREATININE 2 02 (H) 11/04/2017    GLUCOSE 97 11/04/2017    CALCIUM 8 2 (L) 11/04/2017    EGFR 39 11/04/2017   , Coagulation:   No results found for: PT, INR, APTT, Urinalysis: No results found for: COLORU, CLARITYU, SPECGRAV, PHUR, LEUKOCYTESUR, NITRITE, PROTEINUA, GLUCOSEU, KETONESU, BILIRUBINUR, BLOODU, Amylase: No results found for: AMYLASE, Lipase: No results found for: LIPASE    VTE Pharmacologic Prophylaxis: Reason for no pharmacologic prophylaxis retroperitoneal bleed  VTE Mechanical Prophylaxis: sequential compression device

## 2017-11-04 NOTE — PROGRESS NOTES
Went into give morning medications and empty patients jiménez noticed that pts urine started to turn red  Paged urology believe I spoke with Trudy Dukes  Told me this was to be expected  Will continue to monitor pt

## 2017-11-05 LAB
ABO GROUP BLD BPU: NORMAL
ANION GAP SERPL CALCULATED.3IONS-SCNC: 7 MMOL/L (ref 4–13)
BASOPHILS # BLD AUTO: 0.02 THOUSANDS/ΜL (ref 0–0.1)
BASOPHILS NFR BLD AUTO: 0 % (ref 0–1)
BPU ID: NORMAL
BUN SERPL-MCNC: 19 MG/DL (ref 5–25)
CALCIUM SERPL-MCNC: 8.5 MG/DL (ref 8.3–10.1)
CHLORIDE SERPL-SCNC: 109 MMOL/L (ref 100–108)
CO2 SERPL-SCNC: 26 MMOL/L (ref 21–32)
CREAT SERPL-MCNC: 2.24 MG/DL (ref 0.6–1.3)
CROSSMATCH: NORMAL
EOSINOPHIL # BLD AUTO: 0.83 THOUSAND/ΜL (ref 0–0.61)
EOSINOPHIL NFR BLD AUTO: 8 % (ref 0–6)
ERYTHROCYTE [DISTWIDTH] IN BLOOD BY AUTOMATED COUNT: 13.9 % (ref 11.6–15.1)
GFR SERPL CREATININE-BSD FRML MDRD: 35 ML/MIN/1.73SQ M
GLUCOSE SERPL-MCNC: 93 MG/DL (ref 65–140)
HCT VFR BLD AUTO: 27.9 % (ref 36.5–49.3)
HGB BLD-MCNC: 9.3 G/DL (ref 12–17)
LYMPHOCYTES # BLD AUTO: 1.15 THOUSANDS/ΜL (ref 0.6–4.47)
LYMPHOCYTES NFR BLD AUTO: 11 % (ref 14–44)
MAGNESIUM SERPL-MCNC: 2.1 MG/DL (ref 1.6–2.6)
MCH RBC QN AUTO: 29.3 PG (ref 26.8–34.3)
MCHC RBC AUTO-ENTMCNC: 33.3 G/DL (ref 31.4–37.4)
MCV RBC AUTO: 88 FL (ref 82–98)
MONOCYTES # BLD AUTO: 0.97 THOUSAND/ΜL (ref 0.17–1.22)
MONOCYTES NFR BLD AUTO: 9 % (ref 4–12)
NEUTROPHILS # BLD AUTO: 8.01 THOUSANDS/ΜL (ref 1.85–7.62)
NEUTS SEG NFR BLD AUTO: 72 % (ref 43–75)
NRBC BLD AUTO-RTO: 0 /100 WBCS
PLATELET # BLD AUTO: 196 THOUSANDS/UL (ref 149–390)
PMV BLD AUTO: 10.6 FL (ref 8.9–12.7)
POTASSIUM SERPL-SCNC: 3.7 MMOL/L (ref 3.5–5.3)
RBC # BLD AUTO: 3.17 MILLION/UL (ref 3.88–5.62)
SODIUM SERPL-SCNC: 142 MMOL/L (ref 136–145)
UNIT DISPENSE STATUS: NORMAL
UNIT PRODUCT CODE: NORMAL
UNIT RH: NORMAL
WBC # BLD AUTO: 11 THOUSAND/UL (ref 4.31–10.16)

## 2017-11-05 PROCEDURE — 80048 BASIC METABOLIC PNL TOTAL CA: CPT | Performed by: SURGERY

## 2017-11-05 PROCEDURE — 85025 COMPLETE CBC W/AUTO DIFF WBC: CPT | Performed by: SURGERY

## 2017-11-05 PROCEDURE — 83735 ASSAY OF MAGNESIUM: CPT | Performed by: SURGERY

## 2017-11-05 RX ADMIN — Medication 1 TABLET: at 17:34

## 2017-11-05 RX ADMIN — HEPARIN SODIUM 5000 UNITS: 5000 INJECTION, SOLUTION INTRAVENOUS; SUBCUTANEOUS at 06:25

## 2017-11-05 RX ADMIN — ACETAMINOPHEN 975 MG: 325 TABLET ORAL at 09:02

## 2017-11-05 RX ADMIN — HEPARIN SODIUM 5000 UNITS: 5000 INJECTION, SOLUTION INTRAVENOUS; SUBCUTANEOUS at 21:49

## 2017-11-05 RX ADMIN — AMLODIPINE BESYLATE 5 MG: 5 TABLET ORAL at 09:02

## 2017-11-05 RX ADMIN — OXYCODONE HYDROCHLORIDE 5 MG: 5 TABLET ORAL at 21:50

## 2017-11-05 RX ADMIN — OXYCODONE HYDROCHLORIDE 5 MG: 5 TABLET ORAL at 06:23

## 2017-11-05 RX ADMIN — ACETAMINOPHEN 975 MG: 325 TABLET ORAL at 01:33

## 2017-11-05 RX ADMIN — OXYCODONE HYDROCHLORIDE 5 MG: 5 TABLET ORAL at 14:03

## 2017-11-05 RX ADMIN — Medication 1 TABLET: at 09:02

## 2017-11-05 RX ADMIN — ACETAMINOPHEN 975 MG: 325 TABLET ORAL at 17:34

## 2017-11-05 RX ADMIN — HEPARIN SODIUM 5000 UNITS: 5000 INJECTION, SOLUTION INTRAVENOUS; SUBCUTANEOUS at 14:03

## 2017-11-05 RX ADMIN — TAMSULOSIN HYDROCHLORIDE 0.4 MG: 0.4 CAPSULE ORAL at 15:48

## 2017-11-05 NOTE — PROGRESS NOTES
No new complaints  Tolerating some po  Jiménez in place  VSS, Afebrile  Abdomen soft, + incisional tenderness, incision clean  Urine clear via jiménez  Cr- 2 24  Impression: WOLF  H/o bilateral hydro, now s/p left nephrectomy  Recommend: repeat renal ultrasound with jiménez in place- follow cr   Consider Nephrology referral

## 2017-11-05 NOTE — PROGRESS NOTES
Progress Note - General Surgery  Myles Mancera 43 y o  male MRN: [id number]  Unit/Bed#: [facility] 916-01 Encounter: [id number]    Assessment:  43y o -year-old male with spontaneous left kidney rupture s/p ex lap, left nephrectomy, abthera 11/1 w/ ureter ligation and abdominal closure 11/2      Plan:  Regular diet  Thakur-urology following, repeat renal U/S  F/u am labs-trend Cr  IS/OOB/ambulate    Subjective:  ALVIN  Having some abdominal pain  Eating minimal PO  No N/V  Ambulating  Objective:  Patient Vitals for the past 24 hrs:   BP Temp Temp src Pulse Resp SpO2   11/05/17 0322 166/98 98 5 °F (36 9 °C) Oral 87 18 97 %   11/05/17 0054 157/79 99 9 °F (37 7 °C) Oral 92 17 97 %   11/04/17 1927 143/70 99 7 °F (37 6 °C) Oral 90 16 96 %   11/04/17 1530 160/76 99 6 °F (37 6 °C) Oral 90 18 99 %   11/04/17 1132 148/80 99 2 °F (37 3 °C) Oral 82 18 99 %   11/04/17 0739 170/92 99 9 °F (37 7 °C) Oral 95 18 96 %   11/04/17 0553 147/79 99 6 °F (37 6 °C) Oral 92 18 -          Diet Orders            Start     Ordered    11/03/17 1237  Diet Regular; Regular House  Diet effective now     Question Answer Comment   Diet Type Regular    Regular Regular House    RD to adjust diet per protocol?  Yes        11/03/17 1236          Intake/Output Summary (Last 24 hours) at 11/05/17 0515  Last data filed at 11/04/17 2216   Gross per 24 hour   Intake             1280 ml   Output             2550 ml   Net            -1270 ml   UOP 6 9     Physical Exam:  AAOX3  NAD  RRR  Normal respiratory effort  Soft, TTP, distended  Incision c/d/i    Medications:    acetaminophen 975 mg Oral Q8H   amLODIPine 5 mg Oral Daily   heparin (porcine) 5,000 Units Subcutaneous Q8H [address]   oxyCODONE 5 mg Oral Q8H   senna-docusate sodium 1 tablet Oral BID   tamsulosin 0 4 mg Oral Daily With Dinner        hydrALAZINE 10 mg Q6H PRN   HYDROmorphone 1 mg Q2H PRN   influenza vaccine 0 5 mL Prior to discharge   sodium chloride  Code/Trauma/Sedation Med     Laboratory results:   CBC: No results found for: WBC, HGB, HCT, MCV, PLT, ADJUSTEDWBC, MCH, MCHC, RDW, MPV, NRBC, CMP:   No results found for: NA, K, CL, CO2, ANIONGAP, BUN, CREATININE, GLUCOSE, CALCIUM, AST, ALT, ALKPHOS, PROT, ALBUMIN, BILITOT, EGFR, Coagulation:   No results found for: PT, INR, APTT, Urinalysis: No results found for: COLORU, CLARITYU, SPECGRAV, PHUR, LEUKOCYTESUR, NITRITE, PROTEINUA, GLUCOSEU, KETONESU, BILIRUBINUR, BLOODU, Amylase: No results found for: AMYLASE, Lipase: No results found for: LIPASE    VTE Pharmacologic Prophylaxis: Reason for no pharmacologic prophylaxis retroperitoneal bleed  VTE Mechanical Prophylaxis: sequential compression device

## 2017-11-06 ENCOUNTER — APPOINTMENT (INPATIENT)
Dept: RADIOLOGY | Facility: HOSPITAL | Age: 42
DRG: 659 | End: 2017-11-06
Payer: COMMERCIAL

## 2017-11-06 LAB
ANION GAP SERPL CALCULATED.3IONS-SCNC: 6 MMOL/L (ref 4–13)
BASOPHILS # BLD AUTO: 0.02 THOUSANDS/ΜL (ref 0–0.1)
BASOPHILS NFR BLD AUTO: 0 % (ref 0–1)
BUN SERPL-MCNC: 21 MG/DL (ref 5–25)
CALCIUM SERPL-MCNC: 8.4 MG/DL (ref 8.3–10.1)
CHLORIDE SERPL-SCNC: 107 MMOL/L (ref 100–108)
CO2 SERPL-SCNC: 27 MMOL/L (ref 21–32)
CREAT SERPL-MCNC: 2.06 MG/DL (ref 0.6–1.3)
EOSINOPHIL # BLD AUTO: 0.71 THOUSAND/ΜL (ref 0–0.61)
EOSINOPHIL NFR BLD AUTO: 7 % (ref 0–6)
ERYTHROCYTE [DISTWIDTH] IN BLOOD BY AUTOMATED COUNT: 13.7 % (ref 11.6–15.1)
GFR SERPL CREATININE-BSD FRML MDRD: 39 ML/MIN/1.73SQ M
GLUCOSE SERPL-MCNC: 109 MG/DL (ref 65–140)
HCT VFR BLD AUTO: 26.5 % (ref 36.5–49.3)
HGB BLD-MCNC: 9.1 G/DL (ref 12–17)
LYMPHOCYTES # BLD AUTO: 1.25 THOUSANDS/ΜL (ref 0.6–4.47)
LYMPHOCYTES NFR BLD AUTO: 13 % (ref 14–44)
MCH RBC QN AUTO: 29.5 PG (ref 26.8–34.3)
MCHC RBC AUTO-ENTMCNC: 34.3 G/DL (ref 31.4–37.4)
MCV RBC AUTO: 86 FL (ref 82–98)
MONOCYTES # BLD AUTO: 0.9 THOUSAND/ΜL (ref 0.17–1.22)
MONOCYTES NFR BLD AUTO: 9 % (ref 4–12)
NEUTROPHILS # BLD AUTO: 7.1 THOUSANDS/ΜL (ref 1.85–7.62)
NEUTS SEG NFR BLD AUTO: 71 % (ref 43–75)
NRBC BLD AUTO-RTO: 0 /100 WBCS
PLATELET # BLD AUTO: 277 THOUSANDS/UL (ref 149–390)
PMV BLD AUTO: 9.8 FL (ref 8.9–12.7)
POTASSIUM SERPL-SCNC: 3.7 MMOL/L (ref 3.5–5.3)
RBC # BLD AUTO: 3.08 MILLION/UL (ref 3.88–5.62)
SODIUM SERPL-SCNC: 140 MMOL/L (ref 136–145)
WBC # BLD AUTO: 10.01 THOUSAND/UL (ref 4.31–10.16)

## 2017-11-06 PROCEDURE — 76770 US EXAM ABDO BACK WALL COMP: CPT

## 2017-11-06 PROCEDURE — 85025 COMPLETE CBC W/AUTO DIFF WBC: CPT | Performed by: SURGERY

## 2017-11-06 PROCEDURE — 80048 BASIC METABOLIC PNL TOTAL CA: CPT | Performed by: SURGERY

## 2017-11-06 RX ORDER — OXYCODONE HYDROCHLORIDE 5 MG/1
5 TABLET ORAL EVERY 4 HOURS PRN
Status: DISCONTINUED | OUTPATIENT
Start: 2017-11-06 | End: 2017-12-23 | Stop reason: HOSPADM

## 2017-11-06 RX ORDER — POTASSIUM CHLORIDE 20 MEQ/1
40 TABLET, EXTENDED RELEASE ORAL ONCE
Status: COMPLETED | OUTPATIENT
Start: 2017-11-06 | End: 2017-11-06

## 2017-11-06 RX ORDER — OXYCODONE HYDROCHLORIDE 10 MG/1
10 TABLET ORAL EVERY 4 HOURS PRN
Status: DISCONTINUED | OUTPATIENT
Start: 2017-11-06 | End: 2017-11-06

## 2017-11-06 RX ORDER — OXYCODONE HYDROCHLORIDE 10 MG/1
10 TABLET ORAL EVERY 4 HOURS PRN
Status: DISCONTINUED | OUTPATIENT
Start: 2017-11-06 | End: 2017-12-23 | Stop reason: HOSPADM

## 2017-11-06 RX ADMIN — Medication 1 TABLET: at 08:53

## 2017-11-06 RX ADMIN — HEPARIN SODIUM 5000 UNITS: 5000 INJECTION, SOLUTION INTRAVENOUS; SUBCUTANEOUS at 21:05

## 2017-11-06 RX ADMIN — OXYCODONE HYDROCHLORIDE 5 MG: 5 TABLET ORAL at 05:46

## 2017-11-06 RX ADMIN — POTASSIUM CHLORIDE 40 MEQ: 1500 TABLET, EXTENDED RELEASE ORAL at 17:10

## 2017-11-06 RX ADMIN — ACETAMINOPHEN 975 MG: 325 TABLET ORAL at 17:10

## 2017-11-06 RX ADMIN — ACETAMINOPHEN 975 MG: 325 TABLET ORAL at 08:54

## 2017-11-06 RX ADMIN — OXYCODONE HYDROCHLORIDE 10 MG: 10 TABLET ORAL at 12:16

## 2017-11-06 RX ADMIN — HEPARIN SODIUM 5000 UNITS: 5000 INJECTION, SOLUTION INTRAVENOUS; SUBCUTANEOUS at 13:09

## 2017-11-06 RX ADMIN — ACETAMINOPHEN 975 MG: 325 TABLET ORAL at 02:11

## 2017-11-06 RX ADMIN — AMLODIPINE BESYLATE 5 MG: 5 TABLET ORAL at 08:53

## 2017-11-06 RX ADMIN — TAMSULOSIN HYDROCHLORIDE 0.4 MG: 0.4 CAPSULE ORAL at 17:10

## 2017-11-06 RX ADMIN — HEPARIN SODIUM 5000 UNITS: 5000 INJECTION, SOLUTION INTRAVENOUS; SUBCUTANEOUS at 05:46

## 2017-11-06 RX ADMIN — OXYCODONE HYDROCHLORIDE 5 MG: 5 TABLET ORAL at 21:05

## 2017-11-06 NOTE — PROGRESS NOTES
Progress Note - General Surgery   Aleisha Rice 43 y o  male MRN: [id number]  Unit/Bed#: [facility] 916-01 Encounter: [id number]    Assessment:  42 yo M w/ spontaneous left kidney rupture s/p ex lap, left nephrectomy, abthera 11/1 w/ ureter ligation and abdominal closure 11/2    Plan:  Adjust pain medication regimen- oxy 5/10 q4prn  OOB, ambulation  Ultrasound per  pending  Continue jiménez  Trend Cr- bumped yesterday to 2 24  SQH for DVT ppx    Subjective/Objective   Chief Complaint:     Subjective: Complains of feeling rough, pain in abdomen  Tolerating diet, beginning to eat more  + Flatus and BM  Ambulating little  Objective:     Blood pressure 162/90, pulse 91, temperature 99 4 °F (37 4 °C), temperature source Oral, resp  rate 18, height 5' 6" (1 676 m), weight 89 2 kg (196 lb 10 4 oz), SpO2 98 %  ,Body mass index is 31 74 kg/m²  Intake/Output Summary (Last 24 hours) at 11/06/17 5131  Last data filed at 11/06/17 0501   Gross per 24 hour   Intake              960 ml   Output             3475 ml   Net            -2515 ml       Invasive Devices     Peripheral Intravenous Line            Peripheral IV 11/06/17 Right Forearm less than 1 day          Drain            Urethral Catheter Coude 16 Fr  4 days                Physical Exam:   Gen: A&O, NAD  Cardio: RRR  Lungs: CTAB  Abd: Soft, non distended  Tender to palpation   Incision w/ staples intact    Lab, Imaging and other studies:CBC: No results found for: WBC, HGB, HCT, MCV, PLT, ADJUSTEDWBC, MCH, MCHC, RDW, MPV, NRBC, CMP: No results found for: NA, K, CL, CO2, ANIONGAP, BUN, CREATININE, GLUCOSE, CALCIUM, AST, ALT, ALKPHOS, PROT, ALBUMIN, BILITOT, EGFR  VTE Pharmacologic Prophylaxis: Heparin  VTE Mechanical Prophylaxis: sequential compression device

## 2017-11-07 LAB
ANION GAP SERPL CALCULATED.3IONS-SCNC: 8 MMOL/L (ref 4–13)
BACTERIA BLD CULT: NORMAL
BACTERIA BLD CULT: NORMAL
BUN SERPL-MCNC: 22 MG/DL (ref 5–25)
CALCIUM SERPL-MCNC: 8.6 MG/DL (ref 8.3–10.1)
CHLORIDE SERPL-SCNC: 106 MMOL/L (ref 100–108)
CO2 SERPL-SCNC: 27 MMOL/L (ref 21–32)
CREAT SERPL-MCNC: 1.86 MG/DL (ref 0.6–1.3)
ERYTHROCYTE [DISTWIDTH] IN BLOOD BY AUTOMATED COUNT: 13.8 % (ref 11.6–15.1)
GFR SERPL CREATININE-BSD FRML MDRD: 44 ML/MIN/1.73SQ M
GLUCOSE SERPL-MCNC: 106 MG/DL (ref 65–140)
HCT VFR BLD AUTO: 24.6 % (ref 36.5–49.3)
HGB BLD-MCNC: 8.2 G/DL (ref 12–17)
MCH RBC QN AUTO: 29.3 PG (ref 26.8–34.3)
MCHC RBC AUTO-ENTMCNC: 33.3 G/DL (ref 31.4–37.4)
MCV RBC AUTO: 88 FL (ref 82–98)
PLATELET # BLD AUTO: 359 THOUSANDS/UL (ref 149–390)
PMV BLD AUTO: 9.5 FL (ref 8.9–12.7)
POTASSIUM SERPL-SCNC: 4.5 MMOL/L (ref 3.5–5.3)
RBC # BLD AUTO: 2.8 MILLION/UL (ref 3.88–5.62)
SODIUM SERPL-SCNC: 141 MMOL/L (ref 136–145)
WBC # BLD AUTO: 10.67 THOUSAND/UL (ref 4.31–10.16)

## 2017-11-07 PROCEDURE — 85027 COMPLETE CBC AUTOMATED: CPT | Performed by: SURGERY

## 2017-11-07 PROCEDURE — 80048 BASIC METABOLIC PNL TOTAL CA: CPT | Performed by: SURGERY

## 2017-11-07 RX ADMIN — HEPARIN SODIUM 5000 UNITS: 5000 INJECTION, SOLUTION INTRAVENOUS; SUBCUTANEOUS at 14:07

## 2017-11-07 RX ADMIN — HEPARIN SODIUM 5000 UNITS: 5000 INJECTION, SOLUTION INTRAVENOUS; SUBCUTANEOUS at 21:40

## 2017-11-07 RX ADMIN — Medication 1 TABLET: at 18:07

## 2017-11-07 RX ADMIN — OXYCODONE HYDROCHLORIDE 10 MG: 10 TABLET ORAL at 10:18

## 2017-11-07 RX ADMIN — ACETAMINOPHEN 975 MG: 325 TABLET ORAL at 02:16

## 2017-11-07 RX ADMIN — HEPARIN SODIUM 5000 UNITS: 5000 INJECTION, SOLUTION INTRAVENOUS; SUBCUTANEOUS at 05:15

## 2017-11-07 RX ADMIN — OXYCODONE HYDROCHLORIDE 10 MG: 10 TABLET ORAL at 05:16

## 2017-11-07 RX ADMIN — AMLODIPINE BESYLATE 5 MG: 5 TABLET ORAL at 10:18

## 2017-11-07 RX ADMIN — ACETAMINOPHEN 975 MG: 325 TABLET ORAL at 18:08

## 2017-11-07 RX ADMIN — Medication 1 TABLET: at 10:18

## 2017-11-07 RX ADMIN — OXYCODONE HYDROCHLORIDE 10 MG: 10 TABLET ORAL at 14:07

## 2017-11-07 RX ADMIN — ACETAMINOPHEN 975 MG: 325 TABLET ORAL at 10:18

## 2017-11-07 RX ADMIN — TAMSULOSIN HYDROCHLORIDE 0.4 MG: 0.4 CAPSULE ORAL at 18:07

## 2017-11-07 NOTE — PROGRESS NOTES
Progress Note - General Surgery   Chito Joseph 43 y o  male MRN: [id number]  Unit/Bed#: [facility] 916-01 Encounter: [id number]    Assessment:  42 yo M w/ spontaneous left kidney rupture s/p ex lap, left nephrectomy, abthera 11/1 w/ ureter ligation and abdominal closure 11/2   - repeat u/s shows significantly improved R hydronephrosis    Plan:  - regular diet  - prn pain control  - continue jiménez  - f/u AM labs, trend Cr  - PT/OT  - SQH/SCDs      Subjective/Objective   Chief Complaint:     Subjective: C/o persistent pain in abd  Tolerating diet with no nausea or vomiting but not taking very much PO, passing flatus and having BM  Denies fever/chills  Objective:     Blood pressure 141/81, pulse (!) 110, temperature 99 9 °F (37 7 °C), temperature source Oral, resp  rate 18, height 5' 6" (1 676 m), weight 89 2 kg (196 lb 10 4 oz), SpO2 95 %  ,Body mass index is 31 74 kg/m²        Intake/Output Summary (Last 24 hours) at 11/06/17 2253  Last data filed at 11/06/17 2105   Gross per 24 hour   Intake              983 ml   Output             1600 ml   Net             -617 ml       Invasive Devices     Peripheral Intravenous Line            Peripheral IV 11/06/17 Right Forearm less than 1 day          Drain            Urethral Catheter Coude 16 Fr  4 days                Physical Exam:   NAD  RRR  Norm resp effort  Abd soft, min distended, appropriately tender, midline incision cdi closed w/staples  Jiménez in place draining clear yellow urine    Lab, Imaging and other studies:CBC:   Lab Results   Component Value Date    WBC 10 01 11/06/2017    HGB 9 1 (L) 11/06/2017    HCT 26 5 (L) 11/06/2017    MCV 86 11/06/2017     11/06/2017    MCH 29 5 11/06/2017    MCHC 34 3 11/06/2017    RDW 13 7 11/06/2017    MPV 9 8 11/06/2017    NRBC 0 11/06/2017   , CMP:   Lab Results   Component Value Date     11/06/2017    K 3 7 11/06/2017     11/06/2017    CO2 27 11/06/2017    ANIONGAP 6 11/06/2017    BUN 21 11/06/2017    CREATININE 2 06 (H) 11/06/2017    GLUCOSE 109 11/06/2017    CALCIUM 8 4 11/06/2017    EGFR 39 11/06/2017     VTE Pharmacologic Prophylaxis: Heparin  VTE Mechanical Prophylaxis: sequential compression device

## 2017-11-07 NOTE — PROGRESS NOTES
Progress Note - Cj Zeng 43 y o  male MRN: [id number]    Unit/Bed#: [facility] 916-01 Encounter: [id number]      Assessment:  Mr Nathan Cornell is a 49-year-old incarcerated male known to our service of bilateral hydronephrosis and intermittent chronic urinary retention transferred from present to [facility] and then [place] secondary to left flank pain  CT revealed severe left hydronephrosis and large 17 cm collection of perinephric fluid/blood extending to left pelvis and anterior to left psoas  Patient became hemodynamically unstable required ICU admission  Patient was taken to the admitting trauma team now status post exploratory laparotomy left nephro ureterectomy and subsequent second-look exploratory laparotomy, ureteral ligation and closure  Mr Nathan Cornell is now on  medical surgical unit  Recent renal ultrasound shows resolution right hydronephrosis and Thakur catheter insertion  Plan:  Maintain urinary catheter to straight drainage  Continue to trend creatinine  Will consider void trial once creatinine L  Per record review, creatinine in February of 2016 was 1 13    Subjective:   Denies fever or chills  Objective:     Vitals: Blood pressure 139/83, pulse 79, temperature 98 2 °F (36 8 °C), resp  rate 18, height 5' 6" (1 676 m), weight 89 2 kg (196 lb 10 4 oz), SpO2 99 %  ,Body mass index is 31 74 kg/m²        Intake/Output Summary (Last 24 hours) at 11/07/17 1444  Last data filed at 11/07/17 1300   Gross per 24 hour   Intake              923 ml   Output             1650 ml   Net             -727 ml       Physical Exam: General appearance: alert, appears stated age, cooperative and no distress  Head: Normocephalic, without obvious abnormality, atraumatic  Neck: no adenopathy, no carotid bruit, no JVD, supple, symmetrical, trachea midline and thyroid not enlarged, symmetric, no tenderness/mass/nodules  Lungs: clear to auscultation bilaterally  Heart: regular rate and rhythm, S1, S2 normal, no murmur, click, rub or gallop  Abdomen: abnormal findings:  mild and Incisional tenderness appropriate tenderness in the lower abdomen  Extremities: extremities normal, atraumatic, no cyanosis or edema  Pulses: 2+ and symmetric  Neurologic: Grossly normal  Thakur secure and patent for clear yellow urine     Invasive Devices     Peripheral Intravenous Line            Peripheral IV 11/06/17 Right Forearm 1 day          Drain            Urethral Catheter Coude 16 Fr  5 days                Lab, Imaging and other studies: I have personally reviewed pertinent reports

## 2017-11-07 NOTE — CASE MANAGEMENT
Continued Stay Review    Date: 11/7    Vital Signs: /83   Pulse 79   Temp 98 2 °F (36 8 °C)   Resp 18   Ht 5' 6" (1 676 m)   Wt 89 2 kg (196 lb 10 4 oz)   SpO2 99%   BMI 31 74 kg/m²     Medications:   Scheduled Meds:   acetaminophen 975 mg Oral Q8H   amLODIPine 5 mg Oral Daily   heparin (porcine) 5,000 Units Subcutaneous Q8H [address]   senna-docusate sodium 1 tablet Oral BID   tamsulosin 0 4 mg Oral Daily With Dinner     Continuous Infusions:    PRN Meds:   hydrALAZINE    HYDROmorphone    influenza vaccine    oxyCODONE    Abnormal Labs/Diagnostic Results:    Updated: 11/07/17 0633      WBC 10 67 (H) 4 31 - 10 16 Thousand/uL     RBC 2 80 (L) 3 88 - 5 62 Million/uL     Hemoglobin 8 2 (L) 12 0 - 17 0 g/dL     Hematocrit 24 6 (L) 36 5 - 49 3 %      Age/Sex: 43 y o  male     Assessment/Plan:   Assessment:  44 yo M w/ spontaneous left kidney rupture s/p ex lap, left nephrectomy, abthera 11/1 w/ ureter ligation and abdominal closure 11/2   - repeat u/s shows significantly improved R hydronephrosis     Plan:  - regular diet  - prn pain control  - continue jiménez  - f/u AM labs, trend Cr  - PT/OT  - SQH/SCDs    Discharge Plan: Return to [facility]

## 2017-11-08 ENCOUNTER — APPOINTMENT (INPATIENT)
Dept: RADIOLOGY | Facility: HOSPITAL | Age: 42
DRG: 659 | End: 2017-11-08
Payer: COMMERCIAL

## 2017-11-08 LAB
ANION GAP SERPL CALCULATED.3IONS-SCNC: 7 MMOL/L (ref 4–13)
ANION GAP SERPL CALCULATED.3IONS-SCNC: 8 MMOL/L (ref 4–13)
BUN SERPL-MCNC: 30 MG/DL (ref 5–25)
BUN SERPL-MCNC: 34 MG/DL (ref 5–25)
CALCIUM SERPL-MCNC: 8.5 MG/DL (ref 8.3–10.1)
CALCIUM SERPL-MCNC: 8.8 MG/DL (ref 8.3–10.1)
CHLORIDE SERPL-SCNC: 102 MMOL/L (ref 100–108)
CHLORIDE SERPL-SCNC: 104 MMOL/L (ref 100–108)
CO2 SERPL-SCNC: 25 MMOL/L (ref 21–32)
CO2 SERPL-SCNC: 26 MMOL/L (ref 21–32)
CREAT SERPL-MCNC: 2.71 MG/DL (ref 0.6–1.3)
CREAT SERPL-MCNC: 3.02 MG/DL (ref 0.6–1.3)
ERYTHROCYTE [DISTWIDTH] IN BLOOD BY AUTOMATED COUNT: 13.7 % (ref 11.6–15.1)
GFR SERPL CREATININE-BSD FRML MDRD: 24 ML/MIN/1.73SQ M
GFR SERPL CREATININE-BSD FRML MDRD: 28 ML/MIN/1.73SQ M
GLUCOSE SERPL-MCNC: 108 MG/DL (ref 65–140)
GLUCOSE SERPL-MCNC: 111 MG/DL (ref 65–140)
HCT VFR BLD AUTO: 28.6 % (ref 36.5–49.3)
HGB BLD-MCNC: 9.5 G/DL (ref 12–17)
MCH RBC QN AUTO: 29.1 PG (ref 26.8–34.3)
MCHC RBC AUTO-ENTMCNC: 33.2 G/DL (ref 31.4–37.4)
MCV RBC AUTO: 88 FL (ref 82–98)
PLATELET # BLD AUTO: 504 THOUSANDS/UL (ref 149–390)
PMV BLD AUTO: 9.1 FL (ref 8.9–12.7)
POTASSIUM SERPL-SCNC: 4.7 MMOL/L (ref 3.5–5.3)
POTASSIUM SERPL-SCNC: 4.9 MMOL/L (ref 3.5–5.3)
RBC # BLD AUTO: 3.27 MILLION/UL (ref 3.88–5.62)
SODIUM SERPL-SCNC: 136 MMOL/L (ref 136–145)
SODIUM SERPL-SCNC: 136 MMOL/L (ref 136–145)
WBC # BLD AUTO: 14.03 THOUSAND/UL (ref 4.31–10.16)

## 2017-11-08 PROCEDURE — 85027 COMPLETE CBC AUTOMATED: CPT | Performed by: PHYSICIAN ASSISTANT

## 2017-11-08 PROCEDURE — 71020 HB CHEST X-RAY 2VW FRONTAL&LATL: CPT

## 2017-11-08 PROCEDURE — 74176 CT ABD & PELVIS W/O CONTRAST: CPT

## 2017-11-08 PROCEDURE — 80048 BASIC METABOLIC PNL TOTAL CA: CPT | Performed by: PHYSICIAN ASSISTANT

## 2017-11-08 PROCEDURE — 97530 THERAPEUTIC ACTIVITIES: CPT

## 2017-11-08 PROCEDURE — 80048 BASIC METABOLIC PNL TOTAL CA: CPT | Performed by: SURGERY

## 2017-11-08 RX ORDER — SODIUM CHLORIDE 9 MG/ML
50 INJECTION, SOLUTION INTRAVENOUS CONTINUOUS
Status: DISCONTINUED | OUTPATIENT
Start: 2017-11-08 | End: 2017-11-10

## 2017-11-08 RX ADMIN — SODIUM CHLORIDE 100 ML/HR: 0.9 INJECTION, SOLUTION INTRAVENOUS at 07:40

## 2017-11-08 RX ADMIN — OXYCODONE HYDROCHLORIDE 10 MG: 10 TABLET ORAL at 17:12

## 2017-11-08 RX ADMIN — HEPARIN SODIUM 5000 UNITS: 5000 INJECTION, SOLUTION INTRAVENOUS; SUBCUTANEOUS at 14:26

## 2017-11-08 RX ADMIN — ACETAMINOPHEN 975 MG: 325 TABLET ORAL at 23:30

## 2017-11-08 RX ADMIN — AMLODIPINE BESYLATE 5 MG: 5 TABLET ORAL at 10:15

## 2017-11-08 RX ADMIN — Medication 1 TABLET: at 17:13

## 2017-11-08 RX ADMIN — IOHEXOL 50 ML: 240 INJECTION, SOLUTION INTRATHECAL; INTRAVASCULAR; INTRAVENOUS; ORAL at 10:28

## 2017-11-08 RX ADMIN — OXYCODONE HYDROCHLORIDE 10 MG: 10 TABLET ORAL at 07:38

## 2017-11-08 RX ADMIN — OXYCODONE HYDROCHLORIDE 10 MG: 10 TABLET ORAL at 00:11

## 2017-11-08 RX ADMIN — HEPARIN SODIUM 5000 UNITS: 5000 INJECTION, SOLUTION INTRAVENOUS; SUBCUTANEOUS at 05:07

## 2017-11-08 RX ADMIN — TAMSULOSIN HYDROCHLORIDE 0.4 MG: 0.4 CAPSULE ORAL at 17:14

## 2017-11-08 RX ADMIN — ACETAMINOPHEN 975 MG: 325 TABLET ORAL at 07:39

## 2017-11-08 RX ADMIN — SODIUM CHLORIDE 100 ML/HR: 0.9 INJECTION, SOLUTION INTRAVENOUS at 18:36

## 2017-11-08 RX ADMIN — HEPARIN SODIUM 5000 UNITS: 5000 INJECTION, SOLUTION INTRAVENOUS; SUBCUTANEOUS at 21:02

## 2017-11-08 NOTE — PROGRESS NOTES
Progress Note - jC Zeng 43 y o  male MRN: [id number]    Unit/Bed#: [facility] 916-01 Encounter: [id number]      Assessment:  43 y o  Male with a history of hydronephrosis and urinary retention admitted to trauma team for spontaneous left kidney rupture s/p left nephrectomy, exp lap, with ureter ligation  CT scan revealed right side hydronephrosis  Bladder decompression with jiménez catheter placement has resulted in resolution of hydronephrosis, however creatinine continues to rise, today 3 02  Intermittent low grade fevers, last 48hrs  WBC remains elevated  Patient c/o 8/10 LUQ pain which is very sensitive to any type of movement or touch  States difficulty with PO intake related to pain  Denies any right side abdominal/flank pain  Abdominal incision with staples C/D/I  Plan:  Right hydronephrosis significantly decreased with placement of jiménez catheter, however creatinine continues to rise significantly  Repeat CT Scan  Monitor Creatinine  Repeat BMP      Subjective:   Pt c/o abdominal pain radiating to LUQ  Denies any right side abdominal pain  Pain is described as sharp and constant  Worse with any movement  Abdomen soft, (+) BM last night which pt describes as formed, large and brown  States poor appetite and fluid intake related to pain  Denies dizziness, chest pain, chills, nausea, vomiting  No issues regarding jiménez catheter  Objective:     Vitals: Blood pressure 140/79, pulse 105, temperature 99 4 °F (37 4 °C), temperature source Oral, resp  rate 18, height 5' 6" (1 676 m), weight 89 2 kg (196 lb 10 4 oz), SpO2 95 %  ,Body mass index is 31 74 kg/m²        Intake/Output Summary (Last 24 hours) at 11/08/17 1458  Last data filed at 11/08/17 1300   Gross per 24 hour   Intake              720 ml   Output              875 ml   Net             -155 ml       Physical Exam: General appearance: alert, appears stated age and cooperative  Lungs: clear to auscultation bilaterally  Heart: regular rate and rhythm, S1, S2 normal, no murmur, click, rub or gallop  Abdomen: abnormal findings:  distended and tender  Extremities: extremities normal, atraumatic, no cyanosis or edema  Pulses: 2+ and symmetric     Invasive Devices     Peripheral Intravenous Line            Peripheral IV 11/06/17 Right Forearm 2 days          Drain            Urethral Catheter Coude 16 Fr  6 days              Lab Results   Component Value Date    WBC 14 03 (H) 11/08/2017    HGB 9 5 (L) 11/08/2017    HCT 28 6 (L) 11/08/2017    MCV 88 11/08/2017     (H) 11/08/2017     Lab Results   Component Value Date    GLUCOSE 108 11/08/2017    CALCIUM 8 8 11/08/2017     11/08/2017    K 4 9 11/08/2017    CO2 25 11/08/2017     11/08/2017    BUN 34 (H) 11/08/2017    CREATININE 3 02 (H) 11/08/2017         Lab, Imaging and other studies: I have personally reviewed pertinent reports      VTE Pharmacologic Prophylaxis: Heparin  VTE Mechanical Prophylaxis: sequential compression device

## 2017-11-08 NOTE — PLAN OF CARE
Problem: PAIN - ADULT  Goal: Verbalizes/displays adequate comfort level or baseline comfort level  Interventions:  - Encourage patient to monitor pain and request assistance  - Assess pain using appropriate pain scale  - Administer analgesics based on type and severity of pain and evaluate response  - Implement non-pharmacological measures as appropriate and evaluate response  - Consider cultural and social influences on pain and pain management  - Notify physician/advanced practitioner if interventions unsuccessful or patient reports new pain   Outcome: Progressing      Problem: INFECTION - ADULT  Goal: Absence or prevention of progression during hospitalization  INTERVENTIONS:  - Assess and monitor for signs and symptoms of infection  - Monitor lab/diagnostic results  - Monitor all insertion sites, i e  indwelling lines, tubes, and drains  - Monitor endotracheal (as able) and nasal secretions for changes in amount and color  - [place] appropriate cooling/warming therapies per order  - Administer medications as ordered  - Instruct and encourage patient and family to use good hand hygiene technique  - Identify and instruct in appropriate isolation precautions for identified infection/condition   Outcome: Progressing      Problem: SAFETY ADULT  Goal: Patient will remain free of falls  INTERVENTIONS:  - Assess patient frequently for physical needs  -  Identify cognitive and physical deficits and behaviors that affect risk of falls    -  [place] fall precautions as indicated by assessment   - Educate patient/family on patient safety including physical limitations  - Instruct patient to call for assistance with activity based on assessment  - Modify environment to reduce risk of injury  - Consider OT/PT consult to assist with strengthening/mobility    Outcome: Progressing    Goal: Maintain or return to baseline ADL function  INTERVENTIONS:  -  Assess patient's ability to carry out ADLs; assess patient's baseline for ADL function and identify physical deficits which impact ability to perform ADLs (bathing, care of mouth/teeth, toileting, grooming, dressing, etc )  - Assess/evaluate cause of self-care deficits   - Assess range of motion  - Assess patient's mobility; develop plan if impaired  - Assess patient's need for assistive devices and provide as appropriate  - Encourage maximum independence but intervene and supervise when necessary  ¯ Involve family in performance of ADLs  ¯ Assess for home care needs following discharge   ¯ Request OT consult to assist with ADL evaluation and planning for discharge  ¯ Provide patient education as appropriate   Outcome: Progressing    Goal: Maintain or return mobility status to optimal level  INTERVENTIONS:  - Assess patient's baseline mobility status (ambulation, transfers, stairs, etc )    - Identify cognitive and physical deficits and behaviors that affect mobility  - Identify mobility aids required to assist with transfers and/or ambulation (gait belt, sit-to-stand, lift, walker, cane, etc )  - [place] fall precautions as indicated by assessment  - Record patient progress and toleration of activity level on Mobility SBAR; progress patient to next Phase/Stage  - Instruct patient to call for assistance with activity based on assessment  - Request Rehabilitation consult to assist with strengthening/weightbearing, etc    Outcome: Progressing      Problem: DISCHARGE PLANNING  Goal: Discharge to home or other facility with appropriate resources  INTERVENTIONS:  - Identify barriers to discharge w/patient and caregiver  - Arrange for needed discharge resources and transportation as appropriate  - Identify discharge learning needs (meds, wound care, etc )  - Arrange for interpretive services to assist at discharge as needed  - Refer to Case Management Department for coordinating discharge planning if the patient needs post-hospital services based on physician/advanced practitioner order or complex needs related to functional status, cognitive ability, or social support system   Outcome: Progressing      Problem: Knowledge Deficit  Goal: Patient/family/caregiver demonstrates understanding of disease process, treatment plan, medications, and discharge instructions  Complete learning assessment and assess knowledge base  Interventions:  - Provide teaching at level of understanding  - Provide teaching via preferred learning methods   Outcome: Progressing      Problem: Prexisting or High Potential for Compromised Skin Integrity  Goal: Skin integrity is maintained or improved  INTERVENTIONS:  - Identify patients at risk for skin breakdown  - Assess and monitor skin integrity  - Assess and monitor nutrition and hydration status  - Monitor labs (i e  albumin)  - Assess for incontinence   - Turn and reposition patient  - Assist with mobility/ambulation  - Relieve pressure over bony prominences  - Avoid friction and shearing  - Provide appropriate hygiene as needed including keeping skin clean and dry  - Evaluate need for skin moisturizer/barrier cream  - Collaborate with interdisciplinary team (i e  Nutrition, Rehabilitation, etc )   - Patient/family teaching   Outcome: Progressing      Problem: Nutrition/Hydration-ADULT  Goal: Nutrient/Hydration intake appropriate for improving, restoring or maintaining nutritional needs  Monitor and assess patient's nutrition/hydration status for malnutrition (ex- brittle hair, bruises, dry skin, pale skin and conjunctiva, muscle wasting, smooth red tongue, and disorientation)  Collaborate with interdisciplinary team and initiate plan and interventions as ordered  Monitor patient's weight and dietary intake as ordered or per policy  Utilize nutrition screening tool and intervene per policy  Determine patient's food preferences and provide high-protein, high-caloric foods as appropriate       INTERVENTIONS:  - Monitor oral intake, urinary output, labs, and treatment plans  - Assess nutrition and hydration status and recommend course of action  - Evaluate amount of meals eaten  - Assist patient with eating if necessary   - Allow adequate time for meals  - Recommend/ encourage appropriate diets, oral nutritional supplements, and vitamin/mineral supplements  - Order, calculate, and assess calorie counts as needed  - Recommend, monitor, and adjust tube feedings and TPN/PPN based on assessed needs  - Assess need for intravenous fluids  - Provide specific nutrition/hydration education as appropriate  - Include patient/family/caregiver in decisions related to nutrition   Outcome: Progressing      Problem: DISCHARGE PLANNING - CARE MANAGEMENT  Goal: Discharge to post-acute care or home with appropriate resources  INTERVENTIONS:  - Conduct assessment to determine patient/family and health care team treatment goals, and need for post-acute services based on payer coverage, community resources, and patient preferences, and barriers to discharge  - Address psychosocial, clinical, and financial barriers to discharge as identified in assessment in conjunction with the patient/family and health care team  - Arrange appropriate level of post-acute services according to patient's   needs and preference and payer coverage in collaboration with the physician and health care team  - Communicate with and update the patient/family, physician, and health care team regarding progress on the discharge plan  - Arrange appropriate transportation to post-acute venues  -pt to d/c with appropriate resources when medically ready? Outcome: Progressing      Problem: Potential for Falls  Goal: Patient will remain free of falls  INTERVENTIONS:  - Assess patient frequently for physical needs  -  Identify cognitive and physical deficits and behaviors that affect risk of falls    -  [place] fall precautions as indicated by assessment   - Educate patient/family on patient safety including physical limitations  - Instruct patient to call for assistance with activity based on assessment  - Modify environment to reduce risk of injury  - Consider OT/PT consult to assist with strengthening/mobility    Outcome: Progressing

## 2017-11-08 NOTE — PROGRESS NOTES
Progress Note - General Surgery   Terra Whyte 43 y o  male MRN: [id number]  Unit/Bed#: [facility] 916-01 Encounter: [id number]    Assessment:  42 yo M w/ spontaneous left kidney rupture s/p ex lap, left nephrectomy, abthera 11/1 w/ ureter ligation and abdominal closure 11/2  Plan:  Regular diet  Continue PRN pain control  Discussed importance of getting OOB, ambulation  F/U AM Cr  Thakur per urology    Subjective/Objective   Chief Complaint: None    Subjective: Pain adequately controlled but present  States he hasnt walked much  Tolerated liquids no N/V  + flatus and BM    Objective:     Blood pressure 159/82, pulse 101, temperature 99 9 °F (37 7 °C), temperature source Oral, resp  rate 18, height 5' 6" (1 676 m), weight 89 2 kg (196 lb 10 4 oz), SpO2 98 %  ,Body mass index is 31 74 kg/m²        Intake/Output Summary (Last 24 hours) at 11/08/17 0545  Last data filed at 11/07/17 2101   Gross per 24 hour   Intake              920 ml   Output             1525 ml   Net             -605 ml       Invasive Devices     Peripheral Intravenous Line            Peripheral IV 11/06/17 Right Forearm 2 days          Drain            Urethral Catheter Coude 16 Fr  6 days                Physical Exam:   Gen: A&O, NAD  Cardio: RRR  Lungs: CTAB  Abd: Soft, appropriately tender, staples intact    Lab, Imaging and other studies:  CBC:   Lab Results   Component Value Date    WBC 14 03 (H) 11/08/2017    HGB 9 5 (L) 11/08/2017    HCT 28 6 (L) 11/08/2017    MCV 88 11/08/2017     (H) 11/08/2017    MCH 29 1 11/08/2017    MCHC 33 2 11/08/2017    RDW 13 7 11/08/2017    MPV 9 1 11/08/2017   , CMP: No results found for: NA, K, CL, CO2, ANIONGAP, BUN, CREATININE, GLUCOSE, CALCIUM, AST, ALT, ALKPHOS, PROT, ALBUMIN, BILITOT, EGFR  VTE Pharmacologic Prophylaxis: Heparin  VTE Mechanical Prophylaxis: sequential compression device

## 2017-11-08 NOTE — PHYSICAL THERAPY NOTE
PT TREATMENT        11/08/17 1530   Pain Assessment   Pain Assessment FLACC   Pain Rating: FLACC (Rest) - Face 1   Pain Rating: FLACC (Rest) - Legs 1   Pain Rating: FLACC (Rest) - Activity 1   Pain Rating: FLACC (Rest) - Cry 0   Pain Rating: FLACC (Rest) - Consolability 0   Score: FLACC (Rest) 3   Pain Rating: FLACC (Activity) - Face 1   Pain Rating: FLACC (Activity) - Legs 1   Pain Rating: FLACC (Activity) - Activity 1   Pain Rating: FLACC (Activity) - Cry 1   Pain Rating: FLACC (Activity) - Consolability 1   Score: FLACC (Activity) 5   Restrictions/Precautions   Weight Bearing Precautions Per Order No   Other Precautions Fall Risk;Pain  (CORRECTIONS OFFICE PRESENT )   General   Chart Reviewed Yes   Additional Pertinent History POD #6 11/02/17 LAPAROTOMY EXPLORATORY ;LEFT NEPHRECTOMY; LEFT URETERECTOMY; APPLICATION OF ABTHERA VAC    Response to Previous Treatment Patient reporting fatigue but able to participate  Family/Caregiver Present No   Cognition   Overall Cognitive Status WFL   Arousal/Participation Cooperative   Attention Attends with cues to redirect   Orientation Level Oriented X4   Memory Within functional limits   Following Commands Follows one step commands without difficulty   Comments Pt not offerring much conversation other than him being in pain    Subjective   Subjective Pt agreeable to particiapte in PT session; limited by arrival of transport for CT scan    Bed Mobility   Supine to Sit 2  Maximal assistance   Additional items Assist x 1; Increased time required;Verbal cues;LE management   Sit to Supine 2  Maximal assistance   Additional items Assist x 1;Verbal cues   Additional Comments Pt educated on log roll technique for OOB to reduce surgical pain and limit strain on ABD  Pt agreeable to trial but movement of bed to flat position increased pain  Pt required heavy assist to transfer to EOB where he was able to sit >60 seconds with no assistance   At the end of session, pt transferred to stretcher for transport  Pt required assistance for BLE to achieve proper positioning    Transfers   Sit to Stand 3  Moderate assistance   Additional items Assist x 1   Stand to Sit 3  Moderate assistance   Additional items Assist x 1   Ambulation/Elevation   Gait pattern Antalgic;Decreased foot clearance; Forward Flexion; Short stride; Step to;Excessively slow   Gait Assistance 4  Minimal assist   Additional items Assist x 1   Assistive Device Rolling walker   Distance 15'   Balance   Static Sitting Fair   Dynamic Sitting Fair   Static Standing Fair -   Dynamic Standing Fair -   Ambulatory Fair -   Endurance Deficit   Endurance Deficit Yes   Endurance Deficit Description pain    Activity Tolerance   Activity Tolerance Patient limited by pain   Nurse Made Aware yes    Assessment   Prognosis Good   Problem List Decreased strength;Decreased endurance; Impaired balance;Decreased mobility; Decreased skin integrity;Orthopedic restrictions;Pain   Assessment Pt engaged in PT treatment session but was limited 2* arrival of transport  Pt educated throughout session on proper transfer techniques to reduce pain, importance of mobility post surgery and importance of tolerance to upright  Pt remains limited greatly 2* pain  PT to conitnue to follow pt durign stay to progress functioanl mobility (I) and safety  Unclear d/c as patient in [facility]-> would benefit from PT during stay  Goals   Patient Goals to have less pain    STG Expiration Date 11/14/17   Treatment Day 1   Plan   Treatment/Interventions Functional transfer training;LE strengthening/ROM; Therapeutic exercise; Endurance training;Patient/family training;Equipment eval/education; Bed mobility;Gait training;Spoke to nursing   Progress Progressing toward goals   PT Frequency Other (Comment)  (6x/wk)   Recommendation   Recommendation Other (Comment)  (will [facility] )   Equipment Recommended Walker   Additional Comments OOB with transport at the end of PT session        Lorenzo Mehta, PT

## 2017-11-08 NOTE — PLAN OF CARE
Problem: PHYSICAL THERAPY ADULT  Goal: Performs mobility at highest level of function for planned discharge setting  See evaluation for individualized goals  Treatment/Interventions: Functional transfer training, LE strengthening/ROM, Therapeutic exercise, Endurance training, Equipment eval/education, Bed mobility, Gait training, Spoke to nursing, OT  Equipment Recommended: Octavio Edge (at this time)       See flowsheet documentation for full assessment, interventions and recommendations  Outcome: Progressing  Prognosis: Good  Problem List: Decreased strength, Decreased endurance, Impaired balance, Decreased mobility, Decreased skin integrity, Orthopedic restrictions, Pain  Assessment: Pt engaged in PT treatment session but was limited 2* arrival of transport  Pt educated throughout session on proper transfer techniques to reduce pain, importance of mobility post surgery and importance of tolerance to upright  Pt remains limited greatly 2* pain  PT to conitnue to follow pt durign stay to progress functioanl mobility (I) and safety  Unclear d/c as patient in alf-> would benefit from PT during stay  Recommendation: Other (Comment) (will [facility] facility )          See flowsheet documentation for full assessment

## 2017-11-09 ENCOUNTER — APPOINTMENT (INPATIENT)
Dept: RADIOLOGY | Facility: HOSPITAL | Age: 42
DRG: 659 | End: 2017-11-09
Payer: COMMERCIAL

## 2017-11-09 ENCOUNTER — ANESTHESIA EVENT (INPATIENT)
Dept: RADIOLOGY | Facility: HOSPITAL | Age: 42
DRG: 659 | End: 2017-11-09
Payer: COMMERCIAL

## 2017-11-09 ENCOUNTER — ANESTHESIA (INPATIENT)
Dept: RADIOLOGY | Facility: HOSPITAL | Age: 42
DRG: 659 | End: 2017-11-09
Payer: COMMERCIAL

## 2017-11-09 PROBLEM — N13.39 OTHER HYDRONEPHROSIS: Chronic | Status: ACTIVE | Noted: 2017-11-09

## 2017-11-09 PROBLEM — N17.9 AKI (ACUTE KIDNEY INJURY) (HCC): Status: ACTIVE | Noted: 2017-11-09

## 2017-11-09 LAB
ANION GAP SERPL CALCULATED.3IONS-SCNC: 9 MMOL/L (ref 4–13)
ANISOCYTOSIS BLD QL SMEAR: PRESENT
BASOPHILS # BLD MANUAL: 0 THOUSAND/UL (ref 0–0.1)
BASOPHILS NFR MAR MANUAL: 0 % (ref 0–1)
BUN SERPL-MCNC: 39 MG/DL (ref 5–25)
CALCIUM SERPL-MCNC: 8.6 MG/DL (ref 8.3–10.1)
CHLORIDE SERPL-SCNC: 104 MMOL/L (ref 100–108)
CO2 SERPL-SCNC: 23 MMOL/L (ref 21–32)
CREAT FLD-MCNC: 6.87 MG/DL
CREAT SERPL-MCNC: 3.4 MG/DL (ref 0.6–1.3)
EOSINOPHIL # BLD MANUAL: 0.6 THOUSAND/UL (ref 0–0.4)
EOSINOPHIL NFR BLD MANUAL: 4 % (ref 0–6)
ERYTHROCYTE [DISTWIDTH] IN BLOOD BY AUTOMATED COUNT: 14.1 % (ref 11.6–15.1)
GFR SERPL CREATININE-BSD FRML MDRD: 21 ML/MIN/1.73SQ M
GLUCOSE SERPL-MCNC: 120 MG/DL (ref 65–140)
HCT VFR BLD AUTO: 29.1 % (ref 36.5–49.3)
HGB BLD-MCNC: 9.7 G/DL (ref 12–17)
HISTIOCYTES NFR FLD: 2 %
INR PPP: 1.1 (ref 0.86–1.16)
LYMPHOCYTES # BLD AUTO: 1.51 THOUSAND/UL (ref 0.6–4.47)
LYMPHOCYTES # BLD AUTO: 10 % (ref 14–44)
LYMPHOCYTES NFR BLD AUTO: 3 %
MAGNESIUM SERPL-MCNC: 2.4 MG/DL (ref 1.6–2.6)
MCH RBC QN AUTO: 29.4 PG (ref 26.8–34.3)
MCHC RBC AUTO-ENTMCNC: 33.3 G/DL (ref 31.4–37.4)
MCV RBC AUTO: 88 FL (ref 82–98)
MONO+MESO NFR FLD MANUAL: 4 %
MONOCYTES # BLD AUTO: 0.76 THOUSAND/UL (ref 0–1.22)
MONOCYTES NFR BLD AUTO: 13 %
MONOCYTES NFR BLD: 5 % (ref 4–12)
NEUTROPHILS # BLD MANUAL: 11.94 THOUSAND/UL (ref 1.85–7.62)
NEUTS SEG NFR BLD AUTO: 78 %
NEUTS SEG NFR BLD AUTO: 79 % (ref 43–75)
NRBC BLD AUTO-RTO: 0 /100 WBCS
PLATELET # BLD AUTO: 663 THOUSANDS/UL (ref 149–390)
PLATELET BLD QL SMEAR: ABNORMAL
PMV BLD AUTO: 8.9 FL (ref 8.9–12.7)
POLYCHROMASIA BLD QL SMEAR: PRESENT
POTASSIUM SERPL-SCNC: 4.7 MMOL/L (ref 3.5–5.3)
PROMYELOCYTES NFR BLD MANUAL: 1 % (ref 0–0)
PROTHROMBIN TIME: 14.2 SECONDS (ref 12.1–14.4)
RBC # BLD AUTO: 3.3 MILLION/UL (ref 3.88–5.62)
RBC MORPH BLD: PRESENT
SITE: NORMAL
SODIUM SERPL-SCNC: 136 MMOL/L (ref 136–145)
TOTAL CELLS COUNTED SPEC: 100
VARIANT LYMPHS # BLD AUTO: 1 %
WBC # BLD AUTO: 15.12 THOUSAND/UL (ref 4.31–10.16)
WBC # FLD MANUAL: NORMAL /UL

## 2017-11-09 PROCEDURE — C1729 CATH, DRAINAGE: HCPCS

## 2017-11-09 PROCEDURE — 85610 PROTHROMBIN TIME: CPT | Performed by: SURGERY

## 2017-11-09 PROCEDURE — 85007 BL SMEAR W/DIFF WBC COUNT: CPT | Performed by: SURGERY

## 2017-11-09 PROCEDURE — 87205 SMEAR GRAM STAIN: CPT | Performed by: STUDENT IN AN ORGANIZED HEALTH CARE EDUCATION/TRAINING PROGRAM

## 2017-11-09 PROCEDURE — 87086 URINE CULTURE/COLONY COUNT: CPT | Performed by: NURSE PRACTITIONER

## 2017-11-09 PROCEDURE — 83735 ASSAY OF MAGNESIUM: CPT | Performed by: SURGERY

## 2017-11-09 PROCEDURE — 50432 PLMT NEPHROSTOMY CATHETER: CPT

## 2017-11-09 PROCEDURE — C1769 GUIDE WIRE: HCPCS

## 2017-11-09 PROCEDURE — 87070 CULTURE OTHR SPECIMN AEROBIC: CPT | Performed by: STUDENT IN AN ORGANIZED HEALTH CARE EDUCATION/TRAINING PROGRAM

## 2017-11-09 PROCEDURE — 80048 BASIC METABOLIC PNL TOTAL CA: CPT | Performed by: SURGERY

## 2017-11-09 PROCEDURE — 89051 BODY FLUID CELL COUNT: CPT | Performed by: STUDENT IN AN ORGANIZED HEALTH CARE EDUCATION/TRAINING PROGRAM

## 2017-11-09 PROCEDURE — 85027 COMPLETE CBC AUTOMATED: CPT | Performed by: SURGERY

## 2017-11-09 PROCEDURE — 0T9030Z DRAINAGE OF RIGHT KIDNEY WITH DRAINAGE DEVICE, PERCUTANEOUS APPROACH: ICD-10-PCS | Performed by: RADIOLOGY

## 2017-11-09 PROCEDURE — 82570 ASSAY OF URINE CREATININE: CPT | Performed by: STUDENT IN AN ORGANIZED HEALTH CARE EDUCATION/TRAINING PROGRAM

## 2017-11-09 RX ORDER — FENTANYL CITRATE/PF 50 MCG/ML
25 SYRINGE (ML) INJECTION
Status: DISCONTINUED | OUTPATIENT
Start: 2017-11-09 | End: 2017-11-09 | Stop reason: HOSPADM

## 2017-11-09 RX ORDER — SODIUM CHLORIDE, SODIUM LACTATE, POTASSIUM CHLORIDE, CALCIUM CHLORIDE 600; 310; 30; 20 MG/100ML; MG/100ML; MG/100ML; MG/100ML
50 INJECTION, SOLUTION INTRAVENOUS CONTINUOUS
Status: DISCONTINUED | OUTPATIENT
Start: 2017-11-09 | End: 2017-11-09

## 2017-11-09 RX ORDER — ROCURONIUM BROMIDE 10 MG/ML
INJECTION, SOLUTION INTRAVENOUS AS NEEDED
Status: DISCONTINUED | OUTPATIENT
Start: 2017-11-09 | End: 2017-11-09 | Stop reason: SURG

## 2017-11-09 RX ORDER — SODIUM CHLORIDE 9 MG/ML
INJECTION, SOLUTION INTRAVENOUS CONTINUOUS PRN
Status: DISCONTINUED | OUTPATIENT
Start: 2017-11-09 | End: 2017-11-09 | Stop reason: SURG

## 2017-11-09 RX ORDER — CALCIUM CARBONATE 200(500)MG
500 TABLET,CHEWABLE ORAL DAILY PRN
Status: DISCONTINUED | OUTPATIENT
Start: 2017-11-09 | End: 2017-11-30

## 2017-11-09 RX ORDER — FENTANYL CITRATE 50 UG/ML
INJECTION, SOLUTION INTRAMUSCULAR; INTRAVENOUS AS NEEDED
Status: DISCONTINUED | OUTPATIENT
Start: 2017-11-09 | End: 2017-11-09 | Stop reason: SURG

## 2017-11-09 RX ORDER — GLYCOPYRROLATE 0.2 MG/ML
INJECTION INTRAMUSCULAR; INTRAVENOUS AS NEEDED
Status: DISCONTINUED | OUTPATIENT
Start: 2017-11-09 | End: 2017-11-09 | Stop reason: SURG

## 2017-11-09 RX ORDER — PROPOFOL 10 MG/ML
INJECTION, EMULSION INTRAVENOUS AS NEEDED
Status: DISCONTINUED | OUTPATIENT
Start: 2017-11-09 | End: 2017-11-09 | Stop reason: SURG

## 2017-11-09 RX ORDER — LIDOCAINE HYDROCHLORIDE 10 MG/ML
INJECTION, SOLUTION INFILTRATION; PERINEURAL AS NEEDED
Status: DISCONTINUED | OUTPATIENT
Start: 2017-11-09 | End: 2017-11-09 | Stop reason: SURG

## 2017-11-09 RX ORDER — SUCCINYLCHOLINE CHLORIDE 20 MG/ML
INJECTION INTRAMUSCULAR; INTRAVENOUS AS NEEDED
Status: DISCONTINUED | OUTPATIENT
Start: 2017-11-09 | End: 2017-11-09 | Stop reason: SURG

## 2017-11-09 RX ORDER — HYDROMORPHONE HYDROCHLORIDE 2 MG/ML
INJECTION, SOLUTION INTRAMUSCULAR; INTRAVENOUS; SUBCUTANEOUS AS NEEDED
Status: DISCONTINUED | OUTPATIENT
Start: 2017-11-09 | End: 2017-11-09 | Stop reason: SURG

## 2017-11-09 RX ORDER — ONDANSETRON 2 MG/ML
INJECTION INTRAMUSCULAR; INTRAVENOUS AS NEEDED
Status: DISCONTINUED | OUTPATIENT
Start: 2017-11-09 | End: 2017-11-09 | Stop reason: SURG

## 2017-11-09 RX ORDER — ONDANSETRON 2 MG/ML
4 INJECTION INTRAMUSCULAR; INTRAVENOUS ONCE AS NEEDED
Status: DISCONTINUED | OUTPATIENT
Start: 2017-11-09 | End: 2017-11-09 | Stop reason: HOSPADM

## 2017-11-09 RX ADMIN — ROCURONIUM BROMIDE 20 MG: 10 INJECTION INTRAVENOUS at 11:55

## 2017-11-09 RX ADMIN — HEPARIN SODIUM 5000 UNITS: 5000 INJECTION, SOLUTION INTRAVENOUS; SUBCUTANEOUS at 22:43

## 2017-11-09 RX ADMIN — LIDOCAINE HYDROCHLORIDE 50 MG: 10 INJECTION, SOLUTION INFILTRATION; PERINEURAL at 11:49

## 2017-11-09 RX ADMIN — PROPOFOL 200 MG: 10 INJECTION, EMULSION INTRAVENOUS at 11:49

## 2017-11-09 RX ADMIN — SODIUM CHLORIDE 100 ML/HR: 0.9 INJECTION, SOLUTION INTRAVENOUS at 03:46

## 2017-11-09 RX ADMIN — HYDROMORPHONE HYDROCHLORIDE 1 MG: 2 INJECTION, SOLUTION INTRAMUSCULAR; INTRAVENOUS; SUBCUTANEOUS at 12:42

## 2017-11-09 RX ADMIN — CEFAZOLIN SODIUM 2000 MG: 2 SOLUTION INTRAVENOUS at 12:12

## 2017-11-09 RX ADMIN — SODIUM CHLORIDE: 0.9 INJECTION, SOLUTION INTRAVENOUS at 11:28

## 2017-11-09 RX ADMIN — ONDANSETRON 4 MG: 2 INJECTION INTRAMUSCULAR; INTRAVENOUS at 12:40

## 2017-11-09 RX ADMIN — HEPARIN SODIUM 5000 UNITS: 5000 INJECTION, SOLUTION INTRAVENOUS; SUBCUTANEOUS at 05:20

## 2017-11-09 RX ADMIN — NEOSTIGMINE METHYLSULFATE 3 MG: 1 INJECTION, SOLUTION INTRAMUSCULAR; INTRAVENOUS; SUBCUTANEOUS at 12:27

## 2017-11-09 RX ADMIN — OXYCODONE HYDROCHLORIDE 10 MG: 10 TABLET ORAL at 22:42

## 2017-11-09 RX ADMIN — FENTANYL CITRATE 50 MCG: 50 INJECTION, SOLUTION INTRAMUSCULAR; INTRAVENOUS at 12:48

## 2017-11-09 RX ADMIN — ACETAMINOPHEN 975 MG: 325 TABLET ORAL at 15:29

## 2017-11-09 RX ADMIN — FENTANYL CITRATE 50 MCG: 50 INJECTION, SOLUTION INTRAMUSCULAR; INTRAVENOUS at 12:31

## 2017-11-09 RX ADMIN — FENTANYL CITRATE 100 MCG: 50 INJECTION, SOLUTION INTRAMUSCULAR; INTRAVENOUS at 11:45

## 2017-11-09 RX ADMIN — TAMSULOSIN HYDROCHLORIDE 0.4 MG: 0.4 CAPSULE ORAL at 17:51

## 2017-11-09 RX ADMIN — FENTANYL CITRATE 50 MCG: 50 INJECTION, SOLUTION INTRAMUSCULAR; INTRAVENOUS at 12:25

## 2017-11-09 RX ADMIN — OXYCODONE HYDROCHLORIDE 10 MG: 10 TABLET ORAL at 03:45

## 2017-11-09 RX ADMIN — SODIUM CHLORIDE: 0.9 INJECTION, SOLUTION INTRAVENOUS at 11:54

## 2017-11-09 RX ADMIN — FENTANYL CITRATE 50 MCG: 50 INJECTION, SOLUTION INTRAMUSCULAR; INTRAVENOUS at 12:45

## 2017-11-09 RX ADMIN — SODIUM CHLORIDE 100 ML/HR: 0.9 INJECTION, SOLUTION INTRAVENOUS at 13:26

## 2017-11-09 RX ADMIN — ACETAMINOPHEN 975 MG: 325 TABLET ORAL at 22:42

## 2017-11-09 RX ADMIN — IOHEXOL 10 ML: 300 INJECTION, SOLUTION INTRAVENOUS at 12:41

## 2017-11-09 RX ADMIN — SUCCINYLCHOLINE CHLORIDE 120 MG: 20 INJECTION, SOLUTION INTRAMUSCULAR; INTRAVENOUS at 11:49

## 2017-11-09 RX ADMIN — GLYCOPYRROLATE 0.5 MG: 0.2 INJECTION, SOLUTION INTRAMUSCULAR; INTRAVENOUS at 12:27

## 2017-11-09 RX ADMIN — HYDROMORPHONE HYDROCHLORIDE 1 MG: 1 INJECTION, SOLUTION INTRAMUSCULAR; INTRAVENOUS; SUBCUTANEOUS at 08:21

## 2017-11-09 NOTE — PHYSICAL THERAPY NOTE
Physical Therapy Cancellation Note        Pt off the unit for procedure  Will cont to see         Charity Gant, PTA

## 2017-11-09 NOTE — PROGRESS NOTES
H and P from admission reviewed  He has undergone left nephrectomy and now has renal failure with right hydronephrosis  He also has postoperative intra abdominal fluid some of which appears loculated  The thought is that his bilateral renal obstruction is related to a bladder outlet obstruction and therefore placement of retrograde stent per urology would mandate leaving a Thakur in place  We have been therefore asked to place a percutaneous for nephrostomy tube rather than retrograde ureteral stent  Also, we have been asked to sample the intra-abdominal fluid for diagnostic purposes to evaluate for urine leak  Prior imaging was reviewed  Questions answered  Informed written consent was obtained      Jose Frank MD  11/09/17  11:27 AM

## 2017-11-09 NOTE — ANESTHESIA POSTPROCEDURE EVALUATION
Post-Op Assessment Note      CV Status:  Stable    Mental Status:  Alert and awake    Hydration Status:  Euvolemic    PONV Controlled:  Controlled    Airway Patency:  Patent  Airway: intubated    Post Op Vitals Reviewed: Yes          Staff: RICHMOND           BP (!) 181/82 (11/09/17 1243)    Temp (!) 100 9 °F (38 3 °C) (11/09/17 1243)    Pulse 103 (11/09/17 1243)   Resp (!) 24 (11/09/17 1243)    SpO2 100 % (11/09/17 1243)

## 2017-11-09 NOTE — PROGRESS NOTES
Progress Note - General Surgery   Omi Cid 43 y o  male MRN: [id number]  Unit/Bed#: [facility] 916-01 Encounter: [id number]    Assessment:  42 yo M w/ spontaneous left kidney rupture s/p ex lap, left nephrectomy, abthera 11/1 w/ ureter ligation and abdominal closure 11/2 , creatinine continues increasing 3 4 from 3     Plan:  NPO  Continue PRN pain control  F/U AM Cr  Thakur per urology  IR consult for PCN and drainage of collections  Nephrology consult    Subjective/Objective   Chief Complaint: None    Subjective: Pain adequately controlled but present  States he hasnt walked much  Tolerated poorly PO route  + flatus and BM    Objective:     Blood pressure 133/79, pulse 95, temperature 100 1 °F (37 8 °C), temperature source Oral, resp  rate 18, height 5' 6" (1 676 m), weight 89 2 kg (196 lb 10 4 oz), SpO2 99 %  ,Body mass index is 31 74 kg/m²        Intake/Output Summary (Last 24 hours) at 11/09/17 0951  Last data filed at 11/09/17 0830   Gross per 24 hour   Intake             1600 ml   Output             1325 ml   Net              275 ml       Invasive Devices     Peripheral Intravenous Line            Peripheral IV 11/06/17 Right Forearm 3 days          Drain            Urethral Catheter Coude 16 Fr  7 days                Physical Exam:   Gen: A&O, NAD  Cardio: RRR  Lungs: CTAB  Abd: Soft, appropriately tender, staples intact    Lab, Imaging and other studies:  CBC:   No results found for: WBC, HGB, HCT, MCV, PLT, ADJUSTEDWBC, MCH, MCHC, RDW, MPV, NRBC, CMP:   Lab Results   Component Value Date     11/09/2017    K 4 7 11/09/2017     11/09/2017    CO2 23 11/09/2017    ANIONGAP 9 11/09/2017    BUN 39 (H) 11/09/2017    CREATININE 3 40 (H) 11/09/2017    GLUCOSE 120 11/09/2017    CALCIUM 8 6 11/09/2017    EGFR 21 11/09/2017     VTE Pharmacologic Prophylaxis: Heparin  VTE Mechanical Prophylaxis: sequential compression device       Current Facility-Administered Medications:     acetaminophen (TYLENOL) tablet 975 mg, 975 mg, Oral, Q8H, Colt Janet, DO, 975 mg at 11/08/17 2330    amLODIPine (NORVASC) tablet 5 mg, 5 mg, Oral, Daily, Katie Wilcox PA-C, 5 mg at 11/08/17 1015    heparin (porcine) subcutaneous injection 5,000 Units, 5,000 Units, Subcutaneous, Q8H [address], Katie Wilcox PA-C, 5,000 Units at 11/09/17 0520    hydrALAZINE (APRESOLINE) injection 10 mg, 10 mg, Intravenous, Q6H PRN, Florin Burch PA-C, 10 mg at 11/04/17 0313    HYDROmorphone (DILAUDID) 1 mg/mL injection 1 mg, 1 mg, Intravenous, Q2H PRN, Darrell Arzola, 1 mg at 11/09/17 4579    influenza inactivated quadrivalent vaccine (FLULAVAL) IM injection 0 5 mL, 0 5 mL, Intramuscular, Prior to discharge, Foreign Ball MD    oxyCODONE (ROXICODONE) immediate release tablet 10 mg, 10 mg, Oral, Q4H PRN, Darrell Arzola, 10 mg at 11/09/17 0345    oxyCODONE (ROXICODONE) IR tablet 5 mg, 5 mg, Oral, Q4H PRN, Marina Hernandez MD, 5 mg at 11/06/17 2105    senna-docusate sodium (SENOKOT S) 8 6-50 mg per tablet 1 tablet, 1 tablet, Oral, BID, Katie Wilcox PA-C, 1 tablet at 11/08/17 1713    sodium chloride 0 9 % bolus, , , Code/Trauma/Sedation Med, Sandi Zelaya MD, 2,000 mL at 11/02/17 0055    sodium chloride 0 9 % infusion, 100 mL/hr, Intravenous, Continuous, Karla Lou MD, Last Rate: 100 mL/hr at 11/09/17 0346, 100 mL/hr at 11/09/17 0346    tamsulosin (FLOMAX) capsule 0 4 mg, 0 4 mg, Oral, Daily With Mateus Olson DO, 0 4 mg at 11/08/17 1714

## 2017-11-09 NOTE — PERIOPERATIVE NURSING NOTE
Patient hemodynamically stable and in no sign of resp distress  Patient sleeping and denies pain   Patient being transported to floor

## 2017-11-09 NOTE — PROGRESS NOTES
Progress Note - Deneen Holguin 43 y o  male MRN: [id number]          Pt is currently off unit for placement of right nephrostomy tube after CT scan revealed recurrence of right moderate hydronephrosis  Creatinine remains elevated along with WBC count  Will continue to monitor creatinine level  Vitals: Blood pressure 133/79, pulse 95, temperature 100 1 °F (37 8 °C), temperature source Oral, resp  rate 18, height 5' 6" (1 676 m), weight 89 2 kg (196 lb 10 4 oz), SpO2 99 %  ,Body mass index is 31 74 kg/m²        Intake/Output Summary (Last 24 hours) at 11/09/17 1155  Last data filed at 11/09/17 0830   Gross per 24 hour   Intake             1600 ml   Output             1325 ml   Net              275 ml

## 2017-11-09 NOTE — CONSULTS
Consultation - Nephrology   Fernandez Bryant 43 y o  male MRN: [id number]  Unit/Bed#: [facility] 916-01 Encounter: [id number]    ASSESSMENT AND PLAN:  Patient is a 80-year-old male with previous history of chronic bilateral hydronephrosis, initially presented from OSH due to left flank pain  He eventually he was found to have left renal injury and had ex lap with left nephrectomy due to left kidney rupture, and he was also found to have right-sided hydronephrosis was being managed with Thakur catheter  We are consulted for WOLF management  WorseningNonoliguric WOLF, baseline serum creatinine 1 1 in 2016  - initial admission creatinine 3 0 significantly improved to 1 8 and now again significantly worsening to 3 4 today  - WOLF likely secondary to obstructive uropathy in a solitary kidney after left nephrectomy this admission, given intermittent fever underlying sepsis causing ? ATN (previous WOLF on admission likely secondary to bilateral hydronephrosis, severe anemia, hypotension)  - no obvious hypotension episodes in last 24 hours    - currently remains on IV normal saline at 100 mL/hour   - initial urinalysis on admission showed positive nitrite, greater than 3+ proteinuria, innumerable RBCs, 2 to 4 WBCs with moderate bacteria  - will need repeat urinalysis once Thakur catheter is removed  - continue to closely monitor intake and output, avoid nephrotoxins or NSAIDs    Moderate right-sided hydronephrosis and hydroureter on CT scan  - plan for right-sided percutaneous nephrostomy by IR today as per Urology  - hopefully renal function will improve  - currently continue to have Thakur catheter  - closely monitor  - hydronephrosis thought to be likely related to dysfunctional voiding/reflux/outlet obstruction    Worsening leukocytosis in the setting of fever, rule out underlying sepsis, antibiotic management as per primary team  - initial blood cultures were negative    Anemia likely blood loss  - closely monitor hemoglobin, transfuse p r n  for hemoglobin less than seven    No obvious history of hypertension, blood pressure is well controlled but currently remains on amlodipine 5 mg p o  daily  Also underlying pain contributing to high blood pressure  - continue to monitor blood pressure  Avoid low BP  Goal SBP 140s to 150s for now    Will discuss with primary team    HISTORY OF PRESENT ILLNESS:  Requesting Physician: Shonda Vazquez MD  Reason for Consult: WOLF Bryant is a 43y o  year old male with previous medical history of hydronephrosis, BPH, who was admitted to hospitals after presenting with left flank pain  A renal consultation is requested today for assistance in the management of WOLF  Patient was incarcerated before admission with history of chronic bilateral hydronephrosis and eventually presented at outside hospital with left flank plain  He was found to have severe bilateral hydronephrosis with possible left renal injury with significant hyperdense fluid and eventually was transferred to [facility] for [facility] evaluation  He also had initially hypotensive episodes with hemoglobin drop with severe anemia and was emergently taken to OR for exploratory laparotomy  He was found to have left renal rupture which thought to be spontaneous without any obvious history of trauma, had left nephrectomy  At the time of my encounter, patient is in significant left-sided abdominal pain  He denies any nausea or vomiting  He is planned for right-sided PCN tube placement today  He has a Thakur catheter placed  He denies any history of hypertension or diabetes  He had issues with hydronephrosis couple years ago when it was managed with Thakur catheter  He has been in the retirement for last 6 to 7 months  Denies any lightheadedness or dizziness      PAST MEDICAL HISTORY:  Past Medical History:   Diagnosis Date    Enlarged prostate     UTI (urinary tract infection)        PAST SURGICAL HISTORY:  Past Surgical History: Procedure Laterality Date    LAPAROTOMY N/A 11/2/2017    Procedure: LAPAROTOMY EXPLORATORY 2nd LOOK, ligation of left ureter, I/D with closure;  Surgeon: Susana Amin MD;  Location: [facility];  Service: General       ALLERGIES:  No Known Allergies    SOCIAL HISTORY:  History   Alcohol Use    Yes     Comment: "socially"     History   Drug Use No     History   Smoking Status    Current Every Day Smoker   Smokeless Tobacco    Never Used       FAMILY HISTORY:  No family history on file      MEDICATIONS:    Current Facility-Administered Medications:     acetaminophen (TYLENOL) tablet 975 mg, 975 mg, Oral, Q8H, Colt Janet, DO, 975 mg at 11/08/17 2330    amLODIPine (NORVASC) tablet 5 mg, 5 mg, Oral, Daily, Theoplis Louder, PA-C, 5 mg at 11/08/17 1015    heparin (porcine) subcutaneous injection 5,000 Units, 5,000 Units, Subcutaneous, Q8H [facility], Theoplis Louder, PA-C, 5,000 Units at 11/09/17 0520    hydrALAZINE (APRESOLINE) injection 10 mg, 10 mg, Intravenous, Q6H PRN, Barbara Deniz Rasmuson, PA-C, 10 mg at 11/04/17 0313    HYDROmorphone (DILAUDID) 1 mg/mL injection 1 mg, 1 mg, Intravenous, Q2H PRN, Lluvia Sanchez    influenza inactivated quadrivalent vaccine (FLULAVAL) IM injection 0 5 mL, 0 5 mL, Intramuscular, Prior to discharge, Alex Reza MD    oxyCODONE (ROXICODONE) immediate release tablet 10 mg, 10 mg, Oral, Q4H PRN, Lluvia Crystal, 10 mg at 11/09/17 0345    oxyCODONE (ROXICODONE) IR tablet 5 mg, 5 mg, Oral, Q4H PRN, Phu Hooper MD, 5 mg at 11/06/17 2105    senna-docusate sodium (SENOKOT S) 8 6-50 mg per tablet 1 tablet, 1 tablet, Oral, BID, Theoplis Louder, PA-C, 1 tablet at 11/08/17 1713    sodium chloride 0 9 % bolus, , , Code/Trauma/Sedation Med, Clyde Rodriguez MD, 2,000 mL at 11/02/17 0055    sodium chloride 0 9 % infusion, 100 mL/hr, Intravenous, Continuous, Arsenio Soliz MD, Last Rate: 100 mL/hr at 11/09/17 0346, 100 mL/hr at 11/09/17 0346    tamsulosin (FLOMAX) capsule 0 4 mg, 0 4 mg, Oral, Daily With Dinner, Michelle Tapia DO, 0 4 mg at 11/08/17 1714    REVIEW OF SYSTEMS:  Complete 10 point review of systems were obtained and discussed in length with the patient  Complete review of systems were negative / unremarkable except mentioned in the HPI section  PHYSICAL EXAM:  Current Weight: Weight - Scale: 89 2 kg (196 lb 10 4 oz)  First Weight: Weight - Scale: 85 kg (187 lb 6 3 oz)  Vitals:    11/09/17 0730   BP: 133/79   Pulse: 95   Resp: 18   Temp: 100 1 °F (37 8 °C)   SpO2: 99%       Intake/Output Summary (Last 24 hours) at 11/09/17 7896  Last data filed at 11/09/17 4642   Gross per 24 hour   Intake             1840 ml   Output             1325 ml   Net              515 ml       Physical Examination:  General:  Lying in bed, in moderate acute distress  Eyes:  PERRLA, EOMI, mild conjunctival pallor present  ENT:  External examination of ears and nose unremarkable  Neck:  Supple  Respiratory:  Bilateral air entry present  CVS:  S1, S2 present  GI:  Soft, moderate tenderness in mid and left abdomen, incision site seems to be healing well with staples present, Thakur catheter present  CNS:  Active, alert, oriented x3  Extremities:  No edema in both lower extremities  Psych:  Conscious, coherent, oriented  Skin:  Unremarkable    Invasive Devices:   Urethral Catheter Coude 16 Fr   (Active)   Reasons to continue Urinary Catheter  Accurate I&O assessment in critically ill patients (48 hr  max) 11/2/2017  9:31 AM   Uretheral Catheter No longer needed- Will place order to discontinue 11/3/2017  6:39 AM   Site Assessment Clean;Skin intact 11/9/2017  5:31 AM   Collection Container Standard drainage bag 11/9/2017  5:31 AM   Securement Method Securing device (Describe) 11/9/2017  5:31 AM   Output (mL) 175 mL 11/9/2017  6:33 AM     Lab Results:     Results from last 7 days  Lab Units 11/09/17  0503 11/08/17  1438 11/08/17  0509 11/07/17  0454 11/06/17  0515 11/06/17  0514 11/05/17  1094 11/04/17  0510  11/03/17  0438 11/02/17  2233 11/02/17  1519 11/02/17  1047   WBC Thousand/uL  --   --  14 03* 10 67*  --  10 01 11 00* 11 51*  --  11 41*  --  7 18 7 77   HEMOGLOBIN g/dL  --   --  9 5* 8 2*  --  9 1* 9 3* 8 7*  --  9 7* 10 3* 9 3* 8 9*   HEMATOCRIT %  --   --  28 6* 24 6*  --  26 5* 27 9* 25 3*  --  27 6* 29 9* 26 6* 24 9*   PLATELETS Thousands/uL  --   --  504* 359  --  277 196 141*  --  128*  --  119* 116*   SODIUM mmol/L 136 136 136 141 140  --  142 144  < > 152*  --  152* 152*   POTASSIUM mmol/L 4 7 4 9 4 7 4 5 3 7  --  3 7 3 4*  --  3 8  --  3 7 3 6   CHLORIDE mmol/L 104 104 102 106 107  --  109* 111*  --  119*  --  120* 120*   CO2 mmol/L 23 25 26 27 27  --  26 26  --  26  --  26 27   BUN mg/dL 39* 34* 30* 22 21  --  19 20  --  20  --  21 22   CREATININE mg/dL 3 40* 3 02* 2 71* 1 86* 2 06*  --  2 24* 2 02*  --  2 17*  --  2 18* 2 12*   CALCIUM mg/dL 8 6 8 8 8 5 8 6 8 4  --  8 5 8 2*  --  8 1*  --  7 8* 8 0*   MAGNESIUM mg/dL 2 4  --   --   --   --   --  2 1 2 0  --  2 2  --  1 7 1 7   PHOSPHORUS mg/dL  --   --   --   --   --   --   --  2 0*  --  3 1  --   --  2 9   ALBUMIN g/dL  --   --   --   --   --   --   --   --   --  2 4*  --   --  2 3*   TOTAL PROTEIN g/dL  --   --   --   --   --   --   --   --   --  5 1*  --   --  4 6*   GLUCOSE RANDOM mg/dL 120 108 111 106 109  --  93 97  --  96  --  97 89   < > = values in this interval not displayed  Other Studies:   Results for orders placed during the hospital encounter of 11/02/17   XR chest portable    Narrative CHEST     INDICATION:  Endotracheal tube    COMPARISON:  11/2/2017 at 4:58 AM    VIEWS:   AP frontal    IMAGES:  1    FINDINGS:      Endotracheal tube and nasogastric tube have been removed  Mild cardiomegaly is stable  Mild pulmonary vascular congestion is stable  There are no focal consolidations, pleural effusions, or pneumothorax  Visualized osseous structures appear within normal limits for the patient's age  Impression 1  Status post removal of endotracheal tube and nasogastric tube  2   Unchanged mild pulmonary vascular congestion and mild cardiomegaly  Workstation performed: [id number]       Results for orders placed during the hospital encounter of 11/02/17   XR chest pa & lateral    Narrative CHEST - DUAL ENERGY    INDICATION:  Fever  Anterior chest pain  COMPARISON:  None    VIEWS:  PA (including soft tissue/bone algorithms) and lateral projections    IMAGES:  4    FINDINGS:         Cardiomediastinal silhouette appears unremarkable  No effusions  No congestive failure  No pneumothorax  Minimal subsegmental atelectasis versus scarring within the right lung base  Visualized osseous structures appear within normal limits for the patient's age  Impression Minimal subsegmental atelectasis versus scarring within the right lung base  Workstation performed: [id number]       Results for orders placed during the hospital encounter of 11/01/17   CT chest without contrast    Narrative CT CHEST WITHOUT IV CONTRAST    INDICATION:  Left flank pain    COMPARISON: None  TECHNIQUE: CT examination of the chest was performed without intravenous contrast   Reformatted images were created in axial, sagittal, and coronal planes  Radiation dose length product (DLP) for this visit:  326 mGy-cm   This examination, like all CT scans performed in the [facility], was performed utilizing techniques to minimize radiation dose exposure, including the use of iterative   reconstruction and automated exposure control  FINDINGS:    LUNGS:  Right middle lobe subsegmental atelectasis  There is no tracheal or endobronchial lesion  PLEURA:  Small bilateral pleural effusions  HEART/GREAT VESSELS:  Unremarkable for patient's age  MEDIASTINUM AND CESAR:  Unremarkable      CHEST WALL AND LOWER NECK:       Normal     VISUALIZED STRUCTURES IN THE UPPER ABDOMEN:  With refer to CT abdomen pelvis  OSSEOUS STRUCTURES:  Small 1 cm osseous density at the proximal aspect of the left clavicle, correlate for any history of trauma to this region  Old appearing left coracoid fracture  No destructive osseous lesion  Impression 1  Small bilateral pleural effusions  2  Please refer to CT abdomen pelvis for significant abdomen and pelvis findings  3  Small 1 cm osseous density at the proximal aspect of the left clavicle, correlate for any history of trauma to this region  Old appearing left coracoid fracture  Workstation performed: [id number]       No results found for this or any previous visit  Results for orders placed during the hospital encounter of 11/02/17   CT abdomen pelvis wo contrast    Narrative CT ABDOMEN AND PELVIS WITHOUT IV CONTRAST    INDICATION:  Status post left nephrectomy  Worsening creatinine  COMPARISON: 11/1/2017    TECHNIQUE:  CT examination of the abdomen and pelvis was performed without intravenous contrast   Reformatted images were created in axial, sagittal, and coronal planes  Radiation dose length product (DLP) for this visit:  423 17 mGy-cm   This examination, like all CT scans performed in the [facility], was performed utilizing techniques to minimize radiation dose exposure, including the use of iterative   reconstruction and automated exposure control  Only a small amount of enteric contrast was administered  FINDINGS:    ABDOMEN    LOWER CHEST:  Moderate posterior basilar left-sided effusion with adjacent compressive atelectasis  LIVER/BILIARY TREE:  Unremarkable  GALLBLADDER:  No calcified gallstones  No pericholecystic inflammatory change  SPLEEN:  Unremarkable  PANCREAS:  Unremarkable  ADRENAL GLANDS:  Unremarkable  KIDNEYS/URETERS:  The right kidney again demonstrates moderate hydronephrosis and hydroureter diffusely  Hypodensities within the right kidney suggesting renal cysts      The patient is status post [rectum and removal of a large amount of the large hematoma  Some residual hematoma remains within the renal fossa, somewhat heterogeneous suggesting different stages of blood  This extends inferiorly into the left lateral   paracolic gutter  STOMACH AND BOWEL:  Limited without adequate oral contrast   No signs of bowel obstruction  APPENDIX:  No findings to suggest appendicitis  ABDOMINOPELVIC CAVITY:  There is extensive ascites throughout the abdomen and pelvis extending in the perihepatic and perisplenic regions inferiorly into the pelvis  The majority of this ascites is new compared to the prior examination and simple  There is some hyperdensity layering within the pelvis suggesting a small amount of intraperitoneal hemorrhage  This may represent urine filling the abdominal cavity  VESSELS:  Unremarkable for patient's age  PELVIS    REPRODUCTIVE ORGANS:  Unremarkable for patient's age  URINARY BLADDER:  Thakur catheter within the bladder, limiting evaluation  Bladder wall thickening  ABDOMINAL WALL/INGUINAL REGIONS:  Intact midline incision  OSSEOUS STRUCTURES:  No acute fracture or destructive osseous lesion  Impression Interval development of extensive abdominal and pelvic ascites, the majority of which appears simple  Given recent surgery, findings may represent urine leaking into the abdominal cavity  There is a moderate-sized hematoma identified within the left renal fossa status post left nephrectomy, markedly improved compared to the prior examination  Moderate posterior layering left effusion with adjacent compressive atelectasis  ##cfslh  I personally discussed this result with the surgery resident  on 11/8/2017 5:01 PM   ##      Workstation performed: [id number]       Chest x-ray personally reviewed which shows possible minimal subsegmental atelectasis, no significant pulmonary edema appreciated      Portions of the record may have been created with voice recognition software  Occasional wrong word or "sound a like" substitutions may have occurred due to the inherent limitations of voice recognition software  Read the chart carefully and recognize, using context, where substitutions have occurred

## 2017-11-10 LAB
ANION GAP SERPL CALCULATED.3IONS-SCNC: 8 MMOL/L (ref 4–13)
BACTERIA UR CULT: NORMAL
BASOPHILS # BLD AUTO: 0.01 THOUSANDS/ΜL (ref 0–0.1)
BASOPHILS NFR BLD AUTO: 0 % (ref 0–1)
BUN SERPL-MCNC: 22 MG/DL (ref 5–25)
CALCIUM SERPL-MCNC: 8.4 MG/DL (ref 8.3–10.1)
CHLORIDE SERPL-SCNC: 107 MMOL/L (ref 100–108)
CO2 SERPL-SCNC: 26 MMOL/L (ref 21–32)
CREAT SERPL-MCNC: 1.53 MG/DL (ref 0.6–1.3)
EOSINOPHIL # BLD AUTO: 0.65 THOUSAND/ΜL (ref 0–0.61)
EOSINOPHIL NFR BLD AUTO: 5 % (ref 0–6)
ERYTHROCYTE [DISTWIDTH] IN BLOOD BY AUTOMATED COUNT: 14 % (ref 11.6–15.1)
GFR SERPL CREATININE-BSD FRML MDRD: 55 ML/MIN/1.73SQ M
GLUCOSE SERPL-MCNC: 94 MG/DL (ref 65–140)
HCT VFR BLD AUTO: 26.3 % (ref 36.5–49.3)
HGB BLD-MCNC: 8.5 G/DL (ref 12–17)
LYMPHOCYTES # BLD AUTO: 1.9 THOUSANDS/ΜL (ref 0.6–4.47)
LYMPHOCYTES NFR BLD AUTO: 16 % (ref 14–44)
MAGNESIUM SERPL-MCNC: 2.3 MG/DL (ref 1.6–2.6)
MCH RBC QN AUTO: 28.7 PG (ref 26.8–34.3)
MCHC RBC AUTO-ENTMCNC: 32.3 G/DL (ref 31.4–37.4)
MCV RBC AUTO: 89 FL (ref 82–98)
MONOCYTES # BLD AUTO: 1.76 THOUSAND/ΜL (ref 0.17–1.22)
MONOCYTES NFR BLD AUTO: 15 % (ref 4–12)
NEUTROPHILS # BLD AUTO: 7.7 THOUSANDS/ΜL (ref 1.85–7.62)
NEUTS SEG NFR BLD AUTO: 64 % (ref 43–75)
NRBC BLD AUTO-RTO: 0 /100 WBCS
PLATELET # BLD AUTO: 824 THOUSANDS/UL (ref 149–390)
PMV BLD AUTO: 8.9 FL (ref 8.9–12.7)
POTASSIUM SERPL-SCNC: 4.5 MMOL/L (ref 3.5–5.3)
RBC # BLD AUTO: 2.96 MILLION/UL (ref 3.88–5.62)
SODIUM SERPL-SCNC: 141 MMOL/L (ref 136–145)
WBC # BLD AUTO: 12.09 THOUSAND/UL (ref 4.31–10.16)

## 2017-11-10 PROCEDURE — 97116 GAIT TRAINING THERAPY: CPT

## 2017-11-10 PROCEDURE — 83735 ASSAY OF MAGNESIUM: CPT | Performed by: SURGERY

## 2017-11-10 PROCEDURE — 85025 COMPLETE CBC W/AUTO DIFF WBC: CPT | Performed by: SURGERY

## 2017-11-10 PROCEDURE — 80048 BASIC METABOLIC PNL TOTAL CA: CPT | Performed by: SURGERY

## 2017-11-10 RX ORDER — SODIUM CHLORIDE 450 MG/100ML
50 INJECTION, SOLUTION INTRAVENOUS CONTINUOUS
Status: DISCONTINUED | OUTPATIENT
Start: 2017-11-10 | End: 2017-11-11

## 2017-11-10 RX ADMIN — HEPARIN SODIUM 5000 UNITS: 5000 INJECTION, SOLUTION INTRAVENOUS; SUBCUTANEOUS at 14:46

## 2017-11-10 RX ADMIN — OXYCODONE HYDROCHLORIDE 10 MG: 10 TABLET ORAL at 20:43

## 2017-11-10 RX ADMIN — HEPARIN SODIUM 5000 UNITS: 5000 INJECTION, SOLUTION INTRAVENOUS; SUBCUTANEOUS at 05:28

## 2017-11-10 RX ADMIN — TAMSULOSIN HYDROCHLORIDE 0.4 MG: 0.4 CAPSULE ORAL at 16:57

## 2017-11-10 RX ADMIN — HEPARIN SODIUM 5000 UNITS: 5000 INJECTION, SOLUTION INTRAVENOUS; SUBCUTANEOUS at 22:44

## 2017-11-10 RX ADMIN — SODIUM CHLORIDE 50 ML/HR: 0.9 INJECTION, SOLUTION INTRAVENOUS at 09:22

## 2017-11-10 RX ADMIN — Medication 500 MG: at 00:35

## 2017-11-10 RX ADMIN — ACETAMINOPHEN 975 MG: 325 TABLET ORAL at 15:49

## 2017-11-10 RX ADMIN — OXYCODONE HYDROCHLORIDE 5 MG: 5 TABLET ORAL at 09:26

## 2017-11-10 RX ADMIN — SODIUM CHLORIDE 50 ML/HR: 0.45 INJECTION, SOLUTION INTRAVENOUS at 12:28

## 2017-11-10 RX ADMIN — OXYCODONE HYDROCHLORIDE 10 MG: 10 TABLET ORAL at 05:28

## 2017-11-10 RX ADMIN — PIPERACILLIN SODIUM AND TAZOBACTAM SODIUM 3.38 G: 36; 4.5 INJECTION, POWDER, FOR SOLUTION INTRAVENOUS at 20:43

## 2017-11-10 RX ADMIN — Medication 1 TABLET: at 09:26

## 2017-11-10 RX ADMIN — Medication 1 TABLET: at 16:57

## 2017-11-10 RX ADMIN — Medication 500 MG: at 20:43

## 2017-11-10 RX ADMIN — ACETAMINOPHEN 975 MG: 325 TABLET ORAL at 22:44

## 2017-11-10 RX ADMIN — ACETAMINOPHEN 975 MG: 325 TABLET ORAL at 09:25

## 2017-11-10 RX ADMIN — PIPERACILLIN SODIUM AND TAZOBACTAM SODIUM 3.38 G: 36; 4.5 INJECTION, POWDER, FOR SOLUTION INTRAVENOUS at 09:23

## 2017-11-10 RX ADMIN — PIPERACILLIN SODIUM AND TAZOBACTAM SODIUM 3.38 G: 36; 4.5 INJECTION, POWDER, FOR SOLUTION INTRAVENOUS at 16:57

## 2017-11-10 RX ADMIN — AMLODIPINE BESYLATE 5 MG: 5 TABLET ORAL at 09:26

## 2017-11-10 NOTE — PROGRESS NOTES
Progress Note - General Surgery   Cassandra Galvez 43 y o  male MRN: [id number]  Unit/Bed#: [facility] 916-01 Encounter: [id number]    Assessment:  42 yo M w/ spontaneous left kidney rupture s/p ex lap, left nephrectomy, abthera 11/1 w/ ureter ligation and abdominal closure 11/2 , s/p PCN yesterday    Plan:    Regular diet  Continue PRN pain control  F/U AM Cr  Thakur per urology      Subjective/Objective   Chief Complaint: None    Subjective: Pain adequately controlled but present  States he hasnt walked much  Tolerated poorly PO route  + flatus and BM    Objective:     Blood pressure 133/62, pulse 76, temperature 98 8 °F (37 1 °C), temperature source Oral, resp  rate 18, height 5' 6" (1 676 m), weight 89 2 kg (196 lb 10 4 oz), SpO2 99 %  ,Body mass index is 31 74 kg/m²        Intake/Output Summary (Last 24 hours) at 11/10/17 0522  Last data filed at 11/09/17 2121   Gross per 24 hour   Intake             2460 ml   Output             4300 ml   Net            -1840 ml       Invasive Devices     Peripheral Intravenous Line            Peripheral IV 11/09/17 Right Hand less than 1 day          Drain            Urethral Catheter Coude 16 Fr  8 days    Nephrostomy Right 1 10 2 Fr  less than 1 day                Physical Exam:   Gen: A&O, NAD  Cardio: RRR  Lungs: CTAB  Abd: Soft, appropriately tender, staples intact    Lab, Imaging and other studies:  CBC:   Lab Results   Component Value Date    WBC 15 12 (H) 11/09/2017    HGB 9 7 (L) 11/09/2017    HCT 29 1 (L) 11/09/2017    MCV 88 11/09/2017     (H) 11/09/2017    MCH 29 4 11/09/2017    MCHC 33 3 11/09/2017    RDW 14 1 11/09/2017    MPV 8 9 11/09/2017    NRBC 0 11/09/2017   , CMP:   No results found for: NA, K, CL, CO2, ANIONGAP, BUN, CREATININE, GLUCOSE, CALCIUM, AST, ALT, ALKPHOS, PROT, ALBUMIN, BILITOT, EGFR  VTE Pharmacologic Prophylaxis: Heparin  VTE Mechanical Prophylaxis: sequential compression device       Current Facility-Administered Medications:     acetaminophen (TYLENOL) tablet 975 mg, 975 mg, Oral, Q8H, Colt Gonzales DO, 975 mg at 11/09/17 2242    amLODIPine (NORVASC) tablet 5 mg, 5 mg, Oral, Daily, Jaelyn Becerra PA-C, 5 mg at 11/08/17 1015    calcium carbonate (TUMS) chewable tablet 500 mg, 500 mg, Oral, Daily PRN, Seema Saeed MD, 500 mg at 11/10/17 0035    heparin (porcine) subcutaneous injection 5,000 Units, 5,000 Units, Subcutaneous, Q8H [address], Jaelyn Becerra PA-C, 5,000 Units at 11/09/17 2243    hydrALAZINE (APRESOLINE) injection 10 mg, 10 mg, Intravenous, Q6H PRN, Princess Saundra Burch PA-C, 10 mg at 11/04/17 0313    HYDROmorphone (DILAUDID) 1 mg/mL injection 1 mg, 1 mg, Intravenous, Q2H PRN, Deni Alfonso, 1 mg at 11/09/17 6972    influenza inactivated quadrivalent vaccine (FLULAVAL) IM injection 0 5 mL, 0 5 mL, Intramuscular, Prior to discharge, Renard Bloom MD    oxyCODONE (ROXICODONE) immediate release tablet 10 mg, 10 mg, Oral, Q4H PRN, Denigalen Alfonso, 10 mg at 11/09/17 2242    oxyCODONE (ROXICODONE) IR tablet 5 mg, 5 mg, Oral, Q4H PRN, Alfreda Lancaster MD, 5 mg at 11/06/17 2105    senna-docusate sodium (SENOKOT S) 8 6-50 mg per tablet 1 tablet, 1 tablet, Oral, BID, Jaelyn Becerra PA-C, Stopped at 11/09/17 1518    sodium chloride 0 9 % infusion, 50 mL/hr, Intravenous, Continuous, Alfreda Lancaster MD, Last Rate: 50 mL/hr at 11/09/17 1734, 50 mL/hr at 11/09/17 1734    tamsulosin (FLOMAX) capsule 0 4 mg, 0 4 mg, Oral, Daily With Yves Mayo DO, 0 4 mg at 11/09/17 2078

## 2017-11-10 NOTE — PLAN OF CARE
Problem: PHYSICAL THERAPY ADULT  Goal: Performs mobility at highest level of function for planned discharge setting  See evaluation for individualized goals  Treatment/Interventions: Functional transfer training, LE strengthening/ROM, Therapeutic exercise, Endurance training, Equipment eval/education, Bed mobility, Gait training, Spoke to nursing, OT  Equipment Recommended: Rose Marie Fisher (at this time)       See flowsheet documentation for full assessment, interventions and recommendations  Prognosis: Good  Problem List: Decreased strength, Decreased range of motion, Decreased endurance, Impaired balance, Decreased mobility, Decreased coordination, Decreased safety awareness, Impaired judgement, Pain, Decreased skin integrity  Assessment: pt progressed w mob and funct activity tolerance; requires min assist w transf and amb 50 ft using RW- gait antalgic, inconsistent cisco, decreased stride length, foot clearance; walker reliant; fatigued w exertion;rec cont PT to regain funct indep, current plan is to return to facility when med cleared        Recommendation:  (current plan is to corrections facility)          See flowsheet documentation for full assessment

## 2017-11-10 NOTE — PLAN OF CARE
Discharge to home or other facility with appropriate resources Progressing      Discharge to post-acute care or home with appropriate resources Progressing      Minimal or absence of nausea and/or vomiting Progressing      Maintains or returns to baseline bowel function Progressing      Maintains adequate nutritional intake Progressing      Maintains or returns to baseline urinary function Progressing      Absence of urinary retention Progressing      Urinary catheter remains patent Progressing      Absence or prevention of progression during hospitalization Progressing      Patient/family/caregiver demonstrates understanding of disease process, treatment plan, medications, and discharge instructions Progressing      Electrolytes maintained within normal limits Progressing      Fluid balance maintained Progressing      Glucose maintained within target range Progressing      Nutrient/Hydration intake appropriate for improving, restoring or maintaining nutritional needs Progressing      Patient will remain free of falls Progressing      Skin integrity is maintained or improved Progressing      Patient will remain free of falls Progressing      Maintain or return to baseline ADL function Progressing      Maintain or return mobility status to optimal level Progressing      Skin integrity remains intact Progressing      Incision(s), wounds(s) or drain site(s) healing without S/S of infection Progressing      Oral mucous membranes remain intact Progressing

## 2017-11-10 NOTE — PHYSICAL THERAPY NOTE
PT Progress Note     11/10/17 1600   Pain Assessment   Pain Assessment 0-10   Pain Score 8   Pain Type Acute pain   Hospital Pain Intervention(s) Ambulation/increased activity   Response to Interventions unchanged   Restrictions/Precautions   Other Precautions Pain; Fall Risk  ( present)   General   Chart Reviewed Yes   Response to Previous Treatment Patient with no complaints from previous session  Family/Caregiver Present No   Cognition   Arousal/Participation Alert   Attention Within functional limits   Following Commands Follows multistep commands with increased time or repetition   Subjective   Subjective pt in bed, nsg cleared, pt agreeable for PT    Bed Mobility   Supine to Sit 4  Minimal assistance   Additional items Increased time required;Verbal cues;HOB elevated   Transfers   Sit to Stand 4  Minimal assistance   Additional items Increased time required;Verbal cues   Stand to Sit 4  Minimal assistance   Additional items Increased time required;Verbal cues   Stand pivot 4  Minimal assistance   Additional items Increased time required;Verbal cues  (RW)   Ambulation/Elevation   Gait pattern Antalgic;Decreased foot clearance; Short stride; Excessively slow   Gait Assistance 4  Minimal assist   Additional items Tactile cues; Verbal cues   Assistive Device Rolling walker   Distance 90 ft   Balance   Static Standing Fair  (RW)   Dynamic Standing Poor +  (RW)   Endurance Deficit   Endurance Deficit Yes   Endurance Deficit Description pain limited   Activity Tolerance   Activity Tolerance Patient limited by pain   Nurse Made Aware yes   Assessment   Prognosis Good   Problem List Decreased strength;Decreased range of motion;Decreased endurance; Impaired balance;Decreased mobility; Decreased coordination;Decreased safety awareness; Impaired judgement;Pain;Decreased skin integrity   Assessment pt progressed w mob and funct activity tolerance; requires min assist w transf and amb 50 ft using RW- gait antalgic, inconsistent cisco, decreased stride length, foot clearance; walker reliant; fatigued w exertion;rec cont PT to regain funct indep, current plan is to return to facility when med cleared   Goals   Patient Goals not expressed   STG Expiration Date 11/14/17   Plan   Treatment/Interventions Endurance training;Functional transfer training;LE strengthening/ROM; Elevations; Therapeutic exercise;Cognitive reorientation;Patient/family training;Equipment eval/education; Bed mobility;Gait training; Compensatory technique education;Continued evaluation;Spoke to nursing   Progress Improving as expected   PT Frequency (6x/wk)   Recommendation   Recommendation (current plan is to corrections facility)   Equipment Recommended Leonie Breath

## 2017-11-10 NOTE — PROGRESS NOTES
NEPHROLOGY PROGRESS NOTE   Cj Zeng 43 y o  male MRN: [id number]  Unit/Bed#: [facility] 916-01 Encounter: [id number]  Reason for Consult: WOLF/CKD    ASSESSMENT AND PLAN:  Patient is a 27-year-old male with previous history of chronic bilateral hydronephrosis, initially presented from OSH due to left flank pain  He eventually he was found to have left renal injury and had ex lap with left nephrectomy due to left kidney rupture, and he was also found to have right-sided hydronephrosis was being managed with Thakur catheter  We are consulted for WOLF management      Nonoliguric WOLF, baseline serum creatinine 1 1 in 2016  - initial admission creatinine 3 0 significantly improved to 1 8 and then again significantly worsened at 3 4 which is improving to 1 5 today  - WOLF likely secondary to obstructive uropathy in a solitary kidney after left nephrectomy this admission, (previous WOLF on admission likely secondary to bilateral hydronephrosis, severe anemia, hypotension)  - change IVF to 1/2 NS at 50 ml/hour and can discontinue if tolerating diet     - initial urinalysis on admission showed positive nitrite, greater than 3+ proteinuria, innumerable RBCs, 2 to 4 WBCs with moderate bacteria  - will need repeat urinalysis once Thakur catheter is removed  - continue to closely monitor intake and output, avoid nephrotoxins or NSAIDs     Moderate right-sided hydronephrosis and hydroureter on CT scan  - s/p right-sided percutaneous nephrostomy on 11/9/17  - hopefully renal function will improve  - currently continue to have Thakur catheter  - closely monitor  - hydronephrosis thought to be likely related to dysfunctional voiding/reflux/outlet obstruction     leukocytosis in the setting of fever, rule out underlying sepsis, antibiotic management as per primary team  - initial blood cultures were negative  - repeat urine culture pending       Anemia likely blood loss  - closely monitor hemoglobin, transfuse p r n  for hemoglobin less than seven     No obvious history of hypertension, blood pressure is well controlled but currently remains on amlodipine 5 mg p o  daily  Also underlying pain contributing to high blood pressure  - continue to monitor blood pressure  Avoid low BP  Goal SBP 140s to 150s for now    Overall stable from nephrology standpoint  SUBJECTIVE:  Patient seen and examined at bedside  No chest pain, shortness of breath, nausea, vomiting, diarrhea  Has post op abdominal pain but stable  OBJECTIVE:  Current Weight: Weight - Scale: 89 2 kg (196 lb 10 4 oz)  Vitals:    11/10/17 0721   BP: 143/92   Pulse: 90   Resp: 18   Temp: 99 5 °F (37 5 °C)   SpO2: 100%       Intake/Output Summary (Last 24 hours) at 11/10/17 0951  Last data filed at 11/10/17 7574   Gross per 24 hour   Intake             2180 ml   Output             4825 ml   Net            -2645 ml       Physical Examination:  General: no acute distress   HEENT: PERRLa, EOMI  Neck: supple  Respiratory:  B/l air entry present  CVS: s1, s2 present  GI: soft ND, mild tenderness as post op  CNS: AAOX3  Extremities: no edema in LE  Skin:  No new changes       Medications:    Current Facility-Administered Medications:     acetaminophen (TYLENOL) tablet 975 mg, 975 mg, Oral, Q8H, Colt Janet, DO, 975 mg at 11/10/17 0925    amLODIPine (NORVASC) tablet 5 mg, 5 mg, Oral, Daily, Shad Crowder PA-C, 5 mg at 11/10/17 9262    calcium carbonate (TUMS) chewable tablet 500 mg, 500 mg, Oral, Daily PRN, Taylor Marshall MD, 500 mg at 11/10/17 0035    heparin (porcine) subcutaneous injection 5,000 Units, 5,000 Units, Subcutaneous, Q8H [address], Shad Crowder PA-C, 5,000 Units at 11/10/17 0528    hydrALAZINE (APRESOLINE) injection 10 mg, 10 mg, Intravenous, Q6H PRN, Georgie Burch PA-C, 10 mg at 11/04/17 0313    HYDROmorphone (DILAUDID) 1 mg/mL injection 1 mg, 1 mg, Intravenous, Q2H PRN, Ardith Schilder, 1 mg at 11/09/17 1452    influenza inactivated quadrivalent vaccine (FLULAVAL) IM injection 0 5 mL, 0 5 mL, Intramuscular, Prior to discharge, Louann Boyce MD    oxyCODONE (ROXICODONE) immediate release tablet 10 mg, 10 mg, Oral, Q4H PRN, Geroge Newer, 10 mg at 11/10/17 0528    oxyCODONE (ROXICODONE) IR tablet 5 mg, 5 mg, Oral, Q4H PRN, Arun Naranjo MD, 5 mg at 11/10/17 0926    piperacillin-tazobactam (ZOSYN) 3 375 g in dextrose 5 % 100 mL IVPB, 3 375 g, Intravenous, Q6H, Geroge Newer, Last Rate: 200 mL/hr at 11/10/17 0923, 3 375 g at 11/10/17 0987    senna-docusate sodium (SENOKOT S) 8 6-50 mg per tablet 1 tablet, 1 tablet, Oral, BID, Catie Velasco PA-C, 1 tablet at 11/10/17 8193    sodium chloride 0 9 % infusion, 50 mL/hr, Intravenous, Continuous, Arun Naranjo MD, Last Rate: 50 mL/hr at 11/10/17 0922, 50 mL/hr at 11/10/17 5385    tamsulosin [facility]) capsule 0 4 mg, 0 4 mg, Oral, Daily With Ann Howe DO, 0 4 mg at 11/09/17 1751    Laboratory Results:    Results from last 7 days  Lab Units 11/10/17  0529 11/09/17  0937 11/09/17  0503 11/08/17  1438 11/08/17  0509 11/07/17  0454 11/06/17  0515 11/06/17  0514 11/05/17  0456 11/04/17  0510   WBC Thousand/uL 12 09* 15 12*  --   --  14 03* 10 67*  --  10 01 11 00* 11 51*   HEMOGLOBIN g/dL 8 5* 9 7*  --   --  9 5* 8 2*  --  9 1* 9 3* 8 7*   HEMATOCRIT % 26 3* 29 1*  --   --  28 6* 24 6*  --  26 5* 27 9* 25 3*   PLATELETS Thousands/uL 824* 663*  --   --  504* 359  --  277 196 141*   SODIUM mmol/L 141  --  136 136 136 141 140  --  142 144   POTASSIUM mmol/L 4 5  --  4 7 4 9 4 7 4 5 3 7  --  3 7 3 4*   CHLORIDE mmol/L 107  --  104 104 102 106 107  --  109* 111*   CO2 mmol/L 26  --  23 25 26 27 27  --  26 26   BUN mg/dL 22  --  39* 34* 30* 22 21  --  19 20   CREATININE mg/dL 1 53*  --  3 40* 3 02* 2 71* 1 86* 2 06*  --  2 24* 2 02*   CALCIUM mg/dL 8 4  --  8 6 8 8 8 5 8 6 8 4  --  8 5 8 2*   MAGNESIUM mg/dL 2 3  --  2 4  --   --   --   --   --  2 1 2 0   PHOSPHORUS mg/dL  --   --   --   --   --   --   -- --   --  2 0*   GLUCOSE RANDOM mg/dL 94  --  120 108 111 106 109  --  93 97       Results for orders placed during the hospital encounter of 11/02/17   XR chest portable    Narrative CHEST     INDICATION:  Endotracheal tube    COMPARISON:  11/2/2017 at 4:58 AM    VIEWS:   AP frontal    IMAGES:  1    FINDINGS:      Endotracheal tube and nasogastric tube have been removed  Mild cardiomegaly is stable  Mild pulmonary vascular congestion is stable  There are no focal consolidations, pleural effusions, or pneumothorax  Visualized osseous structures appear within normal limits for the patient's age  Impression 1  Status post removal of endotracheal tube and nasogastric tube  2   Unchanged mild pulmonary vascular congestion and mild cardiomegaly  Workstation performed: [id number]       Results for orders placed during the hospital encounter of 11/02/17   XR chest pa & lateral    Narrative CHEST - DUAL ENERGY    INDICATION:  Fever  Anterior chest pain  COMPARISON:  None    VIEWS:  PA (including soft tissue/bone algorithms) and lateral projections    IMAGES:  4    FINDINGS:         Cardiomediastinal silhouette appears unremarkable  No effusions  No congestive failure  No pneumothorax  Minimal subsegmental atelectasis versus scarring within the right lung base  Visualized osseous structures appear within normal limits for the patient's age  Impression Minimal subsegmental atelectasis versus scarring within the right lung base  Workstation performed: [id number]       Results for orders placed during the hospital encounter of 11/01/17   CT chest without contrast    Narrative CT CHEST WITHOUT IV CONTRAST    INDICATION:  Left flank pain    COMPARISON: None  TECHNIQUE: CT examination of the chest was performed without intravenous contrast   Reformatted images were created in axial, sagittal, and coronal planes  Radiation dose length product (DLP) for this visit:  326 mGy-cm   This examination, like all CT scans performed in the [facility], was performed utilizing techniques to minimize radiation dose exposure, including the use of iterative   reconstruction and automated exposure control  FINDINGS:    LUNGS:  Right middle lobe subsegmental atelectasis  There is no tracheal or endobronchial lesion  PLEURA:  Small bilateral pleural effusions  HEART/GREAT VESSELS:  Unremarkable for patient's age  MEDIASTINUM AND CESAR:  Unremarkable  CHEST WALL AND LOWER NECK:       Normal     VISUALIZED STRUCTURES IN THE UPPER ABDOMEN:  With refer to CT abdomen pelvis  OSSEOUS STRUCTURES:  Small 1 cm osseous density at the proximal aspect of the left clavicle, correlate for any history of trauma to this region  Old appearing left coracoid fracture  No destructive osseous lesion  Impression 1  Small bilateral pleural effusions  2  Please refer to CT abdomen pelvis for significant abdomen and pelvis findings  3  Small 1 cm osseous density at the proximal aspect of the left clavicle, correlate for any history of trauma to this region  Old appearing left coracoid fracture  Workstation performed: [id number]       No results found for this or any previous visit  Results for orders placed during the hospital encounter of 11/02/17   CT abdomen pelvis wo contrast    Narrative CT ABDOMEN AND PELVIS WITHOUT IV CONTRAST    INDICATION:  Status post left nephrectomy  Worsening creatinine  COMPARISON: 11/1/2017    TECHNIQUE:  CT examination of the abdomen and pelvis was performed without intravenous contrast   Reformatted images were created in axial, sagittal, and coronal planes  Radiation dose length product (DLP) for this visit:  423 17 mGy-cm     This examination, like all CT scans performed in the [facility], was performed utilizing techniques to minimize radiation dose exposure, including the use of iterative   reconstruction and automated exposure control  Only a small amount of enteric contrast was administered  FINDINGS:    ABDOMEN    LOWER CHEST:  Moderate posterior basilar left-sided effusion with adjacent compressive atelectasis  LIVER/BILIARY TREE:  Unremarkable  GALLBLADDER:  No calcified gallstones  No pericholecystic inflammatory change  SPLEEN:  Unremarkable  PANCREAS:  Unremarkable  ADRENAL GLANDS:  Unremarkable  KIDNEYS/URETERS:  The right kidney again demonstrates moderate hydronephrosis and hydroureter diffusely  Hypodensities within the right kidney suggesting renal cysts  The patient is status post [rectum and removal of a large amount of the large hematoma  Some residual hematoma remains within the renal fossa, somewhat heterogeneous suggesting different stages of blood  This extends inferiorly into the left lateral   paracolic gutter  STOMACH AND BOWEL:  Limited without adequate oral contrast   No signs of bowel obstruction  APPENDIX:  No findings to suggest appendicitis  ABDOMINOPELVIC CAVITY:  There is extensive ascites throughout the abdomen and pelvis extending in the perihepatic and perisplenic regions inferiorly into the pelvis  The majority of this ascites is new compared to the prior examination and simple  There is some hyperdensity layering within the pelvis suggesting a small amount of intraperitoneal hemorrhage  This may represent urine filling the abdominal cavity  VESSELS:  Unremarkable for patient's age  PELVIS    REPRODUCTIVE ORGANS:  Unremarkable for patient's age  URINARY BLADDER:  Thakur catheter within the bladder, limiting evaluation  Bladder wall thickening  ABDOMINAL WALL/INGUINAL REGIONS:  Intact midline incision  OSSEOUS STRUCTURES:  No acute fracture or destructive osseous lesion  Impression Interval development of extensive abdominal and pelvic ascites, the majority of which appears simple    Given recent surgery, findings may represent urine leaking into the abdominal cavity  There is a moderate-sized hematoma identified within the left renal fossa status post left nephrectomy, markedly improved compared to the prior examination  Moderate posterior layering left effusion with adjacent compressive atelectasis  ##cfslh  I personally discussed this result with the surgery resident  on 11/8/2017 5:01 PM   ##      Workstation performed: [id number]       No results found for this or any previous visit  Portions of the record may have been created with voice recognition software  Occasional wrong word or "sound a like" substitutions may have occurred due to the inherent limitations of voice recognition software  Read the chart carefully and recognize, using context, where substitutions have occurred

## 2017-11-10 NOTE — PROGRESS NOTES
Progress Note - Cassandra Galvez 43 y o  male MRN: [id number]    Unit[facility]Bed#: [facility]16-01 Encounter: [id number]      Assessment:  43 y o  Male with a history of bilateral hydronephrosis and urinary retention admitted to trauma team for spontaneous left kidney rupture s/p left nephrectomy, exp lap, with ureter ligation  CT scan revealed right side hydronephrosis  Bladder decompression with jiménez catheter placement resulted in resolution of hydronephrosis on recent repeat US  Later,  creatinine continued to rise  Repeat CT scan was demonstrative for refractory hydronephrosis  Patient is now status post IR placed percutaneous nephrostomy of right solitary kidney  Creatinine is trended downward overnight to 1 53 from 3 4  Plan:  Maintain urinary drains and continue to trend creatinine  Can irrigate catheter p r n  for mild hematuria secondary to self-induced trauma  Nephrostogram early next week further evaluation  Subjective:   Denies fever or chills    Objective:     Vitals: Blood pressure 143/92, pulse 90, temperature 99 5 °F (37 5 °C), temperature source Oral, resp  rate 18, height 5' 6" (1 676 m), weight 89 2 kg (196 lb 10 4 oz), SpO2 100 %  ,Body mass index is 31 74 kg/m²        Intake/Output Summary (Last 24 hours) at 11/10/17 1010  Last data filed at 11/10/17 1001   Gross per 24 hour   Intake             2420 ml   Output             4825 ml   Net            -2405 ml       Physical Exam: General appearance: alert, appears stated age and cooperative  Lungs: clear to auscultation bilaterally  Heart: regular rate and rhythm, S1, S2 normal, no murmur, click, rub or gallop  Abdomen: abnormal findings:  distended and tender  Extremities: extremities normal, atraumatic, no cyanosis or edema  Pulses: 2+ and symmetric   Right percutaneous nephrostomy= large amounts clear sean urine  Invasive Devices     Peripheral Intravenous Line            Peripheral IV 11/09/17 Right Hand less than 1 day          Drain Urethral Catheter Coude 16 Fr  8 days    Nephrostomy Right 1 10 2 Fr  less than 1 day              Lab Results   Component Value Date    WBC 12 09 (H) 11/10/2017    HGB 8 5 (L) 11/10/2017    HCT 26 3 (L) 11/10/2017    MCV 89 11/10/2017     (H) 11/10/2017     Lab Results   Component Value Date    GLUCOSE 94 11/10/2017    CALCIUM 8 4 11/10/2017     11/10/2017    K 4 5 11/10/2017    CO2 26 11/10/2017     11/10/2017    BUN 22 11/10/2017    CREATININE 1 53 (H) 11/10/2017         Lab, Imaging and other studies: I have personally reviewed pertinent reports      VTE Pharmacologic Prophylaxis: Heparin  VTE Mechanical Prophylaxis: sequential compression device

## 2017-11-11 ENCOUNTER — APPOINTMENT (INPATIENT)
Dept: RADIOLOGY | Facility: HOSPITAL | Age: 42
DRG: 659 | End: 2017-11-11
Payer: COMMERCIAL

## 2017-11-11 LAB
ANION GAP SERPL CALCULATED.3IONS-SCNC: 7 MMOL/L (ref 4–13)
BACTERIA UR QL AUTO: ABNORMAL /HPF
BASOPHILS # BLD AUTO: 0.02 THOUSANDS/ΜL (ref 0–0.1)
BASOPHILS NFR BLD AUTO: 0 % (ref 0–1)
BILIRUB UR QL STRIP: NEGATIVE
BUN SERPL-MCNC: 17 MG/DL (ref 5–25)
CALCIUM SERPL-MCNC: 8.1 MG/DL (ref 8.3–10.1)
CHLORIDE SERPL-SCNC: 102 MMOL/L (ref 100–108)
CLARITY UR: ABNORMAL
CO2 SERPL-SCNC: 24 MMOL/L (ref 21–32)
COLOR UR: YELLOW
CREAT SERPL-MCNC: 1.27 MG/DL (ref 0.6–1.3)
EOSINOPHIL # BLD AUTO: 0.77 THOUSAND/ΜL (ref 0–0.61)
EOSINOPHIL NFR BLD AUTO: 6 % (ref 0–6)
ERYTHROCYTE [DISTWIDTH] IN BLOOD BY AUTOMATED COUNT: 13.9 % (ref 11.6–15.1)
GFR SERPL CREATININE-BSD FRML MDRD: 69 ML/MIN/1.73SQ M
GLUCOSE SERPL-MCNC: 115 MG/DL (ref 65–140)
GLUCOSE UR STRIP-MCNC: NEGATIVE MG/DL
HCT VFR BLD AUTO: 27.2 % (ref 36.5–49.3)
HGB BLD-MCNC: 9.1 G/DL (ref 12–17)
HGB UR QL STRIP.AUTO: ABNORMAL
KETONES UR STRIP-MCNC: NEGATIVE MG/DL
LEUKOCYTE ESTERASE UR QL STRIP: ABNORMAL
LYMPHOCYTES # BLD AUTO: 2.3 THOUSANDS/ΜL (ref 0.6–4.47)
LYMPHOCYTES NFR BLD AUTO: 17 % (ref 14–44)
MCH RBC QN AUTO: 29.4 PG (ref 26.8–34.3)
MCHC RBC AUTO-ENTMCNC: 33.5 G/DL (ref 31.4–37.4)
MCV RBC AUTO: 88 FL (ref 82–98)
MONOCYTES # BLD AUTO: 1.4 THOUSAND/ΜL (ref 0.17–1.22)
MONOCYTES NFR BLD AUTO: 10 % (ref 4–12)
NEUTROPHILS # BLD AUTO: 8.85 THOUSANDS/ΜL (ref 1.85–7.62)
NEUTS SEG NFR BLD AUTO: 67 % (ref 43–75)
NITRITE UR QL STRIP: NEGATIVE
NON-SQ EPI CELLS URNS QL MICRO: ABNORMAL /HPF
NRBC BLD AUTO-RTO: 0 /100 WBCS
PH UR STRIP.AUTO: 6 [PH] (ref 4.5–8)
PLATELET # BLD AUTO: 908 THOUSANDS/UL (ref 149–390)
PMV BLD AUTO: 8.4 FL (ref 8.9–12.7)
POTASSIUM SERPL-SCNC: 4.3 MMOL/L (ref 3.5–5.3)
PROT UR STRIP-MCNC: ABNORMAL MG/DL
RBC # BLD AUTO: 3.1 MILLION/UL (ref 3.88–5.62)
RBC #/AREA URNS AUTO: ABNORMAL /HPF
SODIUM SERPL-SCNC: 133 MMOL/L (ref 136–145)
SP GR UR STRIP.AUTO: 1.02 (ref 1–1.03)
URATE CRY URNS QL MICRO: ABNORMAL /HPF
UROBILINOGEN UR QL STRIP.AUTO: 1 E.U./DL
WBC # BLD AUTO: 13.41 THOUSAND/UL (ref 4.31–10.16)
WBC #/AREA URNS AUTO: ABNORMAL /HPF

## 2017-11-11 PROCEDURE — 80048 BASIC METABOLIC PNL TOTAL CA: CPT | Performed by: SURGERY

## 2017-11-11 PROCEDURE — 97116 GAIT TRAINING THERAPY: CPT

## 2017-11-11 PROCEDURE — 81001 URINALYSIS AUTO W/SCOPE: CPT | Performed by: SURGERY

## 2017-11-11 PROCEDURE — 85025 COMPLETE CBC W/AUTO DIFF WBC: CPT | Performed by: SURGERY

## 2017-11-11 PROCEDURE — 71010 HB CHEST X-RAY 1 VIEW FRONTAL: CPT

## 2017-11-11 RX ADMIN — SODIUM CHLORIDE 50 ML/HR: 0.45 INJECTION, SOLUTION INTRAVENOUS at 04:00

## 2017-11-11 RX ADMIN — ACETAMINOPHEN 975 MG: 325 TABLET ORAL at 15:45

## 2017-11-11 RX ADMIN — PIPERACILLIN SODIUM AND TAZOBACTAM SODIUM 3.38 G: 36; 4.5 INJECTION, POWDER, FOR SOLUTION INTRAVENOUS at 21:30

## 2017-11-11 RX ADMIN — OXYCODONE HYDROCHLORIDE 5 MG: 5 TABLET ORAL at 22:08

## 2017-11-11 RX ADMIN — PIPERACILLIN SODIUM AND TAZOBACTAM SODIUM 3.38 G: 36; 4.5 INJECTION, POWDER, FOR SOLUTION INTRAVENOUS at 03:11

## 2017-11-11 RX ADMIN — HEPARIN SODIUM 5000 UNITS: 5000 INJECTION, SOLUTION INTRAVENOUS; SUBCUTANEOUS at 05:32

## 2017-11-11 RX ADMIN — HEPARIN SODIUM 5000 UNITS: 5000 INJECTION, SOLUTION INTRAVENOUS; SUBCUTANEOUS at 15:46

## 2017-11-11 RX ADMIN — ACETAMINOPHEN 975 MG: 325 TABLET ORAL at 23:38

## 2017-11-11 RX ADMIN — PIPERACILLIN SODIUM AND TAZOBACTAM SODIUM 3.38 G: 36; 4.5 INJECTION, POWDER, FOR SOLUTION INTRAVENOUS at 15:45

## 2017-11-11 RX ADMIN — AMLODIPINE BESYLATE 5 MG: 5 TABLET ORAL at 08:52

## 2017-11-11 RX ADMIN — HEPARIN SODIUM 5000 UNITS: 5000 INJECTION, SOLUTION INTRAVENOUS; SUBCUTANEOUS at 22:09

## 2017-11-11 RX ADMIN — ACETAMINOPHEN 975 MG: 325 TABLET ORAL at 08:51

## 2017-11-11 RX ADMIN — Medication 1 TABLET: at 18:05

## 2017-11-11 RX ADMIN — PIPERACILLIN SODIUM AND TAZOBACTAM SODIUM 3.38 G: 36; 4.5 INJECTION, POWDER, FOR SOLUTION INTRAVENOUS at 08:49

## 2017-11-11 RX ADMIN — OXYCODONE HYDROCHLORIDE 10 MG: 10 TABLET ORAL at 00:46

## 2017-11-11 RX ADMIN — Medication 1 TABLET: at 08:52

## 2017-11-11 RX ADMIN — TAMSULOSIN HYDROCHLORIDE 0.4 MG: 0.4 CAPSULE ORAL at 15:45

## 2017-11-11 NOTE — PROGRESS NOTES
NEPHROLOGY PROGRESS NOTE   Deneen Holguin 43 y o  male MRN: [id number]  Unit/Bed#: [facility] 916-01 Encounter: [id number]  Reason for Consult: WOLF/CKD    ASSESSMENT AND PLAN:  Patient is a 61-year-old male with previous history of chronic bilateral hydronephrosis, initially presented from OSH due to left flank pain  He eventually he was found to have left renal injury and had ex lap with left nephrectomy due to left kidney rupture, and he was also found to have right-sided hydronephrosis was being managed with Thakur catheter  We are consulted for WOLF management      1  Nonoliguric WOLF, baseline serum creatinine 1 1 in 2016  - initial admission creatinine 3 0 significantly improved to 1 8 and then again significantly worsened at 3 4 which is improving to 1 5 today  - WOLF in the setting of obstructive uropathy in a solitary kidney after left nephrectomy this admission, (previous WOLF on admission likely secondary to bilateral hydronephrosis, severe anemia, hypotension)  - renal function improving, serum creatinine today 1 27    - monitor serial labs, ins and outs, avoid nephrotoxic      2  Moderate right-sided hydronephrosis and hydroureter on CT scan, s/p right-sided percutaneous nephrostomy on 11/9/17  - hydronephrosis thought to be likely related to dysfunctional voiding/reflux/outlet obstruction  Urology as well as surgery on board, plan for a right antegrade nephrostogram by IR next week due to urinary ascites, as per Urology note suspected secondary to reflux up his left ureteral stump     3  Leukocytosis in the setting of fever, rule out underlying sepsis, antibiotic management as per primary team  -blood culture from November 1st negative  -urine culture from November 9 no growth       4    Anemia likely secondary to blood loss  - closely monitor hemoglobin, transfuse p r n  for hemoglobin less than seven      SUBJECTIVE:  Patient seen and examined, in general feeling okay ordered and continue some abdominal pain after surgery, denies any shortness of breath or chest pain       OBJECTIVE:  Current Weight: Weight - Scale: 89 2 kg (196 lb 10 4 oz)  Vitals:    11/11/17 0852   BP: 133/72   Pulse:    Resp:    Temp:    SpO2:        Intake/Output Summary (Last 24 hours) at 11/11/17 1501  Last data filed at 11/11/17 1447   Gross per 24 hour   Intake           3077 5 ml   Output             2250 ml   Net            827 5 ml       Physical Examination:  General:  In no acute distress   HEENT:  Mucous membranes moist, eyes no jaundice  Neck: supple, no JVD  Respiratory:  Clear to auscultation bilaterally, mildly decreased breath sounds at bases  CVS:  Regular rate and rhythm  GI:  Soft, nondistended, mild tenderness around incision  CNS:  Alert and oriented  Extremities: no edema in LE  Skin:  Pale    Medications:    Current Facility-Administered Medications:     acetaminophen (TYLENOL) tablet 975 mg, 975 mg, Oral, Q8H, Colt Janet, DO, 975 mg at 11/11/17 0851    amLODIPine (NORVASC) tablet 5 mg, 5 mg, Oral, Daily, Venancio Amor PA-C, 5 mg at 11/11/17 3903    calcium carbonate (TUMS) chewable tablet 500 mg, 500 mg, Oral, Daily PRN, Pedrito Adams MD, 500 mg at 11/10/17 2043    heparin (porcine) subcutaneous injection 5,000 Units, 5,000 Units, Subcutaneous, Q8H [address], Venancio Amor PA-C, 5,000 Units at 11/11/17 0532    hydrALAZINE (APRESOLINE) injection 10 mg, 10 mg, Intravenous, Q6H PRN, Isabella Burch PA-C, 10 mg at 11/04/17 0313    HYDROmorphone (DILAUDID) 1 mg/mL injection 1 mg, 1 mg, Intravenous, Q2H PRN, Artie Conchis, 1 mg at 11/09/17 8614    influenza inactivated quadrivalent vaccine (FLULAVAL) IM injection 0 5 mL, 0 5 mL, Intramuscular, Prior to discharge, Jacque Cockayne, MD    oxyCODONE (ROXICODONE) immediate release tablet 10 mg, 10 mg, Oral, Q4H PRN, Artie Conchis, 10 mg at 11/11/17 0046    oxyCODONE (ROXICODONE) IR tablet 5 mg, 5 mg, Oral, Q4H PRN, Oneida Miller MD, 5 mg at 11/10/17 4449   piperacillin-tazobactam (ZOSYN) 3 375 g in dextrose 5 % 100 mL IVPB, 3 375 g, Intravenous, Q6H, Carley Higgins, Last Rate: 200 mL/hr at 11/11/17 0849, 3 375 g at 11/11/17 0849    senna-docusate sodium (SENOKOT S) 8 6-50 mg per tablet 1 tablet, 1 tablet, Oral, BID, Brandyn Tirado PA-C, 1 tablet at 11/11/17 6982    tamsulosin [facility]) capsule 0 4 mg, 0 4 mg, Oral, Daily With Cookie Zamora DO, 0 4 mg at 11/10/17 1657    Laboratory Results:    Results from last 7 days  Lab Units 11/11/17  0557 11/11/17  0546 11/10/17  0529 11/09/17  0937 11/09/17  0503 11/08/17  1438 11/08/17  0509 11/07/17  0454 11/06/17  0515 11/06/17  0514 11/05/17  0456   WBC Thousand/uL  --  13 41* 12 09* 15 12*  --   --  14 03* 10 67*  --  10 01 11 00*   HEMOGLOBIN g/dL  --  9 1* 8 5* 9 7*  --   --  9 5* 8 2*  --  9 1* 9 3*   HEMATOCRIT %  --  27 2* 26 3* 29 1*  --   --  28 6* 24 6*  --  26 5* 27 9*   PLATELETS Thousands/uL  --  908* 824* 663*  --   --  504* 359  --  277 196   SODIUM mmol/L 133*  --  141  --  136 136 136 141 140  --  142   POTASSIUM mmol/L 4 3  --  4 5  --  4 7 4 9 4 7 4 5 3 7  --  3 7   CHLORIDE mmol/L 102  --  107  --  104 104 102 106 107  --  109*   CO2 mmol/L 24  --  26  --  23 25 26 27 27  --  26   BUN mg/dL 17  --  22  --  39* 34* 30* 22 21  --  19   CREATININE mg/dL 1 27  --  1 53*  --  3 40* 3 02* 2 71* 1 86* 2 06*  --  2 24*   CALCIUM mg/dL 8 1*  --  8 4  --  8 6 8 8 8 5 8 6 8 4  --  8 5   MAGNESIUM mg/dL  --   --  2 3  --  2 4  --   --   --   --   --  2 1   GLUCOSE RANDOM mg/dL 115  --  94  --  120 108 111 106 109  --  93

## 2017-11-11 NOTE — PROGRESS NOTES
Progress Note - General Surgery   Suzy Mayo 43 y o  male MRN: [id number]  Unit/Bed#: [facility] 916-01 Encounter: [id number]    Assessment:  44 yo M w/ spontaneous left kidney rupture s/p ex lap, left nephrectomy, abthera 11/1 w/ ureter ligation and abdominal closure 11/2  Plan:  - regular diet  - continue antibiotics  - monitor fevers  - WOLF improving  - appreciate urology and nephrology recs  - maintain jiménez and perc nephrostomy    Subjective/Objective     Subjective: Patient passing gas, no bowel movements  No nausea or vomiting  Objective:     Vitals: Blood pressure 146/76, pulse 99, temperature 99 7 °F (37 6 °C), temperature source Oral, resp  rate 18, height 5' 6" (1 676 m), weight 89 2 kg (196 lb 10 4 oz), SpO2 97 %  ,Body mass index is 31 74 kg/m²  I/O       11/09 0701 - 11/10 0700 11/10 0701 - 11/11 0700 11/11 0701 - 11/12 0700    P  O  680 1920     I V  (mL/kg) 1500 (16 8) 1283 3 (14 4)     IV Piggyback  400     Total Intake(mL/kg) 2180 (24 4) 3603 3 (40 4)     Urine (mL/kg/hr) 2925 (1 4) 2100 (1)     Other 1900 (0 9)      Total Output 4825 2100      Net -2645 +1503 3                   Physical Exam:  GEN: NAD  HEENT: MMM  CV: RRR  Lung: Normal effort  Ab: Soft, NT/ND, perc nephrostomy with UOP  Extrem: No CCE  Neuro:  A+Ox3    Lab, Imaging and other studies:   CBC with diff:   Lab Results   Component Value Date    WBC 13 41 (H) 11/11/2017    HGB 9 1 (L) 11/11/2017    HCT 27 2 (L) 11/11/2017    MCV 88 11/11/2017     (H) 11/11/2017    MCH 29 4 11/11/2017    MCHC 33 5 11/11/2017    RDW 13 9 11/11/2017    MPV 8 4 (L) 11/11/2017    NRBC 0 11/11/2017   , BMP/CMP:   Lab Results   Component Value Date     (L) 11/11/2017    K 4 3 11/11/2017     11/11/2017    CO2 24 11/11/2017    ANIONGAP 7 11/11/2017    BUN 17 11/11/2017    CREATININE 1 27 11/11/2017    GLUCOSE 115 11/11/2017    CALCIUM 8 1 (L) 11/11/2017    EGFR 69 11/11/2017   , Magnesium: No components found for: MAG  VTE Pharmacologic Prophylaxis: Heparin  VTE Mechanical Prophylaxis: sequential compression device

## 2017-11-11 NOTE — PLAN OF CARE
Problem: PHYSICAL THERAPY ADULT  Goal: Performs mobility at highest level of function for planned discharge setting  See evaluation for individualized goals  Treatment/Interventions: Functional transfer training, LE strengthening/ROM, Therapeutic exercise, Endurance training, Equipment eval/education, Bed mobility, Gait training, Spoke to nursing, OT  Equipment Recommended: Hassan Shone (at this time)       See flowsheet documentation for full assessment, interventions and recommendations  Outcome: Progressing  Prognosis: Good  Problem List: Decreased strength, Decreased range of motion, Decreased endurance, Impaired balance, Pain  Assessment: Pt supine in bed upon arrival, agreeable to OOB ambualtion> reports having abdminal pain at this time  Pt requires supervision only with bed mobility as well as transfers  Min assist with ambualtion for line assist  Pt had LOB x1 with ambualtion, greater distance during this session but continues to be limited in activity due to high pain  Pt will continue to benenfot from skilled therapy to achieve safe maximum functional mobility upon d/c  Recommendation:  ([facility] to [facility] facility)     PT - OK to Discharge: No    See flowsheet documentation for full assessment

## 2017-11-11 NOTE — PHYSICAL THERAPY NOTE
Physical Therapy Progress Note     11/11/17 1112   Pain Assessment   Pain Assessment 0-10   Pain Score 5   Pain Type Acute pain   Pain Location Abdomen   Pain Orientation Bilateral   Restrictions/Precautions   Weight Bearing Precautions Per Order No   Other Precautions Pain; Fall Risk   General   Chart Reviewed Yes   Response to Previous Treatment Patient with no complaints from previous session  Family/Caregiver Present No  (CO present)   Cognition   Overall Cognitive Status WFL   Arousal/Participation Alert   Attention Within functional limits   Orientation Level Oriented X4   Memory Within functional limits   Following Commands Follows all commands and directions without difficulty   Bed Mobility   Supine to Sit 5  Supervision   Additional items Assist x 1; Increased time required   Sit to Supine 5  Supervision   Additional items Increased time required;Assist x 1   Transfers   Sit to Stand 5  Supervision   Additional items Increased time required;Assist x 1   Stand to Sit 5  Supervision   Additional items Assist x 1; Increased time required   Ambulation/Elevation   Gait pattern Decreased foot clearance;Shuffling;Excessively slow; Short stride   Gait Assistance 4  Minimal assist   Additional items Assist x 1  (assist for lines)   Assistive Device Rolling walker   Distance 80'x2   Endurance Deficit   Endurance Deficit Yes   Activity Tolerance   Activity Tolerance Patient limited by fatigue;Patient limited by pain   Nurse Made Aware DIPESH Cramer Hearing ok to see   Assessment   Prognosis Good   Problem List Decreased strength;Decreased range of motion;Decreased endurance; Impaired balance;Pain   Assessment Pt supine in bed upon arrival, agreeable to OOB ambualtion> reports having abdminal pain at this time  Pt requires supervision only with bed mobility as well as transfers   Min assist with ambualtion for line assist  Pt had LOB x1 with ambualtion, greater distance during this session but continues to be limited in activity due to high pain  Pt will continue to benenfot from skilled therapy to achieve safe maximum functional mobility upon d/c  Goals   Patient Goals none stated   STG Expiration Date 11/14/17   Plan   Treatment/Interventions LE strengthening/ROM; Functional transfer training;Elevations; Therapeutic exercise; Endurance training   Progress Progressing toward goals   PT Frequency (6x/week)   Recommendation   Recommendation (return to [facility] facility)   Equipment Recommended Kerwin Peña   PT - OK to Discharge No     Danika Christie, ADAN

## 2017-11-11 NOTE — PROGRESS NOTES
Status post emergent nephrectomy secondary to trauma  Abdominal ascites noted  Possibly secondary to vesicoureteral reflux  Paracentesis was performed and fluid was consistent with urine  A right nephrostomy tube was placed as the patient appeared to have hydronephrosis  This again may be secondary to vesicoureteral reflux  His creatinine is now normal   I would recommend maintaining the Thakur catheter to gravity drainage at this time as the patient may be refluxing up his left ureteral stump contributing to the urinary ascites  Recommend right antegrade nephrostogram in interventional Radiology next week  I would also consider performing a cystogram through his existing Thakur catheter to see if there is reflux up the left ureter with intra-abdominal extravasation

## 2017-11-12 LAB
ANION GAP SERPL CALCULATED.3IONS-SCNC: 7 MMOL/L (ref 4–13)
BACTERIA SPEC BFLD CULT: NO GROWTH
BASOPHILS # BLD AUTO: 0.03 THOUSANDS/ΜL (ref 0–0.1)
BASOPHILS NFR BLD AUTO: 0 % (ref 0–1)
BUN SERPL-MCNC: 16 MG/DL (ref 5–25)
CALCIUM SERPL-MCNC: 8.7 MG/DL (ref 8.3–10.1)
CHLORIDE SERPL-SCNC: 104 MMOL/L (ref 100–108)
CO2 SERPL-SCNC: 27 MMOL/L (ref 21–32)
CREAT SERPL-MCNC: 1.27 MG/DL (ref 0.6–1.3)
EOSINOPHIL # BLD AUTO: 0.75 THOUSAND/ΜL (ref 0–0.61)
EOSINOPHIL NFR BLD AUTO: 6 % (ref 0–6)
ERYTHROCYTE [DISTWIDTH] IN BLOOD BY AUTOMATED COUNT: 13.9 % (ref 11.6–15.1)
GFR SERPL CREATININE-BSD FRML MDRD: 69 ML/MIN/1.73SQ M
GLUCOSE SERPL-MCNC: 106 MG/DL (ref 65–140)
GRAM STN SPEC: NORMAL
GRAM STN SPEC: NORMAL
HCT VFR BLD AUTO: 26.1 % (ref 36.5–49.3)
HGB BLD-MCNC: 8.8 G/DL (ref 12–17)
LYMPHOCYTES # BLD AUTO: 2.05 THOUSANDS/ΜL (ref 0.6–4.47)
LYMPHOCYTES NFR BLD AUTO: 16 % (ref 14–44)
MAGNESIUM SERPL-MCNC: 2.1 MG/DL (ref 1.6–2.6)
MCH RBC QN AUTO: 29.6 PG (ref 26.8–34.3)
MCHC RBC AUTO-ENTMCNC: 33.7 G/DL (ref 31.4–37.4)
MCV RBC AUTO: 88 FL (ref 82–98)
MONOCYTES # BLD AUTO: 1.2 THOUSAND/ΜL (ref 0.17–1.22)
MONOCYTES NFR BLD AUTO: 9 % (ref 4–12)
NEUTROPHILS # BLD AUTO: 8.96 THOUSANDS/ΜL (ref 1.85–7.62)
NEUTS SEG NFR BLD AUTO: 69 % (ref 43–75)
NRBC BLD AUTO-RTO: 0 /100 WBCS
PLATELET # BLD AUTO: 970 THOUSANDS/UL (ref 149–390)
PMV BLD AUTO: 8.5 FL (ref 8.9–12.7)
POTASSIUM SERPL-SCNC: 3.9 MMOL/L (ref 3.5–5.3)
RBC # BLD AUTO: 2.97 MILLION/UL (ref 3.88–5.62)
SODIUM SERPL-SCNC: 138 MMOL/L (ref 136–145)
WBC # BLD AUTO: 13.07 THOUSAND/UL (ref 4.31–10.16)

## 2017-11-12 PROCEDURE — 83735 ASSAY OF MAGNESIUM: CPT | Performed by: SURGERY

## 2017-11-12 PROCEDURE — 80048 BASIC METABOLIC PNL TOTAL CA: CPT | Performed by: SURGERY

## 2017-11-12 PROCEDURE — 87493 C DIFF AMPLIFIED PROBE: CPT | Performed by: SURGERY

## 2017-11-12 PROCEDURE — 85025 COMPLETE CBC W/AUTO DIFF WBC: CPT | Performed by: SURGERY

## 2017-11-12 RX ORDER — DEXTROSE, SODIUM CHLORIDE, AND POTASSIUM CHLORIDE 5; .45; .15 G/100ML; G/100ML; G/100ML
100 INJECTION INTRAVENOUS CONTINUOUS
Status: DISCONTINUED | OUTPATIENT
Start: 2017-11-12 | End: 2017-11-14

## 2017-11-12 RX ORDER — ATROPA BELLADONNA AND OPIUM 16.2; 6 MG/1; MG/1
1 SUPPOSITORY RECTAL EVERY 6 HOURS PRN
Status: DISCONTINUED | OUTPATIENT
Start: 2017-11-12 | End: 2017-11-27

## 2017-11-12 RX ORDER — ATROPA BELLADONNA AND OPIUM 16.2; 6 MG/1; MG/1
1 SUPPOSITORY RECTAL ONCE
Status: COMPLETED | OUTPATIENT
Start: 2017-11-12 | End: 2017-11-12

## 2017-11-12 RX ORDER — POTASSIUM CHLORIDE 750 MG/1
10 TABLET, EXTENDED RELEASE ORAL ONCE
Status: COMPLETED | OUTPATIENT
Start: 2017-11-12 | End: 2017-11-12

## 2017-11-12 RX ADMIN — POTASSIUM CHLORIDE 10 MEQ: 750 TABLET, EXTENDED RELEASE ORAL at 19:23

## 2017-11-12 RX ADMIN — PIPERACILLIN SODIUM AND TAZOBACTAM SODIUM 3.38 G: 36; 4.5 INJECTION, POWDER, FOR SOLUTION INTRAVENOUS at 17:49

## 2017-11-12 RX ADMIN — PIPERACILLIN SODIUM AND TAZOBACTAM SODIUM 3.38 G: 36; 4.5 INJECTION, POWDER, FOR SOLUTION INTRAVENOUS at 03:29

## 2017-11-12 RX ADMIN — ACETAMINOPHEN 975 MG: 325 TABLET ORAL at 08:15

## 2017-11-12 RX ADMIN — AMLODIPINE BESYLATE 5 MG: 5 TABLET ORAL at 08:16

## 2017-11-12 RX ADMIN — ATROPA BELLADONNA AND OPIUM 1 SUPPOSITORY: 16.2; 6 SUPPOSITORY RECTAL at 14:42

## 2017-11-12 RX ADMIN — PIPERACILLIN SODIUM AND TAZOBACTAM SODIUM 3.38 G: 36; 4.5 INJECTION, POWDER, FOR SOLUTION INTRAVENOUS at 11:31

## 2017-11-12 RX ADMIN — HEPARIN SODIUM 5000 UNITS: 5000 INJECTION, SOLUTION INTRAVENOUS; SUBCUTANEOUS at 14:31

## 2017-11-12 RX ADMIN — HEPARIN SODIUM 5000 UNITS: 5000 INJECTION, SOLUTION INTRAVENOUS; SUBCUTANEOUS at 22:13

## 2017-11-12 RX ADMIN — DEXTROSE, SODIUM CHLORIDE, AND POTASSIUM CHLORIDE 100 ML/HR: 5; .45; .15 INJECTION INTRAVENOUS at 19:02

## 2017-11-12 RX ADMIN — OXYCODONE HYDROCHLORIDE 5 MG: 5 TABLET ORAL at 22:13

## 2017-11-12 RX ADMIN — HEPARIN SODIUM 5000 UNITS: 5000 INJECTION, SOLUTION INTRAVENOUS; SUBCUTANEOUS at 06:11

## 2017-11-12 RX ADMIN — TAMSULOSIN HYDROCHLORIDE 0.4 MG: 0.4 CAPSULE ORAL at 17:51

## 2017-11-12 RX ADMIN — PIPERACILLIN SODIUM AND TAZOBACTAM SODIUM 3.38 G: 36; 4.5 INJECTION, POWDER, FOR SOLUTION INTRAVENOUS at 22:14

## 2017-11-12 NOTE — PROGRESS NOTES
Progress Note - General Surgery  Cassandra Galvez 43 y o  male MRN: [id number]  Unit/Bed#: [facility] 916-01 Encounter: [id number]    Assessment:  43y o -year-old male with spontaneous left kidney rupture    - s/p ex lap, left nephrectomy, abthera 11/1 Latoya Dates)  - s/p ex lap, removal of packs, ligation of left ureter, abdominal closure 11/2 Shay Dey)  - s/p right PCN w/ intraabdominal ascites samples 11/9 (IR)    Plan:  - continue diet  - likely right anteriorograde nephrostogram and cysto next week  - bentyl for spasms  - belladonna for watery diarrhea    Sumit Knox MD PGY-4  11:43 AM  11/12/17      Subjective:  No nausea or vomiting, is having flatus with watery bowel movements  Complaining of spasms in his bladder  Objective:  Patient Vitals for the past 24 hrs:   BP Temp Temp src Pulse Resp SpO2   11/12/17 0816 155/93 - - - - -   11/11/17 2358 141/72 99 3 °F (37 4 °C) Oral 77 18 98 %   11/11/17 1535 138/75 99 2 °F (37 3 °C) Oral 75 18 98 %          Diet Orders            Start     Ordered    11/09/17 1511  Diet Regular; Regular House  Diet effective now     Question Answer Comment   Diet Type Regular    Regular Regular House    RD to adjust diet per protocol?  Yes        11/09/17 1511    11/07/17 1555  Dietary nutrition supplements  Once     Question Answer Comment   Select Supplement: Ensure Enlive-Vanilla    Frequency Breakfast, Dinner        11/07/17 1555          Intake/Output Summary (Last 24 hours) at 11/12/17 1143  Last data filed at 11/12/17 0637   Gross per 24 hour   Intake             1080 ml   Output             2400 ml   Net            -1320 ml   UOP  2 4    Physical Exam:  General: NAD  Cardiovascular: RRR  Respiratory: breath sounds b/l  Abdomen: soft, mild distension, dressing midline clean/dry, PCN on right  Extremities: no edema    Medications:    acetaminophen 975 mg Oral Q8H   amLODIPine 5 mg Oral Daily   heparin (porcine) 5,000 Units Subcutaneous Q8H [address]   piperacillin-tazobactam 3 375 g Intravenous Q6H   senna-docusate sodium 1 tablet Oral BID   tamsulosin 0 4 mg Oral Daily With Dinner        calcium carbonate 500 mg Daily PRN   hydrALAZINE 10 mg Q6H PRN   HYDROmorphone 1 mg Q2H PRN   influenza vaccine 0 5 mL Prior to discharge   oxyCODONE 10 mg Q4H PRN   oxyCODONE 5 mg Q4H PRN     Laboratory results:   CBC:   Lab Results   Component Value Date    WBC 13 07 (H) 11/12/2017    HGB 8 8 (L) 11/12/2017    HCT 26 1 (L) 11/12/2017    MCV 88 11/12/2017     (H) 11/12/2017    MCH 29 6 11/12/2017    MCHC 33 7 11/12/2017    RDW 13 9 11/12/2017    MPV 8 5 (L) 11/12/2017    NRBC 0 11/12/2017   , CMP:   Lab Results   Component Value Date     11/12/2017    K 3 9 11/12/2017     11/12/2017    CO2 27 11/12/2017    ANIONGAP 7 11/12/2017    BUN 16 11/12/2017    CREATININE 1 27 11/12/2017    GLUCOSE 106 11/12/2017    CALCIUM 8 7 11/12/2017    EGFR 69 11/12/2017   , Coagulation:   No results found for: PT, INR, APTT, Urinalysis: No results found for: COLORU, CLARITYU, SPECGRAV, PHUR, LEUKOCYTESUR, NITRITE, PROTEINUA, GLUCOSEU, KETONESU, BILIRUBINUR, BLOODU, Amylase: No results found for: AMYLASE, Lipase: No results found for: LIPASE    VTE Pharmacologic Prophylaxis: Reason for no pharmacologic prophylaxis retroperitoneal bleed  VTE Mechanical Prophylaxis: sequential compression device

## 2017-11-12 NOTE — PROGRESS NOTES
NEPHROLOGY PROGRESS NOTE   Joselito Shoemaker 43 y o  male MRN: [id number]  Unit/Bed#: [facility] 916-01 Encounter: [id number]  Reason for Consult: WOLF/CKD    ASSESSMENT AND PLAN:  Patient is a 20-year-old male with previous history of chronic bilateral hydronephrosis, initially presented from OSH due to left flank pain  He eventually he was found to have left renal injury and had ex lap with left nephrectomy due to left kidney rupture, and he was also found to have right-sided hydronephrosis was being managed with Thakur catheter  We are consulted for WOLF management      1  Nonoliguric WOLF, baseline serum creatinine 1 1 in 2016  - initial admission creatinine 3 0 significantly improved to 1 8 and then again significantly worsened at 3 4 which is improving to 1 27 today  - WOLF in the setting of obstructive uropathy in a solitary kidney after left nephrectomy this admission, (previous WOLF on admission likely secondary to bilateral hydronephrosis, severe anemia, hypotension)  - renal function improving, serum creatinine remains stable at 1 27 for the last 2 days  - monitor serial labs, ins and outs, avoid nephrotoxic      2  Moderate right-sided hydronephrosis and hydroureter on CT scan, s/p right-sided percutaneous nephrostomy on 11/9/17  - hydronephrosis thought to be likely related to dysfunctional voiding/reflux/outlet obstruction  Urology as well as surgery on board, plan for a right antegrade nephrostogram by IR next week due to urinary ascites, as per Urology note suspected secondary to reflux up his left ureteral stump     3  Leukocytosis in the setting of fever, rule out underlying sepsis, antibiotic management as per primary team  -blood culture from November 1st negative  -urine culture from November 9 no growth  Noted now having diarrhea, stools were sent for C diff PCR, encourage pain should increase fluid intake, diarrhea worsened then recommend to restart intravenously fluids     4    Anemia likely secondary to blood loss  - closely monitor hemoglobin, transfuse p r n  for hemoglobin less than seven      SUBJECTIVE:  Patient seen and examined, start having diarrhea so far 4 loose bowel movements today, abdominal pain stable, denies any short shortness of breath or chest pain no nausea or vomiting     OBJECTIVE:  Current Weight: Weight - Scale: 89 2 kg (196 lb 10 4 oz)  Vitals:    11/12/17 0816   BP: 155/93   Pulse:    Resp:    Temp:    SpO2:        Intake/Output Summary (Last 24 hours) at 11/12/17 1425  Last data filed at 11/12/17 1417   Gross per 24 hour   Intake             1390 ml   Output             3225 ml   Net            -1835 ml       Physical Examination:  General:  In no acute distress   HEENT:  Mucous membranes mildly dry, eyes no jaundice  Neck: supple, no JVD  Respiratory:  Clear to auscultation bilaterally, mildly decreased breath sounds at bases  CVS:  Regular rate and rhythm  GI:  Soft, nondistended, mild tenderness around incision  CNS:  Alert and oriented  Extremities: no edema in LE  Skin:  Pale    Medications:    Current Facility-Administered Medications:     acetaminophen (TYLENOL) tablet 975 mg, 975 mg, Oral, Q8H, Colt Janet, DO, 975 mg at 11/12/17 0815    amLODIPine (NORVASC) tablet 5 mg, 5 mg, Oral, Daily, Katie Wilcox PA-C, 5 mg at 11/12/17 0816    belladonna-opium (B&O SUPPOSITORY) 16 2-60 mg per suppository 1 suppository, 1 suppository, Rectal, Q6H PRN, Karla Lou MD    belladonna-opium (B&O SUPPOSITORY) 16 2-60 mg per suppository 1 suppository, 1 suppository, Rectal, Once, Karla Lou MD    calcium carbonate (TUMS) chewable tablet 500 mg, 500 mg, Oral, Daily PRN, Kylah Cruz MD, 500 mg at 11/10/17 2043    heparin (porcine) subcutaneous injection 5,000 Units, 5,000 Units, Subcutaneous, Q8H [facility], Katie Wilcox PA-C, 5,000 Units at 11/12/17 6614    hydrALAZINE (APRESOLINE) injection 10 mg, 10 mg, Intravenous, Q6H PRN, Florin Burch PA-C, 10 mg at 11/04/17 7724    HYDROmorphone (DILAUDID) 1 mg/mL injection 1 mg, 1 mg, Intravenous, Q2H PRN, Go Spar, 1 mg at 11/09/17 0155    influenza inactivated quadrivalent vaccine (FLULAVAL) IM injection 0 5 mL, 0 5 mL, Intramuscular, Prior to discharge, Carmela Parker MD    oxyCODONE (ROXICODONE) immediate release tablet 10 mg, 10 mg, Oral, Q4H PRN, Go Spar, 10 mg at 11/11/17 0046    oxyCODONE (ROXICODONE) IR tablet 5 mg, 5 mg, Oral, Q4H PRN, Salvador King MD, 5 mg at 11/11/17 2208    piperacillin-tazobactam (ZOSYN) 3 375 g in dextrose 5 % 100 mL IVPB, 3 375 g, Intravenous, Q6H, Caroline Serrano MD, Last Rate: 200 mL/hr at 11/12/17 1131, 3 375 g at 11/12/17 1131    potassium chloride (K-DUR,KLOR-CON) CR tablet 10 mEq, 10 mEq, Oral, Once, Caroline Serrano MD    senna-docusate sodium (SENOKOT S) 8 6-50 mg per tablet 1 tablet, 1 tablet, Oral, BID, Jose Martin Sparrow PA-C, Stopped at 11/12/17 0135    tamsulosin [facility]) capsule 0 4 mg, 0 4 mg, Oral, Daily With Vickie Actis, DO, 0 4 mg at 11/11/17 1545    Laboratory Results:    Results from last 7 days  Lab Units 11/12/17  0536 11/12/17  0535 11/11/17  0557 11/11/17  0546 11/10/17  0529 11/09/17  0937 11/09/17  0503 11/08/17  1438 11/08/17  0509 11/07/17  0454  11/06/17  0514   WBC Thousand/uL  --  13 07*  --  13 41* 12 09* 15 12*  --   --  14 03* 10 67*  --  10 01   HEMOGLOBIN g/dL  --  8 8*  --  9 1* 8 5* 9 7*  --   --  9 5* 8 2*  --  9 1*   HEMATOCRIT %  --  26 1*  --  27 2* 26 3* 29 1*  --   --  28 6* 24 6*  --  26 5*   PLATELETS Thousands/uL  --  970*  --  908* 824* 663*  --   --  504* 359  --  277   SODIUM mmol/L 138  --  133*  --  141  --  136 136 136 141  < >  --    POTASSIUM mmol/L 3 9  --  4 3  --  4 5  --  4 7 4 9 4 7 4 5  < >  --    CHLORIDE mmol/L 104  --  102  --  107  --  104 104 102 106  < >  --    CO2 mmol/L 27  --  24  --  26  --  23 25 26 27  < >  --    BUN mg/dL 16  --  17  --  22  --  39* 34* 30* 22  < >  --    CREATININE mg/dL 1 27 --  1 27  --  1 53*  --  3 40* 3 02* 2 71* 1 86*  < >  --    CALCIUM mg/dL 8 7  --  8 1*  --  8 4  --  8 6 8 8 8 5 8 6  < >  --    MAGNESIUM mg/dL 2 1  --   --   --  2 3  --  2 4  --   --   --   --   --    GLUCOSE RANDOM mg/dL 106  --  115  --  94  --  120 108 111 106  < >  --    < > = values in this interval not displayed

## 2017-11-13 ENCOUNTER — APPOINTMENT (INPATIENT)
Dept: RADIOLOGY | Facility: HOSPITAL | Age: 42
DRG: 659 | End: 2017-11-13
Payer: COMMERCIAL

## 2017-11-13 LAB
ANION GAP SERPL CALCULATED.3IONS-SCNC: 7 MMOL/L (ref 4–13)
BASOPHILS # BLD AUTO: 0.03 THOUSANDS/ΜL (ref 0–0.1)
BASOPHILS NFR BLD AUTO: 0 % (ref 0–1)
BUN SERPL-MCNC: 14 MG/DL (ref 5–25)
C DIFF TOX GENS STL QL NAA+PROBE: NORMAL
CALCIUM SERPL-MCNC: 8.6 MG/DL (ref 8.3–10.1)
CHLORIDE SERPL-SCNC: 102 MMOL/L (ref 100–108)
CO2 SERPL-SCNC: 28 MMOL/L (ref 21–32)
CREAT SERPL-MCNC: 1.34 MG/DL (ref 0.6–1.3)
EOSINOPHIL # BLD AUTO: 0.68 THOUSAND/ΜL (ref 0–0.61)
EOSINOPHIL NFR BLD AUTO: 4 % (ref 0–6)
ERYTHROCYTE [DISTWIDTH] IN BLOOD BY AUTOMATED COUNT: 14 % (ref 11.6–15.1)
GFR SERPL CREATININE-BSD FRML MDRD: 65 ML/MIN/1.73SQ M
GLUCOSE SERPL-MCNC: 148 MG/DL (ref 65–140)
HCT VFR BLD AUTO: 24.9 % (ref 36.5–49.3)
HGB BLD-MCNC: 8.4 G/DL (ref 12–17)
LYMPHOCYTES # BLD AUTO: 2.19 THOUSANDS/ΜL (ref 0.6–4.47)
LYMPHOCYTES NFR BLD AUTO: 14 % (ref 14–44)
MCH RBC QN AUTO: 29.8 PG (ref 26.8–34.3)
MCHC RBC AUTO-ENTMCNC: 33.7 G/DL (ref 31.4–37.4)
MCV RBC AUTO: 88 FL (ref 82–98)
MONOCYTES # BLD AUTO: 1.5 THOUSAND/ΜL (ref 0.17–1.22)
MONOCYTES NFR BLD AUTO: 9 % (ref 4–12)
NEUTROPHILS # BLD AUTO: 11.64 THOUSANDS/ΜL (ref 1.85–7.62)
NEUTS SEG NFR BLD AUTO: 73 % (ref 43–75)
NRBC BLD AUTO-RTO: 0 /100 WBCS
PLATELET # BLD AUTO: 1084 THOUSANDS/UL (ref 149–390)
PMV BLD AUTO: 8.2 FL (ref 8.9–12.7)
POTASSIUM SERPL-SCNC: 4 MMOL/L (ref 3.5–5.3)
RBC # BLD AUTO: 2.82 MILLION/UL (ref 3.88–5.62)
SODIUM SERPL-SCNC: 137 MMOL/L (ref 136–145)
WBC # BLD AUTO: 16.14 THOUSAND/UL (ref 4.31–10.16)

## 2017-11-13 PROCEDURE — 85025 COMPLETE CBC W/AUTO DIFF WBC: CPT | Performed by: SURGERY

## 2017-11-13 PROCEDURE — BT111ZZ FLUOROSCOPY OF RIGHT KIDNEY USING LOW OSMOLAR CONTRAST: ICD-10-PCS | Performed by: RADIOLOGY

## 2017-11-13 PROCEDURE — 80048 BASIC METABOLIC PNL TOTAL CA: CPT | Performed by: SURGERY

## 2017-11-13 PROCEDURE — 50431 NJX PX NFROSGRM &/URTRGRM: CPT

## 2017-11-13 RX ORDER — OXYBUTYNIN CHLORIDE 5 MG/1
5 TABLET ORAL 3 TIMES DAILY
Status: DISCONTINUED | OUTPATIENT
Start: 2017-11-13 | End: 2017-11-25

## 2017-11-13 RX ADMIN — AMLODIPINE BESYLATE 5 MG: 5 TABLET ORAL at 08:21

## 2017-11-13 RX ADMIN — DEXTROSE, SODIUM CHLORIDE, AND POTASSIUM CHLORIDE 100 ML/HR: 5; .45; .15 INJECTION INTRAVENOUS at 05:14

## 2017-11-13 RX ADMIN — OXYBUTYNIN CHLORIDE 5 MG: 5 TABLET ORAL at 17:43

## 2017-11-13 RX ADMIN — DEXTROSE, SODIUM CHLORIDE, AND POTASSIUM CHLORIDE 100 ML/HR: 5; .45; .15 INJECTION INTRAVENOUS at 17:43

## 2017-11-13 RX ADMIN — PIPERACILLIN SODIUM AND TAZOBACTAM SODIUM 3.38 G: 36; 4.5 INJECTION, POWDER, FOR SOLUTION INTRAVENOUS at 14:34

## 2017-11-13 RX ADMIN — PIPERACILLIN SODIUM AND TAZOBACTAM SODIUM 3.38 G: 36; 4.5 INJECTION, POWDER, FOR SOLUTION INTRAVENOUS at 22:27

## 2017-11-13 RX ADMIN — HEPARIN SODIUM 5000 UNITS: 5000 INJECTION, SOLUTION INTRAVENOUS; SUBCUTANEOUS at 14:34

## 2017-11-13 RX ADMIN — PIPERACILLIN SODIUM AND TAZOBACTAM SODIUM 3.38 G: 36; 4.5 INJECTION, POWDER, FOR SOLUTION INTRAVENOUS at 03:55

## 2017-11-13 RX ADMIN — OXYBUTYNIN CHLORIDE 5 MG: 5 TABLET ORAL at 22:27

## 2017-11-13 RX ADMIN — HEPARIN SODIUM 5000 UNITS: 5000 INJECTION, SOLUTION INTRAVENOUS; SUBCUTANEOUS at 22:27

## 2017-11-13 RX ADMIN — HEPARIN SODIUM 5000 UNITS: 5000 INJECTION, SOLUTION INTRAVENOUS; SUBCUTANEOUS at 05:14

## 2017-11-13 RX ADMIN — PIPERACILLIN SODIUM AND TAZOBACTAM SODIUM 3.38 G: 36; 4.5 INJECTION, POWDER, FOR SOLUTION INTRAVENOUS at 08:22

## 2017-11-13 RX ADMIN — IOHEXOL 20 ML: 300 INJECTION, SOLUTION INTRAVENOUS at 15:52

## 2017-11-13 RX ADMIN — ACETAMINOPHEN 975 MG: 325 TABLET ORAL at 22:41

## 2017-11-13 RX ADMIN — ACETAMINOPHEN 975 MG: 325 TABLET ORAL at 14:34

## 2017-11-13 RX ADMIN — ACETAMINOPHEN 975 MG: 325 TABLET ORAL at 05:18

## 2017-11-13 RX ADMIN — OXYCODONE HYDROCHLORIDE 10 MG: 10 TABLET ORAL at 08:21

## 2017-11-13 RX ADMIN — TAMSULOSIN HYDROCHLORIDE 0.4 MG: 0.4 CAPSULE ORAL at 17:42

## 2017-11-13 NOTE — PROGRESS NOTES
Progress Note - Nephrology   Nancy Okeefe 43 y o  male MRN: [id number]  Unit/Bed#: [facility] 916-01 Encounter: [id number]    ASSESSMENT AND PLAN:  55-year-old male with a history of chronic bilateral hydronephrosis who presented with left flank pain  He was found to have left renal injury and had an ex lap with a left nephrectomy due to left kidney rupture; also found to have right-sided hydronephrosis being managed with a Thakur catheter  We are seeing for acute kidney injury on top of chronic kidney disease:    1  CKD stage 3:  Baseline creatinine ranges 1 1-1 8 mg/dL  2   WOLF:  Peak creatinine was 3 4 mg/dL but improving nicely  Slightly higher today 1 34 mg/dL  His creatinine may settle out to this range or slightly higher status post nephrectomy  3   Electrolytes are acceptable  4  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  Recheck phosphorus and magnesium in a m  5   Anemia:  Secondary blood loss  Check iron studies  Transfuse as needed for hemoglobin less than 7 per primary team   6   Hypertension:  Blood pressure slightly elevated but in the setting of pain  If persists high consider adjusting antihypertensive regimen  Subjective: The patient overall is improved  Still some abdominal discomfort  Only shortness of breath when he gets bladder spasms  No chest pain  No nausea vomiting but ongoing diarrhea  Thakur catheter in place  Objective:     Vitals: Blood pressure 149/69, pulse 82, temperature 98 8 °F (37 1 °C), temperature source Oral, resp  rate 16, height 5' 6" (1 676 m), weight 89 2 kg (196 lb 10 4 oz), SpO2 98 %  ,Body mass index is 31 74 kg/m²  Weight (last 2 days)     None            Intake/Output Summary (Last 24 hours) at 11/13/17 1103  Last data filed at 11/13/17 0649   Gross per 24 hour   Intake          1548 34 ml   Output             2225 ml   Net          -676 66 ml       Urethral Catheter Coude 16 Fr   (Active)   Reasons to continue Urinary Catheter  Post-operative urological requirements 11/12/2017 11:48 AM   Uretheral Catheter No longer needed- Will place order to discontinue 11/3/2017  6:39 AM   Site Assessment Clean;Skin intact 11/13/2017  8:30 AM   Collection Container Standard drainage bag 11/13/2017  8:30 AM   Securement Method Securing device (Describe) 11/13/2017  8:30 AM   Output (mL) 350 mL 11/13/2017  6:49 AM       Physical Exam: General:  Lying in a chair and mild amount of pain  Skin:  No acute rash  Eyes:  No scleral icterus  ENT:  Slightly dry mucous membranes  Neck:  Supple, no jugular venous distention  Chest:  Clear to auscultation  CVS:  No rubs or gallops appreciated  Abdomen:  Soft and nontender with normal bowel sounds  Extremities:  No significant edema  Neuro:  Grossly intact  Psych:  Alert and oriented                Medications:    Scheduled Meds:  acetaminophen 975 mg Oral Q8H   amLODIPine 5 mg Oral Daily   heparin (porcine) 5,000 Units Subcutaneous Q8H [address]   piperacillin-tazobactam 3 375 g Intravenous Q6H   senna-docusate sodium 1 tablet Oral BID   tamsulosin 0 4 mg Oral Daily With Dinner       PRN Meds: belladonna-opium    calcium carbonate    hydrALAZINE    HYDROmorphone    influenza vaccine    oxyCODONE    oxyCODONE    Continuous Infusions:  dextrose 5 % and sodium chloride 0 45 % with KCl 20 mEq/L 100 mL/hr Last Rate: 100 mL/hr (11/13/17 0514)       Lab, Imaging and other studies: I have personally reviewed pertinent labs    Laboratory Results:    Results from last 7 days  Lab Units 11/13/17  0506 11/12/17  0536 11/12/17  0535 11/11/17  0557 11/11/17  0546 11/10/17  0529 11/09/17  0937 11/09/17  0503 11/08/17  1438 11/08/17  0509 11/07/17  0454   WBC Thousand/uL 16 14*  --  13 07*  --  13 41* 12 09* 15 12*  --   --  14 03* 10 67*   HEMOGLOBIN g/dL 8 4*  --  8 8*  --  9 1* 8 5* 9 7*  --   --  9 5* 8 2*   HEMATOCRIT % 24 9*  --  26 1*  --  27 2* 26 3* 29 1*  --   --  28 6* 24 6*   PLATELETS Thousands/uL 1,084*  --  970*  --  908* 824* 663* --   --  504* 359   SODIUM mmol/L 137 138  --  133*  --  141  --  136 136 136 141   POTASSIUM mmol/L 4 0 3 9  --  4 3  --  4 5  --  4 7 4 9 4 7 4 5   CHLORIDE mmol/L 102 104  --  102  --  107  --  104 104 102 106   CO2 mmol/L 28 27  --  24  --  26  --  23 25 26 27   BUN mg/dL 14 16  --  17  --  22  --  39* 34* 30* 22   CREATININE mg/dL 1 34* 1 27  --  1 27  --  1 53*  --  3 40* 3 02* 2 71* 1 86*   CALCIUM mg/dL 8 6 8 7  --  8 1*  --  8 4  --  8 6 8 8 8 5 8 6   MAGNESIUM mg/dL  --  2 1  --   --   --  2 3  --  2 4  --   --   --    GLUCOSE RANDOM mg/dL 148* 106  --  115  --  94  --  120 108 111 106     Urinalysis: Lab Results   Component Value Date    COLORU Yellow 11/11/2017    COLORU Jennifer 04/23/2015    CLARITYU Turbid 11/11/2017    CLARITYU Clear 04/23/2015    SPECGRAV 1 018 11/11/2017    SPECGRAV 1 010 04/23/2015    PHUR 6 0 11/11/2017    PHUR 7 0 04/23/2015    LEUKOCYTESUR Small (A) 11/11/2017    LEUKOCYTESUR Large (A) 04/23/2015    NITRITE Negative 11/11/2017    NITRITE Negative 04/23/2015    PROTEINUA 30 (1+) (A) 11/11/2017    PROTEINUA 30 (1+) (A) 04/23/2015    GLUCOSEU Negative 11/11/2017    GLUCOSEU Negative 04/23/2015    KETONESU Negative 11/11/2017    KETONESU Negative 04/23/2015    BILIRUBINUR Negative 11/11/2017    BILIRUBINUR Negative 04/23/2015    BLOODU Large (A) 11/11/2017    BLOODU Large (A) 04/23/2015     ABGs: No results found for: [facility]  Radiology review:     Portions of the record may have been created with voice recognition software   Occasional wrong word or "sound a like" substitutions may have occurred due to the inherent limitations of voice recognition software   Read the chart carefully and recognize, using context, where substitutions have occurred

## 2017-11-13 NOTE — MEDICAL STUDENT
Progress Note - General Surgery   Teresa Stephen 43 y o  male MRN: [id number]  Unit/Bed#: [facility] 916-01 Encounter: [id number]    Assessment:  44 yo male POD#12 s/p ex-lap with left nephrectomy and abthera placement on 11/1; POD#11 s/p ex-lap, removal of packing, ligation of left ureter, and abdominal closure on 11/2; and s/p right PCN with intraabdominal ascites samples on 11/9  Plan:  -continue regular diet  -right anterograde nephrostogram this week per urology  -continue jiménez  -f/u C diff    Subjective/Objective     Subjective: Patient reports mild pain overnight, with intermittent lower abdominal/bladder "spasm" causing increased pain and followed by watery diarrhea  Objective:     Blood pressure 122/80, pulse 82, temperature 98 3 °F (36 8 °C), temperature source Oral, resp  rate 20, height 5' 6" (1 676 m), weight 89 2 kg (196 lb 10 4 oz), SpO2 100 %  ,Body mass index is 31 74 kg/m²        Intake/Output Summary (Last 24 hours) at 11/13/17 9669  Last data filed at 11/13/17 0542   Gross per 24 hour   Intake          2028 34 ml   Output             2675 ml   Net          -646 66 ml       Invasive Devices     Peripheral Intravenous Line            Peripheral IV 11/09/17 Right Hand 3 days          Drain            Urethral Catheter Coude 16 Fr  11 days    Nephrostomy Right 1 10 2 Fr  3 days                Physical Exam: General appearance: alert, appears stated age and cooperative  Lungs: normal respiratory effort  Abdomen: incision clean, dry, intact; mild tenderness to palpation on the left  Neurologic: Grossly normal    Lab, Imaging and other studies:  Lab Results   Component Value Date    WBC 13 07 (H) 11/12/2017    HGB 8 8 (L) 11/12/2017    HCT 26 1 (L) 11/12/2017    MCV 88 11/12/2017     (H) 11/12/2017     Lab Results   Component Value Date    GLUCOSE 148 (H) 11/13/2017    CALCIUM 8 6 11/13/2017     11/13/2017    K 4 0 11/13/2017    CO2 28 11/13/2017     11/13/2017    BUN 14 11/13/2017 CREATININE 1 34 (H) 11/13/2017       Genoveva Valiente, MS-3

## 2017-11-13 NOTE — PROGRESS NOTES
Progress Note - General Surgery   Nisa Tay 43 y o  male MRN: [id number]  Unit/Bed#: [facility] 916-01 Encounter: [id number]    Assessment:  43 M with left kidney rupture, s/p ex-lap left nephrectomy, open abdomen, second look, ligation of left ureter, abdominal closure, Right PCN, with urinary ascites    Plan:  Right nephrostogram and possible cysto, will discuss with teams regarding timing  Continue regular diet, IVF  Continue jiménez, PCN  Pain control  F/u C  Diff cx    Subjective/Objective     Subjective: No acute events  Complaining of some pain  Tolerating diet  Still with watery bowel movements  Objective:    Blood pressure 122/80, pulse 82, temperature 98 3 °F (36 8 °C), temperature source Oral, resp  rate 20, height 5' 6" (1 676 m), weight 89 2 kg (196 lb 10 4 oz), SpO2 100 %  ,Body mass index is 31 74 kg/m²  Intake/Output Summary (Last 24 hours) at 11/13/17 0616  Last data filed at 11/13/17 0542   Gross per 24 hour   Intake          2028 34 ml   Output             2675 ml   Net          -646 66 ml       Invasive Devices     Peripheral Intravenous Line            Peripheral IV 11/09/17 Right Hand 3 days          Drain            Urethral Catheter Coude 16 Fr  11 days    Nephrostomy Right 1 10 2 Fr  3 days                Physical Exam:   General: NAD, AAOx3  CV: RRR +S1/S2  Chest: breath sounds bilaterally  Abdomen: soft, mildly tender, mildly distended   Incision c/d/i, right PCN in place with clear urine, jiménez with blood-tinged urine  Extremities: atraumatic, no edema        Results from last 7 days  Lab Units 11/12/17  0535 11/11/17  0546 11/10/17  0529   WBC Thousand/uL 13 07* 13 41* 12 09*   HEMOGLOBIN g/dL 8 8* 9 1* 8 5*   HEMATOCRIT % 26 1* 27 2* 26 3*   PLATELETS Thousands/uL 970* 908* 824*       Results from last 7 days  Lab Units 11/13/17  0506 11/12/17  0536 11/11/17  0557   SODIUM mmol/L 137 138 133*   POTASSIUM mmol/L 4 0 3 9 4 3   CHLORIDE mmol/L 102 104 102   CO2 mmol/L 28 27 24   BUN mg/dL 14 16 17   CREATININE mg/dL 1 34* 1 27 1 27   GLUCOSE RANDOM mg/dL 148* 106 115   CALCIUM mg/dL 8 6 8 7 8 1*       Results from last 7 days  Lab Units 11/09/17  0937   INR  1 10

## 2017-11-13 NOTE — PHYSICAL THERAPY NOTE
Physical Therapy Cancellation Note      Pt off the  Unit in IR     Will cont to see as able       Rolanda Fortune, PTA

## 2017-11-13 NOTE — SOCIAL WORK
Cm reviewed patient during care coordination rounds  Patient is not medically stable for d/c today  When medically stable patient is to return to [facility]

## 2017-11-14 ENCOUNTER — APPOINTMENT (INPATIENT)
Dept: RADIOLOGY | Facility: HOSPITAL | Age: 42
DRG: 659 | End: 2017-11-14
Payer: COMMERCIAL

## 2017-11-14 LAB
ANION GAP SERPL CALCULATED.3IONS-SCNC: 5 MMOL/L (ref 4–13)
BASOPHILS # BLD AUTO: 0.02 THOUSANDS/ΜL (ref 0–0.1)
BASOPHILS NFR BLD AUTO: 0 % (ref 0–1)
BUN SERPL-MCNC: 12 MG/DL (ref 5–25)
CALCIUM SERPL-MCNC: 8.9 MG/DL (ref 8.3–10.1)
CHLORIDE SERPL-SCNC: 103 MMOL/L (ref 100–108)
CO2 SERPL-SCNC: 27 MMOL/L (ref 21–32)
CREAT SERPL-MCNC: 1.26 MG/DL (ref 0.6–1.3)
EOSINOPHIL # BLD AUTO: 0.61 THOUSAND/ΜL (ref 0–0.61)
EOSINOPHIL NFR BLD AUTO: 4 % (ref 0–6)
ERYTHROCYTE [DISTWIDTH] IN BLOOD BY AUTOMATED COUNT: 14.1 % (ref 11.6–15.1)
GFR SERPL CREATININE-BSD FRML MDRD: 70 ML/MIN/1.73SQ M
GLUCOSE SERPL-MCNC: 117 MG/DL (ref 65–140)
HCT VFR BLD AUTO: 26 % (ref 36.5–49.3)
HGB BLD-MCNC: 8.6 G/DL (ref 12–17)
LYMPHOCYTES # BLD AUTO: 1.95 THOUSANDS/ΜL (ref 0.6–4.47)
LYMPHOCYTES NFR BLD AUTO: 14 % (ref 14–44)
MAGNESIUM SERPL-MCNC: 2.2 MG/DL (ref 1.6–2.6)
MCH RBC QN AUTO: 29.3 PG (ref 26.8–34.3)
MCHC RBC AUTO-ENTMCNC: 33.1 G/DL (ref 31.4–37.4)
MCV RBC AUTO: 88 FL (ref 82–98)
MONOCYTES # BLD AUTO: 1.11 THOUSAND/ΜL (ref 0.17–1.22)
MONOCYTES NFR BLD AUTO: 8 % (ref 4–12)
NEUTROPHILS # BLD AUTO: 10.26 THOUSANDS/ΜL (ref 1.85–7.62)
NEUTS SEG NFR BLD AUTO: 74 % (ref 43–75)
NRBC BLD AUTO-RTO: 0 /100 WBCS
PHOSPHATE SERPL-MCNC: 3.6 MG/DL (ref 2.7–4.5)
PLATELET # BLD AUTO: 1087 THOUSANDS/UL (ref 149–390)
PMV BLD AUTO: 7.9 FL (ref 8.9–12.7)
POTASSIUM SERPL-SCNC: 4.4 MMOL/L (ref 3.5–5.3)
RBC # BLD AUTO: 2.94 MILLION/UL (ref 3.88–5.62)
SODIUM SERPL-SCNC: 135 MMOL/L (ref 136–145)
WBC # BLD AUTO: 14.05 THOUSAND/UL (ref 4.31–10.16)

## 2017-11-14 PROCEDURE — 84100 ASSAY OF PHOSPHORUS: CPT | Performed by: INTERNAL MEDICINE

## 2017-11-14 PROCEDURE — 97116 GAIT TRAINING THERAPY: CPT

## 2017-11-14 PROCEDURE — 83735 ASSAY OF MAGNESIUM: CPT | Performed by: INTERNAL MEDICINE

## 2017-11-14 PROCEDURE — 74430 CONTRAST X-RAY BLADDER: CPT

## 2017-11-14 PROCEDURE — 85025 COMPLETE CBC W/AUTO DIFF WBC: CPT | Performed by: STUDENT IN AN ORGANIZED HEALTH CARE EDUCATION/TRAINING PROGRAM

## 2017-11-14 PROCEDURE — 80048 BASIC METABOLIC PNL TOTAL CA: CPT | Performed by: STUDENT IN AN ORGANIZED HEALTH CARE EDUCATION/TRAINING PROGRAM

## 2017-11-14 RX ORDER — DEXTROSE AND SODIUM CHLORIDE 5; .9 G/100ML; G/100ML
50 INJECTION, SOLUTION INTRAVENOUS CONTINUOUS
Status: DISCONTINUED | OUTPATIENT
Start: 2017-11-14 | End: 2017-11-16

## 2017-11-14 RX ADMIN — PIPERACILLIN SODIUM AND TAZOBACTAM SODIUM 3.38 G: 36; 4.5 INJECTION, POWDER, FOR SOLUTION INTRAVENOUS at 09:03

## 2017-11-14 RX ADMIN — DEXTROSE AND SODIUM CHLORIDE 75 ML/HR: 5; 900 INJECTION, SOLUTION INTRAVENOUS at 14:36

## 2017-11-14 RX ADMIN — ACETAMINOPHEN 975 MG: 325 TABLET ORAL at 09:04

## 2017-11-14 RX ADMIN — PIPERACILLIN SODIUM AND TAZOBACTAM SODIUM 3.38 G: 36; 4.5 INJECTION, POWDER, FOR SOLUTION INTRAVENOUS at 21:12

## 2017-11-14 RX ADMIN — DEXTROSE, SODIUM CHLORIDE, AND POTASSIUM CHLORIDE 100 ML/HR: 5; .45; .15 INJECTION INTRAVENOUS at 03:25

## 2017-11-14 RX ADMIN — HEPARIN SODIUM 5000 UNITS: 5000 INJECTION, SOLUTION INTRAVENOUS; SUBCUTANEOUS at 06:05

## 2017-11-14 RX ADMIN — AMLODIPINE BESYLATE 5 MG: 5 TABLET ORAL at 09:04

## 2017-11-14 RX ADMIN — OXYBUTYNIN CHLORIDE 5 MG: 5 TABLET ORAL at 09:04

## 2017-11-14 RX ADMIN — PIPERACILLIN SODIUM AND TAZOBACTAM SODIUM 3.38 G: 36; 4.5 INJECTION, POWDER, FOR SOLUTION INTRAVENOUS at 04:05

## 2017-11-14 RX ADMIN — TAMSULOSIN HYDROCHLORIDE 0.4 MG: 0.4 CAPSULE ORAL at 16:04

## 2017-11-14 RX ADMIN — Medication 1 TABLET: at 09:04

## 2017-11-14 RX ADMIN — PIPERACILLIN SODIUM AND TAZOBACTAM SODIUM 3.38 G: 36; 4.5 INJECTION, POWDER, FOR SOLUTION INTRAVENOUS at 16:05

## 2017-11-14 RX ADMIN — OXYBUTYNIN CHLORIDE 5 MG: 5 TABLET ORAL at 21:12

## 2017-11-14 RX ADMIN — HEPARIN SODIUM 5000 UNITS: 5000 INJECTION, SOLUTION INTRAVENOUS; SUBCUTANEOUS at 21:12

## 2017-11-14 RX ADMIN — HEPARIN SODIUM 5000 UNITS: 5000 INJECTION, SOLUTION INTRAVENOUS; SUBCUTANEOUS at 13:42

## 2017-11-14 RX ADMIN — ACETAMINOPHEN 975 MG: 325 TABLET ORAL at 23:59

## 2017-11-14 RX ADMIN — OXYBUTYNIN CHLORIDE 5 MG: 5 TABLET ORAL at 16:04

## 2017-11-14 RX ADMIN — IOTHALAMATE MEGLUMINE 150 ML: 172 INJECTION URETERAL at 11:15

## 2017-11-14 RX ADMIN — OXYCODONE HYDROCHLORIDE 10 MG: 10 TABLET ORAL at 09:04

## 2017-11-14 NOTE — CASE MANAGEMENT
Continued Stay Review    Date: 11/11    Vital Signs: Blood pressure 146/76, pulse 99, temperature 99 7 °F (37 6 °C), temperature source Oral, resp  rate 18, height 5' 6" (1 676 m), weight 89 2 kg (196 lb 10 4 oz), SpO2 97 %  ,Body mass index is 31 74 kg/m²  Medications:   Scheduled Meds:   acetaminophen 975 mg Oral Q8H   amLODIPine 5 mg Oral Daily   heparin (porcine) 5,000 Units Subcutaneous Q8H [facility]   oxybutynin 5 mg Oral TID   piperacillin-tazobactam 3 375 g Intravenous Q6H   senna-docusate sodium 1 tablet Oral BID   tamsulosin 0 4 mg Oral Daily With Dinner     Continuous Infusions:   dextrose 5 % and sodium chloride 0 9 % 75 mL/hr Last Rate: 75 mL/hr (11/14/17 1436)     PRN Meds: belladonna-opium    calcium carbonate    hydrALAZINE    HYDROmorphone    influenza vaccine    oxyCODONE    Abnormal Labs/Diagnostic Results:   CXR - Left lower lobe consolidation representing atelectasis or pneumonia  11/11/17 0546     WBC 4 31 - 10 16 Thousand/uL 13 41     RBC 3 88 - 5 62 Million/uL 3 10     Hemoglobin 12 0 - 17 0 g/dL 9 1     Hematocrit 36 5 - 49 3 % 27 2     MCV 82 - 98 fL 88    MCH 26 8 - 34 3 pg 29 4    MCHC 31 4 - 37 4 g/dL 33 5    RDW 11 6 - 15 1 % 13 9    MPV 8 9 - 12 7 fL 8 4     Platelets 998 - 340 Thousands/uL 908       Age/Sex: 43 y o  male     Assessment/Plan:   Assessment:  42 yo M w/ spontaneous left kidney rupture s/p ex lap, left nephrectomy, abthera 11/1 w/ ureter ligation and abdominal closure 11/2      Plan:  - regular diet  - continue antibiotics  - monitor fevers  - WOLF improving  - appreciate urology and nephrology recs  - maintain jiménez and perc nephrostomy    ----------------------------------------------------------------------------------------------------    11/11 Urology progress notes:  Status post emergent nephrectomy secondary to trauma  Abdominal ascites noted  Possibly secondary to vesicoureteral reflux  Paracentesis was performed and fluid was consistent with urine    A right nephrostomy tube was placed as the patient appeared to have hydronephrosis  This again may be secondary to vesicoureteral reflux  His creatinine is now normal   I would recommend maintaining the Thakur catheter to gravity drainage at this time as the patient may be refluxing up his left ureteral stump contributing to the urinary ascites  Recommend right antegrade nephrostogram in interventional Radiology next week  I would also consider performing a cystogram through his existing Thakur catheter to see if there is reflux up the left ureter with intra-abdominal extravasation     ------------------------------------------------------------------------------------------------  11/11 Nephrology progress notes:  ASSESSMENT AND PLAN:  Patient is a 49-year-old male with previous history of chronic bilateral hydronephrosis, initially presented from OSH due to left flank pain  He eventually he was found to have left renal injury and had ex lap with left nephrectomy due to left kidney rupture, and he was also found to have right-sided hydronephrosis was being managed with Thakur catheter   We are consulted for WOLF management      1  Nonoliguric WOLF, baseline serum creatinine 1 1 in 2016  - initial admission creatinine 3 0 significantly improved to 1 8 and then again significantly worsened at 3 4 which is improving to 1 5 today  - WOLF in the setting of obstructive uropathy in a solitary kidney after left nephrectomy this admission, (previous WOLF on admission likely secondary to bilateral hydronephrosis, severe anemia, hypotension)  - renal function improving, serum creatinine today 1 27    - monitor serial labs, ins and outs, avoid nephrotoxic      2  Moderate right-sided hydronephrosis and hydroureter on CT scan, s/p right-sided percutaneous nephrostomy on 11/9/17  - hydronephrosis thought to be likely related to dysfunctional voiding/reflux/outlet obstruction    Urology as well as surgery on board, plan for a right antegrade nephrostogram by IR next week due to urinary ascites, as per Urology note suspected secondary to reflux up his left ureteral stump     3  Leukocytosis in the setting of fever, rule out underlying sepsis, antibiotic management as per primary team  -blood culture from November 1st negative  -urine culture from November 9 no growth       4    Anemia likely secondary to blood loss  - closely monitor hemoglobin, transfuse p r n  for hemoglobin less than seven    Discharge Plan: Return to [facility]

## 2017-11-14 NOTE — MEDICAL STUDENT
Progress Note - General Surgery   Teresa Stephen 43 y o  male MRN: [id number]  Unit/Bed#: [facility] 916-01 Encounter: [id number]    Assessment:  42 yo male POD#13 s/p ex-lap with left nephrectomy and abthera placement on 11/1; POD#11 s/p ex-lap, removal of packing, ligation of left ureter, and abdominal closure on 11/2; and s/p right PCN with intraabdominal ascites samples on 11/9  Plan:  -continue regular diet  -cystogram this week per urology  -continue jiménez per urology      Subjective/Objective     Subjective: Patient reports only mild pain overnight  He continues to have intermittent bladder/lower abdominal "spasms", but reports that his watery diarrhea has resolved and his stool is more formed today  He denies vomiting but reports mild nausea after eating  Objective:    Blood pressure 143/81, pulse 62, temperature 99 °F (37 2 °C), temperature source Oral, resp  rate 18, height 5' 6" (1 676 m), weight 89 2 kg (196 lb 10 4 oz), SpO2 96 %  ,Body mass index is 31 74 kg/m²        Intake/Output Summary (Last 24 hours) at 11/14/17 0543  Last data filed at 11/14/17 0435   Gross per 24 hour   Intake              490 ml   Output             3350 ml   Net            -2860 ml       Invasive Devices     Peripheral Intravenous Line            Peripheral IV 11/13/17 Left Arm less than 1 day          Drain            Urethral Catheter Coude 16 Fr  12 days    Nephrostomy Right 1 10 2 Fr  4 days                Physical Exam: General appearance: alert, appears stated age and cooperative  Lungs: clear to auscultation bilaterally  Heart: regular rate and rhythm  Abdomen: incision clean, dry, intact, with no surrounding erythema or warmth; mild tenderness to palpation surrounding incision, but otherwise non-tender  Neurologic: Grossly normal    Lab, Imaging and other studies:  Lab Results   Component Value Date    WBC 16 14 (H) 11/13/2017    HGB 8 4 (L) 11/13/2017    HCT 24 9 (L) 11/13/2017    MCV 88 11/13/2017    PLT 1,084 ([place]) 11/13/2017     Lab Results   Component Value Date    GLUCOSE 148 (H) 11/13/2017    CALCIUM 8 6 11/13/2017     11/13/2017    K 4 0 11/13/2017    CO2 28 11/13/2017     11/13/2017    BUN 14 11/13/2017    CREATININE 1 34 (H) 11/13/2017

## 2017-11-14 NOTE — PROGRESS NOTES
Progress Note - General Surgery   Abby Serrato 43 y o  male MRN: [id number]  Unit/Bed#: [facility] 916-01 Encounter: [id number]    Assessment:  43 M with left kidney rupture, s/p ex-lap left nephrectomy, open abdomen, second look, ligation of left ureter, abdominal closure, right PCN, urinary leak, nephrostomy check  Plan:  Regular diet  Nephrostogram done yesterday  Pain control  C  Diff negative  Continue jiménez/PCN    Subjective/Objective     Subjective: No acute events  Bladder spasms improved, none overnight and once this morning  Tolerating diet and moving bowels  Objective:    Blood pressure 143/81, pulse 62, temperature 99 °F (37 2 °C), temperature source Oral, resp  rate 18, height 5' 6" (1 676 m), weight 89 2 kg (196 lb 10 4 oz), SpO2 96 %  ,Body mass index is 31 74 kg/m²  Intake/Output Summary (Last 24 hours) at 11/14/17 0548  Last data filed at 11/14/17 0435   Gross per 24 hour   Intake              490 ml   Output             3350 ml   Net            -2860 ml       Invasive Devices     Peripheral Intravenous Line            Peripheral IV 11/13/17 Left Arm less than 1 day          Drain            Urethral Catheter Coude 16 Fr  12 days    Nephrostomy Right 1 10 2 Fr  4 days                Physical Exam:   General: NAD, AAOx3  CV: RRR +S1/S2  Chest: breath sounds bilaterally  Abdomen: soft, mildly tender, mild distention, Right PCN with clear urine   Jiménez in place with blood-tinged urine  Extremities: atraumatic, no edema        Results from last 7 days  Lab Units 11/13/17  0506 11/12/17  0535 11/11/17  0546   WBC Thousand/uL 16 14* 13 07* 13 41*   HEMOGLOBIN g/dL 8 4* 8 8* 9 1*   HEMATOCRIT % 24 9* 26 1* 27 2*   PLATELETS Thousands/uL 1,084* 970* 908*       Results from last 7 days  Lab Units 11/13/17  0506 11/12/17  0536 11/11/17  0557   SODIUM mmol/L 137 138 133*   POTASSIUM mmol/L 4 0 3 9 4 3   CHLORIDE mmol/L 102 104 102   CO2 mmol/L 28 27 24   BUN mg/dL 14 16 17   CREATININE mg/dL 1 34* 1 27 1 27 GLUCOSE RANDOM mg/dL 148* 106 115   CALCIUM mg/dL 8 6 8 7 8 1*       Results from last 7 days  Lab Units 11/09/17  0937   INR  1 10

## 2017-11-14 NOTE — PHYSICAL THERAPY NOTE
Physical Therapy Progress Note     11/14/17 1410   Pain Assessment   Pain Assessment 0-10   Pain Score 3   Pain Type Acute pain   Pain Location Abdomen   Pain Orientation Bilateral   Hospital Pain Intervention(s) Ambulation/increased activity   Response to Interventions tolerated    Restrictions/Precautions   Weight Bearing Precautions Per Order No   Other Precautions Multiple lines;Pain   General   Chart Reviewed Yes   Response to Previous Treatment Patient with no complaints from previous session  Family/Caregiver Present No   Cognition   Overall Cognitive Status WFL   Arousal/Participation Alert; Cooperative   Orientation Level Oriented X4   Following Commands Follows all commands and directions without difficulty   Subjective   Subjective wants to walk    Bed Mobility   Supine to Sit Unable to assess  (received pt sittingin   [facility])   Transfers   Sit to Stand 4  Minimal assistance   Additional items Assist x 1; Increased time required;Verbal cues   Stand to Sit 5  Supervision   Additional items Assist x 1;Verbal cues   Ambulation/Elevation   Gait pattern Decreased foot clearance; Short stride   Gait Assistance 4  Minimal assist   Additional items Assist x 1;Verbal cues   Assistive Device Rolling walker   Distance [id number]   Balance   Static Sitting Fair +   Static Standing Fair   Ambulatory Fair -   Endurance Deficit   Endurance Deficit Yes   Endurance Deficit Description pain   Activity Tolerance   Activity Tolerance Patient limited by pain   Nurse Made Aware yes   Exercises   Ankle Pumps Sitting;AROM; Bilateral;20 reps   Assessment   Prognosis Good   Problem List Decreased strength;Decreased endurance;Decreased mobility;Pain   Assessment pt is  able to complete   transfer  from [facility]    he did amb 100 ft x2 using  rw  c min A  gait is slow  c decreased foot clearance  but steady   he is hindered  by  op pain  and fatgiue   pt  was left in  [facility]      education  given  for need/benefit  of  frequent  mob   will cont to see    present for entire tx     Goals   Patient Goals none stated   STG Expiration Date 11/14/17   Treatment Day 2   Plan   Treatment/Interventions Functional transfer training;Gait training;Patient/family training;Spoke to nursing   Progress Progressing toward goals   PT Frequency (6xwk)   Recommendation   Recommendation (return to  correction facility )   Equipment Recommended Hai Sales PTA

## 2017-11-14 NOTE — PLAN OF CARE
Problem: PHYSICAL THERAPY ADULT  Goal: Performs mobility at highest level of function for planned discharge setting  See evaluation for individualized goals  Treatment/Interventions: Functional transfer training, LE strengthening/ROM, Therapeutic exercise, Endurance training, Equipment eval/education, Bed mobility, Gait training, Spoke to nursing, OT  Equipment Recommended: Octavio Edge (at this time)       See flowsheet documentation for full assessment, interventions and recommendations  Outcome: Progressing  Prognosis: Good  Problem List: Decreased strength, Decreased endurance, Decreased mobility, Pain  Assessment: pt is  able to complete   transfer  from [facility]    he did amb 100 ft x2 using  rw  c min A  gait is slow  c decreased foot clearance  but steady   he is hindered  by  op pain  and fatgiue   pt  was left in  [facility]      education  given  for need/benefit  of  frequent  mob   will cont to see    present for entire tx  Recommendation:  (return to  correction facility )     PT - OK to Discharge: No    See flowsheet documentation for full assessment

## 2017-11-15 ENCOUNTER — APPOINTMENT (INPATIENT)
Dept: RADIOLOGY | Facility: HOSPITAL | Age: 42
DRG: 659 | End: 2017-11-15
Attending: INTERNAL MEDICINE
Payer: COMMERCIAL

## 2017-11-15 LAB
ANION GAP SERPL CALCULATED.3IONS-SCNC: 6 MMOL/L (ref 4–13)
BUN SERPL-MCNC: 13 MG/DL (ref 5–25)
CALCIUM SERPL-MCNC: 8.6 MG/DL (ref 8.3–10.1)
CHLORIDE SERPL-SCNC: 107 MMOL/L (ref 100–108)
CO2 SERPL-SCNC: 26 MMOL/L (ref 21–32)
CREAT SERPL-MCNC: 1.39 MG/DL (ref 0.6–1.3)
ERYTHROCYTE [DISTWIDTH] IN BLOOD BY AUTOMATED COUNT: 14.5 % (ref 11.6–15.1)
FERRITIN SERPL-MCNC: 590 NG/ML (ref 8–388)
GFR SERPL CREATININE-BSD FRML MDRD: 62 ML/MIN/1.73SQ M
GLUCOSE SERPL-MCNC: 101 MG/DL (ref 65–140)
HCT VFR BLD AUTO: 26.3 % (ref 36.5–49.3)
HGB BLD-MCNC: 8.7 G/DL (ref 12–17)
IRON SATN MFR SERPL: 12 %
IRON SERPL-MCNC: 25 UG/DL (ref 65–175)
MCH RBC QN AUTO: 29.3 PG (ref 26.8–34.3)
MCHC RBC AUTO-ENTMCNC: 33.1 G/DL (ref 31.4–37.4)
MCV RBC AUTO: 89 FL (ref 82–98)
PLATELET # BLD AUTO: 1035 THOUSANDS/UL (ref 149–390)
PMV BLD AUTO: 7.8 FL (ref 8.9–12.7)
POTASSIUM SERPL-SCNC: 4.2 MMOL/L (ref 3.5–5.3)
RBC # BLD AUTO: 2.97 MILLION/UL (ref 3.88–5.62)
SODIUM SERPL-SCNC: 139 MMOL/L (ref 136–145)
TIBC SERPL-MCNC: 211 UG/DL (ref 250–450)
WBC # BLD AUTO: 12.93 THOUSAND/UL (ref 4.31–10.16)

## 2017-11-15 PROCEDURE — 85027 COMPLETE CBC AUTOMATED: CPT | Performed by: SURGERY

## 2017-11-15 PROCEDURE — 97116 GAIT TRAINING THERAPY: CPT | Performed by: PHYSICAL THERAPIST

## 2017-11-15 PROCEDURE — 82728 ASSAY OF FERRITIN: CPT | Performed by: INTERNAL MEDICINE

## 2017-11-15 PROCEDURE — 83550 IRON BINDING TEST: CPT | Performed by: INTERNAL MEDICINE

## 2017-11-15 PROCEDURE — G8978 MOBILITY CURRENT STATUS: HCPCS | Performed by: PHYSICAL THERAPIST

## 2017-11-15 PROCEDURE — G8979 MOBILITY GOAL STATUS: HCPCS | Performed by: PHYSICAL THERAPIST

## 2017-11-15 PROCEDURE — 83540 ASSAY OF IRON: CPT | Performed by: INTERNAL MEDICINE

## 2017-11-15 PROCEDURE — 80048 BASIC METABOLIC PNL TOTAL CA: CPT | Performed by: SURGERY

## 2017-11-15 PROCEDURE — 71020 HB CHEST X-RAY 2VW FRONTAL&LATL: CPT

## 2017-11-15 PROCEDURE — 97164 PT RE-EVAL EST PLAN CARE: CPT | Performed by: PHYSICAL THERAPIST

## 2017-11-15 RX ORDER — IRON POLYSACCHARIDE COMPLEX 150 MG
150 CAPSULE ORAL DAILY
Status: DISCONTINUED | OUTPATIENT
Start: 2017-11-15 | End: 2017-12-23 | Stop reason: HOSPADM

## 2017-11-15 RX ADMIN — ACETAMINOPHEN 975 MG: 325 TABLET ORAL at 09:59

## 2017-11-15 RX ADMIN — OXYBUTYNIN CHLORIDE 5 MG: 5 TABLET ORAL at 22:46

## 2017-11-15 RX ADMIN — OXYCODONE HYDROCHLORIDE 10 MG: 10 TABLET ORAL at 17:33

## 2017-11-15 RX ADMIN — HEPARIN SODIUM 5000 UNITS: 5000 INJECTION, SOLUTION INTRAVENOUS; SUBCUTANEOUS at 22:46

## 2017-11-15 RX ADMIN — ACETAMINOPHEN 975 MG: 325 TABLET ORAL at 23:47

## 2017-11-15 RX ADMIN — HEPARIN SODIUM 5000 UNITS: 5000 INJECTION, SOLUTION INTRAVENOUS; SUBCUTANEOUS at 14:04

## 2017-11-15 RX ADMIN — Medication 150 MG: at 14:04

## 2017-11-15 RX ADMIN — OXYBUTYNIN CHLORIDE 5 MG: 5 TABLET ORAL at 17:33

## 2017-11-15 RX ADMIN — HEPARIN SODIUM 5000 UNITS: 5000 INJECTION, SOLUTION INTRAVENOUS; SUBCUTANEOUS at 05:10

## 2017-11-15 RX ADMIN — DEXTROSE AND SODIUM CHLORIDE 50 ML/HR: 5; 900 INJECTION, SOLUTION INTRAVENOUS at 23:53

## 2017-11-15 RX ADMIN — AMLODIPINE BESYLATE 5 MG: 5 TABLET ORAL at 09:59

## 2017-11-15 RX ADMIN — TAMSULOSIN HYDROCHLORIDE 0.4 MG: 0.4 CAPSULE ORAL at 17:32

## 2017-11-15 RX ADMIN — PIPERACILLIN SODIUM AND TAZOBACTAM SODIUM 3.38 G: 36; 4.5 INJECTION, POWDER, FOR SOLUTION INTRAVENOUS at 03:44

## 2017-11-15 RX ADMIN — Medication 1 TABLET: at 17:32

## 2017-11-15 RX ADMIN — OXYBUTYNIN CHLORIDE 5 MG: 5 TABLET ORAL at 09:59

## 2017-11-15 RX ADMIN — ACETAMINOPHEN 975 MG: 325 TABLET ORAL at 17:32

## 2017-11-15 NOTE — MEDICAL STUDENT
Progress Note - General Surgery   Omi Cid 43 y o  male MRN: [id number]  Unit/Bed#: [facility] 916-01 Encounter: [id number]    Assessment:  42 yo male POD#14 s/p ex-lap with left nephrectomy and abthera placement on 11/1; POD#11 s/p ex-lap, removal of packing, ligation of left ureter, and abdominal closure on 11/2; and s/p right PCN with intraabdominal ascites samples on 11/9  Plan:  -continue trending creatinine  -cystogram shows extravasation into left nephrectomy bed, consider possible re-ligation of left ureter; appreciate urology recommendations  -continue jiménez per urology  -OOB, ambulating    Subjective/Objective     Subjective: Patient doing well overnight with only mild discomfort  He reports his bladder spasms have improved, and he has been having flatus and BMs  He continues to report nausea with meals, but denies vomiting  Objective:     Blood pressure 124/79, pulse 63, temperature 98 9 °F (37 2 °C), temperature source Oral, resp  rate 18, height 5' 6" (1 676 m), weight 89 2 kg (196 lb 10 4 oz), SpO2 98 %  ,Body mass index is 31 74 kg/m²        Intake/Output Summary (Last 24 hours) at 11/15/17 1151  Last data filed at 11/15/17 0344   Gross per 24 hour   Intake             1375 ml   Output             2675 ml   Net            -1300 ml       Invasive Devices     Peripheral Intravenous Line            Peripheral IV 11/13/17 Left Arm 1 day          Drain            Urethral Catheter Coude 16 Fr  13 days    Nephrostomy Right 1 10 2 Fr  5 days                Physical Exam: General appearance: alert, appears stated age and cooperative  Lungs: normal respiratory effort  Abdomen: incision clean, dry, intact; minimal tenderness to palpation; abdomen soft  Neurologic: Grossly normal    Lab, Imaging and other studies:  Lab Results   Component Value Date    WBC 12 93 (H) 11/15/2017    HGB 8 7 (L) 11/15/2017    HCT 26 3 (L) 11/15/2017    MCV 89 11/15/2017    PLT 1,035 ([facility]) 11/15/2017     Lab Results   Component Value Date    GLUCOSE 101 11/15/2017    CALCIUM 8 6 11/15/2017     11/15/2017    K 4 2 11/15/2017    CO2 26 11/15/2017     11/15/2017    BUN 13 11/15/2017    CREATININE 1 39 (H) 11/15/2017         Classie Heading, MS-3

## 2017-11-15 NOTE — PHYSICAL THERAPY NOTE
PHYSICAL THERAPY RE-EVALUATION NOTE          Patient Name: Angelito DIAS Date: 11/15/2017        11/15/17 1135   Note Type   Note type Re-eval   Pain Assessment   Pain Assessment 0-10   Pain Score 3   Pain Type Acute pain;Surgical pain   Pain Location Abdomen   Pain Orientation Bilateral   Restrictions/Precautions   Weight Bearing Precautions Per Order No   Other Precautions Multiple lines;Pain   General   Family/Caregiver Present No   Cognition   Overall Cognitive Status WFL   Arousal/Participation Alert   Attention Within functional limits   Orientation Level Oriented X4   RUE Assessment   RUE Assessment WFL   LUE Assessment   LUE Assessment WFL   RLE Assessment   RLE Assessment WFL   Strength RLE   RLE Overall Strength 4-/5   LLE Assessment   LLE Assessment WFL   Strength LLE   LLE Overall Strength 4-/5   Bed Mobility   Sit to Supine 5  Supervision   Additional items HOB elevated; Increased time required   Transfers   Sit to Stand 4  Minimal assistance   Additional items Assist x 1; Increased time required;Verbal cues   Stand to Sit 5  Supervision   Additional items Assist x 1; Increased time required;Verbal cues   Additional Comments Pt performed 30 reps of each of the following in sitting: ankle pumps, LAQ, marches, quad sets  Ambulation/Elevation   Gait pattern Forward Flexion;Decreased foot clearance; Short stride; Excessively slow   Gait Assistance 4  Minimal assist   Additional items Assist x 1;Verbal cues   Assistive Device Rolling walker   Distance 200ft   Balance   Static Sitting Good   Dynamic Sitting Fair +   Static Standing Fair +   Dynamic Standing Fair -   Ambulatory Fair -   Endurance Deficit   Endurance Deficit Yes   Endurance Deficit Description pain   Activity Tolerance   Activity Tolerance Patient limited by pain; Patient limited by fatigue   Nurse Made Aware Yes   Assessment   Prognosis Good   Problem List Decreased strength;Decreased endurance; Impaired balance;Decreased mobility;Pain   Assessment Pt is a 43 y o  Male with a history of bilateral hydronephrosis and urinary retention, spontaneous left kidney rupture s/p left nephrectomy, exp lap, with ureter ligation  Patient is now status post IR placed percutaneous nephrostomy of right solitary kidney  Pt continues with post-operative pain and guarding which is contributing to limitations in balance, endurance, functional mobility and activity tolerance  Pt will benefit from continued skilled PT services in order to address his remaining limitations and to restore maximal function  Anticipate pt will return back to prior facility provided attainment of mobility goals  Goals   Patient Goals none stated   STG Expiration Date 11/22/17   Short Term Goal #1 7-10 days  Pt will amb 2 x 150 ft w/ least restrictive assistive device PRN, mod (I)  Pt will achieve (I) level w/ bed mob  Pt will perform transfers w/ mod (I)  Pt will be (I) w/ HEP  Plan   Treatment/Interventions Functional transfer training;LE strengthening/ROM; Therapeutic exercise;Gait training;Bed mobility;Spoke to nursing   PT Frequency 5x/wk   Recommendation   Recommendation (Return to correction facility)   Equipment Recommended Walker   PT - OK to Discharge No   Additional Comments Not until medically cleared     Barthel Index   Feeding 10   Bathing 0   Grooming Score 5   Dressing Score 5   Bladder Score 0   Bowels Score 5   Toilet Use Score 5   Transfers (Bed/Chair) Score 5   Mobility (Level Surface) Score 10   Stairs Score 5   Barthel Index Score 50     Chai Ash, PT

## 2017-11-15 NOTE — PLAN OF CARE
Problem: PHYSICAL THERAPY ADULT  Goal: Performs mobility at highest level of function for planned discharge setting  See evaluation for individualized goals  Treatment/Interventions: Functional transfer training, LE strengthening/ROM, Therapeutic exercise, Endurance training, Equipment eval/education, Bed mobility, Gait training, Spoke to nursing, OT  Equipment Recommended: Alexandra Ovalle (at this time)       See flowsheet documentation for full assessment, interventions and recommendations  Outcome: Progressing  Prognosis: Good  Problem List: Decreased strength, Decreased endurance, Impaired balance, Decreased mobility, Pain  Assessment:  Pt is a 43 y o  Male with a history of bilateral hydronephrosis and urinary retention, spontaneous left kidney rupture s/p left nephrectomy, exp lap, with ureter ligation  Patient is now status post IR placed percutaneous nephrostomy of right solitary kidney  Pt continues with post-operative pain and guarding which is contributing to limitations in balance, endurance, functional mobility and activity tolerance  Pt will benefit from continued skilled PT services in order to address his remaining limitations and to restore maximal function  Anticipate pt will return back to prior facility provided attainment of mobility goals  Recommendation:  (Return to correction facility)     PT - OK to Discharge: No    See flowsheet documentation for full assessment

## 2017-11-15 NOTE — PROGRESS NOTES
Progress Note - Nephrology   Roque Dodd 43 y o  male MRN: [id number]  Unit/Bed#: [facility] 916-01 Encounter: [id number]    ASSESSMENT AND PLAN:  63-year-old male with a history of chronic bilateral hydronephrosis who presented with left flank pain   He was found to have left renal injury and had an ex lap with a left nephrectomy due to left kidney rupture; also found to have right-sided hydronephrosis being managed with a Thakur catheter   We are seeing for acute kidney injury on top of chronic kidney disease:     1   CKD stage 3:  Baseline creatinine ranges 1 1-1 8 mg/dL  2   WOLF:  Peak creatinine was 3 4 mg/dL  Creatinine is slightly higher 1 39 mg/dL but still in his baseline  Outstanding urine output  Watch for postobstructive diuresis  Patient currently with a Thakur catheter as well as right PCN  I will begin to decrease IV fluid rate  3   Electrolytes are acceptable  Borderline hyponatremia has resolved on isotonic saline  Potassium acceptable  4   Mineral bone disorder:  Acceptable  5   Anemia:  Secondary blood loss  Hemoglobin stable at 8 7  Iron saturation is low  Add oral iron   Transfuse as needed for hemoglobin less than 7 per primary team   6   Hypertension:  Blood pressure doing well  7   Abnormal lung exam:  Check chest x-ray  Possible either atelectasis or small left pleural effusion  Subjective:   Overall better today  No chest pain or shortness of breath  No significant nausea vomiting or diarrhea  Thakur catheter as well as right PCN in place  Objective:     Vitals: Blood pressure 120/74, pulse 64, temperature (!) 97 4 °F (36 3 °C), temperature source Oral, resp  rate 18, height 5' 6" (1 676 m), weight 89 2 kg (196 lb 10 4 oz), SpO2 97 %  ,Body mass index is 31 74 kg/m²      Weight (last 2 days)     None            Intake/Output Summary (Last 24 hours) at 11/15/17 1229  Last data filed at 11/15/17 1109   Gross per 24 hour   Intake             1615 ml   Output 2875 ml   Net            -1260 ml       Urethral Catheter Coude 16 Fr  (Active)   Reasons to continue Urinary Catheter  Post-operative urological requirements 11/13/2017 11:19 AM   Uretheral Catheter No longer needed- Will place order to discontinue 11/3/2017  6:39 AM   Site Assessment Clean;Skin intact 11/15/2017 11:09 AM   Collection Container Standard drainage bag 11/15/2017 11:09 AM   Securement Method Securing device (Describe) 11/15/2017 11:09 AM   Output (mL) 450 mL 11/15/2017  6:42 AM       Physical Exam: General:  No acute distress  Skin:  No acute rash  Eyes:  No scleral icterus  ENT:  Moist mucous membranes  Neck:  Supple, no jugular venous distention  Chest:  Decreased breath sounds at the left bases and dullness to percussion, no rhonchi or rales appreciated  CVS:  No rubs or gallops  Abdomen:  Soft with only minimal tenderness over the midline area with normal bowel sounds  Extremities:  No significant edema  Neuro:  Grossly intact  Psych:  Alert and oriented                Medications:    Scheduled Meds:  acetaminophen 975 mg Oral Q8H   amLODIPine 5 mg Oral Daily   heparin (porcine) 5,000 Units Subcutaneous Q8H [facility]   oxybutynin 5 mg Oral TID   senna-docusate sodium 1 tablet Oral BID   tamsulosin 0 4 mg Oral Daily With Dinner       PRN Meds: belladonna-opium    calcium carbonate    hydrALAZINE    HYDROmorphone    influenza vaccine    oxyCODONE    oxyCODONE    Continuous Infusions:  dextrose 5 % and sodium chloride 0 9 % 75 mL/hr Last Rate: 75 mL/hr (11/14/17 1436)       Lab, Imaging and other studies: I have personally reviewed pertinent labs    Laboratory Results:    Results from last 7 days  Lab Units 11/15/17  0502 11/14/17  0442 11/13/17  0506 11/12/17  0536 11/12/17  0535 11/11/17  0557 11/11/17  0546 11/10/17  0529 11/09/17  0937 11/09/17  0503   WBC Thousand/uL 12 93* 14 05* 16 14*  --  13 07*  --  13 41* 12 09* 15 12*  --    HEMOGLOBIN g/dL 8 7* 8 6* 8 4*  --  8 8*  --  9 1* 8 5* 9 7*  -- HEMATOCRIT % 26 3* 26 0* 24 9*  --  26 1*  --  27 2* 26 3* 29 1*  --    PLATELETS Thousands/uL 1,035* 1,087* 1,084*  --  970*  --  908* 824* 663*  --    SODIUM mmol/L 139 135* 137 138  --  133*  --  141  --  136   POTASSIUM mmol/L 4 2 4 4 4 0 3 9  --  4 3  --  4 5  --  4 7   CHLORIDE mmol/L 107 103 102 104  --  102  --  107  --  104   CO2 mmol/L 26 27 28 27  --  24  --  26  --  23   BUN mg/dL 13 12 14 16  --  17  --  22  --  39*   CREATININE mg/dL 1 39* 1 26 1 34* 1 27  --  1 27  --  1 53*  --  3 40*   CALCIUM mg/dL 8 6 8 9 8 6 8 7  --  8 1*  --  8 4  --  8 6   MAGNESIUM mg/dL  --  2 2  --  2 1  --   --   --  2 3  --  2 4   PHOSPHORUS mg/dL  --  3 6  --   --   --   --   --   --   --   --    GLUCOSE RANDOM mg/dL 101 117 148* 106  --  115  --  94  --  120     Urinalysis: Lab Results   Component Value Date    COLORU Yellow 11/11/2017    COLORU Jennifer 04/23/2015    CLARITYU Turbid 11/11/2017    CLARITYU Clear 04/23/2015    SPECGRAV 1 018 11/11/2017    SPECGRAV 1 010 04/23/2015    PHUR 6 0 11/11/2017    PHUR 7 0 04/23/2015    LEUKOCYTESUR Small (A) 11/11/2017    LEUKOCYTESUR Large (A) 04/23/2015    NITRITE Negative 11/11/2017    NITRITE Negative 04/23/2015    PROTEINUA 30 (1+) (A) 11/11/2017    PROTEINUA 30 (1+) (A) 04/23/2015    GLUCOSEU Negative 11/11/2017    GLUCOSEU Negative 04/23/2015    KETONESU Negative 11/11/2017    KETONESU Negative 04/23/2015    BILIRUBINUR Negative 11/11/2017    BILIRUBINUR Negative 04/23/2015    BLOODU Large (A) 11/11/2017    BLOODU Large (A) 04/23/2015     ABGs: No results found for: [facility]  Radiology review:     Portions of the record may have been created with voice recognition software   Occasional wrong word or "sound a like" substitutions may have occurred due to the inherent limitations of voice recognition software   Read the chart carefully and recognize, using context, where substitutions have occurred

## 2017-11-15 NOTE — PROGRESS NOTES
According to store room, there was no more jiménez wipes  Notified charge nurse, Charis Pretty  I instructed pt to use soap and water with wash clothes to perform PM siva care

## 2017-11-15 NOTE — PROGRESS NOTES
Progress Note - General Surgery   Muna Deluca 43 y o  male MRN: [id number]  Unit/Bed#: [facility] 916-01 Encounter: [id number]    Assessment:  43 M with left kidney rupture, s/p ex-lap left nephrectomy, open abdomen, second look, ligation of left ureter, abdominal closure, right PCN, urinary leak, nephrostomy check  Plan:  - diet as tolerated  - will discuss L ureter leak with urology  - OOB, ambulate  - walker per PT  - belladonna suppositories and ditroban for bladder spasms  - DVT ppx -- [facility]    Subjective/Objective     Subjective: Patient reports mild pain  Passing flatus and and a small BM yesterday  Objective:     Vitals: Blood pressure 124/79, pulse 63, temperature 98 9 °F (37 2 °C), temperature source Oral, resp  rate 18, height 5' 6" (1 676 m), weight 89 2 kg (196 lb 10 4 oz), SpO2 98 %  ,Body mass index is 31 74 kg/m²  I/O       11/13 0701 - 11/14 0700 11/14 0701 - 11/15 0700    P  O  960 540    I V  (mL/kg)  635 (7 1)    IV Piggyback 50 200    Total Intake(mL/kg) 1010 (11 3) 1375 (15 4)    Urine (mL/kg/hr) 3390 (1 6) 2675 (1 2)    Stool 0 (0)     Total Output 3390 2675    Net -2380 -1300          Unmeasured Urine Occurrence 1 x     Unmeasured Stool Occurrence 1 x           Physical Exam:  GEN: NAD  HEENT: MMM  CV: RRR  Lung: Normal effort  Ab: Soft, NT/ND  Extrem: No CCE  Neuro:  A+Ox3    Lab, Imaging and other studies: CBC with diff: No results found for: WBC, HGB, HCT, MCV, PLT, ADJUSTEDWBC, MCH, MCHC, RDW, MPV, NRBC, BMP/CMP: No results found for: NA, K, CL, CO2, ANIONGAP, BUN, CREATININE, GLUCOSE, CALCIUM, AST, ALT, ALKPHOS, PROT, ALBUMIN, BILITOT, EGFR, Magnesium: No components found for: MAG  VTE Pharmacologic Prophylaxis: Heparin  VTE Mechanical Prophylaxis: sequential compression device

## 2017-11-15 NOTE — PROGRESS NOTES
Progress Note - Omi Cid 43 y o  male MRN: [id number]    [facility] Encounter: [id number]      Assessment:  43 y o  Male with a history of bilateral hydronephrosis and urinary retention admitted to trauma team for spontaneous left kidney rupture s/p left nephrectomy, exp lap, with ureter ligation  CT scan revealed right side hydronephrosis  Bladder decompression with jiménez catheter placement resulted in resolution of hydronephrosis on recent repeat US  Later,  creatinine continued to rise  Repeat CT scan was demonstrative for refractory hydronephrosis  Patient is now status post IR placed percutaneous nephrostomy of right solitary kidney  Creatinine today is 1 39  Interval cystogram shows leakage of contrast from the torturous left ureter into the left nephrectomy bed  Patient is afebrile  However, reports occasional bladder spasms  Plan:  Maintain all urinary drains at this time  Due to social circumstances, case is complex  However, drains are therapeutic and medically necessary  Will present case to all group attendings to reach a general consensus as to next course of action  Patient may require reexploration  Will follow and update team members  Subjective:   Denies fever or chills    Objective:     Vitals: Blood pressure 120/74, pulse 64, temperature (!) 97 4 °F (36 3 °C), temperature source Oral, resp  rate 18, height 5' 6" (1 676 m), weight 89 2 kg (196 lb 10 4 oz), SpO2 98 %  ,Body mass index is 31 74 kg/m²        Intake/Output Summary (Last 24 hours) at 11/15/17 0906  Last data filed at 11/15/17 2826   Gross per 24 hour   Intake             1435 ml   Output             2825 ml   Net            -1390 ml       Physical Exam: General appearance: alert, appears stated age and cooperative  Lungs: clear to auscultation bilaterally  Heart: regular rate and rhythm, S1, S2 normal, no murmur, click, rub or gallop  Abdomen: abnormal findings:  distended and tender  Extremities: extremities normal, atraumatic, no cyanosis or edema  Pulses: 2+ and symmetric   Right percutaneous nephrostomy= large amounts clear sean urine  Invasive Devices     Peripheral Intravenous Line            Peripheral IV 11/13/17 Left Arm 1 day          Drain            Urethral Catheter Coude 16 Fr  13 days    Nephrostomy Right 1 10 2 Fr  5 days              Lab Results   Component Value Date    WBC 12 93 (H) 11/15/2017    HGB 8 7 (L) 11/15/2017    HCT 26 3 (L) 11/15/2017    MCV 89 11/15/2017    PLT 1,035 (HH) 11/15/2017     Lab Results   Component Value Date    GLUCOSE 101 11/15/2017    CALCIUM 8 6 11/15/2017     11/15/2017    K 4 2 11/15/2017    CO2 26 11/15/2017     11/15/2017    BUN 13 11/15/2017    CREATININE 1 39 (H) 11/15/2017         Lab, Imaging and other studies: I have personally reviewed pertinent reports      VTE Pharmacologic Prophylaxis: Heparin  VTE Mechanical Prophylaxis: sequential compression device

## 2017-11-16 LAB
ANION GAP SERPL CALCULATED.3IONS-SCNC: 7 MMOL/L (ref 4–13)
BUN SERPL-MCNC: 22 MG/DL (ref 5–25)
CALCIUM SERPL-MCNC: 8.8 MG/DL (ref 8.3–10.1)
CHLORIDE SERPL-SCNC: 107 MMOL/L (ref 100–108)
CO2 SERPL-SCNC: 26 MMOL/L (ref 21–32)
CREAT SERPL-MCNC: 1.18 MG/DL (ref 0.6–1.3)
ERYTHROCYTE [DISTWIDTH] IN BLOOD BY AUTOMATED COUNT: 14.5 % (ref 11.6–15.1)
GFR SERPL CREATININE-BSD FRML MDRD: 76 ML/MIN/1.73SQ M
GLUCOSE SERPL-MCNC: 91 MG/DL (ref 65–140)
HCT VFR BLD AUTO: 26 % (ref 36.5–49.3)
HGB BLD-MCNC: 8.7 G/DL (ref 12–17)
MCH RBC QN AUTO: 29.9 PG (ref 26.8–34.3)
MCHC RBC AUTO-ENTMCNC: 33.5 G/DL (ref 31.4–37.4)
MCV RBC AUTO: 89 FL (ref 82–98)
PLATELET # BLD AUTO: 1090 THOUSANDS/UL (ref 149–390)
PMV BLD AUTO: 7.8 FL (ref 8.9–12.7)
POTASSIUM SERPL-SCNC: 4.3 MMOL/L (ref 3.5–5.3)
RBC # BLD AUTO: 2.91 MILLION/UL (ref 3.88–5.62)
SODIUM SERPL-SCNC: 140 MMOL/L (ref 136–145)
WBC # BLD AUTO: 11.65 THOUSAND/UL (ref 4.31–10.16)

## 2017-11-16 PROCEDURE — 80048 BASIC METABOLIC PNL TOTAL CA: CPT | Performed by: INTERNAL MEDICINE

## 2017-11-16 PROCEDURE — 85027 COMPLETE CBC AUTOMATED: CPT | Performed by: INTERNAL MEDICINE

## 2017-11-16 RX ADMIN — HEPARIN SODIUM 5000 UNITS: 5000 INJECTION, SOLUTION INTRAVENOUS; SUBCUTANEOUS at 16:16

## 2017-11-16 RX ADMIN — HEPARIN SODIUM 5000 UNITS: 5000 INJECTION, SOLUTION INTRAVENOUS; SUBCUTANEOUS at 21:05

## 2017-11-16 RX ADMIN — AMLODIPINE BESYLATE 5 MG: 5 TABLET ORAL at 08:14

## 2017-11-16 RX ADMIN — TAMSULOSIN HYDROCHLORIDE 0.4 MG: 0.4 CAPSULE ORAL at 16:14

## 2017-11-16 RX ADMIN — OXYBUTYNIN CHLORIDE 5 MG: 5 TABLET ORAL at 08:11

## 2017-11-16 RX ADMIN — OXYBUTYNIN CHLORIDE 5 MG: 5 TABLET ORAL at 21:05

## 2017-11-16 RX ADMIN — ACETAMINOPHEN 975 MG: 325 TABLET ORAL at 16:14

## 2017-11-16 RX ADMIN — OXYBUTYNIN CHLORIDE 5 MG: 5 TABLET ORAL at 16:14

## 2017-11-16 RX ADMIN — Medication 150 MG: at 08:13

## 2017-11-16 RX ADMIN — HEPARIN SODIUM 5000 UNITS: 5000 INJECTION, SOLUTION INTRAVENOUS; SUBCUTANEOUS at 05:32

## 2017-11-16 RX ADMIN — ACETAMINOPHEN 975 MG: 325 TABLET ORAL at 08:12

## 2017-11-16 NOTE — CASE MANAGEMENT
Continued Stay Review    Date: 11/15    Vital Signs: Blood pressure 124/79, pulse 63, temperature 98 9 °F (37 2 °C), temperature source Oral, resp  rate 18, height 5' 6" (1 676 m), weight 89 2 kg (196 lb 10 4 oz), SpO2 98 %  ,Body mass index is 31 74 kg/m²      Medications:   Scheduled Meds:   acetaminophen 975 mg Oral Q8H   amLODIPine 5 mg Oral Daily   heparin (porcine) 5,000 Units Subcutaneous Q8H [facility]   iron polysaccharides 150 mg Oral Daily   oxybutynin 5 mg Oral TID   senna-docusate sodium 1 tablet Oral BID   tamsulosin 0 4 mg Oral Daily With Dinner     Continuous Infusions:    PRN Meds: belladonna-opium    calcium carbonate    hydrALAZINE    HYDROmorphone    influenza vaccine    oxyCODONE    Abnormal Labs/Diagnostic Results:   CXR - Left basilar effusion versus atelectasis and/or pneumonia      Updated: 11/15/17 0616        WBC 12 93 (H) 4 31 - 10 16 Thousand/uL     RBC 2 97 (L) 3 88 - 5 62 Million/uL     Hemoglobin 8 7 (L) 12 0 - 17 0 g/dL     Hematocrit 26 3 (L) 36 5 - 49 3 %      Age/Sex: 43 y o  male     Assessment:  43 M with left kidney rupture, s/p ex-lap left nephrectomy, open abdomen, second look, ligation of left ureter, abdominal closure, right PCN, urinary leak, nephrostomy check      Plan:  - diet as tolerated  - will discuss L ureter leak with urology  - OOB, ambulate  - walker per PT  - belladonna suppositories and ditroban for bladder spasms  - DVT ppx -- [facility]      Discharge Plan: Return to [address] [place] [address] when medically cleared

## 2017-11-16 NOTE — PROGRESS NOTES
Progress Note - General Surgery   Mattie James 43 y o  male MRN: [id number]  Unit/Bed#: [facility] 916-01 Encounter: [id number]    Assessment:   43 M with left kidney rupture, s/p ex-lap, left nephrectomy, open abdomen, second look, ligation of left ureter, abdominal closure, right PCN, urinary leak, nephrostomy check  Plan: Will follow up regarding urinary leak  Continue nephrostomy tube and catheter  Diet as tolerated  Pain control  SQH    Subjective/Objective     Subjective:  No acute events  Pain is controlled  Minimal bladder spasms  Tolerating diet  Objective:    Blood pressure 130/60, pulse 97, temperature 99 5 °F (37 5 °C), temperature source Oral, resp  rate 18, height 5' 6" (1 676 m), weight 89 2 kg (196 lb 10 4 oz), SpO2 95 %  ,Body mass index is 31 74 kg/m²        Intake/Output Summary (Last 24 hours) at 11/16/17 0605  Last data filed at 11/16/17 0519   Gross per 24 hour   Intake             1480 ml   Output             2600 ml   Net            -1120 ml       Invasive Devices     Peripheral Intravenous Line            Peripheral IV 11/15/17 Right Antecubital less than 1 day          Drain            Urethral Catheter Coude 16 Fr  14 days    Nephrostomy Right 1 10 2 Fr  6 days                Physical Exam:   General: NAD, AAOx3  CV: RRR +S1/S2  Chest: breath sounds bilaterally  Abdomen: soft, minimally tender, non-distended, jiménez in place with blood tinged urine, PCN in place with clear urine  Extremities: atraumatic, no edema        Results from last 7 days  Lab Units 11/15/17  0502 11/14/17 0442 11/13/17  0506   WBC Thousand/uL 12 93* 14 05* 16 14*   HEMOGLOBIN g/dL 8 7* 8 6* 8 4*   HEMATOCRIT % 26 3* 26 0* 24 9*   PLATELETS Thousands/uL 1,035* 1,087* 1,084*       Results from last 7 days  Lab Units 11/15/17  0502 11/14/17 0442 11/13/17  0506   SODIUM mmol/L 139 135* 137   POTASSIUM mmol/L 4 2 4 4 4 0   CHLORIDE mmol/L 107 103 102   CO2 mmol/L 26 27 28   BUN mg/dL 13 12 14   CREATININE mg/dL 1 39* 1  26 1 34*   GLUCOSE RANDOM mg/dL 101 117 148*   CALCIUM mg/dL 8 6 8 9 8 6       Results from last 7 days  Lab Units 11/09/17  0937   INR  1 10

## 2017-11-16 NOTE — PROGRESS NOTES
Progress Note - Jose James 43 y o  male MRN: [id number]    [facility] Encounter: [id number]      Assessment:  43 y o  Male with a history of bilateral hydronephrosis and urinary retention admitted to trauma team for spontaneous left kidney rupture s/p left nephrectomy, exp lap, with ureter ligation  CT scan revealed right side hydronephrosis  Bladder decompression with jiménez catheter placement resulted in resolution of hydronephrosis on recent repeat US  Later,  creatinine continued to rise  Repeat CT scan was demonstrative for refractory hydronephrosis  Patient is now status post IR placed percutaneous nephrostomy of right solitary kidney  Creatinine today is 1 18  Interval cystogram shows leakage of contrast from the torturous left ureter into the left nephrectomy bed  Plan: This case was reviewed and discussed as a group with general consensus is being to attempt left ureteral deflux in OR  For the time being, maintain all drains  We will update the healthcare team members with timing of procedure  Subjective:   Denies fever or chills    Objective:     Vitals: Blood pressure 124/70, pulse 98, temperature 98 4 °F (36 9 °C), resp  rate 18, height 5' 6" (1 676 m), weight 89 2 kg (196 lb 10 4 oz), SpO2 98 %  ,Body mass index is 31 74 kg/m²        Intake/Output Summary (Last 24 hours) at 11/16/17 0944  Last data filed at 11/16/17 0601   Gross per 24 hour   Intake          1306 67 ml   Output             2150 ml   Net          -843 33 ml       Physical Exam: General appearance: alert, appears stated age and cooperative  Lungs: clear to auscultation bilaterally  Heart: regular rate and rhythm, S1, S2 normal, no murmur, click, rub or gallop  Abdomen: abnormal findings:  distended and tender  Extremities: extremities normal, atraumatic, no cyanosis or edema  Pulses: 2+ and symmetric   Right percutaneous nephrostomy= large amounts clear sean urine  Invasive Devices     Peripheral Intravenous Line            Peripheral IV 11/15/17 Right Antecubital less than 1 day          Drain            Urethral Catheter Coude 16 Fr  14 days    Nephrostomy Right 1 10 2 Fr  6 days              Lab Results   Component Value Date    WBC 11 65 (H) 11/16/2017    HGB 8 7 (L) 11/16/2017    HCT 26 0 (L) 11/16/2017    MCV 89 11/16/2017    PLT 1,090 (HH) 11/16/2017     Lab Results   Component Value Date    GLUCOSE 91 11/16/2017    CALCIUM 8 8 11/16/2017     11/16/2017    K 4 3 11/16/2017    CO2 26 11/16/2017     11/16/2017    BUN 22 11/16/2017    CREATININE 1 18 11/16/2017         Lab, Imaging and other studies: I have personally reviewed pertinent reports      VTE Pharmacologic Prophylaxis: Heparin  VTE Mechanical Prophylaxis: sequential compression device

## 2017-11-16 NOTE — PROGRESS NOTES
Progress Note - Nephrology   Jose Botello 43 y o  male MRN: [id number]  Unit/Bed#: [facility] 916-01 Encounter: [id number]    ASSESSMENT AND PLAN:  27-year-old male with a history of chronic bilateral hydronephrosis who presented with left flank pain   He was found to have left renal injury and had an ex lap with a left nephrectomy due to left kidney rupture; also found to have right-sided hydronephrosis being managed with a Thakur catheter   We are seeing for acute kidney injury on top of chronic kidney disease:     1   CKD stage 3:  Baseline creatinine ranges 1 1-1 8 mg/dL  2   WOLF:  Peak creatinine was 3 4 mg/dL   Patient currently with a Thakur catheter as well as right PCN  his renal function is back at baseline at 1 18 mg/dL  I will Hep-Lock IV fluids at this time  3   Electrolytes are acceptable   Borderline hyponatremia has resolved on isotonic saline  Potassium acceptable  4   Mineral bone disorder:  Acceptable  5   Anemia:  Secondary blood loss  Hemoglobin stable at 8 7  Iron saturation is low  Added oral iron   Transfuse as needed for hemoglobin less than 7 per primary team   6   Hypertension:  Blood pressure doing well  7   Abnormal lung exam:  Check chest x-ray demonstrated left basilar effusion versus atelectasis and/or pneumonia  Based on exam with suggest either atelectasis or small effusion  Patient is asymptomatic  Perhaps follow-up chest x-ray with resolution of his hospital course  At this point we will see the patient on an as-needed basis  Thank you so much for allowing us to participate in the patient's care  Please call if any further questions or concerns               Subjective: The patient is asymptomatic  No chest pain or shortness of breath  Minimal abdominal discomfort  No nausea vomiting or diarrhea but loose stools  Objective:     Vitals: Blood pressure 124/70, pulse 98, temperature 98 4 °F (36 9 °C), resp   rate 18, height 5' 6" (1 676 m), weight 89 2 kg (196 lb 10 4 oz), SpO2 98 %  ,Body mass index is 31 74 kg/m²  Weight (last 2 days)     None            Intake/Output Summary (Last 24 hours) at 11/16/17 1143  Last data filed at 11/16/17 0900   Gross per 24 hour   Intake          1546 67 ml   Output             1450 ml   Net            96 67 ml       Urethral Catheter Coude 16 Fr  (Active)   Reasons to continue Urinary Catheter  Post-operative urological requirements 11/13/2017 11:19 AM   Uretheral Catheter No longer needed- Will place order to discontinue 11/3/2017  6:39 AM   Site Assessment Clean;Skin intact 11/16/2017  8:07 AM   Collection Container Standard drainage bag 11/16/2017  8:07 AM   Securement Method Securing device (Describe) 11/16/2017  8:07 AM   Output (mL) 200 mL 11/15/2017  9:34 PM       Physical Exam: General:  No acute distress  Skin:  No acute rash  Eyes:  No scleral icterus  ENT:  Moist mucous membranes  Neck:  Supple, no jugular venous distention  Chest:  Clear to auscultation  CVS:  No rubs or gallops appreciated  Abdomen:  Soft and nontender with normal bowel sounds today  Extremities:  No significant edema  Neuro:  Grossly intact  Psych:  Alert orient                Medications:    Scheduled Meds:  acetaminophen 975 mg Oral Q8H   amLODIPine 5 mg Oral Daily   heparin (porcine) 5,000 Units Subcutaneous Q8H [address]   iron polysaccharides 150 mg Oral Daily   oxybutynin 5 mg Oral TID   senna-docusate sodium 1 tablet Oral BID   tamsulosin 0 4 mg Oral Daily With Dinner       PRN Meds: belladonna-opium    calcium carbonate    hydrALAZINE    HYDROmorphone    influenza vaccine    oxyCODONE    oxyCODONE    Continuous Infusions:  dextrose 5 % and sodium chloride 0 9 % 50 mL/hr Last Rate: 50 mL/hr (11/15/17 8647)       Lab, Imaging and other studies: I have personally reviewed pertinent labs    Laboratory Results:    Results from last 7 days  Lab Units 11/16/17  0656 11/16/17  8587 11/15/17  0502 11/14/17  4742 11/13/17  0506 11/12/17  0536 11/12/17  0535 11/11/17  0557 11/11/17  0546 11/10/17  0529   WBC Thousand/uL  --  11 65* 12 93* 14 05* 16 14*  --  13 07*  --  13 41* 12 09*   HEMOGLOBIN g/dL  --  8 7* 8 7* 8 6* 8 4*  --  8 8*  --  9 1* 8 5*   HEMATOCRIT %  --  26 0* 26 3* 26 0* 24 9*  --  26 1*  --  27 2* 26 3*   PLATELETS Thousands/uL  --  1,090* 1,035* 1,087* 1,084*  --  970*  --  908* 824*   SODIUM mmol/L 140  --  139 135* 137 138  --  133*  --  141   POTASSIUM mmol/L 4 3  --  4 2 4 4 4 0 3 9  --  4 3  --  4 5   CHLORIDE mmol/L 107  --  107 103 102 104  --  102  --  107   CO2 mmol/L 26  --  26 27 28 27  --  24  --  26   BUN mg/dL 22  --  13 12 14 16  --  17  --  22   CREATININE mg/dL 1 18  --  1 39* 1 26 1 34* 1 27  --  1 27  --  1 53*   CALCIUM mg/dL 8 8  --  8 6 8 9 8 6 8 7  --  8 1*  --  8 4   MAGNESIUM mg/dL  --   --   --  2 2  --  2 1  --   --   --  2 3   PHOSPHORUS mg/dL  --   --   --  3 6  --   --   --   --   --   --    GLUCOSE RANDOM mg/dL 91  --  101 117 148* 106  --  115  --  94     Urinalysis: Lab Results   Component Value Date    COLORU Yellow 11/11/2017    COLORU Jennifer 04/23/2015    CLARITYU Turbid 11/11/2017    CLARITYU Clear 04/23/2015    SPECGRAV 1 018 11/11/2017    SPECGRAV 1 010 04/23/2015    PHUR 6 0 11/11/2017    PHUR 7 0 04/23/2015    LEUKOCYTESUR Small (A) 11/11/2017    LEUKOCYTESUR Large (A) 04/23/2015    NITRITE Negative 11/11/2017    NITRITE Negative 04/23/2015    PROTEINUA 30 (1+) (A) 11/11/2017    PROTEINUA 30 (1+) (A) 04/23/2015    GLUCOSEU Negative 11/11/2017    GLUCOSEU Negative 04/23/2015    KETONESU Negative 11/11/2017    KETONESU Negative 04/23/2015    BILIRUBINUR Negative 11/11/2017    BILIRUBINUR Negative 04/23/2015    BLOODU Large (A) 11/11/2017    BLOODU Large (A) 04/23/2015     ABGs: No results found for: [facility]  Radiology review:     Portions of the record may have been created with voice recognition software   Occasional wrong word or "sound a like" substitutions may have occurred due to the inherent limitations of voice recognition software   Read the chart carefully and recognize, using context, where substitutions have occurred

## 2017-11-17 ENCOUNTER — APPOINTMENT (INPATIENT)
Dept: RADIOLOGY | Facility: HOSPITAL | Age: 42
DRG: 659 | End: 2017-11-17
Payer: COMMERCIAL

## 2017-11-17 LAB
ANION GAP SERPL CALCULATED.3IONS-SCNC: 8 MMOL/L (ref 4–13)
BASOPHILS # BLD AUTO: 0.04 THOUSANDS/ΜL (ref 0–0.1)
BASOPHILS NFR BLD AUTO: 0 % (ref 0–1)
BUN SERPL-MCNC: 20 MG/DL (ref 5–25)
CALCIUM SERPL-MCNC: 9 MG/DL (ref 8.3–10.1)
CHLORIDE SERPL-SCNC: 104 MMOL/L (ref 100–108)
CO2 SERPL-SCNC: 25 MMOL/L (ref 21–32)
CREAT SERPL-MCNC: 1.14 MG/DL (ref 0.6–1.3)
EOSINOPHIL # BLD AUTO: 0.33 THOUSAND/ΜL (ref 0–0.61)
EOSINOPHIL NFR BLD AUTO: 3 % (ref 0–6)
ERYTHROCYTE [DISTWIDTH] IN BLOOD BY AUTOMATED COUNT: 14.3 % (ref 11.6–15.1)
GFR SERPL CREATININE-BSD FRML MDRD: 79 ML/MIN/1.73SQ M
GLUCOSE SERPL-MCNC: 89 MG/DL (ref 65–140)
HCT VFR BLD AUTO: 28.7 % (ref 36.5–49.3)
HGB BLD-MCNC: 9.5 G/DL (ref 12–17)
LYMPHOCYTES # BLD AUTO: 1.69 THOUSANDS/ΜL (ref 0.6–4.47)
LYMPHOCYTES NFR BLD AUTO: 14 % (ref 14–44)
MCH RBC QN AUTO: 29.3 PG (ref 26.8–34.3)
MCHC RBC AUTO-ENTMCNC: 33.1 G/DL (ref 31.4–37.4)
MCV RBC AUTO: 89 FL (ref 82–98)
MONOCYTES # BLD AUTO: 1.07 THOUSAND/ΜL (ref 0.17–1.22)
MONOCYTES NFR BLD AUTO: 9 % (ref 4–12)
NEUTROPHILS # BLD AUTO: 8.83 THOUSANDS/ΜL (ref 1.85–7.62)
NEUTS SEG NFR BLD AUTO: 74 % (ref 43–75)
NRBC BLD AUTO-RTO: 0 /100 WBCS
PLATELET # BLD AUTO: 860 THOUSANDS/UL (ref 149–390)
PMV BLD AUTO: 7.7 FL (ref 8.9–12.7)
POTASSIUM SERPL-SCNC: 4.3 MMOL/L (ref 3.5–5.3)
RBC # BLD AUTO: 3.24 MILLION/UL (ref 3.88–5.62)
SODIUM SERPL-SCNC: 137 MMOL/L (ref 136–145)
WBC # BLD AUTO: 12 THOUSAND/UL (ref 4.31–10.16)

## 2017-11-17 PROCEDURE — 80048 BASIC METABOLIC PNL TOTAL CA: CPT | Performed by: INTERNAL MEDICINE

## 2017-11-17 PROCEDURE — 85025 COMPLETE CBC W/AUTO DIFF WBC: CPT | Performed by: SURGERY

## 2017-11-17 PROCEDURE — 97110 THERAPEUTIC EXERCISES: CPT

## 2017-11-17 PROCEDURE — 97116 GAIT TRAINING THERAPY: CPT

## 2017-11-17 PROCEDURE — 74178 CT ABD&PLV WO CNTR FLWD CNTR: CPT

## 2017-11-17 RX ORDER — SODIUM CHLORIDE 9 MG/ML
84 INJECTION, SOLUTION INTRAVENOUS CONTINUOUS
Status: DISCONTINUED | OUTPATIENT
Start: 2017-11-17 | End: 2017-11-20

## 2017-11-17 RX ADMIN — OXYBUTYNIN CHLORIDE 5 MG: 5 TABLET ORAL at 16:41

## 2017-11-17 RX ADMIN — IOHEXOL 100 ML: 350 INJECTION, SOLUTION INTRAVENOUS at 17:02

## 2017-11-17 RX ADMIN — Medication 150 MG: at 09:40

## 2017-11-17 RX ADMIN — OXYBUTYNIN CHLORIDE 5 MG: 5 TABLET ORAL at 08:34

## 2017-11-17 RX ADMIN — HEPARIN SODIUM 5000 UNITS: 5000 INJECTION, SOLUTION INTRAVENOUS; SUBCUTANEOUS at 14:44

## 2017-11-17 RX ADMIN — HEPARIN SODIUM 5000 UNITS: 5000 INJECTION, SOLUTION INTRAVENOUS; SUBCUTANEOUS at 05:10

## 2017-11-17 RX ADMIN — HEPARIN SODIUM 5000 UNITS: 5000 INJECTION, SOLUTION INTRAVENOUS; SUBCUTANEOUS at 22:41

## 2017-11-17 RX ADMIN — OXYBUTYNIN CHLORIDE 5 MG: 5 TABLET ORAL at 20:46

## 2017-11-17 RX ADMIN — SODIUM CHLORIDE 84 ML/HR: 0.9 INJECTION, SOLUTION INTRAVENOUS at 23:56

## 2017-11-17 RX ADMIN — TAMSULOSIN HYDROCHLORIDE 0.4 MG: 0.4 CAPSULE ORAL at 16:41

## 2017-11-17 RX ADMIN — AMLODIPINE BESYLATE 5 MG: 5 TABLET ORAL at 08:34

## 2017-11-17 NOTE — MEDICAL STUDENT
Progress Note - General Surgery   Suzy Mayo 43 y o  male MRN: [id number]  Unit/Bed#: [facility] 916-01 Encounter: [id number]    Assessment:  42 yo male POD#16 s/p ex-lap with left nephrectomy and abthera placement on 11/1; POD#15 s/p ex-lap, removal of packing, ligation of left ureter, and abdominal closure on 11/2; and s/p right PCN with intraabdominal ascites samples on 11/9  Plan:  -to OR for left ureteral deflux with urology, timing pending  -continue trending creatinine  -continue all drains per urology  -OOB, ambulating    Subjective/Objective     Subjective: Patient reports doing well overnight, with only mild abdominal pain and nausea when he takes his medications  He is otherwise tolerating a regular diet, and denies vomiting or diarrhea  He reports that his appetite is returning and he is taking in more food than previously  + flatus and BMs  Objective:     Blood pressure 118/65, pulse 70, temperature 99 4 °F (37 4 °C), temperature source Oral, resp  rate 17, height 5' 6" (1 676 m), weight 89 2 kg (196 lb 10 4 oz), SpO2 98 %  ,Body mass index is 31 74 kg/m²        Intake/Output Summary (Last 24 hours) at 11/17/17 0541  Last data filed at 11/17/17 0516   Gross per 24 hour   Intake          1026 67 ml   Output             2500 ml   Net         -1473 33 ml       Invasive Devices     Peripheral Intravenous Line            Peripheral IV 11/15/17 Right Antecubital 1 day          Drain            Urethral Catheter Coude 16 Fr  15 days    Nephrostomy Right 1 10 2 Fr  7 days                Physical Exam: General appearance: alert, appears stated age and cooperative  Lungs: normal respiratory effort  Abdomen: midline incision clean, dry, intact, with no erythema or warmth; minimal tenderness to palpation  Neurologic: Grossly normal    Lab, Imaging and other studies:  Lab Results   Component Value Date    WBC 12 00 (H) 11/17/2017    HGB 9 5 (L) 11/17/2017    HCT 28 7 (L) 11/17/2017    MCV 89 11/17/2017     (H) 11/17/2017     Lab Results   Component Value Date    GLUCOSE 89 11/17/2017    CALCIUM 9 0 11/17/2017     11/17/2017    K 4 3 11/17/2017    CO2 25 11/17/2017     11/17/2017    BUN 20 11/17/2017    CREATININE 1 14 11/17/2017         Sherri Sahu, MS-3

## 2017-11-17 NOTE — PROGRESS NOTES
Progress Note - Cj Zeng 43 y o  male MRN: [id number]    Unit/Bed#: [facility] 916-01 Encounter: [id number]      Assessment:  43 y o  Male with a history of bilateral hydronephrosis and urinary retention admitted to trauma team for spontaneous left kidney rupture s/p left nephrectomy, exp lap, with ureter ligation  CT scan revealed right side hydronephrosis  Bladder decompression with jiménez catheter placement resulted in resolution of hydronephrosis on recent repeat US  Later,  creatinine continued to rise  Repeat CT scan was demonstrative for refractory hydronephrosis  Patient is now status post IR placed percutaneous nephrostomy of right solitary kidney  Creatinine has been relatively stable for several days  Interval cystogram shows leakage of contrast from the torturous left ureter into the left nephrectomy bed  Patient is afebrile in urinary drains are intact and patent for sean yellow urine  Plan:  Recent discussion amongst attendings resulted in general consensus to proceed with left ureteral fulguration and efflux  NPO after midnight  Added on the OR schedule for tomorrow  Subjective:   Denies fever or chills    Objective:     Vitals: Blood pressure 137/72, pulse 70, temperature 99 °F (37 2 °C), temperature source Oral, resp  rate 18, height 5' 6" (1 676 m), weight 89 2 kg (196 lb 10 4 oz), SpO2 100 %  ,Body mass index is 31 74 kg/m²        Intake/Output Summary (Last 24 hours) at 11/17/17 1146  Last data filed at 11/17/17 0845   Gross per 24 hour   Intake             1200 ml   Output             2850 ml   Net            -1650 ml       Physical Exam: General appearance: alert, appears stated age and cooperative  Lungs: clear to auscultation bilaterally  Heart: regular rate and rhythm, S1, S2 normal, no murmur, click, rub or gallop  Abdomen: abnormal findings:  distended and tender  Extremities: extremities normal, atraumatic, no cyanosis or edema  Pulses: 2+ and symmetric   Right percutaneous nephrostomy= large amounts clear sean urine  Invasive Devices     Peripheral Intravenous Line            Peripheral IV 11/15/17 Right Antecubital 1 day          Drain            Urethral Catheter Coude 16 Fr  15 days    Nephrostomy Right 1 10 2 Fr  7 days              Lab Results   Component Value Date    WBC 12 00 (H) 11/17/2017    HGB 9 5 (L) 11/17/2017    HCT 28 7 (L) 11/17/2017    MCV 89 11/17/2017     (H) 11/17/2017     Lab Results   Component Value Date    GLUCOSE 89 11/17/2017    CALCIUM 9 0 11/17/2017     11/17/2017    K 4 3 11/17/2017    CO2 25 11/17/2017     11/17/2017    BUN 20 11/17/2017    CREATININE 1 14 11/17/2017         Lab, Imaging and other studies: I have personally reviewed pertinent reports      VTE Pharmacologic Prophylaxis: Heparin  VTE Mechanical Prophylaxis: sequential compression device

## 2017-11-17 NOTE — PLAN OF CARE
Problem: PHYSICAL THERAPY ADULT  Goal: Performs mobility at highest level of function for planned discharge setting  See evaluation for individualized goals  Treatment/Interventions: Functional transfer training, LE strengthening/ROM, Therapeutic exercise, Endurance training, Equipment eval/education, Bed mobility, Gait training, Spoke to nursing, OT  Equipment Recommended: Mayte Lerma (at this time)       See flowsheet documentation for full assessment, interventions and recommendations  Outcome: Progressing  Prognosis: Good  Problem List: Decreased strength, Decreased range of motion, Impaired balance, Decreased endurance, Decreased mobility, Pain, Decreased skin integrity  Assessment: Patient tolerated the PT session well, reviewed supien to sit transfers with patient verbally and demonstrated  Patient reported good understanding  reported he has pain in the abdominal area 3/10 pain level  Patinet performs all activities at a very slow pace  Cues given to ereduce the WBing on the RW and to keep RW at a safe distance while ambualting  Cues given for hand placement during STS transfers  Patient fatigued at the end of session> Pain continues to be a limiting factor  at this time for his mobility  Continue skilled PT to improve patient endurance, strength and mobility  Recommendation: Other (Comment) (Return to prior facility)     PT - OK to Discharge: No    See flowsheet documentation for full assessment

## 2017-11-17 NOTE — SOCIAL WORK
Cm reviewed patient's chart  Patient is not anticipated for d/c at this time  Patient at this time is scheduled for the OR tomorrow  No anticipated to [facility] for this weekend  [facility] Infante will need to be notified when close to discharge

## 2017-11-17 NOTE — PHYSICAL THERAPY NOTE
Physical Therapy Progress Note     11/17/17 0921   Pain Assessment   Pain Assessment 0-10   Pain Score 3   Pain Type Acute pain;Surgical pain   Pain Location Abdomen   Pain Orientation Mid   Restrictions/Precautions   Weight Bearing Precautions Per Order No   Other Precautions Pain; Fall Risk;Multiple lines;Telemetry   General   Chart Reviewed Yes   Family/Caregiver Present No   Cognition   Overall Cognitive Status WFL   Subjective   Subjective Patient agreed to participate in tehrapy  Seqated on recliner upon entry  Transfers   Sit to Stand 5  Supervision   Additional items Assist x 1; Increased time required;Verbal cues;Armrests   Stand to Sit 5  Supervision   Additional items Assist x 1; Increased time required;Verbal cues;Armrests   Stand pivot 5  Supervision   Additional items Assist x 1; Increased time required;Verbal cues   Ambulation/Elevation   Gait pattern Foward flexed; Inconsistent cisco; Short stride; Excessively slow;Decreased foot clearance   Gait Assistance 5  Supervision   Additional items Assist x 1;Verbal cues   Assistive Device Rolling walker   Distance 240ft   Endurance Deficit   Endurance Deficit Yes   Endurance Deficit Description Pain   Activity Tolerance   Activity Tolerance Patient tolerated treatment well;Patient limited by pain   Nurse Made Aware Yes   Exercises   Knee AROM Long Arc Quad 20 reps; Sitting;Bilateral;AROM   Ankle Pumps Bilateral;Sitting;AROM;20 reps   Assessment   Prognosis Good   Problem List Decreased strength;Decreased range of motion; Impaired balance;Decreased endurance;Decreased mobility;Pain;Decreased skin integrity   Assessment Patient tolerated the PT session well, reviewed supien to sit transfers with patient verbally and demonstrated  Patient reported good understanding  reported he has pain in the abdominal area 3/10 pain level  Patinet performs all activities at a very slow pace   Cues given to ereduce the WBing on the RW and to keep RW at a safe distance while ambualting  Cues given for hand placement during STS transfers  Patient fatigued at the end of session> Pain continues to be a limiting factor  at this time for his mobility  Continue skilled PT to improve patient endurance, strength and mobility  Goals   Patient Goals None reported   STG Expiration Date 11/22/17   Plan   Treatment/Interventions Functional transfer training;LE strengthening/ROM; Therapeutic exercise; Endurance training;Patient/family training;Equipment eval/education;Gait training;Spoke to nursing   Progress Progressing toward goals   PT Frequency 5x/wk   Recommendation   Recommendation Other (Comment)  (Return to prior facility)   Equipment Recommended Ewa Miller, PTA

## 2017-11-17 NOTE — PROGRESS NOTES
Progress Note - General Surgery   Radha José 43 y o  male MRN: [id number]  Unit/Bed#: [facility] 916-01 Encounter: [id number]    Assessment:  43 M with left kidney rupture, s/p ex-lap, left nephrectomy, open abdomen, second look, ligation of left ureter, abdominal closure, right PCN, urinary leak, nephrostomy check  Plan: For left ureteral deflux per urology, timing TBD  Continue nephrostomy tube, catheter for now  Diet as tolerated  Pain control  Continue ditropan  OOB    Subjective/Objective     Subjective:  No acute events  Pain is controlled  Bladder spasms have resolved  Tolerating diet, moving bowels  Objective:    Blood pressure 118/65, pulse 70, temperature 99 4 °F (37 4 °C), temperature source Oral, resp  rate 17, height 5' 6" (1 676 m), weight 89 2 kg (196 lb 10 4 oz), SpO2 98 %  ,Body mass index is 31 74 kg/m²        Intake/Output Summary (Last 24 hours) at 11/17/17 0608  Last data filed at 11/17/17 0516   Gross per 24 hour   Intake              720 ml   Output             2500 ml   Net            -1780 ml       Invasive Devices     Peripheral Intravenous Line            Peripheral IV 11/15/17 Right Antecubital 1 day          Drain            Urethral Catheter Coude 16 Fr  15 days    Nephrostomy Right 1 10 2 Fr  7 days                Physical Exam:   General: NAD, AAOx3  CV: RRR +S1/S2  Chest: breath sounds bilaterally  Abdomen: soft, non-tender, non-distended, Incision c/d/i  jiménez in place with blood tinged urine, PCN in place with clear urine  Extremities: atraumatic, no edema        Results from last 7 days  Lab Units 11/16/17  0618 11/15/17  0502 11/14/17  0442   WBC Thousand/uL 11 65* 12 93* 14 05*   HEMOGLOBIN g/dL 8 7* 8 7* 8 6*   HEMATOCRIT % 26 0* 26 3* 26 0*   PLATELETS Thousands/uL 1,090* 1,035* 1,087*       Results from last 7 days  Lab Units 11/16/17  0656 11/15/17  0502 11/14/17  0442   SODIUM mmol/L 140 139 135*   POTASSIUM mmol/L 4 3 4 2 4 4   CHLORIDE mmol/L 107 107 103   CO2 mmol/L 26 26 27   BUN mg/dL 22 13 12   CREATININE mg/dL 1 18 1 39* 1 26   GLUCOSE RANDOM mg/dL 91 101 117   CALCIUM mg/dL 8 8 8 6 8 9

## 2017-11-18 ENCOUNTER — ANESTHESIA (INPATIENT)
Dept: PERIOP | Facility: HOSPITAL | Age: 42
DRG: 659 | End: 2017-11-18
Payer: COMMERCIAL

## 2017-11-18 ENCOUNTER — APPOINTMENT (INPATIENT)
Dept: RADIOLOGY | Facility: HOSPITAL | Age: 42
DRG: 659 | End: 2017-11-18
Payer: COMMERCIAL

## 2017-11-18 ENCOUNTER — ANESTHESIA EVENT (INPATIENT)
Dept: PERIOP | Facility: HOSPITAL | Age: 42
DRG: 659 | End: 2017-11-18
Payer: COMMERCIAL

## 2017-11-18 LAB
ANION GAP SERPL CALCULATED.3IONS-SCNC: 8 MMOL/L (ref 4–13)
BASOPHILS # BLD AUTO: 0.03 THOUSANDS/ΜL (ref 0–0.1)
BASOPHILS NFR BLD AUTO: 0 % (ref 0–1)
BUN SERPL-MCNC: 26 MG/DL (ref 5–25)
CALCIUM SERPL-MCNC: 9 MG/DL (ref 8.3–10.1)
CHLORIDE SERPL-SCNC: 106 MMOL/L (ref 100–108)
CO2 SERPL-SCNC: 26 MMOL/L (ref 21–32)
CREAT SERPL-MCNC: 1.19 MG/DL (ref 0.6–1.3)
EOSINOPHIL # BLD AUTO: 0.37 THOUSAND/ΜL (ref 0–0.61)
EOSINOPHIL NFR BLD AUTO: 3 % (ref 0–6)
ERYTHROCYTE [DISTWIDTH] IN BLOOD BY AUTOMATED COUNT: 14.6 % (ref 11.6–15.1)
GFR SERPL CREATININE-BSD FRML MDRD: 75 ML/MIN/1.73SQ M
GLUCOSE SERPL-MCNC: 85 MG/DL (ref 65–140)
HCT VFR BLD AUTO: 27.9 % (ref 36.5–49.3)
HGB BLD-MCNC: 9.2 G/DL (ref 12–17)
LYMPHOCYTES # BLD AUTO: 1.52 THOUSANDS/ΜL (ref 0.6–4.47)
LYMPHOCYTES NFR BLD AUTO: 13 % (ref 14–44)
MCH RBC QN AUTO: 29.3 PG (ref 26.8–34.3)
MCHC RBC AUTO-ENTMCNC: 33 G/DL (ref 31.4–37.4)
MCV RBC AUTO: 89 FL (ref 82–98)
MONOCYTES # BLD AUTO: 1.09 THOUSAND/ΜL (ref 0.17–1.22)
MONOCYTES NFR BLD AUTO: 10 % (ref 4–12)
NEUTROPHILS # BLD AUTO: 8.44 THOUSANDS/ΜL (ref 1.85–7.62)
NEUTS SEG NFR BLD AUTO: 74 % (ref 43–75)
NRBC BLD AUTO-RTO: 0 /100 WBCS
PLATELET # BLD AUTO: 851 THOUSANDS/UL (ref 149–390)
PMV BLD AUTO: 8 FL (ref 8.9–12.7)
POTASSIUM SERPL-SCNC: 4.4 MMOL/L (ref 3.5–5.3)
RBC # BLD AUTO: 3.14 MILLION/UL (ref 3.88–5.62)
SODIUM SERPL-SCNC: 140 MMOL/L (ref 136–145)
WBC # BLD AUTO: 11.51 THOUSAND/UL (ref 4.31–10.16)

## 2017-11-18 PROCEDURE — L8604 DEXTRANOMER/HYALURONIC ACID: HCPCS | Performed by: UROLOGY

## 2017-11-18 PROCEDURE — 85025 COMPLETE CBC W/AUTO DIFF WBC: CPT | Performed by: NURSE PRACTITIONER

## 2017-11-18 PROCEDURE — C1769 GUIDE WIRE: HCPCS | Performed by: UROLOGY

## 2017-11-18 PROCEDURE — 80048 BASIC METABOLIC PNL TOTAL CA: CPT | Performed by: NURSE PRACTITIONER

## 2017-11-18 PROCEDURE — 0T578ZZ DESTRUCTION OF LEFT URETER, VIA NATURAL OR ARTIFICIAL OPENING ENDOSCOPIC: ICD-10-PCS | Performed by: UROLOGY

## 2017-11-18 PROCEDURE — 0TJ98ZZ INSPECTION OF URETER, VIA NATURAL OR ARTIFICIAL OPENING ENDOSCOPIC: ICD-10-PCS | Performed by: UROLOGY

## 2017-11-18 PROCEDURE — 74430 CONTRAST X-RAY BLADDER: CPT

## 2017-11-18 PROCEDURE — 0TV78ZZ RESTRICTION OF LEFT URETER, VIA NATURAL OR ARTIFICIAL OPENING ENDOSCOPIC: ICD-10-PCS | Performed by: UROLOGY

## 2017-11-18 PROCEDURE — C1758 CATHETER, URETERAL: HCPCS | Performed by: UROLOGY

## 2017-11-18 PROCEDURE — 74420 UROGRAPHY RTRGR +-KUB: CPT

## 2017-11-18 DEVICE — IMPLANTABLE DEVICE: Type: IMPLANTABLE DEVICE | Status: FUNCTIONAL

## 2017-11-18 RX ORDER — CEFAZOLIN SODIUM 1 G/3ML
INJECTION, POWDER, FOR SOLUTION INTRAMUSCULAR; INTRAVENOUS AS NEEDED
Status: DISCONTINUED | OUTPATIENT
Start: 2017-11-18 | End: 2017-11-18 | Stop reason: SURG

## 2017-11-18 RX ORDER — PROPOFOL 10 MG/ML
INJECTION, EMULSION INTRAVENOUS AS NEEDED
Status: DISCONTINUED | OUTPATIENT
Start: 2017-11-18 | End: 2017-11-18 | Stop reason: SURG

## 2017-11-18 RX ORDER — ONDANSETRON 2 MG/ML
INJECTION INTRAMUSCULAR; INTRAVENOUS AS NEEDED
Status: DISCONTINUED | OUTPATIENT
Start: 2017-11-18 | End: 2017-11-18 | Stop reason: SURG

## 2017-11-18 RX ORDER — EPHEDRINE SULFATE 50 MG/ML
INJECTION, SOLUTION INTRAVENOUS AS NEEDED
Status: DISCONTINUED | OUTPATIENT
Start: 2017-11-18 | End: 2017-11-18 | Stop reason: SURG

## 2017-11-18 RX ORDER — METOCLOPRAMIDE HYDROCHLORIDE 5 MG/ML
10 INJECTION INTRAMUSCULAR; INTRAVENOUS ONCE AS NEEDED
Status: DISCONTINUED | OUTPATIENT
Start: 2017-11-18 | End: 2017-11-18 | Stop reason: HOSPADM

## 2017-11-18 RX ORDER — MAGNESIUM HYDROXIDE 1200 MG/15ML
LIQUID ORAL AS NEEDED
Status: DISCONTINUED | OUTPATIENT
Start: 2017-11-18 | End: 2017-11-18 | Stop reason: HOSPADM

## 2017-11-18 RX ORDER — MIDAZOLAM HYDROCHLORIDE 1 MG/ML
INJECTION INTRAMUSCULAR; INTRAVENOUS AS NEEDED
Status: DISCONTINUED | OUTPATIENT
Start: 2017-11-18 | End: 2017-11-18 | Stop reason: SURG

## 2017-11-18 RX ORDER — LIDOCAINE HYDROCHLORIDE 10 MG/ML
INJECTION, SOLUTION INFILTRATION; PERINEURAL AS NEEDED
Status: DISCONTINUED | OUTPATIENT
Start: 2017-11-18 | End: 2017-11-18 | Stop reason: SURG

## 2017-11-18 RX ORDER — FENTANYL CITRATE 50 UG/ML
INJECTION, SOLUTION INTRAMUSCULAR; INTRAVENOUS AS NEEDED
Status: DISCONTINUED | OUTPATIENT
Start: 2017-11-18 | End: 2017-11-18 | Stop reason: SURG

## 2017-11-18 RX ORDER — ONDANSETRON 2 MG/ML
4 INJECTION INTRAMUSCULAR; INTRAVENOUS ONCE AS NEEDED
Status: DISCONTINUED | OUTPATIENT
Start: 2017-11-18 | End: 2017-11-18 | Stop reason: HOSPADM

## 2017-11-18 RX ADMIN — TAMSULOSIN HYDROCHLORIDE 0.4 MG: 0.4 CAPSULE ORAL at 17:25

## 2017-11-18 RX ADMIN — PROPOFOL 200 MG: 10 INJECTION, EMULSION INTRAVENOUS at 08:13

## 2017-11-18 RX ADMIN — ONDANSETRON 4 MG: 2 INJECTION INTRAMUSCULAR; INTRAVENOUS at 08:26

## 2017-11-18 RX ADMIN — MIDAZOLAM HYDROCHLORIDE 2 MG: 1 INJECTION, SOLUTION INTRAMUSCULAR; INTRAVENOUS at 08:06

## 2017-11-18 RX ADMIN — OXYBUTYNIN CHLORIDE 5 MG: 5 TABLET ORAL at 21:17

## 2017-11-18 RX ADMIN — SODIUM CHLORIDE 84 ML/HR: 0.9 INJECTION, SOLUTION INTRAVENOUS at 21:23

## 2017-11-18 RX ADMIN — HEPARIN SODIUM 5000 UNITS: 5000 INJECTION, SOLUTION INTRAVENOUS; SUBCUTANEOUS at 14:53

## 2017-11-18 RX ADMIN — DEXAMETHASONE SODIUM PHOSPHATE 10 MG: 10 INJECTION INTRAMUSCULAR; INTRAVENOUS at 08:26

## 2017-11-18 RX ADMIN — HEPARIN SODIUM 5000 UNITS: 5000 INJECTION, SOLUTION INTRAVENOUS; SUBCUTANEOUS at 06:05

## 2017-11-18 RX ADMIN — ACETAMINOPHEN 975 MG: 325 TABLET ORAL at 14:53

## 2017-11-18 RX ADMIN — OXYBUTYNIN CHLORIDE 5 MG: 5 TABLET ORAL at 17:25

## 2017-11-18 RX ADMIN — FENTANYL CITRATE 50 MCG: 50 INJECTION, SOLUTION INTRAMUSCULAR; INTRAVENOUS at 08:20

## 2017-11-18 RX ADMIN — HEPARIN SODIUM 5000 UNITS: 5000 INJECTION, SOLUTION INTRAVENOUS; SUBCUTANEOUS at 21:17

## 2017-11-18 RX ADMIN — EPHEDRINE SULFATE 5 MG: 50 INJECTION, SOLUTION INTRAMUSCULAR; INTRAVENOUS; SUBCUTANEOUS at 08:20

## 2017-11-18 RX ADMIN — EPHEDRINE SULFATE 5 MG: 50 INJECTION, SOLUTION INTRAMUSCULAR; INTRAVENOUS; SUBCUTANEOUS at 08:25

## 2017-11-18 RX ADMIN — OXYCODONE HYDROCHLORIDE 5 MG: 5 TABLET ORAL at 14:53

## 2017-11-18 RX ADMIN — SODIUM CHLORIDE: 0.9 INJECTION, SOLUTION INTRAVENOUS at 08:06

## 2017-11-18 RX ADMIN — EPHEDRINE SULFATE 10 MG: 50 INJECTION, SOLUTION INTRAMUSCULAR; INTRAVENOUS; SUBCUTANEOUS at 08:42

## 2017-11-18 RX ADMIN — SODIUM CHLORIDE 84 ML/HR: 0.9 INJECTION, SOLUTION INTRAVENOUS at 10:01

## 2017-11-18 RX ADMIN — LIDOCAINE HYDROCHLORIDE 40 MG: 10 INJECTION, SOLUTION INFILTRATION; PERINEURAL at 08:13

## 2017-11-18 RX ADMIN — ACETAMINOPHEN 975 MG: 325 TABLET ORAL at 21:17

## 2017-11-18 RX ADMIN — CEFAZOLIN 2000 MG: 1 INJECTION, POWDER, FOR SOLUTION INTRAVENOUS at 08:20

## 2017-11-18 RX ADMIN — FENTANYL CITRATE 50 MCG: 50 INJECTION, SOLUTION INTRAMUSCULAR; INTRAVENOUS at 08:25

## 2017-11-18 NOTE — PROGRESS NOTES
Progress Note - General Surgery   [facility] 43 y o  male MRN: [id number]  [facility] Encounter: [id number]    Assessment:  43 M with left renal rupture s/p ex-lap, left nephrectomy, open abdomen, second look, ligation of left ureter, abdominal closure, right PCN, urinary leak, nephrostomy check, left ureteral deflux today     Plan:  Pain control  Diet as tolerated  Continue nephrostomy, catheter    Subjective/Objective     Subjective: No acute events  Went for cysto/left ureteral deflux    Objective:    Blood pressure 129/93, pulse 65, temperature (!) 97 4 °F (36 3 °C), temperature source Oral, resp  rate 20, height 5' 6" (1 676 m), weight 89 2 kg (196 lb 10 4 oz), SpO2 98 %  ,Body mass index is 31 74 kg/m²        Intake/Output Summary (Last 24 hours) at 11/18/17 1105  Last data filed at 11/18/17 0958   Gross per 24 hour   Intake             1040 ml   Output             3300 ml   Net            -2260 ml       Invasive Devices     Peripheral Intravenous Line            Peripheral IV 11/15/17 Right Antecubital 2 days          Drain            Urethral Catheter Coude 16 Fr  16 days    Nephrostomy Right 1 10 2 Fr  8 days                Physical Exam:   General: NAD, AAOx3  CV: RRR +S1/S2  Chest: breath sounds bilaterally  Abdomen: soft, NT ND  Extremities: atraumatic, no edema        Results from last 7 days  Lab Units 11/18/17  0451 11/17/17  0817 11/16/17  0618   WBC Thousand/uL 11 51* 12 00* 11 65*   HEMOGLOBIN g/dL 9 2* 9 5* 8 7*   HEMATOCRIT % 27 9* 28 7* 26 0*   PLATELETS Thousands/uL 851* 860* 1,090*       Results from last 7 days  Lab Units 11/18/17  0451 11/17/17  0502 11/16/17  0656   SODIUM mmol/L 140 137 140   POTASSIUM mmol/L 4 4 4 3 4 3   CHLORIDE mmol/L 106 104 107   CO2 mmol/L 26 25 26   BUN mg/dL 26* 20 22   CREATININE mg/dL 1 19 1 14 1 18   GLUCOSE RANDOM mg/dL 85 89 91   CALCIUM mg/dL 9 0 9 0 8 8

## 2017-11-18 NOTE — ANESTHESIA POSTPROCEDURE EVALUATION
Post-Op Assessment Note      CV Status:  Stable    Mental Status:  Alert    Hydration Status:  Stable    PONV Controlled:  Controlled    Airway Patency:  Patent    Post Op Vitals Reviewed: Yes          Staff: AnesthesiologistRICHMOND           BP (P) 111/67 (11/18/17 0933)    Temp (!) (P) 97 4 °F (36 3 °C) (11/18/17 0933)    Pulse (P) 77 (11/18/17 0933)   Resp (P) 16 (11/18/17 0933)    SpO2 (P) 99 % (11/18/17 0933)

## 2017-11-18 NOTE — OP NOTE
OPERATIVE REPORT  PATIENT NAME: Seth Jones    :  1975  MRN: [id number]  Pt Location: [facility] 01    SURGERY DATE: 2017    Surgeon(s) and Role:     Lila Severin, MD - Primary    Preop Diagnosis:  Laceration of left kidney, initial encounter [D49 506S]  Other hydronephrosis [N13 39]  Left veiscoureteral reflux with leak into retroperitoneum    Post-Op Diagnosis Codes:     * Laceration of left kidney, initial encounter [H14 784R]     * Other hydronephrosis [N13 39]    Procedure(s) (LRB):  CYSTOSCOPY Fulguration of ureteral orifice CYSTOGRAM W/ INJECTION DEFLUX, LEFT RETROGRADE, PYLOGRAM AND LEFT URETEROSCOPY (Left)    Specimen(s):  * No specimens in log *    Estimated Blood Loss:   Minimal    Drains:  Nephrostomy Right 1 10 2 Fr  (Active)   Site Assessment FREDDY 2017  8:25 PM   Dressing Status Clean;Dry; Intact 2017  8:25 PM   Dressing Type Dry dressing 2017  8:25 PM   Dressing Change Due 11/16/17 11/15/2017 11:09 AM   Collection Container Leg bag 2017  8:25 PM   Securement Method Tape 2017  8:25 PM   Output (mL) 600 mL 2017  4:54 AM   Number of days: 9       Urethral Catheter Coude 16 Fr   (Active)   Reasons to continue Urinary Catheter  Post-operative urological requirements 2017 11:19 AM   Uretheral Catheter No longer needed- Will place order to discontinue 11/3/2017  6:39 AM   Site Assessment Clean;Skin intact 2017  8:35 AM   Collection Container Standard drainage bag 2017  8:35 AM   Securement Method Securing device (Describe) 2017  8:35 AM   Output (mL) 50 mL 2017 12:51 AM   Number of days: 16       Anesthesia Type:   General    Operative Indications:  Laceration of left kidney, initial encounter [O21 575L]  Other hydronephrosis [N13 39]  Left vesicoureteral reflux with urine leak into retroperitoneum    Operative Findings:  Significant left vesicoureteral reflux with massively dilated, tortuous ureter and apparent leak proximally  After treatment, repeat cystogram shows no apparent reflux  Complications:   None    Procedure and Technique:  The patient was identified, brought to the operating room, and placed on the table in supine position  After induction of general anesthesia, the patient was placed in dorsal lithotomy position and prepped and draped in the usual sterile fashion  A complete formal timeout was performed  The 25 [place] rigid cystoscope was placed per urethra and cystoscopy was performed  The bladder revealed evidence of chronic functional obstruction with mild trabeculation and few diverticula  Right ureteral orifice with abnormal appearance  Left ureteral orifice quite patulous  A 5 [place] catheter was placed into the left ureteral orifice and retrograde pyelogram was performed delineating massively dilated tortuous ureter as has been seen previously on cystogram   Guidewire was placed followed by dual-lumen catheter and a second guidewire  Unfortunately the wire would not reach the most proximal extent of the ureter due to this significant tortuosity  At this point, a 5 3 [place] flexible ureteroscope was placed  I was able to pass the scope over the second wire fairly proximally, however was not able to reach the most proximal extent of the ureter again due to tortuosity  The after multiple attempts, I withdrew the ureteral scope  I then replaced the cystoscope and removed the guidewires  Bugbee cautery was then placed and the left ureteral orifice was completely cauterized until it appeared to be sealed shut  I then placed the Deflux injecting needle and injected 3 mL of the bulking agent circumferentially around the orifice that had been cauterized  Follow-up cystogram was then performed which did not reveal any evidence of persistent left ureteral reflux  Thakur catheter was replaced      The patient tolerated the procedure well and was transferred to the recovery room awake alert and in stable condition  I was present for the entire procedure    Patient Disposition:  PACU      The plan will be to observe the patient for now  He will eventually require an antegrade right nephrostogram the for further evaluation of the drainage, and at that time, may proceed with a follow-up cystogram to re-evaluate left ureteral reflux and potential leak      SIGNATURE: Jaci Ocasio MD  DATE: November 18, 2017  TIME: 9:37 AM

## 2017-11-18 NOTE — ANESTHESIA PREPROCEDURE EVALUATION
Review of Systems/Medical History  Patient summary reviewed  Chart reviewed  No history of anesthetic complications     Cardiovascular  EKG reviewed, Hypertension controlled,    Pulmonary  Smoker cigarette smoker , ,        GI/Hepatic            Endo/Other     GYN       Hematology   Musculoskeletal       Neurology   Psychology           Physical Exam    Airway    Mallampati score: II  TM Distance: >3 FB  Neck ROM: full     Dental       Cardiovascular      Pulmonary      Other Findings        Anesthesia Plan  ASA Score- 2       Anesthesia Type- general with ASA Monitors  Additional Monitors:   Airway Plan: LMA  Induction- intravenous  Informed Consent- Anesthetic plan and risks discussed with patient  I personally reviewed this patient with the CRNA  Discussed and agreed on the Anesthesia Plan with the CRNA  [place]

## 2017-11-19 LAB
ANION GAP SERPL CALCULATED.3IONS-SCNC: 7 MMOL/L (ref 4–13)
BUN SERPL-MCNC: 23 MG/DL (ref 5–25)
CALCIUM SERPL-MCNC: 8.6 MG/DL (ref 8.3–10.1)
CHLORIDE SERPL-SCNC: 109 MMOL/L (ref 100–108)
CO2 SERPL-SCNC: 25 MMOL/L (ref 21–32)
CREAT SERPL-MCNC: 1.19 MG/DL (ref 0.6–1.3)
ERYTHROCYTE [DISTWIDTH] IN BLOOD BY AUTOMATED COUNT: 14.5 % (ref 11.6–15.1)
GFR SERPL CREATININE-BSD FRML MDRD: 75 ML/MIN/1.73SQ M
GLUCOSE SERPL-MCNC: 103 MG/DL (ref 65–140)
HCT VFR BLD AUTO: 26.2 % (ref 36.5–49.3)
HGB BLD-MCNC: 8.4 G/DL (ref 12–17)
MCH RBC QN AUTO: 28.7 PG (ref 26.8–34.3)
MCHC RBC AUTO-ENTMCNC: 32.1 G/DL (ref 31.4–37.4)
MCV RBC AUTO: 89 FL (ref 82–98)
PLATELET # BLD AUTO: 776 THOUSANDS/UL (ref 149–390)
PMV BLD AUTO: 7.9 FL (ref 8.9–12.7)
POTASSIUM SERPL-SCNC: 3.8 MMOL/L (ref 3.5–5.3)
RBC # BLD AUTO: 2.93 MILLION/UL (ref 3.88–5.62)
SODIUM SERPL-SCNC: 141 MMOL/L (ref 136–145)
WBC # BLD AUTO: 14.99 THOUSAND/UL (ref 4.31–10.16)

## 2017-11-19 PROCEDURE — 97116 GAIT TRAINING THERAPY: CPT

## 2017-11-19 PROCEDURE — 85027 COMPLETE CBC AUTOMATED: CPT | Performed by: INTERNAL MEDICINE

## 2017-11-19 PROCEDURE — 80048 BASIC METABOLIC PNL TOTAL CA: CPT | Performed by: INTERNAL MEDICINE

## 2017-11-19 RX ADMIN — Medication 150 MG: at 08:15

## 2017-11-19 RX ADMIN — HEPARIN SODIUM 5000 UNITS: 5000 INJECTION, SOLUTION INTRAVENOUS; SUBCUTANEOUS at 21:56

## 2017-11-19 RX ADMIN — AMLODIPINE BESYLATE 5 MG: 5 TABLET ORAL at 08:13

## 2017-11-19 RX ADMIN — ACETAMINOPHEN 975 MG: 325 TABLET ORAL at 21:56

## 2017-11-19 RX ADMIN — HEPARIN SODIUM 5000 UNITS: 5000 INJECTION, SOLUTION INTRAVENOUS; SUBCUTANEOUS at 05:30

## 2017-11-19 RX ADMIN — ACETAMINOPHEN 975 MG: 325 TABLET ORAL at 14:33

## 2017-11-19 RX ADMIN — OXYBUTYNIN CHLORIDE 5 MG: 5 TABLET ORAL at 08:13

## 2017-11-19 RX ADMIN — OXYCODONE HYDROCHLORIDE 5 MG: 5 TABLET ORAL at 14:44

## 2017-11-19 RX ADMIN — OXYBUTYNIN CHLORIDE 5 MG: 5 TABLET ORAL at 21:56

## 2017-11-19 RX ADMIN — HEPARIN SODIUM 5000 UNITS: 5000 INJECTION, SOLUTION INTRAVENOUS; SUBCUTANEOUS at 14:32

## 2017-11-19 RX ADMIN — TAMSULOSIN HYDROCHLORIDE 0.4 MG: 0.4 CAPSULE ORAL at 17:48

## 2017-11-19 RX ADMIN — ACETAMINOPHEN 975 MG: 325 TABLET ORAL at 08:13

## 2017-11-19 RX ADMIN — OXYBUTYNIN CHLORIDE 5 MG: 5 TABLET ORAL at 17:48

## 2017-11-19 NOTE — PROGRESS NOTES
Progress Note - General Surgery   Duane Gray 43 y o  male MRN: [id number]  Unit/Bed#: [facility] 916-01 Encounter: [id number]    Assessment:  43 M with left renal rupture s/p ex-lap, left nephrectomy, open abdomen, second look, ligation of left ureter, abdominal closure, right PCN, urinary leak, nephrostomy check, left ureteral deflux        Plan:  Pain control  Regular diet  Continue jiménez, nephrostomy  Will need nephrostogram to reassess leakage      Subjective/Objective     Subjective: No acute events  Complaining of some left flank pain which has since resolved    Objective:    Blood pressure 114/72, pulse 86, temperature 98 2 °F (36 8 °C), temperature source Oral, resp  rate 16, height 5' 6" (1 676 m), weight 89 2 kg (196 lb 10 4 oz), SpO2 99 %  ,Body mass index is 31 74 kg/m²        Intake/Output Summary (Last 24 hours) at 11/19/17 0637  Last data filed at 11/19/17 9535   Gross per 24 hour   Intake           2356 2 ml   Output             2500 ml   Net           -143 8 ml       Invasive Devices     Peripheral Intravenous Line            Peripheral IV 11/15/17 Right Antecubital 3 days          Drain            Urethral Catheter Coude 16 Fr  17 days    Nephrostomy Right 1 10 2 Fr  9 days                Physical Exam:   General: NAD, AAOx3  CV: RRR +S1/S2  Chest: breath sounds bilaterally  Abdomen: soft, minimally tender in LUQ  Jiménez and nephrostomy in place with clear yellow urine in both  Extremities: atraumatic, no edema        Results from last 7 days  Lab Units 11/19/17  0454 11/18/17  0451 11/17/17  0817   WBC Thousand/uL 14 99* 11 51* 12 00*   HEMOGLOBIN g/dL 8 4* 9 2* 9 5*   HEMATOCRIT % 26 2* 27 9* 28 7*   PLATELETS Thousands/uL 776* 851* 860*       Results from last 7 days  Lab Units 11/18/17  0451 11/17/17  0502 11/16/17  0656   SODIUM mmol/L 140 137 140   POTASSIUM mmol/L 4 4 4 3 4 3   CHLORIDE mmol/L 106 104 107   CO2 mmol/L 26 25 26   BUN mg/dL 26* 20 22   CREATININE mg/dL 1 19 1 14 1 18   GLUCOSE RANDOM mg/dL 85 89 91   CALCIUM mg/dL 9 0 9 0 8 8

## 2017-11-19 NOTE — PLAN OF CARE
Problem: PHYSICAL THERAPY ADULT  Goal: Performs mobility at highest level of function for planned discharge setting  See evaluation for individualized goals  Treatment/Interventions: Functional transfer training, LE strengthening/ROM, Therapeutic exercise, Endurance training, Equipment eval/education, Bed mobility, Gait training, Spoke to nursing, OT  Equipment Recommended: Keena Reyes (at this time)       See flowsheet documentation for full assessment, interventions and recommendations  Outcome: Progressing  Prognosis: Good  Problem List: Decreased strength, Decreased endurance, Decreased mobility  Assessment: pt ia  able to  complete   trnasfer  from  chr  mod ind ep       he did  amb 250 ft using  rw    he did need  2 standing rest stops  during  gt  trial     gait itself is s teady but pt  remains rw  reliant   he will need cont pT intervention        Recommendation:  (return to prior  facility )     PT - OK to Discharge: No    See flowsheet documentation for full assessment

## 2017-11-19 NOTE — PROGRESS NOTES
Patient with history of traumatic left nephrectomy, urine leak from patulous ureteral stump with subsequent closure, and then persistent urine leak despite the closure  Postop day 1 from cystoscopy, left retrograde pyelogram, left ureteroscopy, fulguration of left ureteral orifice, the flux injection  Postoperative cystogram appeared to show no further reflux on the left  Patient is comfortable with mild left upper quadrant discomfort but otherwise no complaints  Urine has completely cleared via Thakur  Urine also clear via right nephrostomy tube  Abdomen soft, mild left upper quadrant tenderness, incision clean dry and intact  Plan:  1  Patient should have eventual right antegrade nephrostogram to evaluate for any obstruction  If his hydronephrosis is related to reflux, and not obstruction, nephrostomy tube may be removed  2  He will also need eventual cystogram to evaluate for left reflux and any remaining possible leak  I would leave Thakur catheter in place for at least 3 days prior to performing cystogram   3   He will also need eventual Re imaging with CT scan to evaluate his left retroperitoneal hematoma/urinoma  Discussed with patient and he understands the plan

## 2017-11-19 NOTE — PHYSICAL THERAPY NOTE
Physical Therapy Progress Note     11/19/17 1223   Pain Assessment   Pain Assessment No/denies pain   Pain Score No Pain   Restrictions/Precautions   Weight Bearing Precautions Per Order No   Other Precautions Fall Risk;Multiple lines  (CO present )   General   Chart Reviewed Yes   Response to Previous Treatment Patient with no complaints from previous session  Family/Caregiver Present No   Cognition   Overall Cognitive Status WFL   Arousal/Participation Alert; Cooperative   Orientation Level Oriented X4   Following Commands Follows all commands and directions without difficulty   Subjective   Subjective eager to walk    Transfers   Sit to Stand 6  Modified independent   Stand to Sit 6  Modified independent   Ambulation/Elevation   Gait pattern Narrow NANCY; Decreased foot clearance; Short stride   Gait Assistance 5  Supervision  (assist  for lines )   Additional items Assist x 1   Assistive Device Rolling walker   Distance 250ft  (2 stnading rest )   Balance   Static Sitting Good   Static Standing Fair +   Ambulatory Fair   Activity Tolerance   Activity Tolerance Patient tolerated treatment well   Nurse Made Aware Ceasar rn     Assessment   Prognosis Good   Problem List Decreased strength;Decreased endurance;Decreased mobility   Assessment pt ia  able to  complete   trnasfer  from  chr  mod ind ep       he did  amb 250 ft using  rw    he did need  2 standing rest stops  during  gt  trial     gait itself is s teady but pt  remains rw  reliant     he will need cont pT intervention   Goals   Patient Goals none stated   STG Expiration Date 11/22/17   Treatment Day 3   Plan   Treatment/Interventions Functional transfer training;Patient/family training;Gait training;Spoke to nursing   Progress Progressing toward goals   PT Frequency 5x/wk   Recommendation   Recommendation (return to prior  facility )   Equipment Recommended Vini Rodríguez   PT - OK to Discharge (when med ready )     Olman Rendon, PTA

## 2017-11-20 LAB
ANION GAP SERPL CALCULATED.3IONS-SCNC: 6 MMOL/L (ref 4–13)
BUN SERPL-MCNC: 15 MG/DL (ref 5–25)
CALCIUM SERPL-MCNC: 9 MG/DL (ref 8.3–10.1)
CHLORIDE SERPL-SCNC: 105 MMOL/L (ref 100–108)
CO2 SERPL-SCNC: 28 MMOL/L (ref 21–32)
CREAT SERPL-MCNC: 1.09 MG/DL (ref 0.6–1.3)
GFR SERPL CREATININE-BSD FRML MDRD: 83 ML/MIN/1.73SQ M
GLUCOSE SERPL-MCNC: 95 MG/DL (ref 65–140)
POTASSIUM SERPL-SCNC: 4.1 MMOL/L (ref 3.5–5.3)
SODIUM SERPL-SCNC: 139 MMOL/L (ref 136–145)

## 2017-11-20 PROCEDURE — 80048 BASIC METABOLIC PNL TOTAL CA: CPT | Performed by: SURGERY

## 2017-11-20 PROCEDURE — 97110 THERAPEUTIC EXERCISES: CPT

## 2017-11-20 PROCEDURE — 97530 THERAPEUTIC ACTIVITIES: CPT

## 2017-11-20 PROCEDURE — 97116 GAIT TRAINING THERAPY: CPT

## 2017-11-20 RX ADMIN — SODIUM CHLORIDE 84 ML/HR: 0.9 INJECTION, SOLUTION INTRAVENOUS at 15:05

## 2017-11-20 RX ADMIN — OXYBUTYNIN CHLORIDE 5 MG: 5 TABLET ORAL at 16:03

## 2017-11-20 RX ADMIN — HEPARIN SODIUM 5000 UNITS: 5000 INJECTION, SOLUTION INTRAVENOUS; SUBCUTANEOUS at 06:31

## 2017-11-20 RX ADMIN — SODIUM CHLORIDE 84 ML/HR: 0.9 INJECTION, SOLUTION INTRAVENOUS at 03:00

## 2017-11-20 RX ADMIN — AMLODIPINE BESYLATE 5 MG: 5 TABLET ORAL at 08:25

## 2017-11-20 RX ADMIN — HEPARIN SODIUM 5000 UNITS: 5000 INJECTION, SOLUTION INTRAVENOUS; SUBCUTANEOUS at 22:12

## 2017-11-20 RX ADMIN — OXYBUTYNIN CHLORIDE 5 MG: 5 TABLET ORAL at 22:12

## 2017-11-20 RX ADMIN — TAMSULOSIN HYDROCHLORIDE 0.4 MG: 0.4 CAPSULE ORAL at 16:03

## 2017-11-20 RX ADMIN — OXYBUTYNIN CHLORIDE 5 MG: 5 TABLET ORAL at 08:25

## 2017-11-20 RX ADMIN — HEPARIN SODIUM 5000 UNITS: 5000 INJECTION, SOLUTION INTRAVENOUS; SUBCUTANEOUS at 13:33

## 2017-11-20 NOTE — PHYSICAL THERAPY NOTE
Physical Therapy Progress Note     11/20/17 5247   Pain Assessment   Pain Assessment 0-10   Pain Score 5  (With mobility )   Pain Type Acute pain;Surgical pain   Pain Location Abdomen   Pain Orientation Mid   Hospital Pain Intervention(s) Repositioned; Ambulation/increased activity; Emotional support   Response to Interventions Tolerated  Restrictions/Precautions   Other Precautions Pain; Fall Risk;Multiple lines  ( present )   Subjective   Subjective The pt  states that his pain is gradually improving, but he notes that he continues to remain limited due to his pain  Transfers   Sit to Stand 6  Modified independent   Stand to Sit 6  Modified independent   Ambulation/Elevation   Gait pattern Short stride; Foward flexed   Gait Assistance 5  Supervision   Assistive Device Rolling walker   Distance 360 feet  Balance   Static Sitting Good   Dynamic Sitting Good   Static Standing Fair +   Ambulatory Fair   Activity Tolerance   Activity Tolerance Patient limited by pain; Patient tolerated treatment well   Nurse [address], RN  Exercises   TKR Sitting;Bilateral;AROM;10 reps   Assessment   Prognosis Good   Problem List Decreased mobility;Pain   Assessment The pt  was able to progress his ambulatory distance, but he does continue to remain limited due to pain  He had no loss of balance, and he was able to perform dynamic head and trunk movements with ambulation with decreased cisco  Anticipate that he will be able to return to his facility at discharge  Goals   Patient Goals To have less pain  STG Expiration Date 11/22/17   Treatment Day 4   Plan   Treatment/Interventions Functional transfer training; Endurance training; Therapeutic exercise;Patient/family training;Bed mobility;Gait training   Progress Progressing toward goals   PT Frequency 5x/wk   Recommendation   Recommendation Other (Comment)  (Return to facility )   Equipment Recommended Dragan Roque   PT - OK to Discharge Yes     Lizzeth ORNELAS Nevils, PTA

## 2017-11-20 NOTE — CASE MANAGEMENT
Continued Stay Review    Date: 11/19/17    Vital Signs: /81   Pulse 65   Temp 98 4 °F (36 9 °C) (Oral)   Resp 16   Ht 5' 6" (1 676 m)   Wt 89 2 kg (196 lb 10 4 oz)   SpO2 99%   BMI 31 74 kg/m²     Medications:   Scheduled Meds:   acetaminophen 975 mg Oral Q8H   amLODIPine 5 mg Oral Daily   heparin (porcine) 5,000 Units Subcutaneous Q8H [address]   iron polysaccharides 150 mg Oral Daily   oxybutynin 5 mg Oral TID   senna-docusate sodium 1 tablet Oral BID   tamsulosin 0 4 mg Oral Daily With Dinner     Continuous Infusions:   sodium chloride 84 mL/hr Last Rate: 84 mL/hr (11/20/17 0300)     PRN Meds: belladonna-opium    calcium carbonate    hydrALAZINE    HYDROmorphone    influenza vaccine    oxyCODONE    oxyCODONE    Abnormal Labs/Diagnostic Results:   Results from last 7 days  Lab 11/19/17  0454 11/18/17  0451 11/17/17  0817   WBC 14 99* 11 51* 12 00*   HEMOGLOBIN 8 4* 9 2* 9 5*   HEMATOCRIT 26 2* 27 9* 28 7*   PLATELETS 877* 444* 386*      Lab 11/18/17  0451   BUN 26*       Age/Sex: 43 y o  male     Assessment:  43 M with left renal rupture s/p ex-lap, left nephrectomy, open abdomen, second look, ligation of left ureter, abdominal closure, right PCN, urinary leak, nephrostomy check, left ureteral deflux         Plan:  Pain control  Regular diet  Continue jiménez, nephrostomy  Will need nephrostogram to reassess leakage    Discharge Plan: Return to [facility] when medically cleared      [address] in the [facility] by Parrish Barry for 2017  Network Utilization Review Department  Phone: [phone]; Fax [phone]  ATTENTION: The Network Utilization Review Department is now centralized for our 7 Facilities  Make a note that we have a new phone and fax numbers for our Department  Please call with any questions or concerns to [phone] and carefully follow the prompts so that you are directed to the right person   All voicemails are confidential  Fax any determinations, approvals, denials, and requests for initial or continue stay review clinical to [phone]  Due to HIGH CALL volume, it would be easier if you could please send faxed requests to expedite your requests and in part, help us provide discharge notifications faster

## 2017-11-20 NOTE — PROGRESS NOTES
Progress Note - Muna Deluca 43 y o  male MRN: [id number]    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, exp lap, with ureter ligation  CT scan revealed right side hydronephrosis  Bladder decompression with jiménez catheter placement resulted in resolution of hydronephrosis on recent repeat US  Later,  creatinine continued to rise  Repeat CT scan was demonstrative for refractory hydronephrosis  Patient is now status post IR placed percutaneous nephrostomy of right solitary kidney  Creatinine has been relatively stable for many days  Recent cystogram shows leakage of contrast from the torturous left ureter into the left nephrectomy bed  Hence, patient now postoperative day 2  Cystoscopy, fulguration of ureteral orifice,cystogram, left retrograde pyelography and left ureteroscopy with injection of Deflux  Plan:   Creatinine maintains essentially unchanged  Patient is afebrile and denies chills  Modest fluctuation or WBCs  Pain is well controlled  Proceed with right nephrostogram for re-evaluation and determine if percutaneous nephrostomy may indeed be removed  Eventual cystogram to assess for left ureteral closure to follow after several days  Will monitor and update accordingly  Subjective:   Denies fever or chills    Objective:     Vitals: Blood pressure 120/68, pulse 79, temperature 98 8 °F (37 1 °C), temperature source Oral, resp  rate 16, height 5' 6" (1 676 m), weight 89 2 kg (196 lb 10 4 oz), SpO2 99 %  ,Body mass index is 31 74 kg/m²        Intake/Output Summary (Last 24 hours) at 11/20/17 9940  Last data filed at 11/20/17 1400   Gross per 24 hour   Intake              340 ml   Output             3525 ml   Net            -3185 ml       Physical Exam: General appearance: alert, appears stated age and cooperative  Lungs: clear to auscultation bilaterally  Heart: regular rate and rhythm, S1, S2 normal, no murmur, click, rub or gallop  Abdomen: abnormal findings:  distended and tender  Extremities: extremities normal, atraumatic, no cyanosis or edema  Pulses: 2+ and symmetric   Right percutaneous nephrostomy= large amounts clear sean urine  Invasive Devices     Peripheral Intravenous Line            Peripheral IV 11/19/17 Right Forearm less than 1 day          Drain            Urethral Catheter Coude 16 Fr  18 days    Nephrostomy Right 1 10 2 Fr  11 days              Lab Results   Component Value Date    WBC 14 99 (H) 11/19/2017    HGB 8 4 (L) 11/19/2017    HCT 26 2 (L) 11/19/2017    MCV 89 11/19/2017     (H) 11/19/2017     Lab Results   Component Value Date    GLUCOSE 95 11/20/2017    CALCIUM 9 0 11/20/2017     11/20/2017    K 4 1 11/20/2017    CO2 28 11/20/2017     11/20/2017    BUN 15 11/20/2017    CREATININE 1 09 11/20/2017         Lab, Imaging and other studies: I have personally reviewed pertinent reports      VTE Pharmacologic Prophylaxis: Heparin  VTE Mechanical Prophylaxis: sequential compression device

## 2017-11-20 NOTE — PROGRESS NOTES
Progress Note - General Surgery   Angelito Milder 43 y o  male MRN: [id number]  Unit/Bed#: [facility] 916-01 Encounter: [id number]    Assessment:  43 M with left renal rupture s/p ex-lap, left nephrectomy, open abdomen, second look, ligation of left ureter, abdominal closure, right PCN, urinary leak, nephrostomy check, left ureteral deflux        Plan:  Pain control  Regular diet  Continue jiménez, nephrostomy  Nephrostogram/cystogram early this week      Subjective/Objective     Subjective: No acute events  Pain controlled, tolerating diet    Objective:    Blood pressure 133/70, pulse 83, temperature 99 4 °F (37 4 °C), temperature source Oral, resp  rate 18, height 5' 6" (1 676 m), weight 89 2 kg (196 lb 10 4 oz), SpO2 98 %  ,Body mass index is 31 74 kg/m²  Intake/Output Summary (Last 24 hours) at 11/20/17 0631  Last data filed at 11/20/17 0600   Gross per 24 hour   Intake              860 ml   Output             2975 ml   Net            -2115 ml       Invasive Devices     Peripheral Intravenous Line            Peripheral IV 11/19/17 Right Forearm less than 1 day          Drain            Urethral Catheter Coude 16 Fr  18 days    Nephrostomy Right 1 10 2 Fr   10 days                Physical Exam:   General: NAD, AAOx3  CV: RRR +S1/S2  Chest: breath sounds bilaterally  Abdomen: soft, NT ND, incision c/d/i  Extremities: atraumatic, no edema          Results from last 7 days  Lab Units 11/19/17  0454 11/18/17  0451 11/17/17  0817   WBC Thousand/uL 14 99* 11 51* 12 00*   HEMOGLOBIN g/dL 8 4* 9 2* 9 5*   HEMATOCRIT % 26 2* 27 9* 28 7*   PLATELETS Thousands/uL 776* 851* 860*       Results from last 7 days  Lab Units 11/19/17  0454 11/18/17  0451 11/17/17  0502   SODIUM mmol/L 141 140 137   POTASSIUM mmol/L 3 8 4 4 4 3   CHLORIDE mmol/L 109* 106 104   CO2 mmol/L 25 26 25   BUN mg/dL 23 26* 20   CREATININE mg/dL 1 19 1 19 1 14   GLUCOSE RANDOM mg/dL 103 85 89   CALCIUM mg/dL 8 6 9 0 9 0

## 2017-11-20 NOTE — PLAN OF CARE
Problem: PHYSICAL THERAPY ADULT  Goal: Performs mobility at highest level of function for planned discharge setting  See evaluation for individualized goals  Treatment/Interventions: Functional transfer training, LE strengthening/ROM, Therapeutic exercise, Endurance training, Equipment eval/education, Bed mobility, Gait training, Spoke to nursing, OT  Equipment Recommended: Octavio Edge (at this time)       See flowsheet documentation for full assessment, interventions and recommendations  Outcome: Adequate for Discharge  Prognosis: Good  Problem List: Decreased mobility, Pain  Assessment: The pt  was able to progress his ambulatory distance, but he does continue to remain limited due to pain  He had no loss of balance, and he was able to perform dynamic head and trunk movements with ambulation with decreased cisco  Anticipate that he will be able to return to his facility at discharge  Recommendation: Other (Comment) (Return to facility )     PT - OK to Discharge: Yes    See flowsheet documentation for full assessment

## 2017-11-21 ENCOUNTER — TELEPHONE (OUTPATIENT)
Dept: RADIOLOGY | Facility: HOSPITAL | Age: 42
End: 2017-11-21

## 2017-11-21 ENCOUNTER — APPOINTMENT (INPATIENT)
Dept: RADIOLOGY | Facility: HOSPITAL | Age: 42
DRG: 659 | End: 2017-11-21
Payer: COMMERCIAL

## 2017-11-21 LAB
ANION GAP SERPL CALCULATED.3IONS-SCNC: 5 MMOL/L (ref 4–13)
BUN SERPL-MCNC: 17 MG/DL (ref 5–25)
CALCIUM SERPL-MCNC: 9.2 MG/DL (ref 8.3–10.1)
CHLORIDE SERPL-SCNC: 104 MMOL/L (ref 100–108)
CO2 SERPL-SCNC: 28 MMOL/L (ref 21–32)
CREAT SERPL-MCNC: 1.14 MG/DL (ref 0.6–1.3)
ERYTHROCYTE [DISTWIDTH] IN BLOOD BY AUTOMATED COUNT: 14.5 % (ref 11.6–15.1)
GFR SERPL CREATININE-BSD FRML MDRD: 79 ML/MIN/1.73SQ M
GLUCOSE SERPL-MCNC: 87 MG/DL (ref 65–140)
HCT VFR BLD AUTO: 27.8 % (ref 36.5–49.3)
HGB BLD-MCNC: 9 G/DL (ref 12–17)
MCH RBC QN AUTO: 28.8 PG (ref 26.8–34.3)
MCHC RBC AUTO-ENTMCNC: 32.4 G/DL (ref 31.4–37.4)
MCV RBC AUTO: 89 FL (ref 82–98)
PLATELET # BLD AUTO: 606 THOUSANDS/UL (ref 149–390)
PMV BLD AUTO: 8 FL (ref 8.9–12.7)
POTASSIUM SERPL-SCNC: 4.1 MMOL/L (ref 3.5–5.3)
RBC # BLD AUTO: 3.12 MILLION/UL (ref 3.88–5.62)
SODIUM SERPL-SCNC: 137 MMOL/L (ref 136–145)
WBC # BLD AUTO: 8.49 THOUSAND/UL (ref 4.31–10.16)

## 2017-11-21 PROCEDURE — 80048 BASIC METABOLIC PNL TOTAL CA: CPT | Performed by: SURGERY

## 2017-11-21 PROCEDURE — 85027 COMPLETE CBC AUTOMATED: CPT | Performed by: SURGERY

## 2017-11-21 PROCEDURE — 97530 THERAPEUTIC ACTIVITIES: CPT

## 2017-11-21 PROCEDURE — 50431 NJX PX NFROSGRM &/URTRGRM: CPT

## 2017-11-21 PROCEDURE — 97116 GAIT TRAINING THERAPY: CPT

## 2017-11-21 PROCEDURE — BT111ZZ FLUOROSCOPY OF RIGHT KIDNEY USING LOW OSMOLAR CONTRAST: ICD-10-PCS | Performed by: RADIOLOGY

## 2017-11-21 RX ADMIN — HEPARIN SODIUM 5000 UNITS: 5000 INJECTION, SOLUTION INTRAVENOUS; SUBCUTANEOUS at 21:34

## 2017-11-21 RX ADMIN — HEPARIN SODIUM 5000 UNITS: 5000 INJECTION, SOLUTION INTRAVENOUS; SUBCUTANEOUS at 13:29

## 2017-11-21 RX ADMIN — IOHEXOL 15 ML: 300 INJECTION, SOLUTION INTRAVENOUS at 14:25

## 2017-11-21 RX ADMIN — TAMSULOSIN HYDROCHLORIDE 0.4 MG: 0.4 CAPSULE ORAL at 18:59

## 2017-11-21 RX ADMIN — OXYBUTYNIN CHLORIDE 5 MG: 5 TABLET ORAL at 16:54

## 2017-11-21 RX ADMIN — HEPARIN SODIUM 5000 UNITS: 5000 INJECTION, SOLUTION INTRAVENOUS; SUBCUTANEOUS at 06:18

## 2017-11-21 RX ADMIN — Medication 150 MG: at 08:10

## 2017-11-21 RX ADMIN — OXYBUTYNIN CHLORIDE 5 MG: 5 TABLET ORAL at 08:10

## 2017-11-21 RX ADMIN — AMLODIPINE BESYLATE 5 MG: 5 TABLET ORAL at 08:10

## 2017-11-21 RX ADMIN — OXYBUTYNIN CHLORIDE 5 MG: 5 TABLET ORAL at 21:34

## 2017-11-21 NOTE — PLAN OF CARE
Problem: PHYSICAL THERAPY ADULT  Goal: Performs mobility at highest level of function for planned discharge setting  See evaluation for individualized goals  Treatment/Interventions: Functional transfer training, LE strengthening/ROM, Therapeutic exercise, Endurance training, Equipment eval/education, Bed mobility, Gait training, Spoke to nursing, OT  Equipment Recommended: Linard Bernheim (at this time)       See flowsheet documentation for full assessment, interventions and recommendations  Outcome: Progressing  Prognosis: Good  Problem List: Decreased mobility, Pain  Assessment: pt  conintues to progress well c mobility   he trnasfers  mod indep      endurance has increased  pt  remians  rw  reliant  and   does  fatgiue  needing  standing  rest   will contto  see to  improve   safe functional  mob         Recommendation:  (return to facility)     PT - OK to Discharge: Yes    See flowsheet documentation for full assessment

## 2017-11-21 NOTE — PLAN OF CARE
Problem: Nutrition/Hydration-ADULT  Goal: Nutrient/Hydration intake appropriate for improving, restoring or maintaining nutritional needs  Monitor and assess patient's nutrition/hydration status for malnutrition (ex- brittle hair, bruises, dry skin, pale skin and conjunctiva, muscle wasting, smooth red tongue, and disorientation)  Collaborate with interdisciplinary team and initiate plan and interventions as ordered  Monitor patient's weight and dietary intake as ordered or per policy  Utilize nutrition screening tool and intervene per policy  Determine patient's food preferences and provide high-protein, high-caloric foods as appropriate  INTERVENTIONS:  - Monitor oral intake, urinary output, labs, and treatment plans  - Assess nutrition and hydration status and recommend course of action  - Evaluate amount of meals eaten  - Assist patient with eating if necessary   - Allow adequate time for meals  - Recommend/ encourage appropriate diets, oral nutritional supplements, and vitamin/mineral supplements  - Order, calculate, and assess calorie counts as needed  - Recommend, monitor, and adjust tube feedings and TPN/PPN based on assessed needs  - Assess need for intravenous fluids  - Provide specific nutrition/hydration education as appropriate  - Include patient/family/caregiver in decisions related to nutrition   Outcome: Progressing  Pt appetite greatly recovered  Completing and tolerating meals on regular diet

## 2017-11-21 NOTE — PHYSICAL THERAPY NOTE
Physical Therapy Progress Note     11/21/17 0925   Pain Assessment   Pain Assessment 0-10   Pain Score 3   Pain Type Acute pain   Pain Location Abdomen   Pain Orientation Mid   Hospital Pain Intervention(s) Ambulation/increased activity   Response to Interventions tolerated    Restrictions/Precautions   Weight Bearing Precautions Per Order No   Other Precautions Pain  (CO present )   General   Chart Reviewed Yes   Response to Previous Treatment Patient with no complaints from previous session  Family/Caregiver Present No   Cognition   Overall Cognitive Status WFL   Arousal/Participation Alert; Cooperative   Orientation Level Oriented X4   Following Commands Follows all commands and directions without difficulty   Subjective   Subjective states that he has  mild  abd pain    Bed Mobility   Supine to Sit (received pt sitting in  r)   Transfers   Sit to Stand 6  Modified independent   Stand to Sit 6  Modified independent   Ambulation/Elevation   Gait pattern Decreased foot clearance; Short stride   Gait Assistance 5  Supervision   Additional items Assist x 1   Assistive Device Rolling walker   Distance 150ft 250ft 125ft   (standing  rest between )   Balance   Static Sitting Good   Static Standing Fair +   Ambulatory Fair   Endurance Deficit   Endurance Deficit Yes   Endurance Deficit Description pain   Activity Tolerance   Activity Tolerance Patient limited by pain   Nurse Made Aware yes   Exercises   Hip Extension Standing;20 reps;AROM; Bilateral   Squat Standing;20 reps;AROM; Bilateral  (heel ups x20 )   Marching Standing;20 reps;AROM; Bilateral   Assessment   Prognosis Good   Assessment pt  conintues to progress well c mobility   he trnasfers  mod indep      endurance has increased  pt  remians  rw  reliant  and   does  fatgiue  needing  standing  rest      will contto  see to  improve   safe functional  mob    Goals   Patient Goals none stated   STG Expiration Date 11/22/17   Treatment Day 5   Plan Treatment/Interventions Functional transfer training;LE strengthening/ROM; Gait training;Spoke to nursing   Progress Progressing toward goals   PT Frequency 5x/wk   Recommendation   Recommendation (return to facility)   Equipment Recommended Winston Letters   PT - OK to Discharge Yes     Camden Hutchinson, PTA

## 2017-11-21 NOTE — PROGRESS NOTES
Progress Note - General Surgery   Shauna Serrano 43 y o  male MRN: [id number]  [facility] Encounter: [id number]    Assessment:  43 M with left renal rupture, s/p left nephrectomy, ligation of left ureter, right PCN, urinary leak, left ureteral deflux  Plan:  Regular diet  Continue jiménez  Nephrostogram today, possible removal  Will need cystogram as well  CT to evaluate subcutaneous collection? [facility]    Subjective/Objective     Subjective: No acute events  Complaining of some pain in left abdomen under skin    Objective:    Blood pressure 133/78, pulse 68, temperature 98 3 °F (36 8 °C), temperature source Oral, resp  rate 16, height 5' 6" (1 676 m), weight 89 2 kg (196 lb 10 4 oz), SpO2 97 %  ,Body mass index is 31 74 kg/m²        Intake/Output Summary (Last 24 hours) at 11/21/17 0553  Last data filed at 11/20/17 2208   Gross per 24 hour   Intake              580 ml   Output             2400 ml   Net            -1820 ml       Invasive Devices     Peripheral Intravenous Line            Peripheral IV 11/19/17 Right Forearm 1 day          Drain            Urethral Catheter Coude 16 Fr  19 days    Nephrostomy Right 1 10 2 Fr  11 days                Physical Exam:   General: NAD, AAOx3  CV: RRR +S1/S2  Chest: breath sounds bilaterally  Abdomen: soft, mildly tender left of midline incision, incision c/d/i  Jiménez/PCN in place  Extremities: atraumatic, no edema        Results from last 7 days  Lab Units 11/19/17  0454 11/18/17  0451 11/17/17  0817   WBC Thousand/uL 14 99* 11 51* 12 00*   HEMOGLOBIN g/dL 8 4* 9 2* 9 5*   HEMATOCRIT % 26 2* 27 9* 28 7*   PLATELETS Thousands/uL 776* 851* 860*       Results from last 7 days  Lab Units 11/20/17  1103 11/19/17  0454 11/18/17  0451   SODIUM mmol/L 139 141 140   POTASSIUM mmol/L 4 1 3 8 4 4   CHLORIDE mmol/L 105 109* 106   CO2 mmol/L 28 25 26   BUN mg/dL 15 23 26*   CREATININE mg/dL 1 09 1 19 1 19   GLUCOSE RANDOM mg/dL 95 103 85   CALCIUM mg/dL 9 0 8 6 9 0

## 2017-11-21 NOTE — PROGRESS NOTES
Progress Note - Muna Deluca 43 y o  male MRN: [id number]    Unit/Bed#: [facility] 916-01 Encounter: [id number]      Assessment:  43 y o  Male with a history of bilateral hydronephrosis and urinary retention admitted to trauma team for spontaneous left kidney rupture s/p left nephrectomy, exp lap, with ureter ligation  CT scan revealed right side hydronephrosis  Bladder decompression with jiménez catheter placement resulted in resolution of hydronephrosis on recent repeat US  Later,  creatinine continued to rise  Repeat CT scan was demonstrative for refractory hydronephrosis  Patient is now status post IR placed percutaneous nephrostomy of right solitary kidney  Creatinine has been relatively stable for many days  Recent cystogram shows leakage of contrast from the torturous left ureter into the left nephrectomy bed  Hence, patient now postoperative day 3  Cystoscopy, fulguration of ureteral orifice,cystogram, left retrograde pyelography and left ureteroscopy with injection of Deflux  Plan:  Patient for nephrostogram today  Will await results  Subjective:   Denies fever or chills    Objective:     Vitals: Blood pressure 138/77, pulse 65, temperature 98 3 °F (36 8 °C), temperature source Oral, resp  rate 18, height 5' 6" (1 676 m), weight 89 2 kg (196 lb 10 4 oz), SpO2 98 %  ,Body mass index is 31 74 kg/m²        Intake/Output Summary (Last 24 hours) at 11/21/17 1445  Last data filed at 11/21/17 1301   Gross per 24 hour   Intake              900 ml   Output             2025 ml   Net            -1125 ml       Physical Exam: General appearance: alert, appears stated age and cooperative  Lungs: clear to auscultation bilaterally  Heart: regular rate and rhythm, S1, S2 normal, no murmur, click, rub or gallop  Abdomen: abnormal findings:  distended and tender  Extremities: extremities normal, atraumatic, no cyanosis or edema  Pulses: 2+ and symmetric   Right percutaneous nephrostomy= large amounts clear sean urine  Invasive Devices     Peripheral Intravenous Line            Peripheral IV 11/19/17 Right Forearm 1 day          Drain            Urethral Catheter Coude 16 Fr  19 days    Nephrostomy Right 1 10 2 Fr  12 days              Lab Results   Component Value Date    WBC 8 49 11/21/2017    HGB 9 0 (L) 11/21/2017    HCT 27 8 (L) 11/21/2017    MCV 89 11/21/2017     (H) 11/21/2017     Lab Results   Component Value Date    GLUCOSE 87 11/21/2017    CALCIUM 9 2 11/21/2017     11/21/2017    K 4 1 11/21/2017    CO2 28 11/21/2017     11/21/2017    BUN 17 11/21/2017    CREATININE 1 14 11/21/2017         Lab, Imaging and other studies: I have personally reviewed pertinent reports      VTE Pharmacologic Prophylaxis: Heparin  VTE Mechanical Prophylaxis: sequential compression device

## 2017-11-22 PROCEDURE — 97530 THERAPEUTIC ACTIVITIES: CPT

## 2017-11-22 PROCEDURE — 97116 GAIT TRAINING THERAPY: CPT

## 2017-11-22 RX ADMIN — OXYBUTYNIN CHLORIDE 5 MG: 5 TABLET ORAL at 17:03

## 2017-11-22 RX ADMIN — HEPARIN SODIUM 5000 UNITS: 5000 INJECTION, SOLUTION INTRAVENOUS; SUBCUTANEOUS at 14:35

## 2017-11-22 RX ADMIN — OXYBUTYNIN CHLORIDE 5 MG: 5 TABLET ORAL at 22:14

## 2017-11-22 RX ADMIN — OXYBUTYNIN CHLORIDE 5 MG: 5 TABLET ORAL at 09:14

## 2017-11-22 RX ADMIN — HEPARIN SODIUM 5000 UNITS: 5000 INJECTION, SOLUTION INTRAVENOUS; SUBCUTANEOUS at 06:31

## 2017-11-22 RX ADMIN — ACETAMINOPHEN 975 MG: 325 TABLET ORAL at 00:03

## 2017-11-22 RX ADMIN — Medication 150 MG: at 09:14

## 2017-11-22 RX ADMIN — TAMSULOSIN HYDROCHLORIDE 0.4 MG: 0.4 CAPSULE ORAL at 18:58

## 2017-11-22 RX ADMIN — HEPARIN SODIUM 5000 UNITS: 5000 INJECTION, SOLUTION INTRAVENOUS; SUBCUTANEOUS at 22:14

## 2017-11-22 NOTE — PLAN OF CARE
DISCHARGE PLANNING     Discharge to home or other facility with appropriate resources Progressing        DISCHARGE PLANNING - CARE MANAGEMENT     Discharge to post-acute care or home with appropriate resources Progressing        GASTROINTESTINAL - ADULT     Minimal or absence of nausea and/or vomiting Progressing     Maintains or returns to baseline bowel function Progressing     Maintains adequate nutritional intake Progressing        GENITOURINARY - ADULT     Maintains or returns to baseline urinary function Progressing     Absence of urinary retention Progressing     Urinary catheter remains patent Progressing        INFECTION - ADULT     Absence or prevention of progression during hospitalization Progressing        Knowledge Deficit     Patient/family/caregiver demonstrates understanding of disease process, treatment plan, medications, and discharge instructions Progressing        METABOLIC, FLUID AND ELECTROLYTES - ADULT     Electrolytes maintained within normal limits Progressing     Fluid balance maintained Progressing     Glucose maintained within target range Progressing        Nutrition/Hydration-ADULT     Nutrient/Hydration intake appropriate for improving, restoring or maintaining nutritional needs Progressing        PAIN - ADULT     Verbalizes/displays adequate comfort level or baseline comfort level Progressing        Potential for Falls     Patient will remain free of falls Progressing        Prexisting or High Potential for Compromised Skin Integrity     Skin integrity is maintained or improved Progressing        SAFETY ADULT     Patient will remain free of falls Progressing     Maintain or return to baseline ADL function Progressing     Maintain or return mobility status to optimal level Progressing        SKIN/TISSUE INTEGRITY - ADULT     Skin integrity remains intact Progressing     Incision(s), wounds(s) or drain site(s) healing without S/S of infection Progressing     Oral mucous membranes remain intact Progressing

## 2017-11-22 NOTE — SOCIAL WORK
Cm reviewed patient during care coordination rounds  Patient is not medically stable for d/c today  Hugh received phone call from Chelsea Perdomo, [address] [place] [address] with [facility], [phone]  Cm left a return voice message informing her that patient is not medically stable for d/c today  Cm received a return phone call from Chelsea Perdomo who stated that as long as there were no more tests or need for acute evaluation, the intermediate will take patient back and manage his jiménez catheter and neprhostomy tube  Per Chelsea Perdomo she will speak with Hugh on Monday morning concerning status for discharge

## 2017-11-22 NOTE — PROGRESS NOTES
Progress Note - General Surgery   Angelito Salinas 43 y o  male MRN: [id number]  Unit/Bed#: [facility] 916-01 Encounter: [id number]    Assessment:  43 M with left renal rupture s/p left nephrectomy, ligation of left ureter, right PCN, urinary leak, left deflux    Plan:  Regular diet  Continue jiménez  PCN capped  Cysto planned for 11/24 Friday  SQ[facility]    Subjective/Objective     Subjective: No acute events  Complaining of some subcutaneous pain  Objective:    Blood pressure 103/54, pulse 61, temperature 98 9 °F (37 2 °C), temperature source Oral, resp  rate 18, height 5' 6" (1 676 m), weight 89 2 kg (196 lb 10 4 oz), SpO2 100 %  ,Body mass index is 31 74 kg/m²        Intake/Output Summary (Last 24 hours) at 11/22/17 0925  Last data filed at 11/22/17 3291   Gross per 24 hour   Intake              680 ml   Output             1425 ml   Net             -745 ml       Invasive Devices     Peripheral Intravenous Line            Peripheral IV 11/19/17 Right Forearm 2 days          Drain            Urethral Catheter Coude 16 Fr  20 days    Nephrostomy Right 1 10 2 Fr  12 days                Physical Exam:   General: NAD, AAOx3  CV: RRR +S1/S2  Chest: breath sounds bilaterally  Abdomen: soft, minimally tender, incision c/d/i  Extremities: atraumatic, no edema        Results from last 7 days  Lab Units 11/21/17  0500 11/19/17  0454 11/18/17  0451   WBC Thousand/uL 8 49 14 99* 11 51*   HEMOGLOBIN g/dL 9 0* 8 4* 9 2*   HEMATOCRIT % 27 8* 26 2* 27 9*   PLATELETS Thousands/uL 606* 776* 851*       Results from last 7 days  Lab Units 11/21/17  0500 11/20/17  1103 11/19/17  0454   SODIUM mmol/L 137 139 141   POTASSIUM mmol/L 4 1 4 1 3 8   CHLORIDE mmol/L 104 105 109*   CO2 mmol/L 28 28 25   BUN mg/dL 17 15 23   CREATININE mg/dL 1 14 1 09 1 19   GLUCOSE RANDOM mg/dL 87 95 103   CALCIUM mg/dL 9 2 9 0 8 6

## 2017-11-22 NOTE — PROGRESS NOTES
Progress Note - Terra Whyte 43 y o  male MRN: [id number]    [facility] Encounter: [id number]      Assessment:  43 y o  Male with a history of bilateral hydronephrosis and urinary retention admitted to trauma team for spontaneous left kidney rupture s/p left nephrectomy, exp lap, with ureter ligation  CT scan revealed right side hydronephrosis  Bladder decompression with jiménez catheter placement resulted in resolution of hydronephrosis on recent repeat US  Later,  creatinine continued to rise  Repeat CT scan was demonstrative for refractory hydronephrosis  Patient is now status post IR placed percutaneous nephrostomy of right solitary kidney  Creatinine has been relatively stable for many days  Recent cystogram shows leakage of contrast from the torturous left ureter into the left nephrectomy bed  Hence, patient now postoperative day 4 Cystoscopy, fulguration of ureteral orifice,cystogram, left retrograde pyelography and left ureteroscopy with injection of Deflux  Plan:  Patient is currently tolerating nephrostomy clamping; maintain  Jiménez remains in-situ  Will schedule cystogram for Friday  Subjective:   Denies fever or chills    Objective:     Vitals: Blood pressure 103/54, pulse 61, temperature 98 9 °F (37 2 °C), temperature source Oral, resp  rate 18, height 5' 6" (1 676 m), weight 89 2 kg (196 lb 10 4 oz), SpO2 100 %  ,Body mass index is 31 74 kg/m²        Intake/Output Summary (Last 24 hours) at 11/22/17 1235  Last data filed at 11/22/17 0920   Gross per 24 hour   Intake              920 ml   Output             1425 ml   Net             -505 ml       Physical Exam: General appearance: alert, appears stated age and cooperative  Lungs: clear to auscultation bilaterally  Heart: regular rate and rhythm, S1, S2 normal, no murmur, click, rub or gallop  Abdomen: abnormal findings:  distended and tender  Extremities: extremities normal, atraumatic, no cyanosis or edema  Pulses: 2+ and symmetric   Right percutaneous nephrostomy= large amounts clear sean urine  Invasive Devices     Peripheral Intravenous Line            Peripheral IV 11/19/17 Right Forearm 2 days          Drain            Urethral Catheter Coude 16 Fr  20 days    Nephrostomy Right 1 10 2 Fr  13 days              Lab Results   Component Value Date    WBC 8 49 11/21/2017    HGB 9 0 (L) 11/21/2017    HCT 27 8 (L) 11/21/2017    MCV 89 11/21/2017     (H) 11/21/2017     Lab Results   Component Value Date    GLUCOSE 87 11/21/2017    CALCIUM 9 2 11/21/2017     11/21/2017    K 4 1 11/21/2017    CO2 28 11/21/2017     11/21/2017    BUN 17 11/21/2017    CREATININE 1 14 11/21/2017         Lab, Imaging and other studies: I have personally reviewed pertinent reports      VTE Pharmacologic Prophylaxis: Heparin  VTE Mechanical Prophylaxis: sequential compression device

## 2017-11-22 NOTE — PLAN OF CARE
Problem: PHYSICAL THERAPY ADULT  Goal: Performs mobility at highest level of function for planned discharge setting  See evaluation for individualized goals  Treatment/Interventions: Functional transfer training, LE strengthening/ROM, Therapeutic exercise, Endurance training, Equipment eval/education, Bed mobility, Gait training, Spoke to nursing, OT  Equipment Recommended: Roopa Curiel (at this time)       See flowsheet documentation for full assessment, interventions and recommendations  Outcome: Adequate for Discharge  Prognosis: Good  Problem List: Decreased mobility, Pain  Assessment: The pt  continues to progress with improved posture, and ambulatory distance  He required less time to complete transfers, and he notes that he has less pain with ambulation  Educated the pt  to continue sitting upright in the chair, and to peform his exercises  Will continue to follow  Barriers to Discharge: None     Recommendation:  (Return to previous environment )     PT - OK to Discharge: Yes    See flowsheet documentation for full assessment

## 2017-11-22 NOTE — PHYSICAL THERAPY NOTE
Physical Therapy Progress Note     11/22/17 5525   Pain Assessment   Pain Assessment 0-10   Pain Score 2   Pain Location Abdomen   Hospital Pain Intervention(s) Repositioned; Ambulation/increased activity   Response to Interventions Tolerated, satisfied  Restrictions/Precautions   Other Precautions Pain   Subjective   Subjective The pt  notes that his pain is getting better, and that he only really feels it when he stands upright as he feels stretching at his abdomen  He is eager to go for a walk, and to look outside  Transfers   Sit to Stand 6  Modified independent   Additional items Increased time required   Stand to Sit 6  Modified independent   Ambulation/Elevation   Gait pattern Excessively slow; Foward flexed   Gait Assistance 5  Supervision   Additional items Verbal cues   Assistive Device Rolling walker   Distance 180 feet, 200 feet, 140 feet  Balance   Static Sitting Normal   Dynamic Sitting Good   Static Standing Fair +   Ambulatory Fair   Activity Tolerance   Activity Tolerance Patient tolerated treatment well   Nurse Alicia Shoemaker RN  Exercises   TKR Sitting;Bilateral;AROM;10 reps   Assessment   Prognosis Good   Problem List Decreased mobility;Pain   Assessment The pt  continues to progress with improved posture, and ambulatory distance  He required less time to complete transfers, and he notes that he has less pain with ambulation  Educated the pt  to continue sitting upright in the chair, and to peform his exercises  Will continue to follow  Barriers to Discharge None   Goals   Patient Goals To go for a walk  STG Expiration Date 11/22/17   Treatment Day 6   Plan   Treatment/Interventions Functional transfer training;LE strengthening/ROM; Therapeutic exercise; Endurance training;Patient/family training;Bed mobility;Gait training   Progress Improving as expected   PT Frequency 5x/wk   Recommendation   Recommendation (Return to previous environment )   Equipment Recommended Tracie Becker   PT - OK to Discharge Yes     Brielle Catherine, PTA

## 2017-11-22 NOTE — PROGRESS NOTES
Patient care rounds were completed with the patient's nurse today, Annette Barnett  We discussed the plan is to keep him on a regular diet, continue his Thakur catheter per Urology, continue to keep nephrostomy tube capped with repeat tube evaluation by IR on 11/27/2017, tentatively to go for cystogram on 11/24/2017 and will likely need repeat CT scan of the abdomen and pelvis per Urology  We reviewed all of the invasive devices/lines/telemetry orders  Thakur catheter and right nephrostomy tube as above  All questions and concerns were addressed  I spent greater than 15 minutes reviewing the plan with the patient and the nurse, and coordinating his care for the day      Mili Fisher PA-C  11/22/2017 9:06 AM

## 2017-11-23 LAB
ANION GAP SERPL CALCULATED.3IONS-SCNC: 5 MMOL/L (ref 4–13)
BUN SERPL-MCNC: 22 MG/DL (ref 5–25)
CALCIUM SERPL-MCNC: 9.4 MG/DL (ref 8.3–10.1)
CHLORIDE SERPL-SCNC: 105 MMOL/L (ref 100–108)
CO2 SERPL-SCNC: 30 MMOL/L (ref 21–32)
CREAT SERPL-MCNC: 1.24 MG/DL (ref 0.6–1.3)
ERYTHROCYTE [DISTWIDTH] IN BLOOD BY AUTOMATED COUNT: 14.8 % (ref 11.6–15.1)
GFR SERPL CREATININE-BSD FRML MDRD: 71 ML/MIN/1.73SQ M
GLUCOSE SERPL-MCNC: 89 MG/DL (ref 65–140)
HCT VFR BLD AUTO: 30.7 % (ref 36.5–49.3)
HGB BLD-MCNC: 9.9 G/DL (ref 12–17)
MCH RBC QN AUTO: 29 PG (ref 26.8–34.3)
MCHC RBC AUTO-ENTMCNC: 32.2 G/DL (ref 31.4–37.4)
MCV RBC AUTO: 90 FL (ref 82–98)
PLATELET # BLD AUTO: 551 THOUSANDS/UL (ref 149–390)
PMV BLD AUTO: 8.1 FL (ref 8.9–12.7)
POTASSIUM SERPL-SCNC: 4.5 MMOL/L (ref 3.5–5.3)
RBC # BLD AUTO: 3.41 MILLION/UL (ref 3.88–5.62)
SODIUM SERPL-SCNC: 140 MMOL/L (ref 136–145)
WBC # BLD AUTO: 7.59 THOUSAND/UL (ref 4.31–10.16)

## 2017-11-23 PROCEDURE — 85027 COMPLETE CBC AUTOMATED: CPT | Performed by: INTERNAL MEDICINE

## 2017-11-23 PROCEDURE — 80048 BASIC METABOLIC PNL TOTAL CA: CPT | Performed by: INTERNAL MEDICINE

## 2017-11-23 RX ADMIN — HEPARIN SODIUM 5000 UNITS: 5000 INJECTION, SOLUTION INTRAVENOUS; SUBCUTANEOUS at 05:03

## 2017-11-23 RX ADMIN — OXYBUTYNIN CHLORIDE 5 MG: 5 TABLET ORAL at 09:34

## 2017-11-23 RX ADMIN — TAMSULOSIN HYDROCHLORIDE 0.4 MG: 0.4 CAPSULE ORAL at 16:03

## 2017-11-23 RX ADMIN — HEPARIN SODIUM 5000 UNITS: 5000 INJECTION, SOLUTION INTRAVENOUS; SUBCUTANEOUS at 21:17

## 2017-11-23 RX ADMIN — HEPARIN SODIUM 5000 UNITS: 5000 INJECTION, SOLUTION INTRAVENOUS; SUBCUTANEOUS at 13:09

## 2017-11-23 RX ADMIN — OXYBUTYNIN CHLORIDE 5 MG: 5 TABLET ORAL at 21:17

## 2017-11-23 RX ADMIN — Medication 150 MG: at 09:35

## 2017-11-23 RX ADMIN — AMLODIPINE BESYLATE 5 MG: 5 TABLET ORAL at 09:35

## 2017-11-23 RX ADMIN — OXYBUTYNIN CHLORIDE 5 MG: 5 TABLET ORAL at 16:03

## 2017-11-23 NOTE — PROGRESS NOTES
Progress Note - General Surgery   Joselito Shoemaker 43 y o  male MRN: [id number]  Unit/Bed#: [facility] 916-01 Encounter: [id number]    Assessment:  43 M with left renal rupture, s/p left nephrectomy, ligation of left ureter, right PCN, urinary leak, left ureteral deflux, Right PCN capping  Plan:  Regular diet  Thakur  Cystogram tomorrow  OOB and ambulating  SQH    Subjective/Objective     Subjective: No acute events  Tolerating diet  Still with some loose bowel movements  Some abdominal pain  Objective:    Blood pressure 127/72, pulse 57, temperature (!) 97 4 °F (36 3 °C), temperature source Oral, resp  rate 19, height 5' 6" (1 676 m), weight 89 2 kg (196 lb 10 4 oz), SpO2 98 %  ,Body mass index is 31 74 kg/m²  Intake/Output Summary (Last 24 hours) at 11/23/17 3402  Last data filed at 11/23/17 0557   Gross per 24 hour   Intake              930 ml   Output             1300 ml   Net             -370 ml       Invasive Devices     Peripheral Intravenous Line            Peripheral IV 11/19/17 Right Forearm 3 days          Drain            Urethral Catheter Coude 16 Fr  21 days    Nephrostomy Right 1 10 2 Fr  13 days                Physical Exam:   General: NAD, AAOx3  CV: RRR +S1/S2  Chest: breath sounds bilaterally  Abdomen: soft, mildly tender, non-distended, incision c/d/i  PCN capped   Thakur in place  Extremities: atraumatic, no edema        Results from last 7 days  Lab Units 11/23/17  0441 11/21/17  0500 11/19/17  0454   WBC Thousand/uL 7 59 8 49 14 99*   HEMOGLOBIN g/dL 9 9* 9 0* 8 4*   HEMATOCRIT % 30 7* 27 8* 26 2*   PLATELETS Thousands/uL 551* 606* 776*       Results from last 7 days  Lab Units 11/23/17  0441 11/21/17  0500 11/20/17  1103   SODIUM mmol/L 140 137 139   POTASSIUM mmol/L 4 5 4 1 4 1   CHLORIDE mmol/L 105 104 105   CO2 mmol/L 30 28 28   BUN mg/dL 22 17 15   CREATININE mg/dL 1 24 1 14 1 09   GLUCOSE RANDOM mg/dL 89 87 95   CALCIUM mg/dL 9 4 9 2 9 0

## 2017-11-24 ENCOUNTER — APPOINTMENT (INPATIENT)
Dept: RADIOLOGY | Facility: HOSPITAL | Age: 42
DRG: 659 | End: 2017-11-24
Payer: COMMERCIAL

## 2017-11-24 LAB
ANION GAP SERPL CALCULATED.3IONS-SCNC: 5 MMOL/L (ref 4–13)
BUN SERPL-MCNC: 23 MG/DL (ref 5–25)
CALCIUM SERPL-MCNC: 9.3 MG/DL (ref 8.3–10.1)
CHLORIDE SERPL-SCNC: 105 MMOL/L (ref 100–108)
CO2 SERPL-SCNC: 30 MMOL/L (ref 21–32)
CREAT SERPL-MCNC: 1.29 MG/DL (ref 0.6–1.3)
ERYTHROCYTE [DISTWIDTH] IN BLOOD BY AUTOMATED COUNT: 14.8 % (ref 11.6–15.1)
GFR SERPL CREATININE-BSD FRML MDRD: 68 ML/MIN/1.73SQ M
GLUCOSE SERPL-MCNC: 85 MG/DL (ref 65–140)
HCT VFR BLD AUTO: 30.5 % (ref 36.5–49.3)
HGB BLD-MCNC: 9.9 G/DL (ref 12–17)
MCH RBC QN AUTO: 29.2 PG (ref 26.8–34.3)
MCHC RBC AUTO-ENTMCNC: 32.5 G/DL (ref 31.4–37.4)
MCV RBC AUTO: 90 FL (ref 82–98)
PLATELET # BLD AUTO: 465 THOUSANDS/UL (ref 149–390)
PMV BLD AUTO: 8 FL (ref 8.9–12.7)
POTASSIUM SERPL-SCNC: 4.3 MMOL/L (ref 3.5–5.3)
RBC # BLD AUTO: 3.39 MILLION/UL (ref 3.88–5.62)
SODIUM SERPL-SCNC: 140 MMOL/L (ref 136–145)
WBC # BLD AUTO: 7.5 THOUSAND/UL (ref 4.31–10.16)

## 2017-11-24 PROCEDURE — 80048 BASIC METABOLIC PNL TOTAL CA: CPT | Performed by: STUDENT IN AN ORGANIZED HEALTH CARE EDUCATION/TRAINING PROGRAM

## 2017-11-24 PROCEDURE — 74430 CONTRAST X-RAY BLADDER: CPT

## 2017-11-24 PROCEDURE — 85027 COMPLETE CBC AUTOMATED: CPT | Performed by: STUDENT IN AN ORGANIZED HEALTH CARE EDUCATION/TRAINING PROGRAM

## 2017-11-24 RX ADMIN — Medication 150 MG: at 09:42

## 2017-11-24 RX ADMIN — HEPARIN SODIUM 5000 UNITS: 5000 INJECTION, SOLUTION INTRAVENOUS; SUBCUTANEOUS at 22:10

## 2017-11-24 RX ADMIN — AMLODIPINE BESYLATE 5 MG: 5 TABLET ORAL at 09:38

## 2017-11-24 RX ADMIN — OXYBUTYNIN CHLORIDE 5 MG: 5 TABLET ORAL at 16:16

## 2017-11-24 RX ADMIN — OXYBUTYNIN CHLORIDE 5 MG: 5 TABLET ORAL at 09:38

## 2017-11-24 RX ADMIN — HEPARIN SODIUM 5000 UNITS: 5000 INJECTION, SOLUTION INTRAVENOUS; SUBCUTANEOUS at 13:06

## 2017-11-24 RX ADMIN — TAMSULOSIN HYDROCHLORIDE 0.4 MG: 0.4 CAPSULE ORAL at 16:16

## 2017-11-24 RX ADMIN — OXYBUTYNIN CHLORIDE 5 MG: 5 TABLET ORAL at 22:10

## 2017-11-24 RX ADMIN — IOTHALAMATE MEGLUMINE 240 ML: 172 INJECTION URETERAL at 10:30

## 2017-11-24 NOTE — PROGRESS NOTES
Progress Note - General Surgery   Chito Joseph 43 y o  male MRN: [id number]  Unit/Bed#: [facility] 916-01 Encounter: [id number]    Assessment:  43 M with left renal rupture, s/p left nephrectomy, ligation of left ureter, right PCN, urinary leak, left ureteral deflux, Right PCN capping        Plan:  Cystoscopy today with   NPO for cysto, resume diet afterwards  IR to re-eval 11/27 for possible nephrostomy removal  Off antibiotics  PT/OT OOB  Dispo once nephrostomy removed      Subjective/Objective   Chief Complaint: None    Subjective: No complaints this AM  Tolerating diet, pain controlled  + bowel function    Objective:     Blood pressure 136/83, pulse 72, temperature 98 2 °F (36 8 °C), temperature source Oral, resp  rate 18, height 5' 6" (1 676 m), weight 89 2 kg (196 lb 10 4 oz), SpO2 99 %  ,Body mass index is 31 74 kg/m²  Intake/Output Summary (Last 24 hours) at 11/24/17 5315  Last data filed at 11/23/17 1858   Gross per 24 hour   Intake              840 ml   Output              650 ml   Net              190 ml       Invasive Devices     Peripheral Intravenous Line            Peripheral IV 11/23/17 Left;Ventral (anterior) Forearm 1 day          Drain            Urethral Catheter Coude 16 Fr  22 days    Nephrostomy Right 1 10 2 Fr  14 days                Physical Exam:   Gen: A&O, NAD  Cardio: RRR  Lungs: CTAB  Abd: Soft, non distended, non tender  R nephrostomy capped   Thakur with light yellow urine      Lab, Imaging and other studies:  CBC:   Lab Results   Component Value Date    WBC 7 50 11/24/2017    HGB 9 9 (L) 11/24/2017    HCT 30 5 (L) 11/24/2017    MCV 90 11/24/2017     (H) 11/24/2017    MCH 29 2 11/24/2017    MCHC 32 5 11/24/2017    RDW 14 8 11/24/2017    MPV 8 0 (L) 11/24/2017   , CMP:   Lab Results   Component Value Date     11/24/2017    K 4 3 11/24/2017     11/24/2017    CO2 30 11/24/2017    ANIONGAP 5 11/24/2017    BUN 23 11/24/2017    CREATININE 1 29 11/24/2017    GLUCOSE 85 11/24/2017    CALCIUM 9 3 11/24/2017    EGFR 68 11/24/2017     VTE Pharmacologic Prophylaxis: Heparin  VTE Mechanical Prophylaxis: sequential compression device

## 2017-11-24 NOTE — PHYSICAL THERAPY NOTE
Physical Therapy Cancellation Note  The pt  Is off of the floor at a test  Will continue to follow      Re Santiago, PTA

## 2017-11-24 NOTE — CASE MANAGEMENT
Continued Stay Review    [address] [place] [address] in the [facility] by Parrish Barry for 2017  Network Utilization Review Department  Phone: [phone]; Fax [phone]  ATTENTION: The Network Utilization Review Department is now centralized for our 7 Facilities  Make a note that we have a new phone and fax numbers for our Department  Please call with any questions or concerns to [phone] and carefully follow the prompts so that you are directed to the right person  All voicemails are confidential  Fax any determinations, approvals, denials, and requests for initial or continue stay review clinical to [phone]  Due to HIGH CALL volume, it would be easier if you could please send faxed requests to expedite your requests and in part, help us provide discharge notifications faster        Date: 11/24/2017    Vital Signs: /62   Pulse (!) 52   Temp 99 2 °F (37 3 °C) (Oral)   Resp 16   Ht 5' 6" (1 676 m)   Wt 89 2 kg (196 lb 10 4 oz)   SpO2 99%   BMI 31 74 kg/m²     Medications:   Scheduled Meds:   acetaminophen 975 mg Oral Q8H   amLODIPine 5 mg Oral Daily   heparin (porcine) 5,000 Units Subcutaneous Q8H [address]   iron polysaccharides 150 mg Oral Daily   oxybutynin 5 mg Oral TID   senna-docusate sodium 1 tablet Oral BID   tamsulosin 0 4 mg Oral Daily With Dinner     Continuous Infusions:    PRN Meds: belladonna-opium    calcium carbonate    hydrALAZINE    HYDROmorphone    influenza vaccine    oxyCODONE      Abnormal Labs/Diagnostic Results:    WBC 7 50 11/24/2017     HGB 9 9 (L) 11/24/2017     HCT 30 5 (L) 11/24/2017     MCV 90 11/24/2017      (H) 11/24/2017     MCH 29 2 11/24/2017     MCHC 32 5 11/24/2017     RDW 14 8 11/24/2017     MPV 8 0 (L) 11/24/2017   , CMP:         Lab Results   Component Value Date      11/24/2017     K 4 3 11/24/2017      11/24/2017     CO2 30 11/24/2017     ANIONGAP 5 11/24/2017     BUN 23 11/24/2017     CREATININE 1 29 11/24/2017     GLUCOSE 85 11/24/2017     CALCIUM 9 3 11/24/2017     EGFR 68 11/24/2017       Age/Sex: 43 y o  male     Assessment/Plan:    Assessment:  43 M with left renal rupture, s/p left nephrectomy, ligation of left ureter, right PCN, urinary leak, left ureteral deflux, Right PCN capping       Plan:  Cystoscopy today with   NPO for cysto, resume diet afterwards  IR to re-eval 11/27 for possible nephrostomy removal  Off antibiotics  PT/OT OOB  Dispo once nephrostomy removed      Discharge Plan: to previous environment

## 2017-11-24 NOTE — PROGRESS NOTES
Progress Note - Gladys Do 43 y o  male MRN: [id number]    Unit/Bed#: [facility] 916-01 Encounter: [id number]      Assessment:  Mr Brent Flores is an incarcerated 59-year-old male admitted to Trauma team after suffering left renal fracture status post left nephrectomy  Patient is known to our group for history of bilateral hydronephrosis  Patient developed ureteral leak postoperatively as well as refractory right hydronephrosis status post Thakur catheter placement; in addition to right percutaneous nephrostomy as indicated for renal decompression  Creatinine is normalized since nephrostomy placement  Recent nephrostomy on November 21st is positive for distal right ureteral stricture without significant stenosis and therefore percutaneous nephrostomy was clamped for trial     Plan:  Patient is now status post cystogram to re-evaluate healing of ureteral leak  Will await results  Repeat nephrostogram scheduled on Monday with interventional radiology  Will follow results with the hopes of eliminating all indwelling urinary drains contingent on findings  Subjective:   No fever or chills    Objective:     Vitals: Blood pressure 118/62, pulse (!) 52, temperature 99 2 °F (37 3 °C), temperature source Oral, resp  rate 16, height 5' 6" (1 676 m), weight 89 2 kg (196 lb 10 4 oz), SpO2 99 %  ,Body mass index is 31 74 kg/m²        Intake/Output Summary (Last 24 hours) at 11/24/17 1503  Last data filed at 11/24/17 1422   Gross per 24 hour   Intake             1240 ml   Output             2150 ml   Net             -910 ml       Physical Exam: General appearance: alert, appears stated age, cooperative and no distress  Head: Normocephalic, without obvious abnormality, atraumatic  Neck: no adenopathy, no carotid bruit, no JVD, supple, symmetrical, trachea midline and thyroid not enlarged, symmetric, no tenderness/mass/nodules  Lungs: clear to auscultation bilaterally  Heart: regular rate and rhythm, S1, S2 normal, no murmur, click, rub or gallop  Abdomen: soft, non-tender; bowel sounds normal; no masses,  no organomegaly  Extremities: extremities normal, atraumatic, no cyanosis or edema  Pulses: 2+ and symmetric  Neurologic: Grossly normal  Thakur patent for clear sean urine     Invasive Devices     Peripheral Intravenous Line            Peripheral IV 11/23/17 Left;Ventral (anterior) Forearm 1 day          Drain            Urethral Catheter Coude 16 Fr  22 days    Nephrostomy Right 1 10 2 Fr  15 days              Lab Results   Component Value Date    WBC 7 50 11/24/2017    HGB 9 9 (L) 11/24/2017    HCT 30 5 (L) 11/24/2017    MCV 90 11/24/2017     (H) 11/24/2017     Lab Results   Component Value Date    GLUCOSE 85 11/24/2017    CALCIUM 9 3 11/24/2017     11/24/2017    K 4 3 11/24/2017    CO2 30 11/24/2017     11/24/2017    BUN 23 11/24/2017    CREATININE 1 29 11/24/2017       Lab, Imaging and other studies: I have personally reviewed pertinent reports

## 2017-11-25 RX ADMIN — HEPARIN SODIUM 5000 UNITS: 5000 INJECTION, SOLUTION INTRAVENOUS; SUBCUTANEOUS at 22:00

## 2017-11-25 RX ADMIN — TAMSULOSIN HYDROCHLORIDE 0.4 MG: 0.4 CAPSULE ORAL at 18:09

## 2017-11-25 RX ADMIN — HEPARIN SODIUM 5000 UNITS: 5000 INJECTION, SOLUTION INTRAVENOUS; SUBCUTANEOUS at 15:21

## 2017-11-25 RX ADMIN — HEPARIN SODIUM 5000 UNITS: 5000 INJECTION, SOLUTION INTRAVENOUS; SUBCUTANEOUS at 05:11

## 2017-11-25 RX ADMIN — Medication 150 MG: at 09:41

## 2017-11-25 RX ADMIN — OXYBUTYNIN CHLORIDE 5 MG: 5 TABLET ORAL at 09:41

## 2017-11-25 NOTE — PLAN OF CARE
Problem: PAIN - ADULT  Goal: Verbalizes/displays adequate comfort level or baseline comfort level  Interventions:  - Encourage patient to monitor pain and request assistance  - Assess pain using appropriate pain scale  - Administer analgesics based on type and severity of pain and evaluate response  - Implement non-pharmacological measures as appropriate and evaluate response  - Consider cultural and social influences on pain and pain management  - Notify physician/advanced practitioner if interventions unsuccessful or patient reports new pain   Outcome: Progressing      Problem: INFECTION - ADULT  Goal: Absence or prevention of progression during hospitalization  INTERVENTIONS:  - Assess and monitor for signs and symptoms of infection  - Monitor lab/diagnostic results  - Monitor all insertion sites, i e  indwelling lines, tubes, and drains  - Monitor endotracheal (as able) and nasal secretions for changes in amount and color  - [place] appropriate cooling/warming therapies per order  - Administer medications as ordered  - Instruct and encourage patient and family to use good hand hygiene technique  - Identify and instruct in appropriate isolation precautions for identified infection/condition   Outcome: Progressing      Problem: SAFETY ADULT  Goal: Patient will remain free of falls  INTERVENTIONS:  - Assess patient frequently for physical needs  -  Identify cognitive and physical deficits and behaviors that affect risk of falls    -  [place] fall precautions as indicated by assessment   - Educate patient/family on patient safety including physical limitations  - Instruct patient to call for assistance with activity based on assessment  - Modify environment to reduce risk of injury  - Consider OT/PT consult to assist with strengthening/mobility    Outcome: Progressing    Goal: Maintain or return to baseline ADL function  INTERVENTIONS:  -  Assess patient's ability to carry out ADLs; assess patient's baseline for ADL function and identify physical deficits which impact ability to perform ADLs (bathing, care of mouth/teeth, toileting, grooming, dressing, etc )  - Assess/evaluate cause of self-care deficits   - Assess range of motion  - Assess patient's mobility; develop plan if impaired  - Assess patient's need for assistive devices and provide as appropriate  - Encourage maximum independence but intervene and supervise when necessary  ¯ Involve family in performance of ADLs  ¯ Assess for home care needs following discharge   ¯ Request OT consult to assist with ADL evaluation and planning for discharge  ¯ Provide patient education as appropriate   Outcome: Progressing    Goal: Maintain or return mobility status to optimal level  INTERVENTIONS:  - Assess patient's baseline mobility status (ambulation, transfers, stairs, etc )    - Identify cognitive and physical deficits and behaviors that affect mobility  - Identify mobility aids required to assist with transfers and/or ambulation (gait belt, sit-to-stand, lift, walker, cane, etc )  - [place] fall precautions as indicated by assessment  - Record patient progress and toleration of activity level on Mobility SBAR; progress patient to next Phase/Stage  - Instruct patient to call for assistance with activity based on assessment  - Request Rehabilitation consult to assist with strengthening/weightbearing, etc    Outcome: Progressing      Problem: DISCHARGE PLANNING  Goal: Discharge to home or other facility with appropriate resources  INTERVENTIONS:  - Identify barriers to discharge w/patient and caregiver  - Arrange for needed discharge resources and transportation as appropriate  - Identify discharge learning needs (meds, wound care, etc )  - Arrange for interpretive services to assist at discharge as needed  - Refer to Case Management Department for coordinating discharge planning if the patient needs post-hospital services based on physician/advanced practitioner order or complex needs related to functional status, cognitive ability, or social support system   Outcome: Progressing      Problem: Knowledge Deficit  Goal: Patient/family/caregiver demonstrates understanding of disease process, treatment plan, medications, and discharge instructions  Complete learning assessment and assess knowledge base  Interventions:  - Provide teaching at level of understanding  - Provide teaching via preferred learning methods   Outcome: Progressing      Problem: Prexisting or High Potential for Compromised Skin Integrity  Goal: Skin integrity is maintained or improved  INTERVENTIONS:  - Identify patients at risk for skin breakdown  - Assess and monitor skin integrity  - Assess and monitor nutrition and hydration status  - Monitor labs (i e  albumin)  - Assess for incontinence   - Turn and reposition patient  - Assist with mobility/ambulation  - Relieve pressure over bony prominences  - Avoid friction and shearing  - Provide appropriate hygiene as needed including keeping skin clean and dry  - Evaluate need for skin moisturizer/barrier cream  - Collaborate with interdisciplinary team (i e  Nutrition, Rehabilitation, etc )   - Patient/family teaching   Outcome: Progressing      Problem: Nutrition/Hydration-ADULT  Goal: Nutrient/Hydration intake appropriate for improving, restoring or maintaining nutritional needs  Monitor and assess patient's nutrition/hydration status for malnutrition (ex- brittle hair, bruises, dry skin, pale skin and conjunctiva, muscle wasting, smooth red tongue, and disorientation)  Collaborate with interdisciplinary team and initiate plan and interventions as ordered  Monitor patient's weight and dietary intake as ordered or per policy  Utilize nutrition screening tool and intervene per policy  Determine patient's food preferences and provide high-protein, high-caloric foods as appropriate       INTERVENTIONS:  - Monitor oral intake, urinary output, labs, and treatment plans  - Assess nutrition and hydration status and recommend course of action  - Evaluate amount of meals eaten  - Assist patient with eating if necessary   - Allow adequate time for meals  - Recommend/ encourage appropriate diets, oral nutritional supplements, and vitamin/mineral supplements  - Order, calculate, and assess calorie counts as needed  - Recommend, monitor, and adjust tube feedings and TPN/PPN based on assessed needs  - Assess need for intravenous fluids  - Provide specific nutrition/hydration education as appropriate  - Include patient/family/caregiver in decisions related to nutrition   Outcome: Progressing      Problem: DISCHARGE PLANNING - CARE MANAGEMENT  Goal: Discharge to post-acute care or home with appropriate resources  INTERVENTIONS:  - Conduct assessment to determine patient/family and health care team treatment goals, and need for post-acute services based on payer coverage, community resources, and patient preferences, and barriers to discharge  - Address psychosocial, clinical, and financial barriers to discharge as identified in assessment in conjunction with the patient/family and health care team  - Arrange appropriate level of post-acute services according to patient's   needs and preference and payer coverage in collaboration with the physician and health care team  - Communicate with and update the patient/family, physician, and health care team regarding progress on the discharge plan  - Arrange appropriate transportation to post-acute venues  -pt to d/c with appropriate resources when medically ready? Outcome: Progressing      Problem: Potential for Falls  Goal: Patient will remain free of falls  INTERVENTIONS:  - Assess patient frequently for physical needs  -  Identify cognitive and physical deficits and behaviors that affect risk of falls    -  [place] fall precautions as indicated by assessment   - Educate patient/family on patient safety including physical limitations  - Instruct patient to call for assistance with activity based on assessment  - Modify environment to reduce risk of injury  - Consider OT/PT consult to assist with strengthening/mobility    Outcome: Progressing      Problem: GASTROINTESTINAL - ADULT  Goal: Minimal or absence of nausea and/or vomiting  INTERVENTIONS:  - Administer IV fluids as ordered to ensure adequate hydration  - Maintain NPO status until nausea and vomiting are resolved  - Nasogastric tube as ordered  - Administer ordered antiemetic medications as needed  - Provide nonpharmacologic comfort measures as appropriate  - Advance diet as tolerated, if ordered  - Nutrition services referral to assist patient with adequate nutrition and appropriate food choices   Outcome: Progressing    Goal: Maintains or returns to baseline bowel function  INTERVENTIONS:  - Assess bowel function  - Encourage oral fluids to ensure adequate hydration  - Administer IV fluids as ordered to ensure adequate hydration  - Administer ordered medications as needed  - Encourage mobilization and activity  - Nutrition services referral to assist patient with appropriate food choices   Outcome: Progressing    Goal: Maintains adequate nutritional intake  INTERVENTIONS:  - Monitor percentage of each meal consumed  - Identify factors contributing to decreased intake, treat as appropriate  - Assist with meals as needed  - Monitor I&O, WT and lab values  - Obtain nutrition services referral as needed   Outcome: Progressing      Problem: GENITOURINARY - ADULT  Goal: Maintains or returns to baseline urinary function  INTERVENTIONS:  - Assess urinary function  - Encourage oral fluids to ensure adequate hydration  - Administer IV fluids as ordered to ensure adequate hydration  - Administer ordered medications as needed  - Offer frequent toileting  - Follow urinary retention protocol if ordered   Outcome: Progressing    Goal: Absence of urinary retention  INTERVENTIONS:  - Assess patients ability to void and empty bladder  - Monitor I/O  - Bladder scan as needed  - Discuss with physician/AP medications to alleviate retention as needed  - Discuss catheterization for long term situations as appropriate   Outcome: Progressing    Goal: Urinary catheter remains patent  INTERVENTIONS:  - Assess patency of urinary catheter  - If patient has a chronic jiménez, consider changing catheter if non-functioning  - Follow guidelines for intermittent irrigation of non-functioning urinary catheter   Outcome: Progressing      Problem: METABOLIC, FLUID AND ELECTROLYTES - ADULT  Goal: Electrolytes maintained within normal limits  INTERVENTIONS:  - Monitor labs and assess patient for signs and symptoms of electrolyte imbalances  - Administer electrolyte replacement as ordered  - Monitor response to electrolyte replacements, including repeat lab results as appropriate  - Instruct patient on fluid and nutrition as appropriate   Outcome: Progressing    Goal: Fluid balance maintained  INTERVENTIONS:  - Monitor labs and assess for signs and symptoms of volume excess or deficit  - Monitor I/O and WT  - Instruct patient on fluid and nutrition as appropriate   Outcome: Progressing    Goal: Glucose maintained within target range  INTERVENTIONS:  - Monitor Blood Glucose as ordered  - Assess for signs and symptoms of hyperglycemia and hypoglycemia  - Administer ordered medications to maintain glucose within target range  - Assess nutritional intake and initiate nutrition service referral as needed   Outcome: Progressing      Problem: SKIN/TISSUE INTEGRITY - ADULT  Goal: Skin integrity remains intact  INTERVENTIONS  - Identify patients at risk for skin breakdown  - Assess and monitor skin integrity  - Assess and monitor nutrition and hydration status  - Monitor labs (i e  albumin)  - Assess for incontinence   - Turn and reposition patient  - Assist with mobility/ambulation  - Relieve pressure over bony prominences  - Avoid friction and shearing  - Provide appropriate hygiene as needed including keeping skin clean and dry  - Evaluate need for skin moisturizer/barrier cream  - Collaborate with interdisciplinary team (i e  Nutrition, Rehabilitation, etc )   - Patient/family teaching   Outcome: Progressing    Goal: Incision(s), wounds(s) or drain site(s) healing without S/S of infection  INTERVENTIONS  - Assess and document risk factors for skin impairment   - Assess and document dressing, incision, wound bed, drain sites and surrounding tissue  - Initiate Nutrition services consult and/or wound management as needed   Outcome: Progressing    Goal: Oral mucous membranes remain intact  INTERVENTIONS  - Assess oral mucosa and hygiene practices  - Implement preventative oral hygiene regimen  - Implement oral medicated treatments as ordered  - Initiate Nutrition services referral as needed   Outcome: Progressing

## 2017-11-25 NOTE — PROGRESS NOTES
Progress Note - General Surgery   Lenora Rios 43 y o  male MRN: [id number]  Unit/Bed#: [facility] 916-01 Encounter: [id number]    Assessment:  43 M with left renal rupture, s/p left nephrectomy, ligation of left ureter, right PCN, urinary leak, left ureteral deflux, Right PCN capping  Plan:  Cysto negative for leak  F/u PCN removal 11/27, jiménez removal  Staple removal  Dispo    Subjective/Objective     Subjective: No acute events  Tolerating diet  Moving bowels  Objective:    Blood pressure 109/57, pulse 55, temperature 98 4 °F (36 9 °C), temperature source Oral, resp  rate 18, height 5' 6" (1 676 m), weight 89 2 kg (196 lb 10 4 oz), SpO2 99 %  ,Body mass index is 31 74 kg/m²        Intake/Output Summary (Last 24 hours) at 11/25/17 0808  Last data filed at 11/25/17 0630   Gross per 24 hour   Intake             1616 ml   Output             1630 ml   Net              -14 ml       Invasive Devices     Peripheral Intravenous Line            Peripheral IV 11/23/17 Left;Ventral (anterior) Forearm 2 days          Drain            Urethral Catheter Coude 16 Fr  23 days    Nephrostomy Right 1 10 2 Fr  15 days                Physical Exam:   General: NAD, AAOx3  CV: RRR +S1/S2  Chest: breath sounds bilaterally  Abdomen: soft, NT, ND  Extremities: atraumatic, no edema        Results from last 7 days  Lab Units 11/24/17  0503 11/23/17  0441 11/21/17  0500   WBC Thousand/uL 7 50 7 59 8 49   HEMOGLOBIN g/dL 9 9* 9 9* 9 0*   HEMATOCRIT % 30 5* 30 7* 27 8*   PLATELETS Thousands/uL 465* 551* 606*       Results from last 7 days  Lab Units 11/24/17  0503 11/23/17  0441 11/21/17  0500   SODIUM mmol/L 140 140 137   POTASSIUM mmol/L 4 3 4 5 4 1   CHLORIDE mmol/L 105 105 104   CO2 mmol/L 30 30 28   BUN mg/dL 23 22 17   CREATININE mg/dL 1 29 1 24 1 14   GLUCOSE RANDOM mg/dL 85 89 87   CALCIUM mg/dL 9 3 9 4 9 2

## 2017-11-25 NOTE — PROGRESS NOTES
Cystogram yesterday was negative for leak  We will order discontinuation of Thakur catheter and Ditropan

## 2017-11-26 RX ORDER — SODIUM CHLORIDE 9 MG/ML
100 INJECTION, SOLUTION INTRAVENOUS CONTINUOUS
Status: DISCONTINUED | OUTPATIENT
Start: 2017-11-27 | End: 2017-11-29

## 2017-11-26 RX ADMIN — TAMSULOSIN HYDROCHLORIDE 0.4 MG: 0.4 CAPSULE ORAL at 17:46

## 2017-11-26 RX ADMIN — Medication 150 MG: at 10:37

## 2017-11-26 RX ADMIN — HEPARIN SODIUM 5000 UNITS: 5000 INJECTION, SOLUTION INTRAVENOUS; SUBCUTANEOUS at 14:24

## 2017-11-26 RX ADMIN — AMLODIPINE BESYLATE 5 MG: 5 TABLET ORAL at 10:37

## 2017-11-26 RX ADMIN — HEPARIN SODIUM 5000 UNITS: 5000 INJECTION, SOLUTION INTRAVENOUS; SUBCUTANEOUS at 23:00

## 2017-11-26 RX ADMIN — HEPARIN SODIUM 5000 UNITS: 5000 INJECTION, SOLUTION INTRAVENOUS; SUBCUTANEOUS at 05:36

## 2017-11-26 NOTE — PROGRESS NOTES
Progress Note - General Surgery   March Sever 43 y o  male MRN: [id number]  Unit/Bed#: [facility] 916-01 Encounter: [id number]    Assessment:  43 M with left renal rupture, s/p left nephrectomy, ligation of left ureter, right PCN, c/b Left ureteral leak, s/p left deflux  Right PCN capped, jiménez removed  Plan:  Staple removal  Diet as tolerated  PCN check/removal tomorrow  Dispo    Subjective/Objective     Subjective: No acute events  Tolerating diet  Jiménez removed yesterday, and voided with some pain  Objective:    Blood pressure 118/68, pulse 57, temperature 98 6 °F (37 °C), temperature source Oral, resp  rate 18, height 5' 6" (1 676 m), weight 89 2 kg (196 lb 10 4 oz), SpO2 97 %  ,Body mass index is 31 74 kg/m²        Intake/Output Summary (Last 24 hours) at 11/26/17 0725  Last data filed at 11/26/17 0601   Gross per 24 hour   Intake             1200 ml   Output             1050 ml   Net              150 ml       Invasive Devices     Peripheral Intravenous Line            Peripheral IV 11/23/17 Left;Ventral (anterior) Forearm 3 days          Drain            Nephrostomy Right 1 10 2 Fr  16 days                Physical Exam:   General: NAD, AAOx3  CV: RRR +S1/S2  Chest: breath sounds bilaterally  Abdomen: soft, NT ND, incision c/d/i with staples  Extremities: atraumatic, no edema        Results from last 7 days  Lab Units 11/24/17  0503 11/23/17  0441 11/21/17  0500   WBC Thousand/uL 7 50 7 59 8 49   HEMOGLOBIN g/dL 9 9* 9 9* 9 0*   HEMATOCRIT % 30 5* 30 7* 27 8*   PLATELETS Thousands/uL 465* 551* 606*       Results from last 7 days  Lab Units 11/24/17  0503 11/23/17  0441 11/21/17  0500   SODIUM mmol/L 140 140 137   POTASSIUM mmol/L 4 3 4 5 4 1   CHLORIDE mmol/L 105 105 104   CO2 mmol/L 30 30 28   BUN mg/dL 23 22 17   CREATININE mg/dL 1 29 1 24 1 14   GLUCOSE RANDOM mg/dL 85 89 87   CALCIUM mg/dL 9 3 9 4 9 2

## 2017-11-26 NOTE — PLAN OF CARE
Problem: PAIN - ADULT  Goal: Verbalizes/displays adequate comfort level or baseline comfort level  Interventions:  - Encourage patient to monitor pain and request assistance  - Assess pain using appropriate pain scale  - Administer analgesics based on type and severity of pain and evaluate response  - Implement non-pharmacological measures as appropriate and evaluate response  - Consider cultural and social influences on pain and pain management  - Notify physician/advanced practitioner if interventions unsuccessful or patient reports new pain   Outcome: Progressing      Problem: INFECTION - ADULT  Goal: Absence or prevention of progression during hospitalization  INTERVENTIONS:  - Assess and monitor for signs and symptoms of infection  - Monitor lab/diagnostic results  - Monitor all insertion sites, i e  indwelling lines, tubes, and drains  - Monitor endotracheal (as able) and nasal secretions for changes in amount and color  - [place] appropriate cooling/warming therapies per order  - Administer medications as ordered  - Instruct and encourage patient and family to use good hand hygiene technique  - Identify and instruct in appropriate isolation precautions for identified infection/condition   Outcome: Progressing      Problem: SAFETY ADULT  Goal: Patient will remain free of falls  INTERVENTIONS:  - Assess patient frequently for physical needs  -  Identify cognitive and physical deficits and behaviors that affect risk of falls    -  [place] fall precautions as indicated by assessment   - Educate patient/family on patient safety including physical limitations  - Instruct patient to call for assistance with activity based on assessment  - Modify environment to reduce risk of injury  - Consider OT/PT consult to assist with strengthening/mobility    Outcome: Progressing    Goal: Maintain or return to baseline ADL function  INTERVENTIONS:  -  Assess patient's ability to carry out ADLs; assess patient's baseline for ADL function and identify physical deficits which impact ability to perform ADLs (bathing, care of mouth/teeth, toileting, grooming, dressing, etc )  - Assess/evaluate cause of self-care deficits   - Assess range of motion  - Assess patient's mobility; develop plan if impaired  - Assess patient's need for assistive devices and provide as appropriate  - Encourage maximum independence but intervene and supervise when necessary  ¯ Involve family in performance of ADLs  ¯ Assess for home care needs following discharge   ¯ Request OT consult to assist with ADL evaluation and planning for discharge  ¯ Provide patient education as appropriate   Outcome: Progressing    Goal: Maintain or return mobility status to optimal level  INTERVENTIONS:  - Assess patient's baseline mobility status (ambulation, transfers, stairs, etc )    - Identify cognitive and physical deficits and behaviors that affect mobility  - Identify mobility aids required to assist with transfers and/or ambulation (gait belt, sit-to-stand, lift, walker, cane, etc )  - [place] fall precautions as indicated by assessment  - Record patient progress and toleration of activity level on Mobility SBAR; progress patient to next Phase/Stage  - Instruct patient to call for assistance with activity based on assessment  - Request Rehabilitation consult to assist with strengthening/weightbearing, etc    Outcome: Progressing      Problem: DISCHARGE PLANNING  Goal: Discharge to home or other facility with appropriate resources  INTERVENTIONS:  - Identify barriers to discharge w/patient and caregiver  - Arrange for needed discharge resources and transportation as appropriate  - Identify discharge learning needs (meds, wound care, etc )  - Arrange for interpretive services to assist at discharge as needed  - Refer to Case Management Department for coordinating discharge planning if the patient needs post-hospital services based on physician/advanced practitioner order or complex needs related to functional status, cognitive ability, or social support system   Outcome: Progressing      Problem: Knowledge Deficit  Goal: Patient/family/caregiver demonstrates understanding of disease process, treatment plan, medications, and discharge instructions  Complete learning assessment and assess knowledge base  Interventions:  - Provide teaching at level of understanding  - Provide teaching via preferred learning methods   Outcome: Progressing      Problem: Prexisting or High Potential for Compromised Skin Integrity  Goal: Skin integrity is maintained or improved  INTERVENTIONS:  - Identify patients at risk for skin breakdown  - Assess and monitor skin integrity  - Assess and monitor nutrition and hydration status  - Monitor labs (i e  albumin)  - Assess for incontinence   - Turn and reposition patient  - Assist with mobility/ambulation  - Relieve pressure over bony prominences  - Avoid friction and shearing  - Provide appropriate hygiene as needed including keeping skin clean and dry  - Evaluate need for skin moisturizer/barrier cream  - Collaborate with interdisciplinary team (i e  Nutrition, Rehabilitation, etc )   - Patient/family teaching   Outcome: Progressing      Problem: Nutrition/Hydration-ADULT  Goal: Nutrient/Hydration intake appropriate for improving, restoring or maintaining nutritional needs  Monitor and assess patient's nutrition/hydration status for malnutrition (ex- brittle hair, bruises, dry skin, pale skin and conjunctiva, muscle wasting, smooth red tongue, and disorientation)  Collaborate with interdisciplinary team and initiate plan and interventions as ordered  Monitor patient's weight and dietary intake as ordered or per policy  Utilize nutrition screening tool and intervene per policy  Determine patient's food preferences and provide high-protein, high-caloric foods as appropriate       INTERVENTIONS:  - Monitor oral intake, urinary output, labs, and treatment plans  - Assess nutrition and hydration status and recommend course of action  - Evaluate amount of meals eaten  - Assist patient with eating if necessary   - Allow adequate time for meals  - Recommend/ encourage appropriate diets, oral nutritional supplements, and vitamin/mineral supplements  - Order, calculate, and assess calorie counts as needed  - Recommend, monitor, and adjust tube feedings and TPN/PPN based on assessed needs  - Assess need for intravenous fluids  - Provide specific nutrition/hydration education as appropriate  - Include patient/family/caregiver in decisions related to nutrition   Outcome: Progressing      Problem: DISCHARGE PLANNING - CARE MANAGEMENT  Goal: Discharge to post-acute care or home with appropriate resources  INTERVENTIONS:  - Conduct assessment to determine patient/family and health care team treatment goals, and need for post-acute services based on payer coverage, community resources, and patient preferences, and barriers to discharge  - Address psychosocial, clinical, and financial barriers to discharge as identified in assessment in conjunction with the patient/family and health care team  - Arrange appropriate level of post-acute services according to patient's   needs and preference and payer coverage in collaboration with the physician and health care team  - Communicate with and update the patient/family, physician, and health care team regarding progress on the discharge plan  - Arrange appropriate transportation to post-acute venues  -pt to d/c with appropriate resources when medically ready? Outcome: Progressing      Problem: Potential for Falls  Goal: Patient will remain free of falls  INTERVENTIONS:  - Assess patient frequently for physical needs  -  Identify cognitive and physical deficits and behaviors that affect risk of falls    -  [place] fall precautions as indicated by assessment   - Educate patient/family on patient safety including physical limitations  - Instruct patient to call for assistance with activity based on assessment  - Modify environment to reduce risk of injury  - Consider OT/PT consult to assist with strengthening/mobility    Outcome: Progressing      Problem: GASTROINTESTINAL - ADULT  Goal: Minimal or absence of nausea and/or vomiting  INTERVENTIONS:  - Administer IV fluids as ordered to ensure adequate hydration  - Maintain NPO status until nausea and vomiting are resolved  - Nasogastric tube as ordered  - Administer ordered antiemetic medications as needed  - Provide nonpharmacologic comfort measures as appropriate  - Advance diet as tolerated, if ordered  - Nutrition services referral to assist patient with adequate nutrition and appropriate food choices   Outcome: Progressing    Goal: Maintains or returns to baseline bowel function  INTERVENTIONS:  - Assess bowel function  - Encourage oral fluids to ensure adequate hydration  - Administer IV fluids as ordered to ensure adequate hydration  - Administer ordered medications as needed  - Encourage mobilization and activity  - Nutrition services referral to assist patient with appropriate food choices   Outcome: Progressing    Goal: Maintains adequate nutritional intake  INTERVENTIONS:  - Monitor percentage of each meal consumed  - Identify factors contributing to decreased intake, treat as appropriate  - Assist with meals as needed  - Monitor I&O, WT and lab values  - Obtain nutrition services referral as needed   Outcome: Progressing      Problem: GENITOURINARY - ADULT  Goal: Maintains or returns to baseline urinary function  INTERVENTIONS:  - Assess urinary function  - Encourage oral fluids to ensure adequate hydration  - Administer IV fluids as ordered to ensure adequate hydration  - Administer ordered medications as needed  - Offer frequent toileting  - Follow urinary retention protocol if ordered   Outcome: Progressing    Goal: Absence of urinary retention  INTERVENTIONS:  - Assess patients ability to void and empty bladder  - Monitor I/O  - Bladder scan as needed  - Discuss with physician/AP medications to alleviate retention as needed  - Discuss catheterization for long term situations as appropriate   Outcome: Progressing    Goal: Urinary catheter remains patent  INTERVENTIONS:  - Assess patency of urinary catheter  - If patient has a chronic jiménez, consider changing catheter if non-functioning  - Follow guidelines for intermittent irrigation of non-functioning urinary catheter   Outcome: Progressing      Problem: METABOLIC, FLUID AND ELECTROLYTES - ADULT  Goal: Electrolytes maintained within normal limits  INTERVENTIONS:  - Monitor labs and assess patient for signs and symptoms of electrolyte imbalances  - Administer electrolyte replacement as ordered  - Monitor response to electrolyte replacements, including repeat lab results as appropriate  - Instruct patient on fluid and nutrition as appropriate   Outcome: Progressing    Goal: Fluid balance maintained  INTERVENTIONS:  - Monitor labs and assess for signs and symptoms of volume excess or deficit  - Monitor I/O and WT  - Instruct patient on fluid and nutrition as appropriate   Outcome: Progressing    Goal: Glucose maintained within target range  INTERVENTIONS:  - Monitor Blood Glucose as ordered  - Assess for signs and symptoms of hyperglycemia and hypoglycemia  - Administer ordered medications to maintain glucose within target range  - Assess nutritional intake and initiate nutrition service referral as needed   Outcome: Progressing      Problem: SKIN/TISSUE INTEGRITY - ADULT  Goal: Skin integrity remains intact  INTERVENTIONS  - Identify patients at risk for skin breakdown  - Assess and monitor skin integrity  - Assess and monitor nutrition and hydration status  - Monitor labs (i e  albumin)  - Assess for incontinence   - Turn and reposition patient  - Assist with mobility/ambulation  - Relieve pressure over bony prominences  - Avoid friction and shearing  - Provide appropriate hygiene as needed including keeping skin clean and dry  - Evaluate need for skin moisturizer/barrier cream  - Collaborate with interdisciplinary team (i e  Nutrition, Rehabilitation, etc )   - Patient/family teaching   Outcome: Progressing    Goal: Incision(s), wounds(s) or drain site(s) healing without S/S of infection  INTERVENTIONS  - Assess and document risk factors for skin impairment   - Assess and document dressing, incision, wound bed, drain sites and surrounding tissue  - Initiate Nutrition services consult and/or wound management as needed   Outcome: Progressing    Goal: Oral mucous membranes remain intact  INTERVENTIONS  - Assess oral mucosa and hygiene practices  - Implement preventative oral hygiene regimen  - Implement oral medicated treatments as ordered  - Initiate Nutrition services referral as needed   Outcome: Progressing

## 2017-11-27 ENCOUNTER — APPOINTMENT (INPATIENT)
Dept: RADIOLOGY | Facility: HOSPITAL | Age: 42
DRG: 659 | End: 2017-11-27
Payer: COMMERCIAL

## 2017-11-27 LAB
ANION GAP SERPL CALCULATED.3IONS-SCNC: 6 MMOL/L (ref 4–13)
BASOPHILS # BLD AUTO: 0.02 THOUSANDS/ΜL (ref 0–0.1)
BASOPHILS NFR BLD AUTO: 0 % (ref 0–1)
BUN SERPL-MCNC: 22 MG/DL (ref 5–25)
CALCIUM SERPL-MCNC: 8.9 MG/DL (ref 8.3–10.1)
CHLORIDE SERPL-SCNC: 106 MMOL/L (ref 100–108)
CO2 SERPL-SCNC: 28 MMOL/L (ref 21–32)
CREAT SERPL-MCNC: 1.22 MG/DL (ref 0.6–1.3)
EOSINOPHIL # BLD AUTO: 0.42 THOUSAND/ΜL (ref 0–0.61)
EOSINOPHIL NFR BLD AUTO: 7 % (ref 0–6)
ERYTHROCYTE [DISTWIDTH] IN BLOOD BY AUTOMATED COUNT: 14.9 % (ref 11.6–15.1)
GFR SERPL CREATININE-BSD FRML MDRD: 73 ML/MIN/1.73SQ M
GLUCOSE SERPL-MCNC: 84 MG/DL (ref 65–140)
HCT VFR BLD AUTO: 31.1 % (ref 36.5–49.3)
HGB BLD-MCNC: 9.8 G/DL (ref 12–17)
INR PPP: 0.99 (ref 0.86–1.16)
LYMPHOCYTES # BLD AUTO: 1.5 THOUSANDS/ΜL (ref 0.6–4.47)
LYMPHOCYTES NFR BLD AUTO: 23 % (ref 14–44)
MAGNESIUM SERPL-MCNC: 2 MG/DL (ref 1.6–2.6)
MCH RBC QN AUTO: 28.6 PG (ref 26.8–34.3)
MCHC RBC AUTO-ENTMCNC: 31.5 G/DL (ref 31.4–37.4)
MCV RBC AUTO: 91 FL (ref 82–98)
MONOCYTES # BLD AUTO: 0.76 THOUSAND/ΜL (ref 0.17–1.22)
MONOCYTES NFR BLD AUTO: 12 % (ref 4–12)
NEUTROPHILS # BLD AUTO: 3.78 THOUSANDS/ΜL (ref 1.85–7.62)
NEUTS SEG NFR BLD AUTO: 58 % (ref 43–75)
NRBC BLD AUTO-RTO: 0 /100 WBCS
PLATELET # BLD AUTO: 316 THOUSANDS/UL (ref 149–390)
PMV BLD AUTO: 8.1 FL (ref 8.9–12.7)
POTASSIUM SERPL-SCNC: 4.2 MMOL/L (ref 3.5–5.3)
PROTHROMBIN TIME: 13.1 SECONDS (ref 12.1–14.4)
RBC # BLD AUTO: 3.43 MILLION/UL (ref 3.88–5.62)
SODIUM SERPL-SCNC: 140 MMOL/L (ref 136–145)
WBC # BLD AUTO: 6.5 THOUSAND/UL (ref 4.31–10.16)

## 2017-11-27 PROCEDURE — 85610 PROTHROMBIN TIME: CPT | Performed by: SURGERY

## 2017-11-27 PROCEDURE — 80048 BASIC METABOLIC PNL TOTAL CA: CPT | Performed by: SURGERY

## 2017-11-27 PROCEDURE — 50431 NJX PX NFROSGRM &/URTRGRM: CPT

## 2017-11-27 PROCEDURE — BT111ZZ FLUOROSCOPY OF RIGHT KIDNEY USING LOW OSMOLAR CONTRAST: ICD-10-PCS | Performed by: RADIOLOGY

## 2017-11-27 PROCEDURE — 83735 ASSAY OF MAGNESIUM: CPT | Performed by: SURGERY

## 2017-11-27 PROCEDURE — 85025 COMPLETE CBC W/AUTO DIFF WBC: CPT | Performed by: SURGERY

## 2017-11-27 RX ADMIN — IOHEXOL 35 ML: 300 INJECTION, SOLUTION INTRAVENOUS at 11:22

## 2017-11-27 RX ADMIN — HEPARIN SODIUM 5000 UNITS: 5000 INJECTION, SOLUTION INTRAVENOUS; SUBCUTANEOUS at 13:19

## 2017-11-27 RX ADMIN — SODIUM CHLORIDE 100 ML/HR: 0.9 INJECTION, SOLUTION INTRAVENOUS at 01:16

## 2017-11-27 RX ADMIN — Medication 150 MG: at 09:06

## 2017-11-27 RX ADMIN — TAMSULOSIN HYDROCHLORIDE 0.4 MG: 0.4 CAPSULE ORAL at 18:07

## 2017-11-27 RX ADMIN — HEPARIN SODIUM 5000 UNITS: 5000 INJECTION, SOLUTION INTRAVENOUS; SUBCUTANEOUS at 22:25

## 2017-11-27 RX ADMIN — AMLODIPINE BESYLATE 5 MG: 5 TABLET ORAL at 09:06

## 2017-11-27 NOTE — CASE MANAGEMENT
Continued Stay Review    Date: 11/27    Vital Signs: /53   Pulse 61   Temp 99 °F (37 2 °C) (Oral)   Resp 18   Ht 5' 6" (1 676 m)   Wt 89 2 kg (196 lb 10 4 oz)   SpO2 99%   BMI 31 74 kg/m²     Medications:   Scheduled Meds:   acetaminophen 975 mg Oral Q8H   amLODIPine 5 mg Oral Daily   heparin (porcine) 5,000 Units Subcutaneous Q8H [address]   iron polysaccharides 150 mg Oral Daily   senna-docusate sodium 1 tablet Oral BID   tamsulosin 0 4 mg Oral Daily With Dinner     Continuous Infusions:   sodium chloride 100 mL/hr Last Rate: Stopped (11/27/17 1506)     PRN Meds: calcium carbonate    hydrALAZINE    HYDROmorphone    influenza vaccine    oxyCODONE    Abnormal Labs/Diagnostic Results:   11/27 IR Tube check    Updated: 11/27/17 0708        WBC 6 50 4 31 - 10 16 Thousand/uL     RBC 3 43 (L) 3 88 - 5 62 Million/uL     Hemoglobin 9 8 (L) 12 0 - 17 0 g/dL     Hematocrit 31 1 (L) 36 5 - 49 3 %        Age/Sex: 43 y o  male     Assessment:  43 M s/p left nephrectomy, L ureter ligation, right PCN, left ureteral leak, deflux,  Right PCN capped, jiménez removed      Plan:  NPO since mn  PCN check/removal today  Diet as tolerated post-procedure  Dispo    Discharge Plan: Return to [address] [place] [address] when medically cleared

## 2017-11-27 NOTE — PHYSICAL THERAPY NOTE
Physical Therapy Cancellation Note  The pt  Left the floor to IR  Will continue to follow      Rich Obando, ADAN

## 2017-11-27 NOTE — OCCUPATIONAL THERAPY NOTE
OCCUPATIONAL THERAPY CANCEL NOTE:    PT OFF THE FLOOR IN IR FOR PCN CHECK/REMOVAL  WILL CONT TO FOLLOW AS ABLE      Leslie Diacik, MOT, OTR/L

## 2017-11-27 NOTE — PROGRESS NOTES
Progress Note - Abby Serrato 43 y o  male MRN: [id number]    Unit/Bed#: [facility] 916-01 Encounter: [id number]      Assessment:  Mr Tray Sood is an incarcerated 49-year-old male admitted to Trauma team after suffering left renal fracture status post left nephrectomy  Patient is known to our group for history of bilateral hydronephrosis  Patient developed ureteral leak postoperatively as well as refractory right hydronephrosis status post Thakur catheter placement; in addition to right percutaneous nephrostomy as indicated for renal decompression  Creatinine is normalized since nephrostomy placement  Recent nephrostomy on November 21st is positive for distal right ureteral stricture without significant stenosis and therefore percutaneous nephrostomy was clamped for trial     Plan: Thaukr catheter was removed secondary to negative cystogram findings  IR percutaneous stent placement on hold for now  Will proceed with nuclear medicine Lasix renogram   If negative, may just remove nephrostomy  Subjective:   No fever or chills    Objective:     Vitals: Blood pressure 110/53, pulse 61, temperature 99 °F (37 2 °C), temperature source Oral, resp  rate 18, height 5' 6" (1 676 m), weight 89 2 kg (196 lb 10 4 oz), SpO2 99 %  ,Body mass index is 31 74 kg/m²        Intake/Output Summary (Last 24 hours) at 11/27/17 1630  Last data filed at 11/27/17 1300   Gross per 24 hour   Intake             1015 ml   Output             1225 ml   Net             -210 ml       Physical Exam: General appearance: alert, appears stated age, cooperative and no distress  Head: Normocephalic, without obvious abnormality, atraumatic  Neck: no adenopathy, no carotid bruit, no JVD, supple, symmetrical, trachea midline and thyroid not enlarged, symmetric, no tenderness/mass/nodules  Lungs: clear to auscultation bilaterally  Heart: regular rate and rhythm, S1, S2 normal, no murmur, click, rub or gallop  Abdomen: soft, non-tender; bowel sounds normal; no masses,  no organomegaly  Extremities: extremities normal, atraumatic, no cyanosis or edema  Pulses: 2+ and symmetric  Neurologic: Grossly normal  Thakur patent for clear sean urine     Invasive Devices     Peripheral Intravenous Line            Peripheral IV 11/27/17 Right Arm less than 1 day          Drain            Nephrostomy Right 1 10 2 Fr  18 days              Lab Results   Component Value Date    WBC 6 50 11/27/2017    HGB 9 8 (L) 11/27/2017    HCT 31 1 (L) 11/27/2017    MCV 91 11/27/2017     11/27/2017     Lab Results   Component Value Date    GLUCOSE 84 11/27/2017    CALCIUM 8 9 11/27/2017     11/27/2017    K 4 2 11/27/2017    CO2 28 11/27/2017     11/27/2017    BUN 22 11/27/2017    CREATININE 1 22 11/27/2017       Lab, Imaging and other studies: I have personally reviewed pertinent reports

## 2017-11-27 NOTE — PROGRESS NOTES
Progress Note - General Surgery   Roque Dodd 43 y o  male MRN: [id number]  [facility] Encounter: [id number]    Assessment:  43 M s/p left nephrectomy, L ureter ligation, right PCN, left ureteral leak, deflux,  Right PCN capped, jiménez removed  Plan:  NPO since mn  PCN check/removal today  Diet as tolerated post-procedure  Dispo    Subjective/Objective     Subjective: No acute events  Voiding with less pain/discomfort  Some abdominal pain with heparin shots  Objective:    Blood pressure 120/63, pulse 58, temperature 98 1 °F (36 7 °C), temperature source Oral, resp  rate 18, height 5' 6" (1 676 m), weight 89 2 kg (196 lb 10 4 oz), SpO2 97 %  ,Body mass index is 31 74 kg/m²  Intake/Output Summary (Last 24 hours) at 11/27/17 0614  Last data filed at 11/27/17 0501   Gross per 24 hour   Intake              780 ml   Output             1275 ml   Net             -495 ml       Invasive Devices     Drain            Nephrostomy Right 1 10 2 Fr  17 days                Physical Exam:   General: NAD, AAOx3  CV: RRR +S1/S2  Chest: breath sounds bilaterally  Abdomen: soft, NT ND, incision c/d/i with staples   Right PCN in place  Extremities: atraumatic, no edema        Results from last 7 days  Lab Units 11/24/17  0503 11/23/17  0441 11/21/17  0500   WBC Thousand/uL 7 50 7 59 8 49   HEMOGLOBIN g/dL 9 9* 9 9* 9 0*   HEMATOCRIT % 30 5* 30 7* 27 8*   PLATELETS Thousands/uL 465* 551* 606*       Results from last 7 days  Lab Units 11/27/17  0451 11/24/17  0503 11/23/17  0441   SODIUM mmol/L 140 140 140   POTASSIUM mmol/L 4 2 4 3 4 5   CHLORIDE mmol/L 106 105 105   CO2 mmol/L 28 30 30   BUN mg/dL 22 23 22   CREATININE mg/dL 1 22 1 29 1 24   GLUCOSE RANDOM mg/dL 84 85 89   CALCIUM mg/dL 8 9 9 3 9 4       Results from last 7 days  Lab Units 11/27/17  0451   INR  0 99

## 2017-11-28 ENCOUNTER — APPOINTMENT (INPATIENT)
Dept: RADIOLOGY | Facility: HOSPITAL | Age: 42
DRG: 659 | End: 2017-11-28
Payer: COMMERCIAL

## 2017-11-28 PROCEDURE — A9562 TC99M MERTIATIDE: HCPCS

## 2017-11-28 PROCEDURE — 78708 K FLOW/FUNCT IMAGE W/DRUG: CPT

## 2017-11-28 RX ORDER — FUROSEMIDE 10 MG/ML
INJECTION INTRAMUSCULAR; INTRAVENOUS
Status: COMPLETED
Start: 2017-11-28 | End: 2017-11-28

## 2017-11-28 RX ADMIN — Medication 150 MG: at 08:11

## 2017-11-28 RX ADMIN — FUROSEMIDE 20 MG: 10 INJECTION, SOLUTION INTRAMUSCULAR; INTRAVENOUS at 15:22

## 2017-11-28 RX ADMIN — HEPARIN SODIUM 5000 UNITS: 5000 INJECTION, SOLUTION INTRAVENOUS; SUBCUTANEOUS at 05:00

## 2017-11-28 RX ADMIN — HEPARIN SODIUM 5000 UNITS: 5000 INJECTION, SOLUTION INTRAVENOUS; SUBCUTANEOUS at 22:49

## 2017-11-28 RX ADMIN — TAMSULOSIN HYDROCHLORIDE 0.4 MG: 0.4 CAPSULE ORAL at 18:06

## 2017-11-28 RX ADMIN — AMLODIPINE BESYLATE 5 MG: 5 TABLET ORAL at 08:12

## 2017-11-28 NOTE — PROGRESS NOTES
Progress Note - General Surgery   Aleisha Rice 43 y o  male MRN: [id number]  Unit/Bed#: [facility] 916-01 Encounter: [id number]    Assessment:  43 M s/p left nephrectomy, L ureter ligation, right PCN, left ureteral leak, deflux,  Right PCN capped    Plan:  nuclear study, plan to d/c if PCN is normal  PCN checked yesterday  Diet as tolerated post-procedure  Dispo    Subjective/Objective     Subjective: No acute events  Voiding with less pain/discomfort  Objective:    Blood pressure 129/85, pulse 63, temperature 98 6 °F (37 °C), temperature source Oral, resp  rate 16, height 5' 6" (1 676 m), weight 89 2 kg (196 lb 10 4 oz), SpO2 98 %  ,Body mass index is 31 74 kg/m²  Intake/Output Summary (Last 24 hours) at 11/28/17 0618  Last data filed at 11/27/17 1900   Gross per 24 hour   Intake              960 ml   Output              675 ml   Net              285 ml       Invasive Devices     Peripheral Intravenous Line            Peripheral IV 11/27/17 Right Arm 1 day          Drain            Nephrostomy Right 1 10 2 Fr  18 days                Physical Exam:   General: NAD, AAOx3  CV: RRR +S1/S2  Chest: breath sounds bilaterally  Abdomen: soft, NT ND, incision c/d/i with staples   Right PCN in place  Extremities: atraumatic, no edema        Results from last 7 days  Lab Units 11/27/17 0451 11/24/17  0503 11/23/17  0441   WBC Thousand/uL 6 50 7 50 7 59   HEMOGLOBIN g/dL 9 8* 9 9* 9 9*   HEMATOCRIT % 31 1* 30 5* 30 7*   PLATELETS Thousands/uL 316 465* 551*       Results from last 7 days  Lab Units 11/27/17 0451 11/24/17  0503 11/23/17  0441   SODIUM mmol/L 140 140 140   POTASSIUM mmol/L 4 2 4 3 4 5   CHLORIDE mmol/L 106 105 105   CO2 mmol/L 28 30 30   BUN mg/dL 22 23 22   CREATININE mg/dL 1 22 1 29 1 24   GLUCOSE RANDOM mg/dL 84 85 89   CALCIUM mg/dL 8 9 9 3 9 4       Results from last 7 days  Lab Units 11/27/17  0451   INR  0 99

## 2017-11-29 RX ADMIN — ACETAMINOPHEN 975 MG: 325 TABLET ORAL at 22:48

## 2017-11-29 RX ADMIN — TAMSULOSIN HYDROCHLORIDE 0.4 MG: 0.4 CAPSULE ORAL at 18:50

## 2017-11-29 RX ADMIN — HEPARIN SODIUM 5000 UNITS: 5000 INJECTION, SOLUTION INTRAVENOUS; SUBCUTANEOUS at 13:32

## 2017-11-29 RX ADMIN — AMLODIPINE BESYLATE 5 MG: 5 TABLET ORAL at 08:03

## 2017-11-29 RX ADMIN — OXYCODONE HYDROCHLORIDE 10 MG: 10 TABLET ORAL at 18:51

## 2017-11-29 RX ADMIN — HEPARIN SODIUM 5000 UNITS: 5000 INJECTION, SOLUTION INTRAVENOUS; SUBCUTANEOUS at 22:48

## 2017-11-29 RX ADMIN — HEPARIN SODIUM 5000 UNITS: 5000 INJECTION, SOLUTION INTRAVENOUS; SUBCUTANEOUS at 06:05

## 2017-11-29 RX ADMIN — Medication 150 MG: at 08:03

## 2017-11-29 NOTE — PROGRESS NOTES
Progress Note - General Surgery   Gisella You 43 y o  male MRN: [id number]  Unit/Bed#: [facility] 916-01 Encounter: [id number]    Assessment:  43 M s/p left nephrectomy, L ureter ligation, right PCN, left ureteral leak, deflux,  Right PCN capped    Plan:  nuclear study yesterday  Waiting for urology recs  PCN capped  Diet as tolerated post-procedure  Dispo    Subjective/Objective     Subjective: Patient seen and examined at his bed  Discomfort while voiding , otherwise ALVIN  Objective:    Blood pressure 125/80, pulse 71, temperature 98 9 °F (37 2 °C), temperature source Oral, resp  rate 18, height 5' 6" (1 676 m), weight 89 2 kg (196 lb 10 4 oz), SpO2 99 %  ,Body mass index is 31 74 kg/m²  Intake/Output Summary (Last 24 hours) at 11/29/17 0604  Last data filed at 11/28/17 2107   Gross per 24 hour   Intake              960 ml   Output             1300 ml   Net             -340 ml       Invasive Devices     Peripheral Intravenous Line            Peripheral IV 11/27/17 Right Arm 1 day          Drain            Nephrostomy Right 1 10 2 Fr  19 days                Physical Exam:   General: NAD, AAOx3  CV: RRR +S1/S2  Chest: breath sounds bilaterally  Abdomen: soft, NT ND, incision c/d/i with staples   Right PCN in place  Extremities: atraumatic, no edema        Results from last 7 days  Lab Units 11/27/17 0451 11/24/17  0503 11/23/17  0441   WBC Thousand/uL 6 50 7 50 7 59   HEMOGLOBIN g/dL 9 8* 9 9* 9 9*   HEMATOCRIT % 31 1* 30 5* 30 7*   PLATELETS Thousands/uL 316 465* 551*       Results from last 7 days  Lab Units 11/27/17  0451 11/24/17  0503 11/23/17  0441   SODIUM mmol/L 140 140 140   POTASSIUM mmol/L 4 2 4 3 4 5   CHLORIDE mmol/L 106 105 105   CO2 mmol/L 28 30 30   BUN mg/dL 22 23 22   CREATININE mg/dL 1 22 1 29 1 24   GLUCOSE RANDOM mg/dL 84 85 89   CALCIUM mg/dL 8 9 9 3 9 4       Results from last 7 days  Lab Units 11/27/17  0451   INR  0 99          Current Facility-Administered Medications:     acetaminophen (TYLENOL) tablet 975 mg, 975 mg, Oral, Q8H, Colt Janet, DO, Stopped at 11/23/17 2330    amLODIPine (NORVASC) tablet 5 mg, 5 mg, Oral, Daily, Venancio Amor PA-C, 5 mg at 11/28/17 1597    calcium carbonate (TUMS) chewable tablet 500 mg, 500 mg, Oral, Daily PRN, Pedrito Adams MD, 500 mg at 11/10/17 2043    heparin (porcine) subcutaneous injection 5,000 Units, 5,000 Units, Subcutaneous, Q8H [facility], Venancio Amor PA-C, 5,000 Units at 11/29/17 0605    hydrALAZINE (APRESOLINE) injection 10 mg, 10 mg, Intravenous, Q6H PRN, Isabella Burch PA-C, 10 mg at 11/04/17 0313    HYDROmorphone (DILAUDID) 1 mg/mL injection 1 mg, 1 mg, Intravenous, Q2H PRN, Artie Conchis, 1 mg at 11/09/17 5899    influenza inactivated quadrivalent vaccine (FLULAVAL) IM injection 0 5 mL, 0 5 mL, Intramuscular, Prior to discharge, Jacque Cockayne, MD    iron polysaccharides (NIFEREX) capsule 150 mg, 150 mg, Oral, Daily, Dayana Delgadillo MD, 150 mg at 11/28/17 2998    oxyCODONE (ROXICODONE) immediate release tablet 10 mg, 10 mg, Oral, Q4H PRN, Artie Conchis, 10 mg at 11/15/17 1733    oxyCODONE (ROXICODONE) IR tablet 5 mg, 5 mg, Oral, Q4H PRN, Oneida Miller MD, 5 mg at 11/19/17 1444    senna-docusate sodium (SENOKOT S) 8 6-50 mg per tablet 1 tablet, 1 tablet, Oral, BID, Venancio Amor PA-C, Stopped at 11/16/17 0813    tamsulosin (FLOMAX) capsule 0 4 mg, 0 4 mg, Oral, Daily With Diana Infante DO, 0 4 mg at 11/28/17 1806

## 2017-11-29 NOTE — PHYSICAL THERAPY NOTE
Physical Therapy Cancellation Note  Attempted to see pt for PT visit  Pt refused stating he was not willing to ambulate in renee with maura  Per officer at bedside, pt must ambulate with BLE maura donned  Unable to encourage pt  Will continue to follow    Kathy Weaver, PT

## 2017-11-29 NOTE — PROGRESS NOTES
Progress Note - Terra Whyte 43 y o  male MRN: [id number]    Unit/Bed#: [facility] 916-01 Encounter: [id number]      Assessment:  Mr Guille Rush is an incarcerated 80-year-old male admitted to Trauma team after suffering left renal fracture status post left nephrectomy  Patient is known to our group for history of bilateral hydronephrosis  Patient developed ureteral leak postoperatively as well as refractory right hydronephrosis status post Thakur catheter placement; in addition to right percutaneous nephrostomy as indicated for renal decompression  Creatinine is normalized since nephrostomy placement  Recent nephrostomy on November 21st is positive for distal right ureteral stricture without significant stenosis and therefore percutaneous nephrostomy was clamped for trial     Plan:  Creatinine has remained intact since time of clamping  Interval Lasix renogram has demonstrated delayed excretion  However, in light of previous will proceed with removal of nephrostomy tube  IR contacted  Patient is offering complaints of sensation of incomplete bladder emptying  Obtain PVRs and not random bladder scans  Father record, patient has history of atonic bladder and not prostatic obstruction  Subjective:   No fever or chills    Objective:     Vitals: Blood pressure 125/64, pulse 63, temperature 99 3 °F (37 4 °C), temperature source Oral, resp  rate 18, height 5' 6" (1 676 m), weight 89 2 kg (196 lb 10 4 oz), SpO2 99 %  ,Body mass index is 31 74 kg/m²        Intake/Output Summary (Last 24 hours) at 11/29/17 1102  Last data filed at 11/29/17 0901   Gross per 24 hour   Intake             1380 ml   Output             1350 ml   Net               30 ml       Physical Exam: General appearance: alert, appears stated age, cooperative and no distress  Head: Normocephalic, without obvious abnormality, atraumatic  Neck: no adenopathy, no carotid bruit, no JVD, supple, symmetrical, trachea midline and thyroid not enlarged, symmetric, no tenderness/mass/nodules  Lungs: clear to auscultation bilaterally  Heart: regular rate and rhythm, S1, S2 normal, no murmur, click, rub or gallop  Abdomen: soft, non-tender; bowel sounds normal; no masses,  no organomegaly  Extremities: extremities normal, atraumatic, no cyanosis or edema  Pulses: 2+ and symmetric  Neurologic: Grossly normal  Thakur patent for clear sean urine     Invasive Devices     Peripheral Intravenous Line            Peripheral IV 11/27/17 Right Arm 2 days          Drain            Nephrostomy Right 1 10 2 Fr  19 days              Lab Results   Component Value Date    WBC 6 50 11/27/2017    HGB 9 8 (L) 11/27/2017    HCT 31 1 (L) 11/27/2017    MCV 91 11/27/2017     11/27/2017     Lab Results   Component Value Date    GLUCOSE 84 11/27/2017    CALCIUM 8 9 11/27/2017     11/27/2017    K 4 2 11/27/2017    CO2 28 11/27/2017     11/27/2017    BUN 22 11/27/2017    CREATININE 1 22 11/27/2017       Lab, Imaging and other studies: I have personally reviewed pertinent reports

## 2017-11-30 ENCOUNTER — APPOINTMENT (INPATIENT)
Dept: RADIOLOGY | Facility: HOSPITAL | Age: 42
DRG: 659 | End: 2017-11-30
Payer: COMMERCIAL

## 2017-11-30 ENCOUNTER — GENERIC CONVERSION - ENCOUNTER (OUTPATIENT)
Dept: OTHER | Facility: OTHER | Age: 42
End: 2017-11-30

## 2017-11-30 PROBLEM — R65.20 SEVERE SEPSIS (HCC): Status: ACTIVE | Noted: 2017-11-30

## 2017-11-30 PROBLEM — A41.9 SEVERE SEPSIS (HCC): Status: ACTIVE | Noted: 2017-11-30

## 2017-11-30 LAB
ANION GAP SERPL CALCULATED.3IONS-SCNC: 11 MMOL/L (ref 4–13)
ANION GAP SERPL CALCULATED.3IONS-SCNC: 8 MMOL/L (ref 4–13)
ANION GAP SERPL CALCULATED.3IONS-SCNC: 8 MMOL/L (ref 4–13)
ARTERIAL PATENCY WRIST A: YES
BASE EXCESS BLDA CALC-SCNC: -5.1 MMOL/L
BASOPHILS # BLD AUTO: 0.02 THOUSANDS/ΜL (ref 0–0.1)
BASOPHILS NFR BLD AUTO: 0 % (ref 0–1)
BUN SERPL-MCNC: 24 MG/DL (ref 5–25)
BUN SERPL-MCNC: 25 MG/DL (ref 5–25)
BUN SERPL-MCNC: 36 MG/DL (ref 5–25)
CALCIUM SERPL-MCNC: 8.2 MG/DL (ref 8.3–10.1)
CALCIUM SERPL-MCNC: 8.9 MG/DL (ref 8.3–10.1)
CALCIUM SERPL-MCNC: 9.1 MG/DL (ref 8.3–10.1)
CHLORIDE SERPL-SCNC: 103 MMOL/L (ref 100–108)
CHLORIDE SERPL-SCNC: 105 MMOL/L (ref 100–108)
CHLORIDE SERPL-SCNC: 106 MMOL/L (ref 100–108)
CO2 SERPL-SCNC: 22 MMOL/L (ref 21–32)
CO2 SERPL-SCNC: 25 MMOL/L (ref 21–32)
CO2 SERPL-SCNC: 26 MMOL/L (ref 21–32)
CREAT FLD-MCNC: >20 MG/DL
CREAT FLD-MCNC: >20 MG/DL
CREAT SERPL-MCNC: 1.62 MG/DL (ref 0.6–1.3)
CREAT SERPL-MCNC: 1.94 MG/DL (ref 0.6–1.3)
CREAT SERPL-MCNC: 3.1 MG/DL (ref 0.6–1.3)
EOSINOPHIL # BLD AUTO: 0 THOUSAND/ΜL (ref 0–0.61)
EOSINOPHIL NFR BLD AUTO: 0 % (ref 0–6)
ERYTHROCYTE [DISTWIDTH] IN BLOOD BY AUTOMATED COUNT: 15.2 % (ref 11.6–15.1)
ERYTHROCYTE [DISTWIDTH] IN BLOOD BY AUTOMATED COUNT: 15.3 % (ref 11.6–15.1)
ERYTHROCYTE [DISTWIDTH] IN BLOOD BY AUTOMATED COUNT: 15.4 % (ref 11.6–15.1)
GFR SERPL CREATININE-BSD FRML MDRD: 24 ML/MIN/1.73SQ M
GFR SERPL CREATININE-BSD FRML MDRD: 41 ML/MIN/1.73SQ M
GFR SERPL CREATININE-BSD FRML MDRD: 52 ML/MIN/1.73SQ M
GLUCOSE SERPL-MCNC: 118 MG/DL (ref 65–140)
GLUCOSE SERPL-MCNC: 126 MG/DL (ref 65–140)
GLUCOSE SERPL-MCNC: 140 MG/DL (ref 65–140)
GLUCOSE SERPL-MCNC: 164 MG/DL (ref 65–140)
HCO3 BLDA-SCNC: 17.9 MMOL/L (ref 22–28)
HCT VFR BLD AUTO: 31.7 % (ref 36.5–49.3)
HCT VFR BLD AUTO: 35.4 % (ref 36.5–49.3)
HCT VFR BLD AUTO: 36.4 % (ref 36.5–49.3)
HGB BLD-MCNC: 10.3 G/DL (ref 12–17)
HGB BLD-MCNC: 11.7 G/DL (ref 12–17)
HGB BLD-MCNC: 11.7 G/DL (ref 12–17)
INR PPP: 1.12 (ref 0.86–1.16)
LACTATE SERPL-SCNC: 1.2 MMOL/L (ref 0.5–2)
LACTATE SERPL-SCNC: 3.2 MMOL/L (ref 0.5–2)
LACTATE SERPL-SCNC: 3.3 MMOL/L (ref 0.5–2)
LYMPHOCYTES # BLD AUTO: 1.64 THOUSANDS/ΜL (ref 0.6–4.47)
LYMPHOCYTES NFR BLD AUTO: 7 % (ref 14–44)
MCH RBC QN AUTO: 29.2 PG (ref 26.8–34.3)
MCH RBC QN AUTO: 29.5 PG (ref 26.8–34.3)
MCH RBC QN AUTO: 29.7 PG (ref 26.8–34.3)
MCHC RBC AUTO-ENTMCNC: 32.1 G/DL (ref 31.4–37.4)
MCHC RBC AUTO-ENTMCNC: 32.5 G/DL (ref 31.4–37.4)
MCHC RBC AUTO-ENTMCNC: 33.1 G/DL (ref 31.4–37.4)
MCV RBC AUTO: 90 FL (ref 82–98)
MCV RBC AUTO: 91 FL (ref 82–98)
MCV RBC AUTO: 91 FL (ref 82–98)
MONOCYTES # BLD AUTO: 2 THOUSAND/ΜL (ref 0.17–1.22)
MONOCYTES NFR BLD AUTO: 8 % (ref 4–12)
NEUTROPHILS # BLD AUTO: 21.37 THOUSANDS/ΜL (ref 1.85–7.62)
NEUTS SEG NFR BLD AUTO: 85 % (ref 43–75)
NON VENT ROOM AIR: 21 %
NRBC BLD AUTO-RTO: 0 /100 WBCS
O2 CT BLDA-SCNC: 14.7 ML/DL (ref 16–23)
OXYHGB MFR BLDA: >100 % (ref 94–97)
PCO2 BLDA: 27 MM HG (ref 36–44)
PH BLDA: 7.44 [PH] (ref 7.35–7.45)
PLATELET # BLD AUTO: 317 THOUSANDS/UL (ref 149–390)
PLATELET # BLD AUTO: 327 THOUSANDS/UL (ref 149–390)
PLATELET # BLD AUTO: 349 THOUSANDS/UL (ref 149–390)
PMV BLD AUTO: 8.2 FL (ref 8.9–12.7)
PMV BLD AUTO: 8.6 FL (ref 8.9–12.7)
PMV BLD AUTO: 8.9 FL (ref 8.9–12.7)
PO2 BLDA: 85.8 MM HG (ref 75–129)
POTASSIUM SERPL-SCNC: 4.3 MMOL/L (ref 3.5–5.3)
POTASSIUM SERPL-SCNC: 4.4 MMOL/L (ref 3.5–5.3)
POTASSIUM SERPL-SCNC: 5.5 MMOL/L (ref 3.5–5.3)
PROTHROMBIN TIME: 14.4 SECONDS (ref 12.1–14.4)
RBC # BLD AUTO: 3.49 MILLION/UL (ref 3.88–5.62)
RBC # BLD AUTO: 3.94 MILLION/UL (ref 3.88–5.62)
RBC # BLD AUTO: 4.01 MILLION/UL (ref 3.88–5.62)
SODIUM SERPL-SCNC: 137 MMOL/L (ref 136–145)
SODIUM SERPL-SCNC: 138 MMOL/L (ref 136–145)
SODIUM SERPL-SCNC: 139 MMOL/L (ref 136–145)
SPECIMEN SOURCE: ABNORMAL
WBC # BLD AUTO: 13.78 THOUSAND/UL (ref 4.31–10.16)
WBC # BLD AUTO: 14.46 THOUSAND/UL (ref 4.31–10.16)
WBC # BLD AUTO: 25.15 THOUSAND/UL (ref 4.31–10.16)

## 2017-11-30 PROCEDURE — 94660 CPAP INITIATION&MGMT: CPT

## 2017-11-30 PROCEDURE — 85025 COMPLETE CBC W/AUTO DIFF WBC: CPT | Performed by: SURGERY

## 2017-11-30 PROCEDURE — 36600 WITHDRAWAL OF ARTERIAL BLOOD: CPT

## 2017-11-30 PROCEDURE — 87205 SMEAR GRAM STAIN: CPT | Performed by: SURGERY

## 2017-11-30 PROCEDURE — C1729 CATH, DRAINAGE: HCPCS

## 2017-11-30 PROCEDURE — 85027 COMPLETE CBC AUTOMATED: CPT | Performed by: SURGERY

## 2017-11-30 PROCEDURE — 87040 BLOOD CULTURE FOR BACTERIA: CPT | Performed by: SURGERY

## 2017-11-30 PROCEDURE — 80048 BASIC METABOLIC PNL TOTAL CA: CPT | Performed by: SURGERY

## 2017-11-30 PROCEDURE — 49406 IMAGE CATH FLUID PERI/RETRO: CPT

## 2017-11-30 PROCEDURE — C1769 GUIDE WIRE: HCPCS

## 2017-11-30 PROCEDURE — 82948 REAGENT STRIP/BLOOD GLUCOSE: CPT

## 2017-11-30 PROCEDURE — 02H633Z INSERTION OF INFUSION DEVICE INTO RIGHT ATRIUM, PERCUTANEOUS APPROACH: ICD-10-PCS | Performed by: SURGERY

## 2017-11-30 PROCEDURE — 83605 ASSAY OF LACTIC ACID: CPT | Performed by: SURGERY

## 2017-11-30 PROCEDURE — 94760 N-INVAS EAR/PLS OXIMETRY 1: CPT

## 2017-11-30 PROCEDURE — 36620 INSERTION CATHETER ARTERY: CPT

## 2017-11-30 PROCEDURE — 82805 BLOOD GASES W/O2 SATURATION: CPT | Performed by: SURGERY

## 2017-11-30 PROCEDURE — 80048 BASIC METABOLIC PNL TOTAL CA: CPT | Performed by: INTERNAL MEDICINE

## 2017-11-30 PROCEDURE — 87070 CULTURE OTHR SPECIMN AEROBIC: CPT | Performed by: SURGERY

## 2017-11-30 PROCEDURE — 85610 PROTHROMBIN TIME: CPT | Performed by: SURGERY

## 2017-11-30 PROCEDURE — 74176 CT ABD & PELVIS W/O CONTRAST: CPT

## 2017-11-30 PROCEDURE — 82570 ASSAY OF URINE CREATININE: CPT | Performed by: SURGERY

## 2017-11-30 PROCEDURE — 71010 HB CHEST X-RAY 1 VIEW FRONTAL (PORTABLE): CPT

## 2017-11-30 RX ORDER — SODIUM CHLORIDE, SODIUM GLUCONATE, SODIUM ACETATE, POTASSIUM CHLORIDE, MAGNESIUM CHLORIDE, SODIUM PHOSPHATE, DIBASIC, AND POTASSIUM PHOSPHATE .53; .5; .37; .037; .03; .012; .00082 G/100ML; G/100ML; G/100ML; G/100ML; G/100ML; G/100ML; G/100ML
1000 INJECTION, SOLUTION INTRAVENOUS ONCE
Status: COMPLETED | OUTPATIENT
Start: 2017-11-30 | End: 2017-12-03

## 2017-11-30 RX ORDER — MIDAZOLAM HYDROCHLORIDE 1 MG/ML
INJECTION INTRAMUSCULAR; INTRAVENOUS CODE/TRAUMA/SEDATION MEDICATION
Status: COMPLETED | OUTPATIENT
Start: 2017-11-30 | End: 2017-11-30

## 2017-11-30 RX ORDER — LIDOCAINE HYDROCHLORIDE 10 MG/ML
INJECTION, SOLUTION EPIDURAL; INFILTRATION; INTRACAUDAL; PERINEURAL
Status: COMPLETED
Start: 2017-11-30 | End: 2017-11-30

## 2017-11-30 RX ORDER — SODIUM CHLORIDE, SODIUM GLUCONATE, SODIUM ACETATE, POTASSIUM CHLORIDE, MAGNESIUM CHLORIDE, SODIUM PHOSPHATE, DIBASIC, AND POTASSIUM PHOSPHATE .53; .5; .37; .037; .03; .012; .00082 G/100ML; G/100ML; G/100ML; G/100ML; G/100ML; G/100ML; G/100ML
75 INJECTION, SOLUTION INTRAVENOUS CONTINUOUS
Status: DISCONTINUED | OUTPATIENT
Start: 2017-11-30 | End: 2017-12-04

## 2017-11-30 RX ORDER — FENTANYL CITRATE 50 UG/ML
INJECTION, SOLUTION INTRAMUSCULAR; INTRAVENOUS CODE/TRAUMA/SEDATION MEDICATION
Status: COMPLETED | OUTPATIENT
Start: 2017-11-30 | End: 2017-11-30

## 2017-11-30 RX ORDER — FLUCONAZOLE 2 MG/ML
400 INJECTION, SOLUTION INTRAVENOUS EVERY 24 HOURS
Status: COMPLETED | OUTPATIENT
Start: 2017-11-30 | End: 2017-12-06

## 2017-11-30 RX ORDER — SODIUM CHLORIDE 9 MG/ML
125 INJECTION, SOLUTION INTRAVENOUS CONTINUOUS
Status: DISCONTINUED | OUTPATIENT
Start: 2017-11-30 | End: 2017-11-30

## 2017-11-30 RX ADMIN — HYDROMORPHONE HYDROCHLORIDE 1 MG: 1 INJECTION, SOLUTION INTRAMUSCULAR; INTRAVENOUS; SUBCUTANEOUS at 07:29

## 2017-11-30 RX ADMIN — HYDROMORPHONE HYDROCHLORIDE 1 MG: 1 INJECTION, SOLUTION INTRAMUSCULAR; INTRAVENOUS; SUBCUTANEOUS at 22:03

## 2017-11-30 RX ADMIN — SODIUM CHLORIDE, SODIUM GLUCONATE, SODIUM ACETATE, POTASSIUM CHLORIDE AND MAGNESIUM CHLORIDE 150 ML/HR: 526; 502; 368; 37; 30 INJECTION, SOLUTION INTRAVENOUS at 23:17

## 2017-11-30 RX ADMIN — MIDAZOLAM 1 MG: 1 INJECTION INTRAMUSCULAR; INTRAVENOUS at 14:38

## 2017-11-30 RX ADMIN — OXYCODONE HYDROCHLORIDE 10 MG: 10 TABLET ORAL at 16:05

## 2017-11-30 RX ADMIN — HYDROMORPHONE HYDROCHLORIDE 1 MG: 1 INJECTION, SOLUTION INTRAMUSCULAR; INTRAVENOUS; SUBCUTANEOUS at 18:04

## 2017-11-30 RX ADMIN — HEPARIN SODIUM 5000 UNITS: 5000 INJECTION, SOLUTION INTRAVENOUS; SUBCUTANEOUS at 23:02

## 2017-11-30 RX ADMIN — SODIUM CHLORIDE 1000 ML: 0.9 INJECTION, SOLUTION INTRAVENOUS at 15:19

## 2017-11-30 RX ADMIN — SODIUM CHLORIDE 125 ML/HR: 0.9 INJECTION, SOLUTION INTRAVENOUS at 16:24

## 2017-11-30 RX ADMIN — OXYCODONE HYDROCHLORIDE 10 MG: 10 TABLET ORAL at 00:16

## 2017-11-30 RX ADMIN — OXYCODONE HYDROCHLORIDE 10 MG: 10 TABLET ORAL at 12:26

## 2017-11-30 RX ADMIN — CEFAZOLIN SODIUM 2000 MG: 2 SOLUTION INTRAVENOUS at 11:50

## 2017-11-30 RX ADMIN — OXYCODONE HYDROCHLORIDE 10 MG: 10 TABLET ORAL at 04:24

## 2017-11-30 RX ADMIN — FENTANYL CITRATE 50 MCG: 50 INJECTION INTRAMUSCULAR; INTRAVENOUS at 14:42

## 2017-11-30 RX ADMIN — SODIUM CHLORIDE, SODIUM GLUCONATE, SODIUM ACETATE, POTASSIUM CHLORIDE AND MAGNESIUM CHLORIDE 1000 ML: 526; 502; 368; 37; 30 INJECTION, SOLUTION INTRAVENOUS at 21:55

## 2017-11-30 RX ADMIN — VANCOMYCIN HYDROCHLORIDE 1750 MG: 1 INJECTION, POWDER, LYOPHILIZED, FOR SOLUTION INTRAVENOUS at 23:53

## 2017-11-30 RX ADMIN — METRONIDAZOLE 500 MG: 500 INJECTION, SOLUTION INTRAVENOUS at 11:50

## 2017-11-30 RX ADMIN — AMLODIPINE BESYLATE 5 MG: 5 TABLET ORAL at 08:27

## 2017-11-30 RX ADMIN — METRONIDAZOLE 500 MG: 500 INJECTION, SOLUTION INTRAVENOUS at 20:46

## 2017-11-30 RX ADMIN — CEFAZOLIN SODIUM 2000 MG: 2 SOLUTION INTRAVENOUS at 04:40

## 2017-11-30 RX ADMIN — HYDROMORPHONE HYDROCHLORIDE 1 MG: 1 INJECTION, SOLUTION INTRAMUSCULAR; INTRAVENOUS; SUBCUTANEOUS at 05:05

## 2017-11-30 RX ADMIN — HEPARIN SODIUM 5000 UNITS: 5000 INJECTION, SOLUTION INTRAVENOUS; SUBCUTANEOUS at 05:29

## 2017-11-30 RX ADMIN — SODIUM CHLORIDE 60 ML/HR: 0.9 INJECTION, SOLUTION INTRAVENOUS at 07:28

## 2017-11-30 RX ADMIN — SODIUM CHLORIDE 1000 ML: 0.9 INJECTION, SOLUTION INTRAVENOUS at 14:14

## 2017-11-30 RX ADMIN — HYDROMORPHONE HYDROCHLORIDE 1 MG: 1 INJECTION, SOLUTION INTRAMUSCULAR; INTRAVENOUS; SUBCUTANEOUS at 13:08

## 2017-11-30 RX ADMIN — ACETAMINOPHEN 975 MG: 325 TABLET ORAL at 23:02

## 2017-11-30 RX ADMIN — SODIUM CHLORIDE 1000 ML: 0.9 INJECTION, SOLUTION INTRAVENOUS at 20:47

## 2017-11-30 RX ADMIN — FENTANYL CITRATE 50 MCG: 50 INJECTION INTRAMUSCULAR; INTRAVENOUS at 14:35

## 2017-11-30 RX ADMIN — ACETAMINOPHEN 975 MG: 325 TABLET ORAL at 16:05

## 2017-11-30 RX ADMIN — HYDROMORPHONE HYDROCHLORIDE 1 MG: 1 INJECTION, SOLUTION INTRAMUSCULAR; INTRAVENOUS; SUBCUTANEOUS at 10:06

## 2017-11-30 RX ADMIN — SODIUM CHLORIDE, SODIUM GLUCONATE, SODIUM ACETATE, POTASSIUM CHLORIDE AND MAGNESIUM CHLORIDE 1000 ML: 526; 502; 368; 37; 30 INJECTION, SOLUTION INTRAVENOUS at 23:03

## 2017-11-30 RX ADMIN — METRONIDAZOLE 500 MG: 500 INJECTION, SOLUTION INTRAVENOUS at 04:39

## 2017-11-30 RX ADMIN — SODIUM CHLORIDE 125 ML/HR: 0.9 INJECTION, SOLUTION INTRAVENOUS at 21:56

## 2017-11-30 RX ADMIN — LIDOCAINE HYDROCHLORIDE: 10 INJECTION, SOLUTION EPIDURAL; INFILTRATION; INTRACAUDAL; PERINEURAL at 22:56

## 2017-11-30 RX ADMIN — CEFAZOLIN SODIUM 2000 MG: 2 SOLUTION INTRAVENOUS at 19:50

## 2017-11-30 RX ADMIN — SODIUM CHLORIDE 1000 ML: 0.9 INJECTION, SOLUTION INTRAVENOUS at 00:49

## 2017-11-30 RX ADMIN — TAMSULOSIN HYDROCHLORIDE 0.4 MG: 0.4 CAPSULE ORAL at 16:05

## 2017-11-30 NOTE — PHYSICAL THERAPY NOTE
Physical Therapy Cancellation Note        PT deferred per nsg request 2* pain, pend OR procedure; PT will f/u post procedure

## 2017-11-30 NOTE — PROGRESS NOTES
Patient care rounds were completed with the patient's nurse today, Azul Polanco  We discussed the plan is to keep him NPO in anticipation of IR evaluation and intervention today for new intra-abdominal fluid collection  Await urology plan  We reviewed all of the invasive devices/lines/telemetry orders  Right-sided nephrostomy tube to remain in place with increasing WBC & Creatinine; will further discuss with Urology  All questions and concerns were addressed  I spent greater than 20 minutes reviewing the plan with the patient and the nurse, and coordinating his care for the day      Bela Bustos PA-C  11/30/2017 12:01 PM

## 2017-11-30 NOTE — BRIEF OP NOTE (RAD/CATH)
Drainage Procedure Note    PATIENT NAME: Chito Joseph  : 1975  MRN: [id number]     Pre-op Diagnosis:   1  Laceration of left kidney, initial encounter    2  Hx of unilateral nephrectomy    3  Other hydronephrosis      Post-op Diagnosis:   1  Laceration of left kidney, initial encounter    2  Hx of unilateral nephrectomy    3  Other hydronephrosis        Surgeon:   Sandip Herrera MD    Estimated Blood Loss:  None  Findings:  2 left sided 8 Maltese abdominal drains were placed into complex left abdominal/flank fluid collection  Output from both with similar, bloody, thin fluid  Samples for culture and creatinine were sent from both collections      Specimens:  Culture and creatinine from both left-sided collections    Complications:  None    Anesthesia: Conscious sedation    Sandip Herrera MD     Date: 2017  Time: 3:10 PM

## 2017-11-30 NOTE — PROGRESS NOTES
Received multiple pages through the evening from nursing regarding the patient's abdominal pain  This was unusual considering the patient has been ready for D/C for some time and off pain medication  Patient received 10 mg oxycodone earlier which allowed him to sleep for a little while however he soon woke up with persistent left sided abdominal pain  No N/V  He states that this pain feels similar to before when he had a build up of fluid in the abdomen  He has had tachycardia (HR 100s) and low grade temps  Vitals:    11/30/17 0230   BP: 124/77   Pulse: 104   Resp: 17   Temp: 98 °F (36 7 °C)   SpO2: 97%       Physical Exam:   Gen: A&O, appears distressed and in pain  Cardio: RRR  Lungs: CTAB  Abd: Soft, non distended, thin  Very tender to palpation in LUQ      Labs were ordered at this point  WBC was elevated to 13 8, Hb 11 7 (from 9 8, hemoconcentration?) and Cr to 1 6 from 1 2  LA 1 2  The patient continued to be in pain so CT A/P was ordered to evaluate for collection  CTAP prelim read shows multiple abdominal fluid collections, the largest by the spleen       Plan:  - IR consult for AM  - 1 L bolus  - PRN pain control  - AM labs  - Will start empiric ancef/flagyl

## 2017-11-30 NOTE — PROGRESS NOTES
H and P from image reviewed  Has undergone left nephrectomy since admission and cystoscopic repair of left urine leak  Had nephrostomy tube placed on the right for obstruction  Now has left-sided fluid collections and leukocytosis, possible abscess or urine leak  Plan for percutaneous drainage  Although the collections may communicate, 2 drains will likely be required to drain this complex collection reliably  Prior imaging was reviewed  Questions answered  Informed written consent was obtained      Mariana Farley MD  11/30/17  2:34 PM

## 2017-11-30 NOTE — SOCIAL WORK
Cm reviewed patient during care coordination rounds  Patient is not medically stable for d/c today  Patient will require further testing and evaluation

## 2017-11-30 NOTE — PROGRESS NOTES
Progress Note - Ally Chaudhari 43 y o  male MRN: [id number]    Unit/Bed#: [facility] 916-01 Encounter: [id number]      Assessment:   Mr Maryan Mckeon is an incarcerated 71-year-old male admitted to the Trauma service with left renal fracture status post left nephrectomy developed ureteral leak status post ureteroscopy and Deflux for repair  Patient was known prior for atonic bladder and resultant bilateral hydronephrosis status post percutaneous nephrostomy tube insertion of right solitary kidney recently clamped for trial after several days of normalized creatinine  Lasix renogram was positive for excretion although delayed  Arranged for IR right percutaneous nephrostomy removal for today  Overnight, patient offer complaints of abdominal pain  Lab values demonstrated interval increase of both WBCs and creatinine  CT scan of the abdomen pelvis were positive for fluid collections, possible ureteral re-leak and hydroureter  Bladder was not distended on examination  Patient is seen in room afebrile with left flank and abdominal pain  Abdomen is slightly taut  Patient denies nausea or vomiting  Plan:  Maintain NPO for IR fluid aspiration  Likely on clamping of right percutaneous nephrostomy  Will discuss with attending further steps  Subjective:   No fever or chills  Positive abdominal and flank pain  Objective:     Vitals: Blood pressure 136/70, pulse (!) 125, temperature (!) 101 1 °F (38 4 °C), temperature source Oral, resp  rate 20, height 5' 6" (1 676 m), weight 89 2 kg (196 lb 10 4 oz), SpO2 90 %  ,Body mass index is 31 74 kg/m²        Intake/Output Summary (Last 24 hours) at 11/30/17 1143  Last data filed at 11/30/17 0031   Gross per 24 hour   Intake             1270 ml   Output              516 ml   Net              754 ml       Physical Exam: General appearance: alert, appears stated age, cooperative and moderate distress  Head: Normocephalic, without obvious abnormality, atraumatic  Neck: no adenopathy, no carotid bruit, no JVD, supple, symmetrical, trachea midline and thyroid not enlarged, symmetric, no tenderness/mass/nodules  Lungs: clear to auscultation bilaterally  Heart: regular rate and rhythm, S1, S2 normal, no murmur, click, rub or gallop  Abdomen: abnormal findings:  distended and moderate tenderness in the LUQ, in the LLQ, in the lower abdomen and in the left flank  Extremities: extremities normal, atraumatic, no cyanosis or edema  Pulses: 2+ and symmetric  Neurologic: Grossly normal   Staples CDI  Invasive Devices     Peripheral Intravenous Line            Peripheral IV 11/27/17 Right Arm 3 days          Drain            Nephrostomy Right 1 10 2 Fr  20 days              Lab Results   Component Value Date    WBC 14 46 (H) 11/30/2017    HGB 11 7 (L) 11/30/2017    HCT 36 4 (L) 11/30/2017    MCV 91 11/30/2017     11/30/2017     Lab Results   Component Value Date    GLUCOSE 126 11/30/2017    CALCIUM 8 9 11/30/2017     11/30/2017    K 4 3 11/30/2017    CO2 25 11/30/2017     11/30/2017    BUN 25 11/30/2017    CREATININE 1 94 (H) 11/30/2017         Lab, Imaging and other studies: I have personally reviewed pertinent reports

## 2017-11-30 NOTE — PROGRESS NOTES
Progress Note - Acute Care Surgery   Ally Regalado 43 y o  male MRN: [id number]  Unit/Bed#: [facility] 916-01 Encounter: [id number]    Assessment:  43 M with left kidney rupture, s/p left nephrectomy, right PCN, left ureter leak requiring deflux, now with intraabdominal collections, possible recurrent urine leak    Plan:  NPO  IR consult for fluid aspiration/drain  Empiric abx for now  IV fluids  Pain control    Subjective/Objective     Subjective: Increased abdominal pain yesterday afternoon/overnight  CT scan done showing fluid collections  WBC found to be elevated, and Creatinine re-elevated  Objective:    Blood pressure 124/77, pulse 104, temperature 98 °F (36 7 °C), temperature source Oral, resp  rate 17, height 5' 6" (1 676 m), weight 89 2 kg (196 lb 10 4 oz), SpO2 97 %  ,Body mass index is 31 74 kg/m²        Intake/Output Summary (Last 24 hours) at 11/30/17 6944  Last data filed at 11/29/17 1923   Gross per 24 hour   Intake              960 ml   Output              816 ml   Net              144 ml       Invasive Devices     Peripheral Intravenous Line            Peripheral IV 11/27/17 Right Arm 3 days          Drain            Nephrostomy Right 1 10 2 Fr  20 days                Physical Exam:   General: NAD, AAOx3  CV: RRR +S1/S2  Chest: breath sounds bilaterally  Abdomen: LUQ tenderness with voluntary guarding, no rebound  Extremities: atraumatic, no edema        Results from last 7 days  Lab Units 11/30/17  0020 11/27/17  0451 11/24/17  0503   WBC Thousand/uL 13 78* 6 50 7 50   HEMOGLOBIN g/dL 11 7* 9 8* 9 9*   HEMATOCRIT % 35 4* 31 1* 30 5*   PLATELETS Thousands/uL 317 316 465*       Results from last 7 days  Lab Units 11/30/17  0020 11/27/17  0451 11/24/17  0503   SODIUM mmol/L 137 140 140   POTASSIUM mmol/L 4 4 4 2 4 3   CHLORIDE mmol/L 103 106 105   CO2 mmol/L 26 28 30   BUN mg/dL 24 22 23   CREATININE mg/dL 1 62* 1 22 1 29   GLUCOSE RANDOM mg/dL 118 84 85   CALCIUM mg/dL 9 1 8 9 9 3       Results from last 7 days  Lab Units 11/27/17  0451   INR  0 99

## 2017-11-30 NOTE — PROGRESS NOTES
Rounding note with Anne Marie CHRISTIANSEN-spoke to patient about impending procedure in IR and results being urine, etc

## 2017-12-01 ENCOUNTER — APPOINTMENT (INPATIENT)
Dept: RADIOLOGY | Facility: HOSPITAL | Age: 42
DRG: 659 | End: 2017-12-01
Payer: COMMERCIAL

## 2017-12-01 PROBLEM — E87.2 LACTIC ACIDOSIS: Status: ACTIVE | Noted: 2017-12-01

## 2017-12-01 PROBLEM — D62 ACUTE BLOOD LOSS ANEMIA: Status: ACTIVE | Noted: 2017-12-01

## 2017-12-01 LAB
ABO GROUP BLD: NORMAL
ALBUMIN SERPL BCP-MCNC: 1.8 G/DL (ref 3.5–5)
ALP SERPL-CCNC: 55 U/L (ref 46–116)
ALT SERPL W P-5'-P-CCNC: 13 U/L (ref 12–78)
ANION GAP SERPL CALCULATED.3IONS-SCNC: 10 MMOL/L (ref 4–13)
ANION GAP SERPL CALCULATED.3IONS-SCNC: 9 MMOL/L (ref 4–13)
AST SERPL W P-5'-P-CCNC: 6 U/L (ref 5–45)
BACTERIA UR QL AUTO: ABNORMAL /HPF
BASE EXCESS BLDA CALC-SCNC: -1.3 MMOL/L
BASE EXCESS BLDA CALC-SCNC: -3.2 MMOL/L
BASOPHILS # BLD AUTO: 0.01 THOUSANDS/ΜL (ref 0–0.1)
BASOPHILS NFR BLD AUTO: 0 % (ref 0–1)
BILIRUB SERPL-MCNC: 0.6 MG/DL (ref 0.2–1)
BILIRUB UR QL STRIP: NEGATIVE
BLD GP AB SCN SERPL QL: NEGATIVE
BODY TEMPERATURE: 99.2 DEGREES FEHRENHEIT
BUN SERPL-MCNC: 25 MG/DL (ref 5–25)
BUN SERPL-MCNC: 32 MG/DL (ref 5–25)
CA-I BLD-SCNC: 1.03 MMOL/L (ref 1.12–1.32)
CALCIUM SERPL-MCNC: 7.3 MG/DL (ref 8.3–10.1)
CALCIUM SERPL-MCNC: 7.4 MG/DL (ref 8.3–10.1)
CHLORIDE SERPL-SCNC: 107 MMOL/L (ref 100–108)
CHLORIDE SERPL-SCNC: 107 MMOL/L (ref 100–108)
CLARITY UR: CLEAR
CO2 SERPL-SCNC: 21 MMOL/L (ref 21–32)
CO2 SERPL-SCNC: 23 MMOL/L (ref 21–32)
COLOR UR: YELLOW
CREAT SERPL-MCNC: 1.67 MG/DL (ref 0.6–1.3)
CREAT SERPL-MCNC: 2.22 MG/DL (ref 0.6–1.3)
EOSINOPHIL # BLD AUTO: 0 THOUSAND/ΜL (ref 0–0.61)
EOSINOPHIL NFR BLD AUTO: 0 % (ref 0–6)
ERYTHROCYTE [DISTWIDTH] IN BLOOD BY AUTOMATED COUNT: 15.3 % (ref 11.6–15.1)
GFR SERPL CREATININE-BSD FRML MDRD: 35 ML/MIN/1.73SQ M
GFR SERPL CREATININE-BSD FRML MDRD: 50 ML/MIN/1.73SQ M
GLUCOSE SERPL-MCNC: 134 MG/DL (ref 65–140)
GLUCOSE SERPL-MCNC: 154 MG/DL (ref 65–140)
GLUCOSE UR STRIP-MCNC: NEGATIVE MG/DL
HCO3 BLDA-SCNC: 20.1 MMOL/L (ref 22–28)
HCO3 BLDA-SCNC: 22.6 MMOL/L (ref 22–28)
HCT VFR BLD AUTO: 18.9 % (ref 36.5–49.3)
HCT VFR BLD AUTO: 20 % (ref 36.5–49.3)
HCT VFR BLD AUTO: 22.1 % (ref 36.5–49.3)
HCT VFR BLD AUTO: 23.3 % (ref 36.5–49.3)
HFNC FLOW LPM: 50
HGB BLD-MCNC: 6.2 G/DL (ref 12–17)
HGB BLD-MCNC: 6.6 G/DL (ref 12–17)
HGB BLD-MCNC: 7.5 G/DL (ref 12–17)
HGB BLD-MCNC: 7.8 G/DL (ref 12–17)
HGB UR QL STRIP.AUTO: ABNORMAL
HYALINE CASTS #/AREA URNS LPF: ABNORMAL /LPF
KETONES UR STRIP-MCNC: NEGATIVE MG/DL
LACTATE SERPL-SCNC: 1.8 MMOL/L (ref 0.5–2)
LACTATE SERPL-SCNC: 2.2 MMOL/L (ref 0.5–2)
LACTATE SERPL-SCNC: 2.4 MMOL/L (ref 0.5–2)
LACTATE SERPL-SCNC: 2.7 MMOL/L (ref 0.5–2)
LACTATE SERPL-SCNC: 2.7 MMOL/L (ref 0.5–2)
LACTATE SERPL-SCNC: 2.8 MMOL/L (ref 0.5–2)
LEUKOCYTE ESTERASE UR QL STRIP: NEGATIVE
LYMPHOCYTES # BLD AUTO: 1.47 THOUSANDS/ΜL (ref 0.6–4.47)
LYMPHOCYTES NFR BLD AUTO: 9 % (ref 14–44)
MAGNESIUM SERPL-MCNC: 2.1 MG/DL (ref 1.6–2.6)
MCH RBC QN AUTO: 29.5 PG (ref 26.8–34.3)
MCHC RBC AUTO-ENTMCNC: 33 G/DL (ref 31.4–37.4)
MCV RBC AUTO: 89 FL (ref 82–98)
MONOCYTES # BLD AUTO: 1.42 THOUSAND/ΜL (ref 0.17–1.22)
MONOCYTES NFR BLD AUTO: 8 % (ref 4–12)
NEUTROPHILS # BLD AUTO: 14.3 THOUSANDS/ΜL (ref 1.85–7.62)
NEUTS SEG NFR BLD AUTO: 83 % (ref 43–75)
NITRITE UR QL STRIP: NEGATIVE
NON VENT HFNC FIO2: 52
NON VENT TYPE HFNC: ABNORMAL
NON-SQ EPI CELLS URNS QL MICRO: ABNORMAL /HPF
NRBC BLD AUTO-RTO: 0 /100 WBCS
O2 CT BLDA-SCNC: 11.5 ML/DL (ref 16–23)
O2 CT BLDA-SCNC: 9.6 ML/DL (ref 16–23)
OXYHGB MFR BLDA: 97.5 % (ref 94–97)
OXYHGB MFR BLDA: 97.5 % (ref 94–97)
PCO2 BLDA: 29 MM HG (ref 36–44)
PCO2 BLDA: 33.7 MM HG (ref 36–44)
PCO2 TEMP ADJ BLDA: 34.1 MM HG (ref 36–44)
PH BLD: 7.44 [PH] (ref 7.35–7.45)
PH BLDA: 7.45 [PH] (ref 7.35–7.45)
PH BLDA: 7.46 [PH] (ref 7.35–7.45)
PH UR STRIP.AUTO: 5.5 [PH] (ref 4.5–8)
PHOSPHATE SERPL-MCNC: 3.8 MG/DL (ref 2.7–4.5)
PLATELET # BLD AUTO: 211 THOUSANDS/UL (ref 149–390)
PMV BLD AUTO: 8.4 FL (ref 8.9–12.7)
PO2 BLD: 175.6 MM HG (ref 75–129)
PO2 BLDA: 173.9 MM HG (ref 75–129)
PO2 BLDA: 238 MM HG (ref 75–129)
POTASSIUM SERPL-SCNC: 4.5 MMOL/L (ref 3.5–5.3)
POTASSIUM SERPL-SCNC: 4.5 MMOL/L (ref 3.5–5.3)
PROT SERPL-MCNC: 4.9 G/DL (ref 6.4–8.2)
PROT UR STRIP-MCNC: NEGATIVE MG/DL
RBC # BLD AUTO: 2.24 MILLION/UL (ref 3.88–5.62)
RBC #/AREA URNS AUTO: ABNORMAL /HPF
RH BLD: POSITIVE
SODIUM SERPL-SCNC: 138 MMOL/L (ref 136–145)
SODIUM SERPL-SCNC: 139 MMOL/L (ref 136–145)
SP GR UR STRIP.AUTO: 1.02 (ref 1–1.03)
SPECIMEN EXPIRATION DATE: NORMAL
SPECIMEN SOURCE: ABNORMAL
SPECIMEN SOURCE: ABNORMAL
UROBILINOGEN UR QL STRIP.AUTO: 0.2 E.U./DL
WBC # BLD AUTO: 17.27 THOUSAND/UL (ref 4.31–10.16)
WBC #/AREA URNS AUTO: ABNORMAL /HPF

## 2017-12-01 PROCEDURE — 82805 BLOOD GASES W/O2 SATURATION: CPT | Performed by: SURGERY

## 2017-12-01 PROCEDURE — P9021 RED BLOOD CELLS UNIT: HCPCS

## 2017-12-01 PROCEDURE — 82805 BLOOD GASES W/O2 SATURATION: CPT | Performed by: EMERGENCY MEDICINE

## 2017-12-01 PROCEDURE — 71010 HB CHEST X-RAY 1 VIEW FRONTAL (PORTABLE): CPT

## 2017-12-01 PROCEDURE — 85025 COMPLETE CBC W/AUTO DIFF WBC: CPT | Performed by: SURGERY

## 2017-12-01 PROCEDURE — 86901 BLOOD TYPING SEROLOGIC RH(D): CPT | Performed by: EMERGENCY MEDICINE

## 2017-12-01 PROCEDURE — 85018 HEMOGLOBIN: CPT | Performed by: PHYSICIAN ASSISTANT

## 2017-12-01 PROCEDURE — 86923 COMPATIBILITY TEST ELECTRIC: CPT

## 2017-12-01 PROCEDURE — 85014 HEMATOCRIT: CPT | Performed by: PHYSICIAN ASSISTANT

## 2017-12-01 PROCEDURE — 86900 BLOOD TYPING SEROLOGIC ABO: CPT | Performed by: EMERGENCY MEDICINE

## 2017-12-01 PROCEDURE — 81001 URINALYSIS AUTO W/SCOPE: CPT | Performed by: EMERGENCY MEDICINE

## 2017-12-01 PROCEDURE — 83605 ASSAY OF LACTIC ACID: CPT | Performed by: EMERGENCY MEDICINE

## 2017-12-01 PROCEDURE — 85018 HEMOGLOBIN: CPT | Performed by: SURGERY

## 2017-12-01 PROCEDURE — 85014 HEMATOCRIT: CPT | Performed by: SURGERY

## 2017-12-01 PROCEDURE — 85014 HEMATOCRIT: CPT | Performed by: EMERGENCY MEDICINE

## 2017-12-01 PROCEDURE — 80048 BASIC METABOLIC PNL TOTAL CA: CPT | Performed by: EMERGENCY MEDICINE

## 2017-12-01 PROCEDURE — 80053 COMPREHEN METABOLIC PANEL: CPT | Performed by: SURGERY

## 2017-12-01 PROCEDURE — 83735 ASSAY OF MAGNESIUM: CPT | Performed by: SURGERY

## 2017-12-01 PROCEDURE — 85018 HEMOGLOBIN: CPT | Performed by: EMERGENCY MEDICINE

## 2017-12-01 PROCEDURE — 86850 RBC ANTIBODY SCREEN: CPT | Performed by: EMERGENCY MEDICINE

## 2017-12-01 PROCEDURE — 84100 ASSAY OF PHOSPHORUS: CPT | Performed by: SURGERY

## 2017-12-01 PROCEDURE — 94760 N-INVAS EAR/PLS OXIMETRY 1: CPT

## 2017-12-01 PROCEDURE — 82330 ASSAY OF CALCIUM: CPT | Performed by: SURGERY

## 2017-12-01 RX ORDER — SODIUM CHLORIDE, SODIUM GLUCONATE, SODIUM ACETATE, POTASSIUM CHLORIDE, MAGNESIUM CHLORIDE, SODIUM PHOSPHATE, DIBASIC, AND POTASSIUM PHOSPHATE .53; .5; .37; .037; .03; .012; .00082 G/100ML; G/100ML; G/100ML; G/100ML; G/100ML; G/100ML; G/100ML
1000 INJECTION, SOLUTION INTRAVENOUS ONCE
Status: COMPLETED | OUTPATIENT
Start: 2017-12-01 | End: 2017-12-03

## 2017-12-01 RX ORDER — NOREPINEPHRINE BITARTRATE 1 MG/ML
INJECTION, SOLUTION INTRAVENOUS
Status: DISPENSED
Start: 2017-12-01 | End: 2017-12-02

## 2017-12-01 RX ORDER — NICOTINE 21 MG/24HR
1 PATCH, TRANSDERMAL 24 HOURS TRANSDERMAL DAILY
Status: DISCONTINUED | OUTPATIENT
Start: 2017-12-01 | End: 2017-12-01

## 2017-12-01 RX ORDER — SODIUM CHLORIDE, SODIUM GLUCONATE, SODIUM ACETATE, POTASSIUM CHLORIDE, MAGNESIUM CHLORIDE, SODIUM PHOSPHATE, DIBASIC, AND POTASSIUM PHOSPHATE .53; .5; .37; .037; .03; .012; .00082 G/100ML; G/100ML; G/100ML; G/100ML; G/100ML; G/100ML; G/100ML
500 INJECTION, SOLUTION INTRAVENOUS ONCE
Status: COMPLETED | OUTPATIENT
Start: 2017-12-01 | End: 2017-12-03

## 2017-12-01 RX ORDER — FENTANYL CITRATE 50 UG/ML
75 INJECTION, SOLUTION INTRAMUSCULAR; INTRAVENOUS ONCE
Status: COMPLETED | OUTPATIENT
Start: 2017-12-01 | End: 2017-12-01

## 2017-12-01 RX ADMIN — HYDROMORPHONE HYDROCHLORIDE 1 MG: 1 INJECTION, SOLUTION INTRAMUSCULAR; INTRAVENOUS; SUBCUTANEOUS at 19:48

## 2017-12-01 RX ADMIN — ACETAMINOPHEN 975 MG: 325 TABLET ORAL at 09:18

## 2017-12-01 RX ADMIN — PIPERACILLIN SODIUM AND TAZOBACTAM SODIUM 3.38 G: 36; 4.5 INJECTION, POWDER, FOR SOLUTION INTRAVENOUS at 00:15

## 2017-12-01 RX ADMIN — SODIUM CHLORIDE, SODIUM GLUCONATE, SODIUM ACETATE, POTASSIUM CHLORIDE AND MAGNESIUM CHLORIDE 150 ML/HR: 526; 502; 368; 37; 30 INJECTION, SOLUTION INTRAVENOUS at 05:29

## 2017-12-01 RX ADMIN — PIPERACILLIN SODIUM AND TAZOBACTAM SODIUM 3.38 G: 36; 4.5 INJECTION, POWDER, FOR SOLUTION INTRAVENOUS at 18:22

## 2017-12-01 RX ADMIN — SODIUM CHLORIDE, SODIUM GLUCONATE, SODIUM ACETATE, POTASSIUM CHLORIDE AND MAGNESIUM CHLORIDE 75 ML/HR: 526; 502; 368; 37; 30 INJECTION, SOLUTION INTRAVENOUS at 17:37

## 2017-12-01 RX ADMIN — PIPERACILLIN SODIUM AND TAZOBACTAM SODIUM 3.38 G: 36; 4.5 INJECTION, POWDER, FOR SOLUTION INTRAVENOUS at 11:35

## 2017-12-01 RX ADMIN — FLUCONAZOLE 400 MG: 2 INJECTION INTRAVENOUS at 21:18

## 2017-12-01 RX ADMIN — HEPARIN SODIUM 5000 UNITS: 5000 INJECTION, SOLUTION INTRAVENOUS; SUBCUTANEOUS at 21:19

## 2017-12-01 RX ADMIN — ACETAMINOPHEN 975 MG: 325 TABLET ORAL at 16:21

## 2017-12-01 RX ADMIN — FLUCONAZOLE 400 MG: 2 INJECTION INTRAVENOUS at 00:20

## 2017-12-01 RX ADMIN — HYDROMORPHONE HYDROCHLORIDE 1 MG: 1 INJECTION, SOLUTION INTRAMUSCULAR; INTRAVENOUS; SUBCUTANEOUS at 14:18

## 2017-12-01 RX ADMIN — OXYCODONE HYDROCHLORIDE 10 MG: 10 TABLET ORAL at 21:32

## 2017-12-01 RX ADMIN — CALCIUM GLUCONATE 2 G: 94 INJECTION, SOLUTION INTRAVENOUS at 08:06

## 2017-12-01 RX ADMIN — HYDROMORPHONE HYDROCHLORIDE 1 MG: 1 INJECTION, SOLUTION INTRAMUSCULAR; INTRAVENOUS; SUBCUTANEOUS at 02:13

## 2017-12-01 RX ADMIN — Medication 150 MG: at 11:35

## 2017-12-01 RX ADMIN — FENTANYL CITRATE 75 MCG: 50 INJECTION INTRAMUSCULAR; INTRAVENOUS at 05:15

## 2017-12-01 RX ADMIN — HEPARIN SODIUM 5000 UNITS: 5000 INJECTION, SOLUTION INTRAVENOUS; SUBCUTANEOUS at 05:27

## 2017-12-01 RX ADMIN — HYDROMORPHONE HYDROCHLORIDE 1 MG: 1 INJECTION, SOLUTION INTRAMUSCULAR; INTRAVENOUS; SUBCUTANEOUS at 10:40

## 2017-12-01 RX ADMIN — PIPERACILLIN SODIUM AND TAZOBACTAM SODIUM 3.38 G: 36; 4.5 INJECTION, POWDER, FOR SOLUTION INTRAVENOUS at 05:37

## 2017-12-01 RX ADMIN — SODIUM CHLORIDE, SODIUM GLUCONATE, SODIUM ACETATE, POTASSIUM CHLORIDE AND MAGNESIUM CHLORIDE 1000 ML: 526; 502; 368; 37; 30 INJECTION, SOLUTION INTRAVENOUS at 04:30

## 2017-12-01 RX ADMIN — SODIUM CHLORIDE, SODIUM GLUCONATE, SODIUM ACETATE, POTASSIUM CHLORIDE AND MAGNESIUM CHLORIDE 1000 ML: 526; 502; 368; 37; 30 INJECTION, SOLUTION INTRAVENOUS at 01:45

## 2017-12-01 RX ADMIN — SODIUM CHLORIDE, SODIUM GLUCONATE, SODIUM ACETATE, POTASSIUM CHLORIDE AND MAGNESIUM CHLORIDE 500 ML: 526; 502; 368; 37; 30 INJECTION, SOLUTION INTRAVENOUS at 10:24

## 2017-12-01 RX ADMIN — TAMSULOSIN HYDROCHLORIDE 0.4 MG: 0.4 CAPSULE ORAL at 17:37

## 2017-12-01 RX ADMIN — HYDROMORPHONE HYDROCHLORIDE 1 MG: 1 INJECTION, SOLUTION INTRAMUSCULAR; INTRAVENOUS; SUBCUTANEOUS at 04:10

## 2017-12-01 RX ADMIN — OXYCODONE HYDROCHLORIDE 10 MG: 10 TABLET ORAL at 04:58

## 2017-12-01 NOTE — SEPSIS NOTE
Sepsis Note   Ally Regalado 43 y o  male MRN: [id number]  Unit/Bed#: [facility] 916-01 Encounter: [id number]            Initial Sepsis Screening     Row Name 11/30/17 2056 11/01/17 2239             Is the patient's history suggestive of a new or worsening infection? (!)  Yes (Proceed)  -DB (!)  Yes (Proceed)  -SA       Suspected source of infection acute abdominal infection  -DB urinary tract infection  -SA       Are two or more of the following signs & symptoms of infection both present and new to the patient? (!)  Yes (Proceed)  -DB         Indicate SIRS criteria Hyperthemia > 38 3C (100 9F); Tachycardia > 90 bpm;Leukocytosis (WBC > 44237 IJL)  -DB Tachycardia > 90 bpm;Leukocytosis (WBC > 99345 IJL)  -SA       If the answer is yes to both questions, suspicion of sepsis is present  [de-identified]         If severe sepsis is present AND tissue hypoperfusion perists in the hour after fluid resuscitation or lactate > 4, the patient meets criteria for SEPTIC SHOCK           Are any of the following organ dysfunction criteria present within 6 hours of suspected infection and SIRS criteria that are NOT considered to be chronic conditions? (!)  Yes  -DB (!)  Yes  -SA       Organ dysfunction Creatinine > 2 0 mg/dL; Lactate > 2 0 mmol/L;MAP < 65 mmHg  -DB Lactate > 2 0 mmol/L  -SA       Date of presentation of severe sepsis 11/30/17  -DB 11/01/17  -SA       Time of presentation of severe sepsis 2058  -DB 2239  -SA       Tissue hypoperfusion persists in the hour after crystalloid fluid administration, evidenced, by either:           Was hypotension present within one hour of the conclusion of crystalloid fluid administration?   Talha [facility]       Date of presentation of septic shock           Time of presentation of septic shock             User Key  (r) = Recorded By, (t) = Taken By, (c) = Cosigned By    [id number] Name Provider Type     Margareth Crowley MD Physician    Kyle 9, DO Resident

## 2017-12-01 NOTE — PROGRESS NOTES
Progress Note - [address] 43 y o  male MRN: [id number]  Unit/Bed#: ICU 05 Encounter: [id number]    ______________________________________________________________________  Assessment:  Patient originally admitted on the 2nd of November  Grade 4 fracture of left kidney  Was hypotensive with history of bladder atony and bilateral hydroureter nephrosis  Went to OR for ligation of left ureter and removal of left kidney  Left the ICU was on the general medical floor  Was noted to have abdominal pain yesterday  Found have collection along left kidney  IR procedure today for drainage of fluid collection  Today became increasingly tachycardic and tachypneic  Found have elevated lactic/WBC/creatinine  Meeting criteria for severe sepsis  Given 30 cc/kilogram admitted to ICU  Patient Active Problem List    Diagnosis Date Noted    Severe sepsis ([facility] ) 11/30/2017    Other hydronephrosis 11/09/2017    WOLF (acute kidney injury) ([facility] ) 11/09/2017    Kidney laceration, left 11/02/2017       Neuro:  GCS is 15  Moves ext x 4    - Analgesia:  Scheduled Tylenol  Dilaudid 1 mg p r n  Oxycodone p r n    - Still 8/10 abd pain also received 1 time dose of 75 mcg fentanyl    CV:  Last echocardiogram 2015 EF 55-65% w/ No wall abnormality   - Still tachycardic but sig decreased 140's ---> low 100's   - Borderline Hypotension  Currently maps greater than 65  Has received 4 L IVF bolus + maint  Etiology likely intravascular depletion + Sepsis   - Central venous catheter was placed by myself confirmed on chest x-ray  Passes into IVC but in  acceptable area for vaso active medications if necessary   - Lactic acidosis trend till less than 2 (3 2--->2 7---2 4----2 8 (Likely 2/2 to blood loss)    Pulm:  Patient becoming more tachypneic  Saturations dropping to upper 80s    Placed on high-flow nasal cannula currently saturation 100% ---> ABG showed increased PaO2 so titrate down overnight; attempt to wean to NC   - CXR questionable increased densities LLL, increased RR likely 2/2 to metabolic acidosis   - ABG's trending; Base Def improving    - CT chest without to evaluate left lower lobe infiltrate  GI:  NPO sips with meds ---> Advance diet if no planned OR/IR intervention   - senna bowel regimen b i d    - Due to increased firmness ABD and Increasing pain may consider repeat CT scan to evaluate for new etiology/resolution of fluid collections    :  Has perc nephrostomy tube with drainage bag  Bladder scan q shift; perc nephrostomy open for drainage per Urology ----> discussed case with Urology they are already following the patient    -Baseline creatinine 1 2  Had WOLF of 3 1 overnight trending down post resuscitation currently 1 67  Was rapidly increasing  Increasing along with BUN  Since responsive likely Pre-renal in etiology   - Right-sided perc nephrostomy tube urology following  Status post IR drainage left upper quadrant collection  Concern for urinoma although etiology uncertain   -Urine output only document as 50 mL throughout 1st 12 hours of yesterday  Increased with aggressive hydration  Closely monitor    - Will check urinalysis   - PTA flomax    F/E/N:  Intravascular depletion that is improving  Isolyte 150 mL/hour decrease top 75   - Lytes replete as needed    ID:  Meets criteria for severe sepsis  Will check urinalysis  Fluid collection left upper quadrant drained (Blood in drains supposed to be urinoma)  One of 2 cultures shows gram-positive cocci in pairs  Blood cultures were sent x 2  Currently is on vancomycin/Zosyn/fluconazole  Per surgery recommendations  Vancomycin is 20 milligrams/kilogram Q 24 hour dosing due to renal dysfunction  Trough ordered   - Drains left upper quadrant nearly 900 mL output  Is bloody in nature  Does not appear to be urine    - Received 30 cc/kg  Profound IJ collapse during respiration    Aggressive hydration 4 L bolus ordered   - Etiology Urinoma(Infected) vs UTI vs Aspiration? Follow cultures, and urinalysis, consider repeat CXR for LLL fullness    Heme:  Hemoglobin stable  Heparin DVT prophylaxis   - Hemoglobin decreased  2 units PRBCs  Recheck hemoglobin at noon  Will immediately recheck H&H to determine if accurate sample  CT renal protocol per red surgery  Eval for bleeding  In setting of elevated lactic and worsening abdominal exam     Endo:  No acute issues     Msk/Skin:  Out of bed as tolerated    Disposition:  Patient becoming progressively more hemodynamically unstable  ICU level of care  Tenuous airway  Code Status: Level 1 - Full Code   ______________________    Chief Complaint: Persistent Abd pain      HPI/24hr events: Worsening vital signs on floor  Elevated white count and lactic acid  Transferred to ICU  Received 4 L bolus IVF  States continued abdomen pain diffuse  Abdomen slightly more firm  Patient in pain despite multiple pain medication avenues  ______________________________________________________________________    Physical Exam:   General - GCS 15  More somnolent then prior time in ICU; but improved from overnight  Cardiac - tachycardia to 110's  No murmurs  Pulmonary - Breath sounds b/l; intermittent coarse b/l; on hi-flow NC  Abdomen - Tender x4 quadrants  More firm then yesterday  Worsening pain;   Neuro - moves extremities x4   ______________________________________________________________________  Vitals:    17 0329 17 0400 17 0500 17 0600   BP:       Pulse:  100 (!) 112 (!) 106   Resp:  (!) 30 (!) 30 22   Temp:  98 8 °F (37 1 °C)     TempSrc:  Oral     SpO2: 100% 100% 99% 100%   Weight:       Height:         Arterial Line BP: 98/44  Arterial Line MAP (mmHg): 60 mmHg     Temperature:   Temp (24hrs), Av 4 °F (37 4 °C), Min:98 7 °F (37 1 °C), Max:101 1 °F (38 4 °C)    Current Temperature: 98 8 °F (37 1 °C)  Weights:   IBW: 63 8 kg    Body mass index is 31 74 kg/m²    Weight (last 2 days)     None        Hemodynamic Monitoring:  N/A     Non-Invasive/Invasive Ventilation Settings:  Respiratory    Lab Data (Last 4 hours)    None         O2/Vent Data (Last 4 hours)    None              Lab Results   Component Value Date    PHART 7 458 (H) 12/01/2017    ZEN1IUV 29 0 (LL) 12/01/2017    PO2ART 238 0 (H) 12/01/2017    CYR6NIV 20 1 (L) 12/01/2017    BEART -3 2 12/01/2017    SOURCE Line, Arterial 12/01/2017     SpO2: SpO2: 100 %  Intake and Outputs:  I/O       11/29 0701 - 11/30 0700 11/30 0701 - 12/01 0700    P  O  1510 240    IV Piggyback  1050    Total Intake(mL/kg) 1510 (16 9) 1290 (14 5)    Urine (mL/kg/hr) 816 (0 4) 100 (0 1)    Drains  737 (0 5)    Stool 0 (0)     Total Output 816 837    Net +694 +453          Unmeasured Urine Occurrence 1 x     Unmeasured Stool Occurrence 1 x         UOP:  Only 1AM minimal since aggressive hydration 500 ml/24 hrs  Nutrition:        Diet Orders            Start     Ordered    11/30/17 2316  Diet NPO; Sips with meds  Diet effective now     Question Answer Comment   Diet Type NPO    NPO Except: Sips with meds    RD to adjust diet per protocol? No        11/30/17 2315    11/22/17 1646  Dietary nutrition supplements  Once     Question Answer Comment   Select Supplement: Ensure Enlive-Chocolate    Frequency Breakfast        11/22/17 1645        TF currently running at [facility]:     Results from last 7 days  Lab Units 12/01/17  0544 11/30/17  1827 11/30/17  0522  11/27/17  0451   WBC Thousand/uL 17 27* 25 15* 14 46*  < > 6 50   HEMOGLOBIN g/dL 6 6* 10 3* 11 7*  < > 9 8*   HEMATOCRIT % 20 0* 31 7* 36 4*  < > 31 1*   PLATELETS Thousands/uL 211 349 327  < > 316   NEUTROS PCT % 83* 85*  --   --  58   MONOS PCT % 8 8  --   --  12   < > = values in this interval not displayed      Results from last 7 days  Lab Units 12/01/17  0544 12/01/17  0053 11/30/17  1827   SODIUM mmol/L 139 138 139   POTASSIUM mmol/L 4 5 4 5 5 5*   CHLORIDE mmol/L 107 107 106   CO2 mmol/L 23 21 22   BUN mg/dL 25 32* 36*   CREATININE mg/dL 1 67* 2 22* 3 10*   CALCIUM mg/dL 7 3* 7 4* 8 2*   TOTAL PROTEIN g/dL 4 9*  --   --    BILIRUBIN TOTAL mg/dL 0 60  --   --    ALK PHOS U/L 55  --   --    ALT U/L 13  --   --    AST U/L 6  --   --    GLUCOSE RANDOM mg/dL 134 154* 164*       Results from last 7 days  Lab Units 12/01/17  0544 11/27/17  0451   MAGNESIUM mg/dL 2 1 2 0     Lab Results   Component Value Date    PHOS 3 8 12/01/2017    PHOS 3 6 11/14/2017    PHOS 2 0 (L) 11/04/2017        Results from last 7 days  Lab Units 11/30/17  0522 11/27/17  0451   INR  1 12 0 99       0  Lab Value Date/Time   TROPONINI <0 04 04/23/2015 2325       Results from last 7 days  Lab Units 12/01/17  0544 12/01/17  0318 12/01/17  0053   LACTIC ACID mmol/L 2 8* 2 4* 2 7*     ABG:  Lab Results   Component Value Date    PHART 7 458 (H) 12/01/2017    [id number] 29 0 (LL) 12/01/2017    PO2ART 238 0 (H) 12/01/2017    OHR0ASL 20 1 (L) 12/01/2017    BEART -3 2 12/01/2017    SOURCE Line, Arterial 12/01/2017     Imaging:   CT A/P yesterday: 1  Side decreased size of the mass/collection in the left renal fossa which is suboptimally evaluated without intravenous contrast   However, this collection is in communication with a larger collection bordering the spleen  The 11 2 x 4 8 cm   collection bordering the spleen has significantly increased in size  Presence of gas within the collection raises the possibility of abscess  2   New severe left and moderate right hydroureter  3   Right percutaneous of prostate tube is in place, without hydronephrosis  4   Unchanged small left pleural effusion with associated passive atelectasis  EKG:  No New    Micro:  Lab Results   Component Value Date    BLOODCX No Growth After 5 Days  11/01/2017    BLOODCX No Growth After 5 Days   11/01/2017    URINECX No Growth <1000 cfu/mL 11/09/2017    URINECX >100,000 cfu/ml Mixed Contaminants X3 02/15/2016     Allergies: No Known Allergies  Medications:   Scheduled Meds:    acetaminophen 975 mg Oral Q8H   calcium gluconate 2 g Intravenous Once   fluconazole 400 mg Intravenous Q24H   heparin (porcine) 5,000 Units Subcutaneous Q8H [address]   iron polysaccharides 150 mg Oral Daily   piperacillin-tazobactam 3 375 g Intravenous Q6H   senna-docusate sodium 1 tablet Oral BID   tamsulosin 0 4 mg Oral Daily With Dinner   vancomycin 20 mg/kg Intravenous Q24H     Continuous Infusions:    multi-electrolyte 75 mL/hr Last Rate: 75 mL/hr (12/01/17 0726)     PRN Meds:    HYDROmorphone 1 mg Q2H PRN   influenza vaccine 0 5 mL Prior to discharge   oxyCODONE 10 mg Q4H PRN   oxyCODONE 5 mg Q4H PRN     VTE Pharmacologic Prophylaxis: Sequential compression device (Venodyne)  and Heparin  VTE Mechanical Prophylaxis: sequential compression device  Invasive lines and devices: Invasive Devices     Central Venous Catheter Line            CVC Central Lines 11/30/17 Triple 16cm less than 1 day          Peripheral Intravenous Line            Peripheral IV 11/27/17 Right Arm 4 days    Peripheral IV 11/30/17 Left;Upper Arm less than 1 day          Arterial Line            Arterial Line 11/30/17 Left Radial less than 1 day          Drain            Nephrostomy Right 1 10 2 Fr  21 days    Closed/Suction Drain Left Back 8 Fr  less than 1 day    Closed/Suction Drain Left;Lateral Abdomen 8 Fr  less than 1 day                     Portions of the record may have been created with voice recognition software  Occasional wrong word or "sound a like" substitutions may have occurred due to the inherent limitations of voice recognition software  Read the chart carefully and recognize, using context, where substitutions have occurred      Leonela Singer DO

## 2017-12-01 NOTE — CASE MANAGEMENT
Continued Stay Review    Date:  12-1-17     Vital Signs: /50   Pulse (!) 110   Temp 99 5 °F (37 5 °C)   Resp (!) 24   Ht 5' 6" (1 676 m)   Wt 89 2 kg (196 lb 10 4 oz)   SpO2 100%   BMI 31 74 kg/m²   CVC Central Lines    Arterial Line    Nephrostomy Right    Closed/Suction Drain Left backk       Closed/Suction Drain Left  Abdomen      Medications:     Scheduled Meds:   acetaminophen 975 mg Oral Q8H   fluconazole 400 mg Intravenous Q24H   heparin (porcine) 5,000 Units Subcutaneous Q8H [address]   iron polysaccharides 150 mg Oral Daily   multi-electrolyte 500 mL Intravenous Once   nicotine 1 patch Transdermal Daily   piperacillin-tazobactam 3 375 g Intravenous Q6H   senna-docusate sodium 1 tablet Oral BID   tamsulosin 0 4 mg Oral Daily With Dinner   vancomycin 20 mg/kg Intravenous Q24H     Continuous Infusions:   multi-electrolyte 75 mL/hr Last Rate: 75 mL/hr (12/01/17 0726)     PRN Meds: HYDROmorphone    influenza vaccine    oxyCODONE    oxyCODONE    Abnormal Labs/Diagnostic Results:    Component Value Date     WBC 17 27 (H) 12/01/2017     HGB 6 2 (LL) 12/01/2017     HCT 18 9 (L) 12/01/2017     RDW 15 3 (H) 12/01/2017     MPV 8 4 (L) 12/01/2017       CREATININE 1 67 (H) 12/01/2017     CALCIUM 7 3 (L) 12/01/2017     PROT 4 9 (L) 12/01/2017     ALBUMIN 1 8 (L) 12/01/2017         Age/Sex: 43 y o  male     Assessment/Plan:      Assessment:  44 yo M w left renal rupture s/p ex lap lef tnephrectomy 11/1  - second look laparotomy w ligatin of L ureter 11/2 11/9 perc nephrostomy   11/18 cystoscopy with fulguration of ureteral orifice   11/21 nephrostogram   11/27 IR tube check   11/30 Drainage of LUQ fluid collection      -overnight noted to be septic, tachycardic and tachypnic  -transferred to unit on HFNC  - hemoglobin this am down to 6 2 from 10 3     Plan:  Urine leak-   ?related to reflux   Keep perc nephrostomy to drainage   Keep IR drains in place   May need repeat ex lap and ligation of ureter vs repeat cysto and fulguration?   F u urology recs     Sepsis  Continue broad spectrum antibiotics   CT scan      Hypoxic respiratory failure  HFNC  pulm toilet   IS      Acute blood loss anemia   Transfuse  Check INR  Transfuse      WOLF  Improving  Continue to monitor   Keep R perc nephrostomy to drainage         Discharge Plan: the detention will take patient back and manage his jiménez catheter and neprhostomy tube

## 2017-12-01 NOTE — PROCEDURES
Central Line Insertion  Date/Time: 11/30/2017 11:00 PM  Performed by: Vanesa Salcedo by: Mae Dewey     Patient location:  Bedside  Other Assisting Provider: No    Consent:     Consent obtained:  Written and verbal    Consent given by:  Patient    Risks discussed:  Arterial puncture, incorrect placement, infection, bleeding, nerve damage and pneumothorax    Alternatives discussed:  No treatment  Universal protocol:     Procedure explained and questions answered to patient or proxy's satisfaction: yes      Relevant documents present and verified: yes      Test results available and properly labeled: yes      Imaging studies available: yes      Required blood products, implants, devices, and special equipment available: yes      Site/side marked: yes      Immediately prior to procedure, a time out was called: yes      Patient identity confirmed:  Arm band and verbally with patient  Pre-procedure details:     Hand hygiene: Hand hygiene performed prior to insertion      Sterile barrier technique: All elements of maximal sterile technique followed      Skin preparation:  2% chlorhexidine and ChloraPrep    Skin preparation agent: Skin preparation agent completely dried prior to procedure    Indications:     Central line indications: medications requiring central line, hemodynamic monitoring and no peripheral vascular access      Site selection rationale:  Right IJ  Anesthesia (see MAR for exact dosages):      Anesthesia method:  Local infiltration    Local anesthetic:  Lidocaine 1% w/o epi  Procedure details:     Location:  Right internal jugular    Vessel type: vein      Laterality:  Right    Approach: percutaneous technique used      Patient position:  Flat    Catheter type:  Triple lumen 16cm    Catheter size:  7 Fr    Landmarks identified: yes      Ultrasound guidance: yes      Sterile ultrasound techniques: Sterile gel and sterile probe covers were used      Number of attempts:  1    Successful placement: yes    Post-procedure details:     Post-procedure:  Dressing applied and line sutured    Assessment:  Blood return through all ports, no pneumothorax on x-ray and placement verified by x-ray    Post-procedure complications: none      Patient tolerance of procedure:   Tolerated well, no immediate complications  Comments:      Slightly deep passing into IVC, not in heart therefore adequate placement for CVC

## 2017-12-01 NOTE — PROGRESS NOTES
Trinity Ross is status post urgent left nephrectomy secondary to likely renal trauma  The patient is known to Dr Omar Cheung with a history of hydronephrosis likely secondary to reflux  The patient underwent a second-look abdominal exploration with ligation of the left ureter, however, he continued to likely leak urine out of the left ureteral stump into the abdominal compartment  The patient was taken back to the operating room by Dr Mayra Perez who injected [employer]- flux bulking agent around the left ureteral orifice and fulgurated the left ureter as well  The patient had a right nephrostomy tube placed as well  This had been to gravity drainage and then was ultimately clamped  In the interim the patient has become quite ill with an increasing abdominal collection  He was taken to interventional radiology where the abdominal fluid collection was drained  Intra-abdominal drains x2 were placed  On examination his right nephrostomy tube is open to gravity with copious yellow urine noted  Two left lower quadrant abdominal drains are appreciated with serosanguineous discharge  At this time I recommend maintaining the right nephrostomy tube to gravity drainage  I would continue to follow output from the pelvic drainage and then repeat imaging to ensure resolution of the fluid collections  Ultimately the patient may require a return to the operating room for additional De-flux with fulguration of the left ureter  Ideally the patient can avoid an additional abdominal exploration with ligation of the left ureter at the level of the bladder as he has previously had exploratory laparotomy x2 an indwelling pelvic fluid collection which would likely make exploration challenging

## 2017-12-01 NOTE — PROGRESS NOTES
Progress Note - General Surgery   Seth Jones 43 y o  male MRN: [id number]  Unit/Bed#: ICU 05 Encounter: [id number]    Assessment:  42 yo M w left renal rupture s/p ex lap lef tnephrectomy 11/1  - second look laparotomy w ligatin of L ureter 11/2 11/9 perc nephrostomy   11/18 cystoscopy with fulguration of ureteral orifice   11/21 nephrostogram   11/27 IR tube check   11/30 Drainage of LUQ fluid collection     -overnight noted to be septic, tachycardic and tachypnic  -transferred to unit on HFNC  - hemoglobin this am down to 6 2 from 10 3    Plan:  Urine leak-   ?related to reflux   Keep perc nephrostomy to drainage   Keep IR drains in place   May need repeat ex lap and ligation of ureter vs repeat cysto and fulguration? F u urology recs    Sepsis  Continue broad spectrum antibiotics   CT scan     Hypoxic respiratory failure  HFNC  pulm toilet   IS     Acute blood loss anemia   Transfuse  Check INR  Transfuse     WOLF  Improving  Continue to monitor   Keep R perc nephrostomy to drainage     Subjective/Objective   Chief Complaint:     Subjective: overnight tahcycardic/tachypnic on HFNC transferred to unit  Given 4L crystalloid  hemoglboin this am low     Objective:     Blood pressure 126/59, pulse (!) 106, temperature 98 8 °F (37 1 °C), temperature source Oral, resp  rate 22, height 5' 6" (1 676 m), weight 89 2 kg (196 lb 10 4 oz), SpO2 100 %  ,Body mass index is 31 74 kg/m²        Intake/Output Summary (Last 24 hours) at 12/01/17 0635  Last data filed at 12/01/17 0600   Gross per 24 hour   Intake          7966 25 ml   Output             1952 ml   Net          6014 25 ml       Invasive Devices     Central Venous Catheter Line            CVC Central Lines 11/30/17 Triple 16cm less than 1 day          Peripheral Intravenous Line            Peripheral IV 11/27/17 Right Arm 4 days    Peripheral IV 11/30/17 Left;Upper Arm less than 1 day          Arterial Line            Arterial Line 11/30/17 Left Radial less than 1 day Drain            Nephrostomy Right 1 10 2 Fr  21 days    Closed/Suction Drain Left Back 8 Fr  less than 1 day    Closed/Suction Drain Left;Lateral Abdomen 8 Fr  less than 1 day                Physical Exam: General: Alert, awake, somnolent but respopnsive to verbal stimuli   Respiratory: BS b/l  Abdomen: Firm, nontender   Incision c/d/i   R perc: serous  L IR drains: dark sanguinous   Heart: RRR, S1s2  Ext: Warm no cyanosis       Lab, Imaging and other studies:  I have personally reviewed pertinent lab results    , CBC:   Lab Results   Component Value Date    WBC 17 27 (H) 12/01/2017    HGB 6 2 (LL) 12/01/2017    HCT 18 9 (L) 12/01/2017    MCV 89 12/01/2017     12/01/2017    MCH 29 5 12/01/2017    MCHC 33 0 12/01/2017    RDW 15 3 (H) 12/01/2017    MPV 8 4 (L) 12/01/2017    NRBC 0 12/01/2017   , CMP:   Lab Results   Component Value Date     12/01/2017    K 4 5 12/01/2017     12/01/2017    CO2 23 12/01/2017    ANIONGAP 9 12/01/2017    BUN 25 12/01/2017    CREATININE 1 67 (H) 12/01/2017    GLUCOSE 134 12/01/2017    CALCIUM 7 3 (L) 12/01/2017    AST 6 12/01/2017    ALT 13 12/01/2017    ALKPHOS 55 12/01/2017    PROT 4 9 (L) 12/01/2017    ALBUMIN 1 8 (L) 12/01/2017    BILITOT 0 60 12/01/2017    EGFR 50 12/01/2017     VTE Pharmacologic Prophylaxis: Sequential compression device (Venodyne)   VTE Mechanical Prophylaxis: sequential compression device

## 2017-12-01 NOTE — PROGRESS NOTES
Progress Note - [address] 43 y o  male MRN: [id number]  Unit/Bed#: ICU 05 Encounter: [id number]    ______________________________________________________________________  Assessment and Plan:  Patient originally admitted on the 2nd of November  Grade 4 fracture of left kidney  Was hypotensive with history of bladder atony and bilateral hydroureter nephrosis  Went to OR for ligation of left ureter and removal of left kidney  Left the ICU was on the general medical floor  Was noted to have abdominal pain yesterday  Found have collection along left kidney  IR procedure today for drainage of fluid collection  Today became increasingly tachycardic and tachypneic  Found have elevated lactic/WBC/creatinine  Meeting criteria for severe sepsis  Given 30 cc/kilogram admitted to ICU  Patient Active Problem List    Diagnosis Date Noted    Severe sepsis ([facility] ) 11/30/2017    Other hydronephrosis 11/09/2017    WOLF (acute kidney injury) ([facility] ) 11/09/2017    Kidney laceration, left 11/02/2017       Neuro:  Currently GCS is 15   - Analgesia:  Scheduled Tylenol  Dilaudid 1 mg p r n  Oxycodone p r n     CV:  Last echocardiogram 2015 EF 55-65%   - Persistently tachycardic likely secondary to sepsis   - Currently maps greater than 65  Therefore no indication for vaso active meds at this time   - Central venous catheter was placed by myself confirmed on chest x-ray  Passes into IVC but in  acceptable area for vaso active medications if necessary   - Lactic acidosis trend till less than 2    Pulm:  Patient becoming more tachypneic  Saturations dropping to upper 80s  Placed on high-flow nasal cannula currently saturation 100%   - CXR unremarkable likely secondary to being acidotic   - Stat ABG after arterial line placement    GI:  NPO sips with meds   - senna bowel regimen b i d     :  Has perc nephrostomy tube with drainage bag    Will bladder scan if bladder has volume will place Thakur catheter for accurate I/O   -Baseline creatinine 1 2  Currently 3 10  Has been rapidly increasing  Increasing along with BUN  Suspect pre renal    - Right-sided perc nephrostomy tube urology following  Status post IR drainage left upper quadrant collection  Concern for urine leak    F/E/N:  Intravascular depletion  Isolyte 150 mL/hour    ID:  Meets criteria for severe sepsis  Will check urinalysis  Fluid collection left upper quadrant drained  One of 2 cultures shows gram-positive cocci in pairs  Blood cultures were sent x2  Currently is on vancomycin/Zosyn/fluconazole  Per surgery recommendations  Vancomycin is 20 milligrams/kilogram Q 24 hour dosing due to renal dysfunction    - Received 30 cc/kg  Profound IJ collapse during respiration  Aggressive hydration 2 L bolus ordered    Heme:  Hemoglobin stable  Heparin DVT prophylaxis    Endo:  No acute issues     Msk/Skin:  Out of bed as tolerated    Disposition:  Patient becoming progressively more hemodynamically unstable  ICU level of care  Tenuous airway  Code Status: Level 1 - Full Code   ______________________    Chief Complaint:  Increasing tachycardia and tachypnea  Has abdominal pain      HPI/24hr events: Worsening vital signs on floor  Elevated white count and lactic acid  Transferred to ICU     ______________________________________________________________________    Physical Exam:   General - GCS 15  Less interactive than prior  Appears slightly pale  Cardiac - tachycardia to 120s  No murmurs  Pulmonary - bilateral breath sounds  No pathologic sounds  Abdomen - tender x4 quadrants  Not rigid  Soft  Not protuberant  Perc nephrostomy tube right  Bilateral drains left    Neuro - moves extremities x4   ______________________________________________________________________  Vitals:    11/30/17 2145 11/30/17 2200 11/30/17 2215 11/30/17 2230   BP: 122/74 119/66 111/60 116/64   Pulse: (!) 140 (!) 136 (!) 124 (!) 122   Resp: (!) 62 (!) 45 (!) 30 (!) 32   Temp: 99 1 °F (37 3 °C)      TempSrc: Oral      SpO2: 90% 100% 91% (!) 88%   Weight:       Height:         Arterial Line BP: 156/78  Arterial Line MAP (mmHg): 106 mmHg     Temperature:   Temp (24hrs), Av 3 °F (37 4 °C), Min:98 °F (36 7 °C), Max:101 1 °F (38 4 °C)    Current Temperature: 99 1 °F (37 3 °C)  Weights:   IBW: 63 8 kg    Body mass index is 31 74 kg/m²  Weight (last 2 days)     None        Hemodynamic Monitoring:  N/A     Non-Invasive/Invasive Ventilation Settings:  Respiratory    Lab Data (Last 4 hours)       2041            pH, Arterial       7 439           pCO2, Arterial       (!)27 0           pO2, Arterial       85 8           HCO3, Arterial       (!)17 9           Base Excess, Arterial       -5 1                O2/Vent Data     None              Lab Results   Component Value Date    PHART 7 439 2017    UHY0SVG 27 0 (LL) 2017    PO2ART 85 8 2017    PRX2VYE 17 9 (L) 2017    BEART -5 1 2017    SOURCE Radial, Right 2017     SpO2: SpO2: (!) 88 %    Intake and Outputs:  I/O        0701 -  0700  0701 -  0700    P  O  1510 240    IV Piggyback  1050    Total Intake(mL/kg) 1510 (16 9) 1290 (14 5)    Urine (mL/kg/hr) 816 (0 4) 100 (0 1)    Drains  737 (0 5)    Stool 0 (0)     Total Output 816 837    Net +694 +453          Unmeasured Urine Occurrence 1 x     Unmeasured Stool Occurrence 1 x         UOP:  Only 100 documented over past 24 hours   Nutrition:        Diet Orders            Start     Ordered    17 1141  Diet Regular; Regular House  Diet effective now     Question Answer Comment   Diet Type Regular    Regular Regular House    RD to adjust diet per protocol?  No        17 1141    17 1646  Dietary nutrition supplements  Once     Question Answer Comment   Select Supplement: Ensure Enlive-Chocolate    Frequency Breakfast        17 1645        TF currently running at [address]:     Results from last 7 days  Lab Units 11/30/17 1827 11/30/17 0522 11/30/17  0020 11/27/17  0451   WBC Thousand/uL 25 15* 14 46* 13 78* 6 50   HEMOGLOBIN g/dL 10 3* 11 7* 11 7* 9 8*   HEMATOCRIT % 31 7* 36 4* 35 4* 31 1*   PLATELETS Thousands/uL 349 327 317 316   NEUTROS PCT % 85*  --   --  58   MONOS PCT % 8  --   --  12       Results from last 7 days  Lab Units 11/30/17 1827 11/30/17  0522 11/30/17  0020   SODIUM mmol/L 139 138 137   POTASSIUM mmol/L 5 5* 4 3 4 4   CHLORIDE mmol/L 106 105 103   CO2 mmol/L 22 25 26   BUN mg/dL 36* 25 24   CREATININE mg/dL 3 10* 1 94* 1 62*   CALCIUM mg/dL 8 2* 8 9 9 1   GLUCOSE RANDOM mg/dL 164* 126 118       Results from last 7 days  Lab Units 11/27/17  0451   MAGNESIUM mg/dL 2 0     Lab Results   Component Value Date    PHOS 3 6 11/14/2017    PHOS 2 0 (L) 11/04/2017    PHOS 3 1 11/03/2017        Results from last 7 days  Lab Units 11/30/17  0522 11/27/17  0451   INR  1 12 0 99       0  Lab Value Date/Time   TROPONINI <0 04 04/23/2015 2325       Results from last 7 days  Lab Units 11/30/17 1827 11/30/17  0020   LACTIC ACID mmol/L 3 3* 1 2     ABG:  Lab Results   Component Value Date    PHART 7 439 11/30/2017    SXG6UDP 27 0 (LL) 11/30/2017    PO2ART 85 8 11/30/2017    GPY8ANT 17 9 (L) 11/30/2017    BEART -5 1 11/30/2017    SOURCE Radial, Right 11/30/2017     Imaging:   CT A/P yesterday: 1  Side decreased size of the mass/collection in the left renal fossa which is suboptimally evaluated without intravenous contrast   However, this collection is in communication with a larger collection bordering the spleen  The 11 2 x 4 8 cm   collection bordering the spleen has significantly increased in size  Presence of gas within the collection raises the possibility of abscess  2   New severe left and moderate right hydroureter  3   Right percutaneous of prostate tube is in place, without hydronephrosis  4   Unchanged small left pleural effusion with associated passive atelectasis      EKG:  No New    Micro:  Lab Results   Component Value Date    BLOODCX No Growth After 5 Days  11/01/2017    BLOODCX No Growth After 5 Days  11/01/2017    URINECX No Growth <1000 cfu/mL 11/09/2017    URINECX >100,000 cfu/ml Mixed Contaminants X3 02/15/2016     Allergies: No Known Allergies  Medications:   Scheduled Meds:  acetaminophen 975 mg Oral Q8H   amLODIPine 5 mg Oral Daily   fluconazole 400 mg Intravenous Q24H   heparin (porcine) 5,000 Units Subcutaneous Q8H [address]   iron polysaccharides 150 mg Oral Daily   multi-electrolyte 1,000 mL Intravenous Once   piperacillin-tazobactam 3 375 g Intravenous Q6H   senna-docusate sodium 1 tablet Oral BID   tamsulosin 0 4 mg Oral Daily With Dinner   [START ON 12/1/2017] vancomycin 20 mg/kg Intravenous Q24H     Continuous Infusions:  multi-electrolyte 125 mL/hr     PRN Meds:    calcium carbonate 500 mg Daily PRN   hydrALAZINE 10 mg Q6H PRN   HYDROmorphone 1 mg Q2H PRN   influenza vaccine 0 5 mL Prior to discharge   oxyCODONE 10 mg Q4H PRN   oxyCODONE 5 mg Q4H PRN     VTE Pharmacologic Prophylaxis: Sequential compression device (Venodyne)  and Heparin  VTE Mechanical Prophylaxis: sequential compression device  Invasive lines and devices: Invasive Devices     Central Venous Catheter Line            CVC Central Lines 11/30/17 Triple 16cm less than 1 day          Peripheral Intravenous Line            Peripheral IV 11/27/17 Right Arm 3 days    Peripheral IV 11/30/17 Left;Upper Arm less than 1 day          Drain            Nephrostomy Right 1 10 2 Fr  21 days    Closed/Suction Drain Left Back 8 Fr  less than 1 day    Closed/Suction Drain Left;Lateral Abdomen 8 Fr  less than 1 day                     Portions of the record may have been created with voice recognition software  Occasional wrong word or "sound a like" substitutions may have occurred due to the inherent limitations of voice recognition software    Read the chart carefully and recognize, using context, where substitutions have occurred      Esther Welch DO

## 2017-12-01 NOTE — SEPSIS NOTE
Sepsis Note   Abby Serrato 43 y o  male MRN: [id number]  Unit/Bed#: ICU 05 Encounter: [id number]            Initial Sepsis Screening     Row Name 11/30/17 2056 11/01/17 2239             Is the patient's history suggestive of a new or worsening infection? (!)  Yes (Proceed)  -DB (!)  Yes (Proceed)  -SA       Suspected source of infection acute abdominal infection  -DB urinary tract infection  -SA       Are two or more of the following signs & symptoms of infection both present and new to the patient? (!)  Yes (Proceed)  -DB         Indicate SIRS criteria Hyperthemia > 38 3C (100 9F); Tachycardia > 90 bpm;Leukocytosis (WBC > 15214 IJL)  -DB Tachycardia > 90 bpm;Leukocytosis (WBC > 72922 IJL)  -SA       If the answer is yes to both questions, suspicion of sepsis is present  [de-identified]         If severe sepsis is present AND tissue hypoperfusion perists in the hour after fluid resuscitation or lactate > 4, the patient meets criteria for SEPTIC SHOCK           Are any of the following organ dysfunction criteria present within 6 hours of suspected infection and SIRS criteria that are NOT considered to be chronic conditions? (!)  Yes  -DB (!)  Yes  -SA       Organ dysfunction Creatinine > 2 0 mg/dL; Lactate > 2 0 mmol/L;MAP < 65 mmHg  -DB Lactate > 2 0 mmol/L  -SA       Date of presentation of severe sepsis 11/30/17  -DB 11/01/17  -SA       Time of presentation of severe sepsis 2058  -DB 2239  -SA       Tissue hypoperfusion persists in the hour after crystalloid fluid administration, evidenced, by either:           Was hypotension present within one hour of the conclusion of crystalloid fluid administration?   Michell Hernandez       Date of presentation of septic shock           Time of presentation of septic shock             User Key  (r) = Recorded By, (t) = Taken By, (c) = Cosigned By    [address] Name Provider Type    SA Yossi Shaw MD Physician    NEETA Werner DO Resident               Default Flowsheet Data (last 720 hours) Sepsis Reassessment     Row Name 11/30/17 5072                   Volume Status and Tissue Perfusion Post Fluid Resuscitation- Must Document ALL of the Following:    Vital Signs Reviewed Yes  -DS        Cardio (!)  Normal S1/S2; Tachycardia  -DS        Pulmonary (!)  Tachypnea  -DS        Capillary Refill Brisk  -DS        Peripheral Pulses Dorsalis Pedis;Radial  -DS        Peripheral Pulse +2  -DS        Dorsalis Pedis +2  -DS        Skin Diaphoretic  -DS           *OR*   Intensive Monitoring- Must Document Two * of the Following Four *:    Vital Signs Reviewed          * Central Venous Pressure (CVP or RAP)          * Central Venous Oxygen (SVO2, ScvO2 or Oxygen saturation via central catheter)          * Bedside Cardiovascular US in IVC diameter and % collapse          * Passive Leg Raise OR Crystalloid Challenge            User Key  (r) = Recorded By, (t) = Taken By, (c) = Cosigned By    Initials Name Provider Type    DS Nessa France DO Resident

## 2017-12-01 NOTE — PHYSICAL THERAPY NOTE
Physical Therapy Cancellation Note:    Per NSG Freida Orlando, pt not medically appropriate to participate in therapy intervention at this time 2* resp status and low HgB  Cancel PT services for today and will cont to follow as able to cont therapy interventions

## 2017-12-01 NOTE — PROGRESS NOTES
Called to evaluate patient for tachypnea and tachycardia  Patient pale and has rigors on exam  Abdomen non-tender  Patient reports that torso hurts  Wt Readings from Last 3 Encounters:   11/02/17 89 2 kg (196 lb 10 4 oz)   11/01/17 85 kg (187 lb 6 3 oz)   09/29/16 79 4 kg (175 lb)     Temp Readings from Last 3 Encounters:   11/30/17 98 7 °F (37 1 °C) (Oral)   11/01/17 99 °F (37 2 °C) (Oral)   09/29/16 98 5 °F (36 9 °C) (Temporal)     BP Readings from Last 3 Encounters:   11/30/17 120/60   11/01/17 131/66   09/29/16 (!) 179/104     Pulse Readings from Last 3 Encounters:   11/30/17 (!) 130   11/01/17 92   09/29/16 92     --> blood cultures  --> vanc/fluconazole/zosyn  --> perc nephrostomy to gravity  --> transfer to ICU  --> high risk for reintubation    Jesus [address]   [facility] Surgery  PGY3

## 2017-12-01 NOTE — RESPIRATORY THERAPY NOTE
RT Ventilator Management Note  Mattie James 43 y o  male MRN: [id number]  Unit/Bed#: ICU 05 Encounter: [id number]      Daily Screen       11/2/2017 0733 11/2/2017 1157          Patient safety screen outcome[de-identified] - Passed      Spont breathing trial % for 30 min: - No      Spont breathing trial outcome[de-identified] - Failed      Spont breathing trial reason failed: - Hemodynamic unstable              Physical Exam:          Resp Comments: (P) pt placed on hfnc at this time due to desaturations

## 2017-12-02 ENCOUNTER — APPOINTMENT (INPATIENT)
Dept: RADIOLOGY | Facility: HOSPITAL | Age: 42
DRG: 659 | End: 2017-12-02
Payer: COMMERCIAL

## 2017-12-02 PROBLEM — E87.2 LACTIC ACIDOSIS: Status: RESOLVED | Noted: 2017-12-01 | Resolved: 2017-12-02

## 2017-12-02 LAB
ABO GROUP BLD BPU: NORMAL
ABO GROUP BLD BPU: NORMAL
ANION GAP SERPL CALCULATED.3IONS-SCNC: 4 MMOL/L (ref 4–13)
BACTERIA SPEC BFLD CULT: NORMAL
BPU ID: NORMAL
BPU ID: NORMAL
BUN SERPL-MCNC: 18 MG/DL (ref 5–25)
CALCIUM SERPL-MCNC: 8.1 MG/DL (ref 8.3–10.1)
CHLORIDE SERPL-SCNC: 108 MMOL/L (ref 100–108)
CO2 SERPL-SCNC: 27 MMOL/L (ref 21–32)
CREAT SERPL-MCNC: 1.09 MG/DL (ref 0.6–1.3)
ERYTHROCYTE [DISTWIDTH] IN BLOOD BY AUTOMATED COUNT: 15.2 % (ref 11.6–15.1)
GFR SERPL CREATININE-BSD FRML MDRD: 83 ML/MIN/1.73SQ M
GLUCOSE SERPL-MCNC: 93 MG/DL (ref 65–140)
GRAM STN SPEC: NORMAL
HCT VFR BLD AUTO: 20.2 % (ref 36.5–49.3)
HCT VFR BLD AUTO: 23.7 % (ref 36.5–49.3)
HGB BLD-MCNC: 6.8 G/DL (ref 12–17)
HGB BLD-MCNC: 8.1 G/DL (ref 12–17)
MCH RBC QN AUTO: 29.7 PG (ref 26.8–34.3)
MCHC RBC AUTO-ENTMCNC: 33.7 G/DL (ref 31.4–37.4)
MCV RBC AUTO: 88 FL (ref 82–98)
PLATELET # BLD AUTO: 184 THOUSANDS/UL (ref 149–390)
PMV BLD AUTO: 8.4 FL (ref 8.9–12.7)
POTASSIUM SERPL-SCNC: 4.4 MMOL/L (ref 3.5–5.3)
RBC # BLD AUTO: 2.29 MILLION/UL (ref 3.88–5.62)
SODIUM SERPL-SCNC: 139 MMOL/L (ref 136–145)
UNIT DISPENSE STATUS: NORMAL
UNIT DISPENSE STATUS: NORMAL
UNIT PRODUCT CODE: NORMAL
UNIT PRODUCT CODE: NORMAL
UNIT RH: NORMAL
UNIT RH: NORMAL
WBC # BLD AUTO: 12.69 THOUSAND/UL (ref 4.31–10.16)

## 2017-12-02 PROCEDURE — 85027 COMPLETE CBC AUTOMATED: CPT | Performed by: SURGERY

## 2017-12-02 PROCEDURE — 80048 BASIC METABOLIC PNL TOTAL CA: CPT | Performed by: SURGERY

## 2017-12-02 PROCEDURE — 71010 HB CHEST X-RAY 1 VIEW FRONTAL (PORTABLE): CPT

## 2017-12-02 PROCEDURE — 85018 HEMOGLOBIN: CPT | Performed by: EMERGENCY MEDICINE

## 2017-12-02 PROCEDURE — 85014 HEMATOCRIT: CPT | Performed by: EMERGENCY MEDICINE

## 2017-12-02 PROCEDURE — P9021 RED BLOOD CELLS UNIT: HCPCS

## 2017-12-02 RX ADMIN — PIPERACILLIN SODIUM AND TAZOBACTAM SODIUM 3.38 G: 36; 4.5 INJECTION, POWDER, FOR SOLUTION INTRAVENOUS at 06:00

## 2017-12-02 RX ADMIN — OXYCODONE HYDROCHLORIDE 10 MG: 10 TABLET ORAL at 17:53

## 2017-12-02 RX ADMIN — HEPARIN SODIUM 5000 UNITS: 5000 INJECTION, SOLUTION INTRAVENOUS; SUBCUTANEOUS at 14:31

## 2017-12-02 RX ADMIN — VANCOMYCIN HYDROCHLORIDE 1500 MG: 1 INJECTION, POWDER, LYOPHILIZED, FOR SOLUTION INTRAVENOUS at 16:08

## 2017-12-02 RX ADMIN — HYDROMORPHONE HYDROCHLORIDE 1 MG: 1 INJECTION, SOLUTION INTRAMUSCULAR; INTRAVENOUS; SUBCUTANEOUS at 22:00

## 2017-12-02 RX ADMIN — OXYCODONE HYDROCHLORIDE 10 MG: 10 TABLET ORAL at 04:44

## 2017-12-02 RX ADMIN — TAMSULOSIN HYDROCHLORIDE 0.4 MG: 0.4 CAPSULE ORAL at 16:08

## 2017-12-02 RX ADMIN — Medication 150 MG: at 08:01

## 2017-12-02 RX ADMIN — HEPARIN SODIUM 5000 UNITS: 5000 INJECTION, SOLUTION INTRAVENOUS; SUBCUTANEOUS at 06:00

## 2017-12-02 RX ADMIN — ACETAMINOPHEN 975 MG: 325 TABLET ORAL at 08:01

## 2017-12-02 RX ADMIN — PIPERACILLIN SODIUM,TAZOBACTAM SODIUM 4.5 G: 4; .5 INJECTION, POWDER, FOR SOLUTION INTRAVENOUS at 18:58

## 2017-12-02 RX ADMIN — FLUCONAZOLE 400 MG: 2 INJECTION INTRAVENOUS at 21:30

## 2017-12-02 RX ADMIN — OXYCODONE HYDROCHLORIDE 10 MG: 10 TABLET ORAL at 11:50

## 2017-12-02 RX ADMIN — PIPERACILLIN SODIUM AND TAZOBACTAM SODIUM 3.38 G: 36; 4.5 INJECTION, POWDER, FOR SOLUTION INTRAVENOUS at 11:52

## 2017-12-02 RX ADMIN — ACETAMINOPHEN 975 MG: 325 TABLET ORAL at 14:32

## 2017-12-02 RX ADMIN — ACETAMINOPHEN 975 MG: 325 TABLET ORAL at 00:36

## 2017-12-02 RX ADMIN — VANCOMYCIN HYDROCHLORIDE 1750 MG: 1 INJECTION, POWDER, LYOPHILIZED, FOR SOLUTION INTRAVENOUS at 00:49

## 2017-12-02 RX ADMIN — PIPERACILLIN SODIUM AND TAZOBACTAM SODIUM 3.38 G: 36; 4.5 INJECTION, POWDER, FOR SOLUTION INTRAVENOUS at 00:36

## 2017-12-02 RX ADMIN — HEPARIN SODIUM 5000 UNITS: 5000 INJECTION, SOLUTION INTRAVENOUS; SUBCUTANEOUS at 21:59

## 2017-12-02 NOTE — PROGRESS NOTES
Progress Note - Critical Care   Aleisha Rice 43 y o  male MRN: [id number]  Unit/Bed#: ICU 05 Encounter: [id number]    Assessment: 42 yo M w/ spontaneous L renal rupture 2/2 hydronephrosis, s/p ex lap with L nephrectomy on 11/2 and 2nd look 11/3 with ligation of left ureter  Unfortunately he developed a urine leak from this ureter that was drained x 1 by IR  Now transferred to ICU for recurrent acculation of urinoma/abscess and sepsis  Plan:          Neuro: GCS 15   - PRN Pain control w/ dilaudid, oxy PO           CV: Improved, now hemodynamically stable with HR 90s and BP 120s/60s    - Monitor for tachycardia            Lung: On RA  B/L pleural effusions seen on CT/CXR asymptomatic   - Continue IS, pulm toile                 GI: Kept NPO in case of OR/IR procedure- no urgent procedure indicated   - Clears today, advance as tolerated                 FEN:    - Fluids: ISolyte @ 75  Keep on until PO intake adequate    - Electrolyte: WNL   - Nutrition: Initiate clears                : S/P IR drain placement for urinoma   - F/U drain studies   - Continue R perc nephrostomy tube to drainage   - Monitor drain output- currently serosanginous   - F/U  & Surgery for definitive operative plan                 ID: Admitted to ICU w/ SIRS/Sepsis   - Continue Vanco/Zosyn   - Tailor to culture results                 Heme: Persistent drop in Hb- received 2 units yesterday, Hb responded 6 2 > 7 8, down to 7 5 > 6 8 this AM  Undetermined source/etiology of Hb drop as drains are serosanginous   - Transfuse 1 unit this AM   - Post transfusion Hb                 Endo: NO hx of DM, no need for insulin                            Msk/Skin: Drain care                 Disposition: ICU vs SD 1 status    Chief Complaint: Pain    HPI/24hr events:   - Complains of pain most in L abdomen  - Received 2 units pRBC yesterday for Hb 6 2    Physical Exam:   Physical Exam:   Gen: A&O, NAD  Cardio: RRR  Lungs: CTAB  Abd: Soft, non distended  Tender to palpation, most on left abdomen  No rigidity or guarding  L IR drains serosanginous  R perc nephrostomy tube w/ urine      Vitals:    17 0000 17 0100 17 0200 17 0300   BP:       Pulse: 94 94 102 96   Resp: 22 (!) 24 (!) 33 (!) 35   Temp: 99 6 °F (37 6 °C)      TempSrc: Oral      SpO2: 100% 100% 96% 97%   Weight:       Height:         Arterial Line BP: 124/70  Arterial Line MAP (mmHg): 90 mmHg    Temperature:   Temp (24hrs), Av °F (37 2 °C), Min:98 5 °F (36 9 °C), Max:99 6 °F (37 6 °C)    Current: Temperature: 99 6 °F (37 6 °C)    Weights:   IBW: 63 8 kg    Body mass index is 31 74 kg/m²  Weight (last 2 days)     None          Hemodynamic Monitoring:  N/A     Non-Invasive/Invasive Ventilation Settings:  Respiratory    Lab Data (Last 4 hours)    None         O2/Vent Data (Last 4 hours)    None              Lab Results   Component Value Date    PHART 7 445 2017    NNE1WXO 33 7 (L) 2017    PO2ART 173 9 (H) 2017    WUL4NPF 22 6 2017    BEART -1 3 2017    SOURCE Line, Arterial 2017     SpO2: SpO2: 99 %    Intake and Outputs:  I/O        07 -  0700  07 -  0700    P  O  240     I V  (mL/kg) 5176 3 (58) 2072 5 (23 2)    Blood  700    IV Piggyback 2000 800    Total Intake(mL/kg) 7416 3 (83 1) 3572 5 (40 1)    Urine (mL/kg/hr) 970 (0 5) 1675 (0 8)    Drains 982 (0 5) 525 (0 2)    Total Output  2200    Net +5464 3 +1372 5                Nutrition:        Diet Orders            Start     Ordered    17  Diet NPO; Sips with meds  Diet effective now     Question Answer Comment   Diet Type NPO    NPO Except: Sips with meds    RD to adjust diet per protocol?  No        17 1646  Dietary nutrition supplements  Once     Question Answer Comment   Select Supplement: Ensure Enlive-Chocolate    Frequency Breakfast        17 1646            Labs:     Results from last 7 days  Lab Units 17  8666 12/01/17  1125 12/01/17  0731 12/01/17  0544 11/30/17  1827 11/30/17  0522  11/27/17  0451   WBC Thousand/uL  --   --   --  17 27* 25 15* 14 46*  < > 6 50   HEMOGLOBIN g/dL 7 5* 7 8* 6 2* 6 6* 10 3* 11 7*  < > 9 8*   HEMATOCRIT % 22 1* 23 3* 18 9* 20 0* 31 7* 36 4*  < > 31 1*   PLATELETS Thousands/uL  --   --   --  211 349 327  < > 316   NEUTROS PCT %  --   --   --  83* 85*  --   --  58   MONOS PCT %  --   --   --  8 8  --   --  12   < > = values in this interval not displayed  Results from last 7 days  Lab Units 12/01/17  0544 12/01/17  0053 11/30/17  1827   SODIUM mmol/L 139 138 139   POTASSIUM mmol/L 4 5 4 5 5 5*   CHLORIDE mmol/L 107 107 106   CO2 mmol/L 23 21 22   BUN mg/dL 25 32* 36*   CREATININE mg/dL 1 67* 2 22* 3 10*   CALCIUM mg/dL 7 3* 7 4* 8 2*   TOTAL PROTEIN g/dL 4 9*  --   --    BILIRUBIN TOTAL mg/dL 0 60  --   --    ALK PHOS U/L 55  --   --    ALT U/L 13  --   --    AST U/L 6  --   --    GLUCOSE RANDOM mg/dL 134 154* 164*       Results from last 7 days  Lab Units 12/01/17  0544 11/27/17  0451   MAGNESIUM mg/dL 2 1 2 0       Results from last 7 days  Lab Units 12/01/17  0544   PHOSPHORUS mg/dL 3 8        Results from last 7 days  Lab Units 11/30/17  0522 11/27/17  0451   INR  1 12 0 99       Results from last 7 days  Lab Units 12/01/17  1402   LACTIC ACID mmol/L 1 8       0  Lab Value Date/Time   TROPONINI <0 04 04/23/2015 2325       Imaging:   XR chest portable   Final Result by Jackie Wilson MD (12/01 1612)         1  No pneumothorax  Repositioned IJ catheter tip at the cavoatrial junction   2  Left-sided opacity possibly a moderate to large pleural effusion versus atelectasis and/or pneumonia redemonstrated  Workstation performed: [id number]         XR chest portable ICU   Final Result by Emelina Patton MD (12/01 8784)      Persistent consolidation throughout the left lower lobe and moderate-sized pleural effusion  Lung volumes have slightly diminished        Right IJ catheter terminates in the right atrium  No pneumothorax  Workstation performed: [id number]         XR chest portable ICU   Final Result by Hayes Cruz MD (63/54 3945)      Right central line in place tip projects over expected location inferior right atrium  No pneumothorax  Progressive left lower lobe airspace disease may represent pneumonia, aspiration, and/or subsegmental atelectasis  Moderate-sized left pleural effusion  Workstation performed: [id number]         IR image guided aspiration / drainage   Final Result by Gregoria Patterson MD (11/30 5614)   Impression:    Successful image-guided percutaneous drainage of left abdominal flank collection with placement of 2 drains          Workstation performed: [id number]         CT abdomen pelvis wo contrast   Final Result by Wilberto Strong MD (11/30 5172)   1  Side decreased size of the mass/collection in the left renal fossa which is suboptimally evaluated without intravenous contrast   However, this collection is in communication with a larger collection bordering the spleen  The 11 2 x 4 8 cm    collection bordering the spleen has significantly increased in size  Presence of gas within the collection raises the possibility of abscess  2   New severe left and moderate right hydroureter  3   Right percutaneous of prostate tube is in place, without hydronephrosis  4   Unchanged small left pleural effusion with associated passive atelectasis  Findings are consistent with the preliminary report from [facility] which was provided shortly after completion of the exam                       Workstation performed: [id number]         NM kidney w flow and function w pharmacological intervention   Final Result by Jordan German MD (11/28 7228)      1  Slow overall excretion in the solitary right kidney  T-1/2 of Lasix washout is 23 5 minutes suspicious for obstruction           Workstation performed: [id number]         IR tube check   Final Result by Krystal Avitia MD (11/28 1609)   Impression:     1   Ectatic right renal collecting system and ureter  2   Holdup of contrast at the right UVJ and slow passage of contrast into the bladder  3   Dr Alie Diggs plans to evaluate with nuclear medicine Lasix scan  If the study is negative, the right nephrostomy catheter will be removed  Workstation performed: [id number]         [place] cystogram   Final Result by Keira Mcgill MD (11/24 1750)      No evidence of reflux or leak  Workstation performed: [id number]         IR tube check   Final Result by Dee Hampton MD (11/21 1714)   Impression:      Distal right ureteral stricture with drainage into the bladder and trial of internal drainage  Workstation performed: [id number]         [facility] cystogram   Final Result by Brown Sawyer MD (11/19 1349)      Status post cystogram   Please see procedure report for further details  Workstation performed: [id number]         [place] retrograde pyelogram   Final Result by Brown Sawyer MD (11/19 1346)      Marked left hydroureter, status post ureteral stent placement  Please see procedure report for further details  Workstation performed: [id number]         CT renal protocol   Final Result by Anna Regalado DO (11/17 1913)   1  Persistent air and fluid in the abdomen and retroperitoneum, likely related to incomplete ligation of the ureteric stump with reflux from the urinary bladder  2   Heterogeneously enhancing soft tissue in the retroperitoneum, similar to the prior study  Residual renal tissue in the nephrectomy bed not excluded  3   Slight enlarging moderate-sized left pleural effusion  cfs   I personally discussed this result with Dr Delia Canales on 11/17/2017 7:02 PM          Workstation performed: [id number]         XR chest pa & lateral   Final Result by Kush Glass MD (11/15 1630)      Left basilar effusion versus atelectasis and/or pneumonia  Workstation performed: [id number]         IR tube check   Final Result by Linda Lobato MD (11/14 1500)   Impression:     1  Right antegrade nephrostogram showed critical stenosis of the distal 1 cm of the ureter  There was UVJ obstruction  2   Cystogram via the Thakur catheter showed contracted, small volume bladder  The bladder was only minimally distensible  At approximately 10 mL volume, there was reflux of contrast into the left ureteral stump  No leakage of contrast was seen into    the peritoneal cavity  Workstation performed: [id number]         [place] cystogram   Final Result by Keith Stein DO (11/14 1317)   Bilateral vesicoureteral reflux  Unremarkable right ureter  Leakage of contrast from patulous tortuous left ureter into the left nephrectomy bed  ##cfslh   I personally discussed this result with Ron Ralph on 11/14/2017 12:15 PM    ##         Workstation performed: [id number]         IR tube placement nephrostomy   Final Result by Thuan Chen MD (79/76 3428)   Impression:    Successful right percutaneous nephrostomy with 10  Divehi drainage catheter placement  Right UVJ obstruction         Workstation performed: [id number]         XR chest 1 view   Final Result by Whit Verdugo MD (11/11 0571)      Left lower lobe consolidation representing atelectasis or pneumonia  Workstation performed: [id number]         XR chest pa & lateral   Final Result by Delta Pa DO (11/09 0735)      Minimal subsegmental atelectasis versus scarring within the right lung base  Workstation performed: [id number]         CT abdomen pelvis wo contrast   Final Result by Delta Pa DO (11/08 1704)      Interval development of extensive abdominal and pelvic ascites, the majority of which appears simple  Given recent surgery, findings may represent urine leaking into the abdominal cavity        There is a moderate-sized hematoma identified within the left renal fossa status post left nephrectomy, markedly improved compared to the prior examination  Moderate posterior layering left effusion with adjacent compressive atelectasis  ##cfslh   I personally discussed this result with the surgery resident  on 11/8/2017 5:01 PM    ##         Workstation performed: [email or web address] [place] kidney and bladder   Final Result by Kristan Drake DO (11/06 1700)      1  Mild right hydronephrosis identified which has significantly improved since the prior CT scan status post fall catheter placement  2   Status post left refractory  Fluid and echogenic debris in the left nephrectomy bed likely is representative of postoperative change/hemorrhage  3   There is echogenic nonshadowing debris in the gallbladder potentially representing sludge  Workstation performed: [id number]         XR chest portable   Final Result by Ragini Schulz MD (11/03 5611)         1  Status post removal of endotracheal tube and nasogastric tube  2   Unchanged mild pulmonary vascular congestion and mild cardiomegaly  Workstation performed: [id number]         X-ray chest 1 view   Final Result by Cornelia Patricio DO (11/02 8003)   Diminished lung volumes with vascular crowding  Mild pulmonary edema  Endotracheal tube just above lauren, suggest retraction x 2 cm for more optimal position  ##cfslh   I personally discussed this result with Geetha Chahal on 11/2/2017 9:24 AM    ##                Workstation performed: [id number]                EKG: Micro:  Lab Results   Component Value Date    BLOODCX No Growth at 24 hrs  11/30/2017    BLOODCX No Growth at 24 hrs  11/30/2017    BLOODCX No Growth After 5 Days  11/01/2017    BLOODCX No Growth After 5 Days   11/01/2017    URINECX No Growth <1000 cfu/mL 11/09/2017    URINECX >100,000 cfu/ml Mixed Contaminants X3 02/15/2016       Allergies: No Known Allergies    Medications:   Scheduled Meds:  acetaminophen 975 mg Oral Q8H   fluconazole 400 mg Intravenous Q24H   heparin (porcine) 5,000 Units Subcutaneous Q8H [facility]   iron polysaccharides 150 mg Oral Daily   piperacillin-tazobactam 3 375 g Intravenous Q6H   senna-docusate sodium 1 tablet Oral BID   tamsulosin 0 4 mg Oral Daily With Dinner   vancomycin 20 mg/kg Intravenous Q24H     Continuous Infusions:  multi-electrolyte 75 mL/hr Last Rate: 75 mL/hr (12/02/17 0201)     PRN Meds:    HYDROmorphone 1 mg Q2H PRN   influenza vaccine 0 5 mL Prior to discharge   oxyCODONE 10 mg Q4H PRN   oxyCODONE 5 mg Q4H PRN       VTE Pharmacologic Prophylaxis: Heparin  VTE Mechanical Prophylaxis: sequential compression device    Invasive lines and devices: Invasive Devices     Central Venous Catheter Line            CVC Central Lines 11/30/17 Triple 16cm 1 day          Peripheral Intravenous Line            Peripheral IV 11/30/17 Left;Upper Arm 1 day          Arterial Line            Arterial Line 11/30/17 Left Radial 1 day          Drain            Nephrostomy Right 1 10 2 Fr  22 days    Closed/Suction Drain Left Back 8 Fr  1 day    Closed/Suction Drain Left;Lateral Abdomen 8 Fr  1 day                   Counseling / Coordination of Care  Total time spent today 30 minutes  Greater than 50% of total time was spent with the patient and / or family counseling and / or coordination of care  A description of the counseling / coordination of care: patient     Code Status: Level 1 - Full Code     Portions of the record may have been created with voice recognition software  Occasional wrong word or "sound a like" substitutions may have occurred due to the inherent limitations of voice recognition software  Read the chart carefully and recognize, using context, where substitutions have occurred       Sharon Daley MD

## 2017-12-02 NOTE — PROGRESS NOTES
Progress Note - General Surgery  Mattie James 43 y o  male MRN: [id number]  Unit/Bed#: ICU 5-01 Encounter: [id number]    Assessment:  43y o -year-old male with spontaneous left kidney rupture    - s/p ex lap, left nephrectomy, abthera 11/1 Bree Brenner)  - s/p ex lap, removal of packs, ligation of left ureter, abdominal closure 11/2 Pillo Cano)  - s/p right PCN w/ intraabdominal ascites samples 11/9 (IR)    Plan:  - continue sips  - wean O2   - monitor closely  - transfuse 1 U pRBC for hgb 6 8  - f/u cultures  - f/u ct cystogram for next week    Ana Sorensen MD PGY-4  9:53 AM  12/02/17      Subjective:  Passing flatus, no bowel movement  On 5L NC      Objective:  Patient Vitals for the past 24 hrs:   BP Temp Temp src Pulse Resp SpO2   12/02/17 0900 - - - 96 (!) 27 96 %   12/02/17 0800 - 98 6 °F (37 °C) Oral 86 22 100 %   12/02/17 0700 - - - 84 18 100 %   12/02/17 0600 - - - 86 18 100 %   12/02/17 0500 - - - 90 (!) 27 100 %   12/02/17 0400 - 98 7 °F (37 1 °C) Oral 92 (!) 28 100 %   12/02/17 0300 - - - 96 (!) 35 97 %   12/02/17 0200 - - - 102 (!) 33 96 %   12/02/17 0100 - - - 94 (!) 24 100 %   12/02/17 0000 - 99 6 °F (37 6 °C) Oral 94 22 100 %   12/01/17 2300 - - - 98 22 100 %   12/01/17 2200 - - - (!) 106 (!) 38 100 %   12/01/17 2100 - - - 100 (!) 37 98 %   12/01/17 2000 - 99 4 °F (37 4 °C) Oral 102 (!) 34 98 %   12/01/17 1900 - - - (!) 106 (!) 41 98 %   12/01/17 1800 - - - (!) 106 (!) 33 100 %   12/01/17 1700 - - - 100 (!) 25 100 %   12/01/17 1600 - 98 8 °F (37 1 °C) Oral 100 (!) 25 100 %   12/01/17 1500 - - - 102 (!) 25 100 %   12/01/17 1400 - - - (!) 110 (!) 36 -   12/01/17 1300 - - - 100 (!) 23 100 %   12/01/17 1200 - - - 102 (!) 24 100 %   12/01/17 1102 117/59 99 5 °F (37 5 °C) Oral 105 22 100 %   12/01/17 1100 - - - (!) 110 (!) 31 100 %   12/01/17 1015 112/50 99 5 °F (37 5 °C) - (!) 110 (!) 24 100 %   12/01/17 1003 121/55 98 5 °F (36 9 °C) - (!) 118 (!) 28 -          Diet Orders            Start     Ordered 11/30/17 2316  Diet NPO; Sips with meds  Diet effective now     Question Answer Comment   Diet Type NPO    NPO Except: Sips with meds    RD to adjust diet per protocol?  No        11/30/17 2315    11/22/17 1646  Dietary nutrition supplements  Once     Question Answer Comment   Select Supplement: Ensure Enlive-Chocolate    Frequency Breakfast        11/22/17 1645          Intake/Output Summary (Last 24 hours) at 12/02/17 0953  Last data filed at 12/02/17 0800   Gross per 24 hour   Intake          3771 25 ml   Output             2566 ml   Net          1205 25 ml   UOP 2 1 perc nephrostomy  Drain 485 serosanguinous  Drain 110 serosanguinous    Physical Exam:  General: NAD  Cardiovascular: RRR  Respiratory: breath sounds b/l  Abdomen: soft, mild distension, incision c/d/i PCN on right  Extremities: no edema    Medications:    acetaminophen 975 mg Oral Q8H   fluconazole 400 mg Intravenous Q24H   heparin (porcine) 5,000 Units Subcutaneous Q8H [facility]   iron polysaccharides 150 mg Oral Daily   piperacillin-tazobactam 3 375 g Intravenous Q6H   senna-docusate sodium 1 tablet Oral BID   tamsulosin 0 4 mg Oral Daily With Dinner   vancomycin 20 mg/kg Intravenous Q24H       multi-electrolyte 75 mL/hr Last Rate: 75 mL/hr (12/02/17 0600)       HYDROmorphone 1 mg Q2H PRN   influenza vaccine 0 5 mL Prior to discharge   oxyCODONE 10 mg Q4H PRN   oxyCODONE 5 mg Q4H PRN     Laboratory results:   CBC:   Lab Results   Component Value Date    WBC 12 69 (H) 12/02/2017    HGB 6 8 (LL) 12/02/2017    HCT 20 2 (L) 12/02/2017    MCV 88 12/02/2017     12/02/2017    MCH 29 7 12/02/2017    MCHC 33 7 12/02/2017    RDW 15 2 (H) 12/02/2017    MPV 8 4 (L) 12/02/2017   , CMP:   Lab Results   Component Value Date     12/02/2017    K 4 4 12/02/2017     12/02/2017    CO2 27 12/02/2017    ANIONGAP 4 12/02/2017    BUN 18 12/02/2017    CREATININE 1 09 12/02/2017    GLUCOSE 93 12/02/2017    CALCIUM 8 1 (L) 12/02/2017    EGFR 83 12/02/2017   , Coagulation:   No results found for: PT, INR, APTT, Urinalysis: No results found for: COLORU, CLARITYU, SPECGRAV, PHUR, LEUKOCYTESUR, NITRITE, PROTEINUA, GLUCOSEU, KETONESU, BILIRUBINUR, BLOODU, Amylase: No results found for: AMYLASE, Lipase: No results found for: LIPASE    VTE Pharmacologic Prophylaxis: Reason for no pharmacologic prophylaxis retroperitoneal bleed  VTE Mechanical Prophylaxis: sequential compression device

## 2017-12-02 NOTE — PROGRESS NOTES
Progress Note - Teresa Stephen 43 y o  male MRN: [id number]    Unit/Bed#: ICU 05 Encounter: [id number]      Assessment:   Mr Selvin Mak is an incarcerated 42-year-old male admitted to the Trauma service with left renal fracture status post left nephrectomy developed ureteral leak status post ureteroscopy and Deflux for repair  Patient was known prior for atonic bladder and resultant bilateral hydronephrosis status post percutaneous nephrostomy tube insertion of right solitary kidney recently clamped for trial after several days of normalized creatinine  Lab values demonstrated interval increase of both WBCs and creatinine  CT scan of the abdomen pelvis were positive for fluid collections, possible ureteral re-leak and hydroureter and he is status post pelvic drain placement       Recommendations  Continue right nephrostomy tube draining      Subjective:   No fever or chills  Positive abdominal and flank pain  Objective:     Vitals: Blood pressure 156/82, pulse 102, temperature 98 3 °F (36 8 °C), temperature source Oral, resp  rate (!) 36, height 5' 6" (1 676 m), weight 89 2 kg (196 lb 10 4 oz), SpO2 100 %  ,Body mass index is 31 74 kg/m²        Intake/Output Summary (Last 24 hours) at 12/02/17 1410  Last data filed at 12/02/17 1232   Gross per 24 hour   Intake          2713 75 ml   Output             2691 ml   Net            22 75 ml       Physical Exam: General appearance: alert, appears stated age, cooperative and moderate distress  Head: Normocephalic, without obvious abnormality, atraumatic  Neck: no adenopathy, no carotid bruit, no JVD, supple, symmetrical, trachea midline and thyroid not enlarged, symmetric, no tenderness/mass/nodules  Lungs: clear to auscultation bilaterally  Heart: regular rate and rhythm, S1, S2 normal, no murmur, click, rub or gallop  Abdomen: abnormal findings:  distended and moderate tenderness in the LUQ, in the LLQ, in the lower abdomen and in the left flank  Extremities: extremities normal, atraumatic, no cyanosis or edema  Pulses: 2+ and symmetric  Neurologic: Grossly normal   [place] CDI  Invasive Devices     Central Venous Catheter Line            CVC Central Lines 11/30/17 Triple 16cm 1 day          Peripheral Intravenous Line            Peripheral IV 11/30/17 Left;Upper Arm 1 day          Arterial Line            Arterial Line 11/30/17 Left Radial 1 day          Drain            Nephrostomy Right 1 10 2 Fr  23 days    Closed/Suction Drain Left Back 8 Fr  1 day    Closed/Suction Drain Left;Lateral Abdomen 8 Fr  1 day              Lab Results   Component Value Date    WBC 12 69 (H) 12/02/2017    HGB 6 8 (LL) 12/02/2017    HCT 20 2 (L) 12/02/2017    MCV 88 12/02/2017     12/02/2017     Lab Results   Component Value Date    GLUCOSE 93 12/02/2017    CALCIUM 8 1 (L) 12/02/2017     12/02/2017    K 4 4 12/02/2017    CO2 27 12/02/2017     12/02/2017    BUN 18 12/02/2017    CREATININE 1 09 12/02/2017         Lab, Imaging and other studies: I have personally reviewed pertinent reports

## 2017-12-03 ENCOUNTER — GENERIC CONVERSION - ENCOUNTER (OUTPATIENT)
Dept: OTHER | Facility: OTHER | Age: 42
End: 2017-12-03

## 2017-12-03 ENCOUNTER — APPOINTMENT (INPATIENT)
Dept: RADIOLOGY | Facility: HOSPITAL | Age: 42
DRG: 659 | End: 2017-12-03
Payer: COMMERCIAL

## 2017-12-03 PROBLEM — N13.39 OTHER HYDRONEPHROSIS: Chronic | Status: RESOLVED | Noted: 2017-11-09 | Resolved: 2017-12-03

## 2017-12-03 PROBLEM — N15.1 RENAL ABSCESS, LEFT: Status: ACTIVE | Noted: 2017-11-29

## 2017-12-03 PROBLEM — N17.9 AKI (ACUTE KIDNEY INJURY) (HCC): Status: RESOLVED | Noted: 2017-11-09 | Resolved: 2017-12-03

## 2017-12-03 PROBLEM — L02.91 ABSCESS: Status: ACTIVE | Noted: 2017-11-29

## 2017-12-03 PROBLEM — A41.9 SEVERE SEPSIS (HCC): Status: RESOLVED | Noted: 2017-11-30 | Resolved: 2017-12-03

## 2017-12-03 PROBLEM — J90 PLEURAL EFFUSION ON LEFT: Status: ACTIVE | Noted: 2017-12-02

## 2017-12-03 PROBLEM — R65.20 SEVERE SEPSIS (HCC): Status: RESOLVED | Noted: 2017-11-30 | Resolved: 2017-12-03

## 2017-12-03 LAB
ABO GROUP BLD BPU: NORMAL
ANION GAP SERPL CALCULATED.3IONS-SCNC: 8 MMOL/L (ref 4–13)
BACTERIA SPEC BFLD CULT: NO GROWTH
BASOPHILS # BLD AUTO: 0.01 THOUSANDS/ΜL (ref 0–0.1)
BASOPHILS NFR BLD AUTO: 0 % (ref 0–1)
BPU ID: NORMAL
BUN SERPL-MCNC: 14 MG/DL (ref 5–25)
CA-I BLD-SCNC: 1.12 MMOL/L (ref 1.12–1.32)
CALCIUM SERPL-MCNC: 8.1 MG/DL (ref 8.3–10.1)
CHLORIDE SERPL-SCNC: 103 MMOL/L (ref 100–108)
CO2 SERPL-SCNC: 26 MMOL/L (ref 21–32)
CREAT SERPL-MCNC: 0.92 MG/DL (ref 0.6–1.3)
EOSINOPHIL # BLD AUTO: 0.26 THOUSAND/ΜL (ref 0–0.61)
EOSINOPHIL NFR BLD AUTO: 2 % (ref 0–6)
ERYTHROCYTE [DISTWIDTH] IN BLOOD BY AUTOMATED COUNT: 15.2 % (ref 11.6–15.1)
GFR SERPL CREATININE-BSD FRML MDRD: 102 ML/MIN/1.73SQ M
GLUCOSE SERPL-MCNC: 93 MG/DL (ref 65–140)
GRAM STN SPEC: NORMAL
HCT VFR BLD AUTO: 23.7 % (ref 36.5–49.3)
HGB BLD-MCNC: 8 G/DL (ref 12–17)
LYMPHOCYTES # BLD AUTO: 0.99 THOUSANDS/ΜL (ref 0.6–4.47)
LYMPHOCYTES NFR BLD AUTO: 9 % (ref 14–44)
MAGNESIUM SERPL-MCNC: 2 MG/DL (ref 1.6–2.6)
MCH RBC QN AUTO: 29.4 PG (ref 26.8–34.3)
MCHC RBC AUTO-ENTMCNC: 33.8 G/DL (ref 31.4–37.4)
MCV RBC AUTO: 87 FL (ref 82–98)
MONOCYTES # BLD AUTO: 0.87 THOUSAND/ΜL (ref 0.17–1.22)
MONOCYTES NFR BLD AUTO: 8 % (ref 4–12)
NEUTROPHILS # BLD AUTO: 9.13 THOUSANDS/ΜL (ref 1.85–7.62)
NEUTS SEG NFR BLD AUTO: 81 % (ref 43–75)
NRBC BLD AUTO-RTO: 0 /100 WBCS
PHOSPHATE SERPL-MCNC: 3.1 MG/DL (ref 2.7–4.5)
PLATELET # BLD AUTO: 232 THOUSANDS/UL (ref 149–390)
PMV BLD AUTO: 8.8 FL (ref 8.9–12.7)
POTASSIUM SERPL-SCNC: 4 MMOL/L (ref 3.5–5.3)
RBC # BLD AUTO: 2.72 MILLION/UL (ref 3.88–5.62)
SODIUM SERPL-SCNC: 137 MMOL/L (ref 136–145)
UNIT DISPENSE STATUS: NORMAL
UNIT PRODUCT CODE: NORMAL
UNIT RH: NORMAL
WBC # BLD AUTO: 11.31 THOUSAND/UL (ref 4.31–10.16)

## 2017-12-03 PROCEDURE — 85025 COMPLETE CBC W/AUTO DIFF WBC: CPT | Performed by: PHYSICIAN ASSISTANT

## 2017-12-03 PROCEDURE — 0W9B00Z DRAINAGE OF LEFT PLEURAL CAVITY WITH DRAINAGE DEVICE, OPEN APPROACH: ICD-10-PCS | Performed by: SURGERY

## 2017-12-03 PROCEDURE — 84100 ASSAY OF PHOSPHORUS: CPT | Performed by: PHYSICIAN ASSISTANT

## 2017-12-03 PROCEDURE — 83735 ASSAY OF MAGNESIUM: CPT | Performed by: PHYSICIAN ASSISTANT

## 2017-12-03 PROCEDURE — 82330 ASSAY OF CALCIUM: CPT | Performed by: PHYSICIAN ASSISTANT

## 2017-12-03 PROCEDURE — 80048 BASIC METABOLIC PNL TOTAL CA: CPT | Performed by: PHYSICIAN ASSISTANT

## 2017-12-03 PROCEDURE — 0W9B30Z DRAINAGE OF LEFT PLEURAL CAVITY WITH DRAINAGE DEVICE, PERCUTANEOUS APPROACH: ICD-10-PCS | Performed by: SURGERY

## 2017-12-03 PROCEDURE — 71010 HB CHEST X-RAY 1 VIEW FRONTAL (PORTABLE): CPT

## 2017-12-03 RX ORDER — MIDAZOLAM HYDROCHLORIDE 1 MG/ML
0.5 INJECTION INTRAMUSCULAR; INTRAVENOUS ONCE
Status: COMPLETED | OUTPATIENT
Start: 2017-12-03 | End: 2017-12-03

## 2017-12-03 RX ORDER — FENTANYL CITRATE 50 UG/ML
50 INJECTION, SOLUTION INTRAMUSCULAR; INTRAVENOUS ONCE
Status: COMPLETED | OUTPATIENT
Start: 2017-12-03 | End: 2017-12-03

## 2017-12-03 RX ORDER — NICOTINE 21 MG/24HR
1 PATCH, TRANSDERMAL 24 HOURS TRANSDERMAL DAILY
Status: DISCONTINUED | OUTPATIENT
Start: 2017-12-03 | End: 2017-12-03

## 2017-12-03 RX ORDER — TOBRAMYCIN AND DEXAMETHASONE 3; 1 MG/ML; MG/ML
1 SUSPENSION/ DROPS OPHTHALMIC EVERY 6 HOURS SCHEDULED
Status: DISCONTINUED | OUTPATIENT
Start: 2017-12-03 | End: 2017-12-23 | Stop reason: HOSPADM

## 2017-12-03 RX ORDER — LIDOCAINE HYDROCHLORIDE 20 MG/ML
20 INJECTION, SOLUTION EPIDURAL; INFILTRATION; INTRACAUDAL; PERINEURAL ONCE
Status: COMPLETED | OUTPATIENT
Start: 2017-12-03 | End: 2017-12-03

## 2017-12-03 RX ADMIN — Medication 1 TABLET: at 08:52

## 2017-12-03 RX ADMIN — FLUCONAZOLE 400 MG: 2 INJECTION INTRAVENOUS at 21:59

## 2017-12-03 RX ADMIN — ACETAMINOPHEN 975 MG: 325 TABLET ORAL at 23:52

## 2017-12-03 RX ADMIN — OXYCODONE HYDROCHLORIDE 10 MG: 10 TABLET ORAL at 13:21

## 2017-12-03 RX ADMIN — Medication 150 MG: at 08:57

## 2017-12-03 RX ADMIN — VANCOMYCIN HYDROCHLORIDE 1500 MG: 1 INJECTION, POWDER, LYOPHILIZED, FOR SOLUTION INTRAVENOUS at 04:00

## 2017-12-03 RX ADMIN — PIPERACILLIN SODIUM,TAZOBACTAM SODIUM 4.5 G: 4; .5 INJECTION, POWDER, FOR SOLUTION INTRAVENOUS at 23:51

## 2017-12-03 RX ADMIN — ACETAMINOPHEN 975 MG: 325 TABLET ORAL at 08:52

## 2017-12-03 RX ADMIN — HEPARIN SODIUM 5000 UNITS: 5000 INJECTION, SOLUTION INTRAVENOUS; SUBCUTANEOUS at 22:00

## 2017-12-03 RX ADMIN — HYDROMORPHONE HYDROCHLORIDE 1 MG: 1 INJECTION, SOLUTION INTRAMUSCULAR; INTRAVENOUS; SUBCUTANEOUS at 12:14

## 2017-12-03 RX ADMIN — SODIUM CHLORIDE, SODIUM GLUCONATE, SODIUM ACETATE, POTASSIUM CHLORIDE AND MAGNESIUM CHLORIDE 75 ML/HR: 526; 502; 368; 37; 30 INJECTION, SOLUTION INTRAVENOUS at 23:54

## 2017-12-03 RX ADMIN — PIPERACILLIN SODIUM,TAZOBACTAM SODIUM 4.5 G: 4; .5 INJECTION, POWDER, FOR SOLUTION INTRAVENOUS at 06:00

## 2017-12-03 RX ADMIN — MIDAZOLAM 0.5 MG: 1 INJECTION INTRAMUSCULAR; INTRAVENOUS at 11:44

## 2017-12-03 RX ADMIN — ACETAMINOPHEN 975 MG: 325 TABLET ORAL at 15:02

## 2017-12-03 RX ADMIN — PIPERACILLIN SODIUM,TAZOBACTAM SODIUM 4.5 G: 4; .5 INJECTION, POWDER, FOR SOLUTION INTRAVENOUS at 00:20

## 2017-12-03 RX ADMIN — FENTANYL CITRATE 50 MCG: 50 INJECTION INTRAMUSCULAR; INTRAVENOUS at 11:44

## 2017-12-03 RX ADMIN — PIPERACILLIN SODIUM,TAZOBACTAM SODIUM 4.5 G: 4; .5 INJECTION, POWDER, FOR SOLUTION INTRAVENOUS at 18:30

## 2017-12-03 RX ADMIN — HYDROMORPHONE HYDROCHLORIDE 1 MG: 1 INJECTION, SOLUTION INTRAMUSCULAR; INTRAVENOUS; SUBCUTANEOUS at 15:02

## 2017-12-03 RX ADMIN — OXYCODONE HYDROCHLORIDE 10 MG: 10 TABLET ORAL at 18:31

## 2017-12-03 RX ADMIN — HYDROMORPHONE HYDROCHLORIDE 1 MG: 1 INJECTION, SOLUTION INTRAMUSCULAR; INTRAVENOUS; SUBCUTANEOUS at 22:05

## 2017-12-03 RX ADMIN — OXYCODONE HYDROCHLORIDE 10 MG: 10 TABLET ORAL at 08:54

## 2017-12-03 RX ADMIN — Medication 1 TABLET: at 18:31

## 2017-12-03 RX ADMIN — OXYCODONE HYDROCHLORIDE 10 MG: 10 TABLET ORAL at 02:31

## 2017-12-03 RX ADMIN — VANCOMYCIN HYDROCHLORIDE 1500 MG: 10 INJECTION, POWDER, LYOPHILIZED, FOR SOLUTION INTRAVENOUS at 15:09

## 2017-12-03 RX ADMIN — TAMSULOSIN HYDROCHLORIDE 0.4 MG: 0.4 CAPSULE ORAL at 16:24

## 2017-12-03 RX ADMIN — TOBRAMYCIN AND DEXAMETHASONE 1 DROP: 3; 1 SUSPENSION/ DROPS OPHTHALMIC at 23:52

## 2017-12-03 RX ADMIN — ACETAMINOPHEN 975 MG: 325 TABLET ORAL at 00:00

## 2017-12-03 RX ADMIN — SODIUM CHLORIDE, SODIUM GLUCONATE, SODIUM ACETATE, POTASSIUM CHLORIDE AND MAGNESIUM CHLORIDE 75 ML/HR: 526; 502; 368; 37; 30 INJECTION, SOLUTION INTRAVENOUS at 08:53

## 2017-12-03 RX ADMIN — HEPARIN SODIUM 5000 UNITS: 5000 INJECTION, SOLUTION INTRAVENOUS; SUBCUTANEOUS at 05:48

## 2017-12-03 RX ADMIN — HEPARIN SODIUM 5000 UNITS: 5000 INJECTION, SOLUTION INTRAVENOUS; SUBCUTANEOUS at 13:18

## 2017-12-03 RX ADMIN — TOBRAMYCIN AND DEXAMETHASONE 1 DROP: 3; 1 SUSPENSION/ DROPS OPHTHALMIC at 18:23

## 2017-12-03 RX ADMIN — PIPERACILLIN SODIUM,TAZOBACTAM SODIUM 4.5 G: 4; .5 INJECTION, POWDER, FOR SOLUTION INTRAVENOUS at 11:39

## 2017-12-03 RX ADMIN — MIDAZOLAM 0.5 MG: 1 INJECTION INTRAMUSCULAR; INTRAVENOUS at 11:59

## 2017-12-03 RX ADMIN — LIDOCAINE HYDROCHLORIDE 20 ML: 20 INJECTION, SOLUTION EPIDURAL; INFILTRATION; INTRACAUDAL; PERINEURAL at 12:00

## 2017-12-03 NOTE — PROGRESS NOTES
Progress Note - General Surgery  Ally Chaudhari 43 y o  male MRN: [id number]  Unit/Bed#: ICU 5-01 Encounter: [id number]    Assessment:  43y o -year-old male with spontaneous left kidney rupture    - s/p ex lap, left nephrectomy, abthera 11/1 [place])  - s/p ex lap, removal of packs, ligation of left ureter, abdominal closure 11/2 Wilber Myers)  - s/p right PCN w/ intraabdominal ascites samples 11/9 (IR)  - s/p 11/18 cystoscopy w/ fulguration of ureteral orifice, cystogram with injection of deflux, left retrograde pyelograam 11/18 Casey Herron)  - s/p nephrostogram, PCN capping 11/21 (IR)  - s/p IR tube check 11/27 (IR)  - s/p drainage of left upper quadrant fluid collection 11/30 (IR)    Plan:  - adv to toast  - likely has conjunctivitis, would treat  - f/u urology plan  - SICU to place left pleural effusion drain      Kim Kan MD PGY-4  9:39 AM  12/03/17      Subjective:  No nausea or vomiting  Does not feel like eating  Passing flatus       Objective:  Patient Vitals for the past 24 hrs:   BP Temp Temp src Pulse Resp SpO2 Weight   12/03/17 0600 - - - (!) 110 (!) 30 98 % 79 2 kg (174 lb 9 7 oz)   12/03/17 0500 - - - 104 22 100 % -   12/03/17 0400 - 99 2 °F (37 3 °C) - 90 20 99 % -   12/03/17 0340 - - - 92 (!) 28 98 % -   12/03/17 0300 - - - 90 (!) 32 99 % -   12/03/17 0200 - - - 78 19 100 % -   12/03/17 0000 - 99 2 °F (37 3 °C) - 90 (!) 24 97 % -   12/02/17 2230 - - - 94 (!) 23 100 % -   12/02/17 2200 - - - (!) 108 (!) 37 99 % -   12/02/17 2100 - - - 94 (!) 24 98 % -   12/02/17 2000 - 98 9 °F (37 2 °C) Oral 100 (!) 26 98 % -   12/02/17 1900 - - - 96 (!) 33 98 % -   12/02/17 1800 - - - 94 (!) 33 100 % -   12/02/17 1700 - - - 98 (!) 30 99 % -   12/02/17 1605 164/88 98 8 °F (37 1 °C) - 95 (!) 25 - -   12/02/17 1600 - 98 8 °F (37 1 °C) Oral 96 (!) 32 96 % -   12/02/17 1500 - - - 90 (!) 28 98 % -   12/02/17 1348 156/82 98 3 °F (36 8 °C) Oral 102 (!) 36 - -   12/02/17 1322 140/72 98 7 °F (37 1 °C) - 94 (!) 26 - - 12/02/17 1200 - 98 2 °F (36 8 °C) Oral 86 (!) 26 100 % -   12/02/17 1100 - - - 84 (!) 23 99 % -   12/02/17 1000 - - - 90 (!) 25 98 % -   12/02/17 0944 142/78 - - - - - -          Diet Orders            Start     Ordered    12/02/17 1117  Diet Clear Liquid  Diet effective now     Question Answer Comment   Diet Type Clear Liquid    RD to adjust diet per protocol?  No        12/02/17 1116    11/22/17 1646  Dietary nutrition supplements  Once     Question Answer Comment   Select Supplement: Ensure Enlive-Chocolate    Frequency Breakfast        11/22/17 1645          Intake/Output Summary (Last 24 hours) at 12/03/17 0939  Last data filed at 12/03/17 0600   Gross per 24 hour   Intake             2250 ml   Output             3325 ml   Net            -1075 ml   UOP 3 6  Back left drain 76 serosanguinous  Lateral L drain 20 serosanguinous    Physical Exam:  General: NAD  HEENT: danielle crusting around eyelashes  Cardiovascular: RRR  Respiratory: breath sounds b/l  Abdomen: soft, mild distension, incision c/d/i PCN on right  Extremities: no edema    Medications:    acetaminophen 975 mg Oral Q8H   fluconazole 400 mg Intravenous Q24H   heparin (porcine) 5,000 Units Subcutaneous Q8H [address]   iron polysaccharides 150 mg Oral Daily   lidocaine (PF) 20 mL Infiltration Once   nicotine 1 patch Transdermal Daily   piperacillin-tazobactam 4 5 g Intravenous Q6H   senna-docusate sodium 1 tablet Oral BID   tamsulosin 0 4 mg Oral Daily With Dinner   vancomycin 17 5 mg/kg Intravenous Q12H       multi-electrolyte 75 mL/hr Last Rate: 75 mL/hr (12/03/17 0853)       HYDROmorphone 1 mg Q2H PRN   influenza vaccine 0 5 mL Prior to discharge   oxyCODONE 10 mg Q4H PRN   oxyCODONE 5 mg Q4H PRN     Laboratory results:   CBC:   Lab Results   Component Value Date    WBC 11 31 (H) 12/03/2017    HGB 8 0 (L) 12/03/2017    HCT 23 7 (L) 12/03/2017    MCV 87 12/03/2017     12/03/2017    MCH 29 4 12/03/2017    MCHC 33 8 12/03/2017    RDW 15 2 (H) 12/03/2017    MPV 8 8 (L) 12/03/2017    NRBC 0 12/03/2017   , CMP:   Lab Results   Component Value Date     12/03/2017    K 4 0 12/03/2017     12/03/2017    CO2 26 12/03/2017    ANIONGAP 8 12/03/2017    BUN 14 12/03/2017    CREATININE 0 92 12/03/2017    GLUCOSE 93 12/03/2017    CALCIUM 8 1 (L) 12/03/2017    EGFR 102 12/03/2017   , Coagulation:   No results found for: PT, INR, APTT, Urinalysis: No results found for: COLORU, CLARITYU, SPECGRAV, PHUR, LEUKOCYTESUR, NITRITE, PROTEINUA, GLUCOSEU, KETONESU, BILIRUBINUR, BLOODU, Amylase: No results found for: AMYLASE, Lipase: No results found for: LIPASE    VTE Pharmacologic Prophylaxis: Reason for no pharmacologic prophylaxis retroperitoneal bleed  VTE Mechanical Prophylaxis: sequential compression device

## 2017-12-03 NOTE — PROGRESS NOTES
Progress Note - Critical Care   Deneen Holguin 43 y o  male MRN: [id number]  Unit/Bed#: ICU 05 Encounter: [id number]    Assessment: 42 yo M w/ spontaneous L renal rupture 2/2 hydronephrosis, s/p ex lap with L nephrectomy on  and 2nd look 11/3 with ligation of left ureter  Unfortunately he developed a urine leak from this ureter that was drained x 1 by IR  Now transferred to ICU for recurrent acculation of urinoma/abscess and sepsis  Plan:          Neuro: GCS 15   - PRN Pain control w/ dilaudid, oxy PO           CV: Improved, now hemodynamically stable most of the day, though his pressures and HR elevate during episodes of anxiety and acute pain  - Monitor for tachycardia            LunL NC with sats 88-92 while sleeping overnight    - Continue IS, pulm toilet                 GI:    - Clears today, advance as tolerated                 FEN:    - Fluids: ISolyte @ 75  Keep on until PO intake adequate    - Electrolyte: WNL   - Nutrition: Initiate clears                : S/P IR drain placement for urinoma   - Continue R perc nephrostomy tube to drainage   - Monitor drain output- currently serosanginous   - F/U  & Surgery for definitive operative plan                 ID: Admitted to ICU w/ SIRS/Sepsis   - Continue Vanco/Zosyn   - Tailor to culture results                 Heme: Persistent drop in Hb- received 1 unit yesterday, Hb responded 6 8 to 8 1, 8 0 this Am  Undetermined source/etiology of Hb drop as drains are serosanginous   - Repeat hb in 8 hours                 Endo: NO hx of DM, no need for insulin                            Msk/Skin: Drain care                 Disposition: ICU vs SD 1 status    Chief Complaint: Pain    HPI/24hr events:   - Complains of pain most in L abdomen  - Received 2 units pRBC yesterday for Hb 6 2    Physical Exam:   Gen: A&Ox3, NAD  Chest: CTAB  Cv: RRR, good cap refill  Abd: soft, diffusely tender without rebound or gaurding  L IR drains serosanginous   R perc nephrostomy tube with urine  Ext: No peripheral edema      Vitals:    17 0340 17 0400 17 0500 17 0600   BP:       Pulse: 92 90 104 (!) 110   Resp: (!) 28 20 22 (!) 30   Temp:  99 2 °F (37 3 °C)     TempSrc:       SpO2: 98% 99% 100% 98%   Weight:    79 2 kg (174 lb 9 7 oz)   Height:         Arterial Line BP: 164/84  Arterial Line MAP (mmHg): 104 mmHg    Temperature:   Temp (24hrs), Av 8 °F (37 1 °C), Min:98 2 °F (36 8 °C), Max:99 2 °F (37 3 °C)    Current: Temperature: 99 2 °F (37 3 °C)    Weights:   IBW: 63 8 kg    Body mass index is 28 18 kg/m²  Weight (last 2 days)     Date/Time   Weight    17 0600  79 2 (174 6)              Hemodynamic Monitoring:  N/A     Non-Invasive/Invasive Ventilation Settings:  Respiratory    Lab Data (Last 4 hours)    None         O2/Vent Data (Last 4 hours)    None              No results found for: PHART, GPW1YZD, PO2ART, TDS7FQS, K1KNOYLJ, BEART, SOURCE  SpO2: SpO2: 98 %    Intake and Outputs:  I/O       701 -  0700 701 -  0700    P  O  240     I V  (mL/kg) 5176 3 (58) 2072 5 (23 2)    Blood  700    IV Piggyback 2000 800    Total Intake(mL/kg) 7416 3 (83 1) 3572 5 (40 1)    Urine (mL/kg/hr) 970 (0 5) 1675 (0 8)    Drains 982 (0 5) 525 (0 2)    Total Output  2200    Net +5464 3 +1372 5                Nutrition:        Diet Orders            Start     Ordered    17 1117  Diet Clear Liquid  Diet effective now     Question Answer Comment   Diet Type Clear Liquid    RD to adjust diet per protocol?  No        17 1116    17 1646  Dietary nutrition supplements  Once     Question Answer Comment   Select Supplement: Ensure Enlive-Chocolate    Frequency Breakfast        17 1645            Labs:     Results from last 7 days  Lab Units 17  0449 17  1705 17  0735  17  0544 17  1827   WBC Thousand/uL 11 31*  --  12 69*  --  17 27* 25 15*   HEMOGLOBIN g/dL 8 0* 8 1* 6 8*  < > 6 6* 10 3*   HEMATOCRIT % 23 7* 23 7* 20 2*  < > 20 0* 31 7*   PLATELETS Thousands/uL 232  --  184  --  211 349   NEUTROS PCT % 81*  --   --   --  83* 85*   MONOS PCT % 8  --   --   --  8 8   < > = values in this interval not displayed  Results from last 7 days  Lab Units 12/03/17  0449 12/02/17  0733 12/01/17  0544   SODIUM mmol/L 137 139 139   POTASSIUM mmol/L 4 0 4 4 4 5   CHLORIDE mmol/L 103 108 107   CO2 mmol/L 26 27 23   BUN mg/dL 14 18 25   CREATININE mg/dL 0 92 1 09 1 67*   CALCIUM mg/dL 8 1* 8 1* 7 3*   TOTAL PROTEIN g/dL  --   --  4 9*   BILIRUBIN TOTAL mg/dL  --   --  0 60   ALK PHOS U/L  --   --  55   ALT U/L  --   --  13   AST U/L  --   --  6   GLUCOSE RANDOM mg/dL 93 93 134       Results from last 7 days  Lab Units 12/03/17  0449 12/01/17  0544 11/27/17  0451   MAGNESIUM mg/dL 2 0 2 1 2 0       Results from last 7 days  Lab Units 12/03/17  0449 12/01/17  0544   PHOSPHORUS mg/dL 3 1 3 8        Results from last 7 days  Lab Units 11/30/17  0522 11/27/17  0451   INR  1 12 0 99       Results from last 7 days  Lab Units 12/01/17  1402   LACTIC ACID mmol/L 1 8       0  Lab Value Date/Time   TROPONINI <0 04 04/23/2015 2325       Imaging:   XR chest portable   Final Result by Jessi Gillespie MD (12/03 0827)   Persistent large left pleural effusion         Workstation performed: [id number]         XR chest portable   Final Result by Ronnie Avendaño MD (12/01 1612)         1  No pneumothorax  Repositioned IJ catheter tip at the cavoatrial junction   2  Left-sided opacity possibly a moderate to large pleural effusion versus atelectasis and/or pneumonia redemonstrated  Workstation performed: [id number]         XR chest portable ICU   Final Result by Adriana Bartlett MD (12/01 8212)      Persistent consolidation throughout the left lower lobe and moderate-sized pleural effusion  Lung volumes have slightly diminished  Right IJ catheter terminates in the right atrium  No pneumothorax           Workstation performed: [id number]         XR chest portable ICU   Final Result by Rolanda Negrete MD (12/01 7161)      Right central line in place tip projects over expected location inferior right atrium  No pneumothorax  Progressive left lower lobe airspace disease may represent pneumonia, aspiration, and/or subsegmental atelectasis  Moderate-sized left pleural effusion  Workstation performed: [id number]         IR image guided aspiration / drainage   Final Result by Cherrie Gillespie MD (11/30 7704)   Impression:    Successful image-guided percutaneous drainage of left abdominal flank collection with placement of 2 drains          Workstation performed: [id number]         CT abdomen pelvis wo contrast   Final Result by Alida Joseph MD (11/30 0086)   1  Side decreased size of the mass/collection in the left renal fossa which is suboptimally evaluated without intravenous contrast   However, this collection is in communication with a larger collection bordering the spleen  The 11 2 x 4 8 cm    collection bordering the spleen has significantly increased in size  Presence of gas within the collection raises the possibility of abscess  2   New severe left and moderate right hydroureter  3   Right percutaneous of prostate tube is in place, without hydronephrosis  4   Unchanged small left pleural effusion with associated passive atelectasis  Findings are consistent with the preliminary report from [facility] which was provided shortly after completion of the exam                       Workstation performed: [id number]         NM kidney w flow and function w pharmacological intervention   Final Result by Tere Petersen MD (11/28 6703)      1  Slow overall excretion in the solitary right kidney  T-1/2 of Lasix washout is 23 5 minutes suspicious for obstruction  Workstation performed: [id number]         IR tube check   Final Result by Danielle Olivares MD (11/28 0261)   Impression:     1  Ectatic right renal collecting system and ureter  2   Holdup of contrast at the right UVJ and slow passage of contrast into the bladder  3   Dr Catrina Darby plans to evaluate with nuclear medicine Lasix scan  If the study is negative, the right nephrostomy catheter will be removed  Workstation performed: [id number]         FL cystogram   Final Result by Goran Dawn MD (11/24 1750)      No evidence of reflux or leak  Workstation performed: [id number]         IR tube check   Final Result by Eladio Rendon MD (11/21 1714)   Impression:      Distal right ureteral stricture with drainage into the bladder and trial of internal drainage  Workstation performed: [id number]         [facility] cystogram   Final Result by Valery Turpin MD (11/19 1349)      Status post cystogram   Please see procedure report for further details  Workstation performed: [id number]         FL retrograde pyelogram   Final Result by Valery Turpin MD (11/19 1346)      Marked left hydroureter, status post ureteral stent placement  Please see procedure report for further details  Workstation performed: [id number]         CT renal protocol   Final Result by Deep Meraz DO (11/17 1913)   1  Persistent air and fluid in the abdomen and retroperitoneum, likely related to incomplete ligation of the ureteric stump with reflux from the urinary bladder  2   Heterogeneously enhancing soft tissue in the retroperitoneum, similar to the prior study  Residual renal tissue in the nephrectomy bed not excluded  3   Slight enlarging moderate-sized left pleural effusion  cfsl   I personally discussed this result with Dr Mariella Mcknight on 11/17/2017 7:02 PM          Workstation performed: [id number]         XR chest pa & lateral   Final Result by Wellington Patino MD (11/15 1630)      Left basilar effusion versus atelectasis and/or pneumonia           Workstation performed: [id number]         IR tube check   Final Result by Jeniffer Mccall MD (11/14 1500)   Impression:     1  Right antegrade nephrostogram showed critical stenosis of the distal 1 cm of the ureter  There was UVJ obstruction  2   Cystogram via the Thakur catheter showed contracted, small volume bladder  The bladder was only minimally distensible  At approximately 10 mL volume, there was reflux of contrast into the left ureteral stump  No leakage of contrast was seen into    the peritoneal cavity  Workstation performed: [id number]         [facility] cystogram   Final Result by Radha Jones DO (11/14 1317)   Bilateral vesicoureteral reflux  Unremarkable right ureter  Leakage of contrast from patulous tortuous left ureter into the left nephrectomy bed  ##cfslh   I personally discussed this result with Brianne Moore on 11/14/2017 12:15 PM    ##         Workstation performed: [id number]         IR tube placement nephrostomy   Final Result by Alexis Pelayo MD (32/95 1960)   Impression:    Successful right percutaneous nephrostomy with 10  Italian drainage catheter placement  Right UVJ obstruction         Workstation performed: [id number]         XR chest 1 view   Final Result by Xiomara Cee MD (11/11 0079)      Left lower lobe consolidation representing atelectasis or pneumonia  Workstation performed: [id number]         XR chest pa & lateral   Final Result by Kitty Wood DO (11/09 8984)      Minimal subsegmental atelectasis versus scarring within the right lung base  Workstation performed: [id number]         CT abdomen pelvis wo contrast   Final Result by Kitty Wood DO (11/08 1707)      Interval development of extensive abdominal and pelvic ascites, the majority of which appears simple  Given recent surgery, findings may represent urine leaking into the abdominal cavity        There is a moderate-sized hematoma identified within the left renal fossa status post left nephrectomy, markedly improved compared to the prior examination  Moderate posterior layering left effusion with adjacent compressive atelectasis  ##cfslh   I personally discussed this result with the surgery resident  on 11/8/2017 5:01 PM    ##         Workstation performed: Brenda Espinoza kidney and bladder   Final Result by Lizzy Lam,  (11/06 1700)      1  Mild right hydronephrosis identified which has significantly improved since the prior CT scan status post fall catheter placement  2   Status post left refractory  Fluid and echogenic debris in the left nephrectomy bed likely is representative of postoperative change/hemorrhage  3   There is echogenic nonshadowing debris in the gallbladder potentially representing sludge  Workstation performed: [id number]         XR chest portable   Final Result by Rogelio Mustafa MD (11/03 0996)         1  Status post removal of endotracheal tube and nasogastric tube  2   Unchanged mild pulmonary vascular congestion and mild cardiomegaly  Workstation performed: [id number]         X-ray chest 1 view   Final Result by Ed DO Nathaniel (11/02 2517)   Diminished lung volumes with vascular crowding  Mild pulmonary edema  Endotracheal tube just above lauren, suggest retraction x 2 cm for more optimal position  ##cfslh   I personally discussed this result with Virginia Colon on 11/2/2017 9:24 AM    ##                Workstation performed: [id number]                EKG: Micro:  Lab Results   Component Value Date    BLOODCX No Growth at 48 hrs  11/30/2017    BLOODCX No Growth at 48 hrs  11/30/2017    BLOODCX No Growth After 5 Days  11/01/2017    BLOODCX No Growth After 5 Days   11/01/2017    URINECX No Growth <1000 cfu/mL 11/09/2017    URINECX >100,000 cfu/ml Mixed Contaminants X3 02/15/2016       Allergies: No Known Allergies    Medications:   Scheduled Meds:    acetaminophen 975 mg Oral Q8H   fluconazole 400 mg Intravenous Q24H   heparin (porcine) 5,000 Units Subcutaneous Q8H [address]   iron polysaccharides 150 mg Oral Daily   piperacillin-tazobactam 4 5 g Intravenous Q6H   senna-docusate sodium 1 tablet Oral BID   tamsulosin 0 4 mg Oral Daily With Dinner   vancomycin 17 5 mg/kg Intravenous Q12H     Continuous Infusions:    multi-electrolyte 75 mL/hr Last Rate: 75 mL/hr (12/02/17 0600)     PRN Meds:    HYDROmorphone 1 mg Q2H PRN   influenza vaccine 0 5 mL Prior to discharge   oxyCODONE 10 mg Q4H PRN   oxyCODONE 5 mg Q4H PRN       VTE Pharmacologic Prophylaxis: Heparin  VTE Mechanical Prophylaxis: sequential compression device    Invasive lines and devices: Invasive Devices     Central Venous Catheter Line            CVC Central Lines 11/30/17 Triple 16cm 2 days          Peripheral Intravenous Line            Peripheral IV 11/30/17 Left;Upper Arm 2 days          Arterial Line            Arterial Line 11/30/17 Left Radial 2 days          Drain            Nephrostomy Right 1 10 2 Fr  23 days    Closed/Suction Drain Left Back 8 Fr  2 days    Closed/Suction Drain Left;Lateral Abdomen 8 Fr  2 days                   Counseling / Coordination of Care  Total time spent today 30 minutes  Greater than 50% of total time was spent with the patient and / or family counseling and / or coordination of care  A description of the counseling / coordination of care: patient     Code Status: Level 1 - Full Code     Portions of the record may have been created with voice recognition software  Occasional wrong word or "sound a like" substitutions may have occurred due to the inherent limitations of voice recognition software  Read the chart carefully and recognize, using context, where substitutions have occurred       Christin Mayes MD

## 2017-12-03 NOTE — CONSULTS
This 43year-old gentleman complains of crusting in the morning and foreign body sensation times 24 hours  His past ocular history is unremarkable  He is admitted for spontaneous rupture of kidney  He has undergone multiple surgical procedures  On examination without correction at near his visual acuity is 20/20 both eyes  His external examination reveals mild crusting on lashes both eyes  The corneas are clear the conjunctiva is normal the anterior chambers are formed in each eye  Impression bacterial conjunctivitis both eyes  Plan TobraDex drops q 6 hours both eyes x3 days

## 2017-12-03 NOTE — PROCEDURES
Chest Tube Insertion  Date/Time: 12/3/2017 1:19 PM  Performed by: Martinez Olson by: Nida Corbett     Patient location:  Bedside  Other Assisting Provider: Yes (comment)    Consent:     Consent obtained:  Verbal and written    Consent given by:  Patient    Risks discussed:  Bleeding, pain, infection, incomplete drainage and damage to surrounding structures    Alternatives discussed:  No treatment and delayed treatment  Universal protocol:     Imaging studies available: yes      Immediately prior to procedure a time out was called: yes      Patient identity confirmed:  Verbally with patient and hospital-assigned identification number  Pre-procedure details:     Skin preparation:  ChloraPrep  Indications:     Indications: pleural effusion    Sedation:     Sedation type: Anxiolysis  Anesthesia (see MAR for exact dosages): Anesthesia method:  Local infiltration    Local anesthetic:  Lidocaine 1% w/o epi  Procedure details:     Placement location:  Lateral    Laterality:  Left    Approach:  Open    Scalpel size:  10    Tube size (Fr):  32    Dissection instrument:  Finger and Ally clamp    Ultrasound guidance: no      Tension pneumothorax: no      Needle Decompression: no      Tube connected to:  Suction    Drainage characteristics:  Serosanguinous and yellow    Suture material:  2-0 silk    Dressing:  4x4 sterile gauze  Post-procedure details:     Post-insertion x-ray findings: tube in good position      Patient tolerance of procedure:   Tolerated well, no immediate complications

## 2017-12-04 ENCOUNTER — APPOINTMENT (INPATIENT)
Dept: RADIOLOGY | Facility: HOSPITAL | Age: 42
DRG: 659 | End: 2017-12-04
Payer: COMMERCIAL

## 2017-12-04 LAB
ANION GAP SERPL CALCULATED.3IONS-SCNC: 3 MMOL/L (ref 4–13)
BASOPHILS # BLD AUTO: 0.01 THOUSANDS/ΜL (ref 0–0.1)
BASOPHILS NFR BLD AUTO: 0 % (ref 0–1)
BUN SERPL-MCNC: 10 MG/DL (ref 5–25)
CALCIUM SERPL-MCNC: 8.2 MG/DL (ref 8.3–10.1)
CHLORIDE SERPL-SCNC: 104 MMOL/L (ref 100–108)
CO2 SERPL-SCNC: 31 MMOL/L (ref 21–32)
CREAT SERPL-MCNC: 1.02 MG/DL (ref 0.6–1.3)
EOSINOPHIL # BLD AUTO: 0.21 THOUSAND/ΜL (ref 0–0.61)
EOSINOPHIL NFR BLD AUTO: 2 % (ref 0–6)
ERYTHROCYTE [DISTWIDTH] IN BLOOD BY AUTOMATED COUNT: 15.2 % (ref 11.6–15.1)
GFR SERPL CREATININE-BSD FRML MDRD: 90 ML/MIN/1.73SQ M
GLUCOSE SERPL-MCNC: 105 MG/DL (ref 65–140)
HCT VFR BLD AUTO: 23.3 % (ref 36.5–49.3)
HGB BLD-MCNC: 7.8 G/DL (ref 12–17)
LYMPHOCYTES # BLD AUTO: 1.1 THOUSANDS/ΜL (ref 0.6–4.47)
LYMPHOCYTES NFR BLD AUTO: 13 % (ref 14–44)
MAGNESIUM SERPL-MCNC: 2.3 MG/DL (ref 1.6–2.6)
MCH RBC QN AUTO: 29.4 PG (ref 26.8–34.3)
MCHC RBC AUTO-ENTMCNC: 33.5 G/DL (ref 31.4–37.4)
MCV RBC AUTO: 88 FL (ref 82–98)
MONOCYTES # BLD AUTO: 0.96 THOUSAND/ΜL (ref 0.17–1.22)
MONOCYTES NFR BLD AUTO: 11 % (ref 4–12)
NEUTROPHILS # BLD AUTO: 6.44 THOUSANDS/ΜL (ref 1.85–7.62)
NEUTS SEG NFR BLD AUTO: 74 % (ref 43–75)
NRBC BLD AUTO-RTO: 0 /100 WBCS
PHOSPHATE SERPL-MCNC: 3.5 MG/DL (ref 2.7–4.5)
PLATELET # BLD AUTO: 264 THOUSANDS/UL (ref 149–390)
PMV BLD AUTO: 8.4 FL (ref 8.9–12.7)
POTASSIUM SERPL-SCNC: 3.9 MMOL/L (ref 3.5–5.3)
PREALB SERPL-MCNC: 11.2 MG/DL (ref 18–40)
RBC # BLD AUTO: 2.65 MILLION/UL (ref 3.88–5.62)
SODIUM SERPL-SCNC: 138 MMOL/L (ref 136–145)
VANCOMYCIN TROUGH SERPL-MCNC: 16.6 UG/ML (ref 10–20)
WBC # BLD AUTO: 8.74 THOUSAND/UL (ref 4.31–10.16)

## 2017-12-04 PROCEDURE — 83735 ASSAY OF MAGNESIUM: CPT | Performed by: SURGERY

## 2017-12-04 PROCEDURE — 85025 COMPLETE CBC W/AUTO DIFF WBC: CPT | Performed by: SURGERY

## 2017-12-04 PROCEDURE — 71010 HB CHEST X-RAY 1 VIEW FRONTAL (PORTABLE): CPT

## 2017-12-04 PROCEDURE — 97164 PT RE-EVAL EST PLAN CARE: CPT

## 2017-12-04 PROCEDURE — G8978 MOBILITY CURRENT STATUS: HCPCS

## 2017-12-04 PROCEDURE — 80202 ASSAY OF VANCOMYCIN: CPT | Performed by: PHYSICIAN ASSISTANT

## 2017-12-04 PROCEDURE — 80048 BASIC METABOLIC PNL TOTAL CA: CPT | Performed by: SURGERY

## 2017-12-04 PROCEDURE — 84100 ASSAY OF PHOSPHORUS: CPT | Performed by: SURGERY

## 2017-12-04 PROCEDURE — 84134 ASSAY OF PREALBUMIN: CPT | Performed by: SURGERY

## 2017-12-04 PROCEDURE — G8979 MOBILITY GOAL STATUS: HCPCS

## 2017-12-04 RX ORDER — POLYETHYLENE GLYCOL 3350 17 G/17G
17 POWDER, FOR SOLUTION ORAL DAILY
Status: DISCONTINUED | OUTPATIENT
Start: 2017-12-04 | End: 2017-12-23 | Stop reason: HOSPADM

## 2017-12-04 RX ORDER — POTASSIUM CHLORIDE 20 MEQ/1
40 TABLET, EXTENDED RELEASE ORAL ONCE
Status: COMPLETED | OUTPATIENT
Start: 2017-12-04 | End: 2017-12-04

## 2017-12-04 RX ORDER — AMOXICILLIN 250 MG
1 CAPSULE ORAL
Status: DISCONTINUED | OUTPATIENT
Start: 2017-12-04 | End: 2017-12-23 | Stop reason: HOSPADM

## 2017-12-04 RX ORDER — POLYVINYL ALCOHOL 14 MG/ML
1 SOLUTION/ DROPS OPHTHALMIC
Status: DISCONTINUED | OUTPATIENT
Start: 2017-12-04 | End: 2017-12-23 | Stop reason: HOSPADM

## 2017-12-04 RX ADMIN — HEPARIN SODIUM 5000 UNITS: 5000 INJECTION, SOLUTION INTRAVENOUS; SUBCUTANEOUS at 22:39

## 2017-12-04 RX ADMIN — HEPARIN SODIUM 5000 UNITS: 5000 INJECTION, SOLUTION INTRAVENOUS; SUBCUTANEOUS at 06:03

## 2017-12-04 RX ADMIN — TOBRAMYCIN AND DEXAMETHASONE 1 DROP: 3; 1 SUSPENSION/ DROPS OPHTHALMIC at 11:27

## 2017-12-04 RX ADMIN — Medication 150 MG: at 08:35

## 2017-12-04 RX ADMIN — HEPARIN SODIUM 5000 UNITS: 5000 INJECTION, SOLUTION INTRAVENOUS; SUBCUTANEOUS at 13:31

## 2017-12-04 RX ADMIN — PIPERACILLIN SODIUM,TAZOBACTAM SODIUM 4.5 G: 4; .5 INJECTION, POWDER, FOR SOLUTION INTRAVENOUS at 17:27

## 2017-12-04 RX ADMIN — HYDROMORPHONE HYDROCHLORIDE 1 MG: 1 INJECTION, SOLUTION INTRAMUSCULAR; INTRAVENOUS; SUBCUTANEOUS at 20:29

## 2017-12-04 RX ADMIN — OXYCODONE HYDROCHLORIDE 10 MG: 10 TABLET ORAL at 11:27

## 2017-12-04 RX ADMIN — ACETAMINOPHEN 975 MG: 325 TABLET ORAL at 06:47

## 2017-12-04 RX ADMIN — TAMSULOSIN HYDROCHLORIDE 0.4 MG: 0.4 CAPSULE ORAL at 15:52

## 2017-12-04 RX ADMIN — OXYCODONE HYDROCHLORIDE 10 MG: 10 TABLET ORAL at 06:47

## 2017-12-04 RX ADMIN — FLUCONAZOLE 400 MG: 2 INJECTION INTRAVENOUS at 20:29

## 2017-12-04 RX ADMIN — PIPERACILLIN SODIUM,TAZOBACTAM SODIUM 4.5 G: 4; .5 INJECTION, POWDER, FOR SOLUTION INTRAVENOUS at 11:28

## 2017-12-04 RX ADMIN — ACETAMINOPHEN 975 MG: 325 TABLET ORAL at 15:52

## 2017-12-04 RX ADMIN — Medication 1 TABLET: at 08:35

## 2017-12-04 RX ADMIN — HYDROMORPHONE HYDROCHLORIDE 1 MG: 1 INJECTION, SOLUTION INTRAMUSCULAR; INTRAVENOUS; SUBCUTANEOUS at 06:03

## 2017-12-04 RX ADMIN — TOBRAMYCIN AND DEXAMETHASONE 1 DROP: 3; 1 SUSPENSION/ DROPS OPHTHALMIC at 17:27

## 2017-12-04 RX ADMIN — PIPERACILLIN SODIUM,TAZOBACTAM SODIUM 4.5 G: 4; .5 INJECTION, POWDER, FOR SOLUTION INTRAVENOUS at 06:03

## 2017-12-04 RX ADMIN — SODIUM CHLORIDE, SODIUM GLUCONATE, SODIUM ACETATE, POTASSIUM CHLORIDE AND MAGNESIUM CHLORIDE 75 ML/HR: 526; 502; 368; 37; 30 INJECTION, SOLUTION INTRAVENOUS at 13:31

## 2017-12-04 RX ADMIN — OXYCODONE HYDROCHLORIDE 10 MG: 10 TABLET ORAL at 22:39

## 2017-12-04 RX ADMIN — ACETAMINOPHEN 975 MG: 325 TABLET ORAL at 22:39

## 2017-12-04 RX ADMIN — TOBRAMYCIN AND DEXAMETHASONE 1 DROP: 3; 1 SUSPENSION/ DROPS OPHTHALMIC at 06:03

## 2017-12-04 RX ADMIN — VANCOMYCIN HYDROCHLORIDE 1500 MG: 10 INJECTION, POWDER, LYOPHILIZED, FOR SOLUTION INTRAVENOUS at 04:06

## 2017-12-04 RX ADMIN — OXYCODONE HYDROCHLORIDE 10 MG: 10 TABLET ORAL at 18:25

## 2017-12-04 RX ADMIN — HYDROMORPHONE HYDROCHLORIDE 1 MG: 1 INJECTION, SOLUTION INTRAMUSCULAR; INTRAVENOUS; SUBCUTANEOUS at 13:41

## 2017-12-04 RX ADMIN — VANCOMYCIN HYDROCHLORIDE 1500 MG: 10 INJECTION, POWDER, LYOPHILIZED, FOR SOLUTION INTRAVENOUS at 15:52

## 2017-12-04 RX ADMIN — POTASSIUM CHLORIDE 40 MEQ: 1500 TABLET, EXTENDED RELEASE ORAL at 17:27

## 2017-12-04 RX ADMIN — Medication 1 TABLET: at 22:39

## 2017-12-04 RX ADMIN — OXYCODONE HYDROCHLORIDE 10 MG: 10 TABLET ORAL at 00:09

## 2017-12-04 NOTE — SOCIAL WORK
Cm met with pt and pt    Pt not medially clear for discharge at this time pt has right percutaneous nephrostomy to straight drainage, percutaneous abscess drain x2 and new chest tube for large pleural effusion   Cm spoke with  and will provide cm with phone number who to talk to about what drains [facility]    Cm will follow, awaiting phone number

## 2017-12-04 NOTE — PLAN OF CARE
Problem: PHYSICAL THERAPY ADULT  Goal: Performs mobility at highest level of function for planned discharge setting  See evaluation for individualized goals  Treatment/Interventions: Functional transfer training, LE strengthening/ROM, Therapeutic exercise, Endurance training, Equipment eval/education, Bed mobility, Gait training, Spoke to nursing, OT  Equipment Recommended: Harvey Phoenix (at this time)       See flowsheet documentation for full assessment, interventions and recommendations  Outcome: Progressing  Prognosis: Good  Problem List: Decreased strength, Decreased range of motion, Decreased endurance, Impaired balance, Decreased mobility, Decreased coordination, Decreased skin integrity, Orthopedic restrictions, Pain  Assessment: PT re-eval performed 12/4/17 following a complicated 32 day stay at [facility] he was admitted for kidney laceration  Pt initially evalauted 11/4/17 where he required assistance for mobility and was limited by pain and weakness and presented as a fall risk and with poor tolerance to activity  Pt making progress thusfar with PT intervention but has been limited by multiple procedures, pain, and a transfer to ICU  During this session, pt required little assistance to perform tasks but did require increased time and assistance for multiple lines  PT to continue to follow pt during stay to progress functional mobility (I) and safety  Pt will return to long-term at d/c    Barriers to Discharge: None     Recommendation: Other (Comment) (pt to d/c to prision)     PT - OK to Discharge: Yes    See flowsheet documentation for full assessment

## 2017-12-04 NOTE — PROGRESS NOTES
Progress Note - Cassandra Galvez 43 y o  male MRN: [id number]    Unit/Bed#: [facility] 820-01 Encounter: [id number]      Assessment:  Mr Josy Palomo is an incarcerated 70-year-old male admitted to Trauma team after suffering left renal fracture status post left nephrectomy  Patient is known to our group for history of bilateral hydronephrosis  Patient developed ureteral leak postoperatively; as well as, refractory right hydronephrosis status post Thakur catheter placement; in addition to right percutaneous nephrostomy as indicated for renal decompression  Creatinine was normalized since nephrostomy placement  Recent nephrogram on November 21st showed positive for distal right ureteral stricture without significant stenosis and therefore percutaneous nephrostomy was clamped for trial   The evening of planned nephrostomy removal, patient complained of abdominal pain and was re-scanned emergently with findings positive for large fluid collections  Now status post IR percutaneous drain placement  Patient was admitted briefly to the ICU with acute kidney injury and hemodynamic instability  Patient is now transferred to the medical surgical unit with multiple drains in place; including  right percutaneous nephrostomy to straight drainage, percutaneous abscess drain x2 and new chest tube for large pleural effusion per SICU  Creatinine is now normalized  Right percutaneous nephrostomy output was 875 overnight per nursing documentation  Left lateral and flank percutaneous abscess/fluid collection drains have had minimal output overnight  Plan:  Maintain all drains for the time being  Will discuss with attending timing for repeat imaging; as well as, plans or repeat fulguration of left ureter and additional Deflux  Subjective:   No fever or chills    Objective:     Vitals: Blood pressure 142/85, pulse 94, temperature 99 3 °F (37 4 °C), temperature source Oral, resp   rate 18, height 5' 6" (1 676 m), weight 79 2 kg (174 lb 9 7 oz), SpO2 99 %  ,Body mass index is 28 18 kg/m²  Intake/Output Summary (Last 24 hours) at 12/04/17 0947  Last data filed at 12/04/17 9568   Gross per 24 hour   Intake          1680 05 ml   Output             3125 ml   Net         -1444 95 ml       Physical Exam: General appearance: alert, appears stated age, cooperative and no distress  Head: Normocephalic, without obvious abnormality, atraumatic  Neck: no adenopathy, no carotid bruit, no JVD, supple, symmetrical, trachea midline and thyroid not enlarged, symmetric, no tenderness/mass/nodules  Lungs: clear to auscultation bilaterally  Heart: regular rate and rhythm, S1, S2 normal, no murmur, click, rub or gallop  Abdomen: soft, non-tender; bowel sounds normal; no masses,  no organomegaly  Extremities: extremities normal, atraumatic, no cyanosis or edema  Pulses: 2+ and symmetric  Neurologic: Grossly normal  Thakur patent for clear sean urine     Invasive Devices     Central Venous Catheter Line            CVC Central Lines 11/30/17 Triple 16cm 3 days          Peripheral Intravenous Line            Peripheral IV 11/30/17 Left;Upper Arm 3 days          Drain            Nephrostomy Right 1 10 2 Fr  24 days    Closed/Suction Drain Left Back 8 Fr  3 days    Closed/Suction Drain Left;Lateral Abdomen 8 Fr  3 days    Chest Tube Left less than 1 day              Lab Results   Component Value Date    WBC 8 74 12/04/2017    HGB 7 8 (L) 12/04/2017    HCT 23 3 (L) 12/04/2017    MCV 88 12/04/2017     12/04/2017     Lab Results   Component Value Date    GLUCOSE 105 12/04/2017    CALCIUM 8 2 (L) 12/04/2017     12/04/2017    K 3 9 12/04/2017    CO2 31 12/04/2017     12/04/2017    BUN 10 12/04/2017    CREATININE 1 02 12/04/2017       Lab, Imaging and other studies: I have personally reviewed pertinent reports

## 2017-12-05 ENCOUNTER — APPOINTMENT (INPATIENT)
Dept: RADIOLOGY | Facility: HOSPITAL | Age: 42
DRG: 659 | End: 2017-12-05
Payer: COMMERCIAL

## 2017-12-05 LAB
ANION GAP SERPL CALCULATED.3IONS-SCNC: 3 MMOL/L (ref 4–13)
BACTERIA BLD CULT: NORMAL
BACTERIA BLD CULT: NORMAL
BASOPHILS # BLD AUTO: 0.02 THOUSANDS/ΜL (ref 0–0.1)
BASOPHILS NFR BLD AUTO: 0 % (ref 0–1)
BUN SERPL-MCNC: 10 MG/DL (ref 5–25)
CALCIUM SERPL-MCNC: 8.2 MG/DL (ref 8.3–10.1)
CHLORIDE SERPL-SCNC: 106 MMOL/L (ref 100–108)
CO2 SERPL-SCNC: 32 MMOL/L (ref 21–32)
CREAT SERPL-MCNC: 1.2 MG/DL (ref 0.6–1.3)
EOSINOPHIL # BLD AUTO: 0.57 THOUSAND/ΜL (ref 0–0.61)
EOSINOPHIL NFR BLD AUTO: 6 % (ref 0–6)
ERYTHROCYTE [DISTWIDTH] IN BLOOD BY AUTOMATED COUNT: 15.4 % (ref 11.6–15.1)
GFR SERPL CREATININE-BSD FRML MDRD: 74 ML/MIN/1.73SQ M
GLUCOSE SERPL-MCNC: 126 MG/DL (ref 65–140)
HCT VFR BLD AUTO: 24.3 % (ref 36.5–49.3)
HGB BLD-MCNC: 7.9 G/DL (ref 12–17)
LYMPHOCYTES # BLD AUTO: 1.44 THOUSANDS/ΜL (ref 0.6–4.47)
LYMPHOCYTES NFR BLD AUTO: 14 % (ref 14–44)
MCH RBC QN AUTO: 28.9 PG (ref 26.8–34.3)
MCHC RBC AUTO-ENTMCNC: 32.5 G/DL (ref 31.4–37.4)
MCV RBC AUTO: 89 FL (ref 82–98)
MONOCYTES # BLD AUTO: 1.1 THOUSAND/ΜL (ref 0.17–1.22)
MONOCYTES NFR BLD AUTO: 11 % (ref 4–12)
NEUTROPHILS # BLD AUTO: 7.27 THOUSANDS/ΜL (ref 1.85–7.62)
NEUTS SEG NFR BLD AUTO: 69 % (ref 43–75)
NRBC BLD AUTO-RTO: 0 /100 WBCS
PLATELET # BLD AUTO: 312 THOUSANDS/UL (ref 149–390)
PMV BLD AUTO: 8.7 FL (ref 8.9–12.7)
POTASSIUM SERPL-SCNC: 4.2 MMOL/L (ref 3.5–5.3)
RBC # BLD AUTO: 2.73 MILLION/UL (ref 3.88–5.62)
SODIUM SERPL-SCNC: 141 MMOL/L (ref 136–145)
WBC # BLD AUTO: 10.42 THOUSAND/UL (ref 4.31–10.16)

## 2017-12-05 PROCEDURE — 71010 HB CHEST X-RAY 1 VIEW FRONTAL (PORTABLE): CPT

## 2017-12-05 PROCEDURE — 85025 COMPLETE CBC W/AUTO DIFF WBC: CPT | Performed by: SURGERY

## 2017-12-05 PROCEDURE — 80048 BASIC METABOLIC PNL TOTAL CA: CPT | Performed by: SURGERY

## 2017-12-05 RX ADMIN — OXYCODONE HYDROCHLORIDE 10 MG: 10 TABLET ORAL at 03:50

## 2017-12-05 RX ADMIN — HYDROMORPHONE HYDROCHLORIDE 1 MG: 1 INJECTION, SOLUTION INTRAMUSCULAR; INTRAVENOUS; SUBCUTANEOUS at 23:24

## 2017-12-05 RX ADMIN — HYDROMORPHONE HYDROCHLORIDE 1 MG: 1 INJECTION, SOLUTION INTRAMUSCULAR; INTRAVENOUS; SUBCUTANEOUS at 05:39

## 2017-12-05 RX ADMIN — ACETAMINOPHEN 975 MG: 325 TABLET ORAL at 14:34

## 2017-12-05 RX ADMIN — HYDROMORPHONE HYDROCHLORIDE 1 MG: 1 INJECTION, SOLUTION INTRAMUSCULAR; INTRAVENOUS; SUBCUTANEOUS at 00:46

## 2017-12-05 RX ADMIN — HEPARIN SODIUM 5000 UNITS: 5000 INJECTION, SOLUTION INTRAVENOUS; SUBCUTANEOUS at 14:34

## 2017-12-05 RX ADMIN — TOBRAMYCIN AND DEXAMETHASONE 1 DROP: 3; 1 SUSPENSION/ DROPS OPHTHALMIC at 05:40

## 2017-12-05 RX ADMIN — OXYCODONE HYDROCHLORIDE 10 MG: 10 TABLET ORAL at 08:51

## 2017-12-05 RX ADMIN — HEPARIN SODIUM 5000 UNITS: 5000 INJECTION, SOLUTION INTRAVENOUS; SUBCUTANEOUS at 21:21

## 2017-12-05 RX ADMIN — OXYCODONE HYDROCHLORIDE 10 MG: 10 TABLET ORAL at 21:21

## 2017-12-05 RX ADMIN — TOBRAMYCIN AND DEXAMETHASONE 1 DROP: 3; 1 SUSPENSION/ DROPS OPHTHALMIC at 00:46

## 2017-12-05 RX ADMIN — POLYVINYL ALCOHOL 1 DROP: 14 SOLUTION/ DROPS OPHTHALMIC at 08:55

## 2017-12-05 RX ADMIN — POLYETHYLENE GLYCOL 3350 17 G: 17 POWDER, FOR SOLUTION ORAL at 08:51

## 2017-12-05 RX ADMIN — TOBRAMYCIN AND DEXAMETHASONE 1 DROP: 3; 1 SUSPENSION/ DROPS OPHTHALMIC at 17:11

## 2017-12-05 RX ADMIN — PIPERACILLIN SODIUM,TAZOBACTAM SODIUM 4.5 G: 4; .5 INJECTION, POWDER, FOR SOLUTION INTRAVENOUS at 05:39

## 2017-12-05 RX ADMIN — TOBRAMYCIN AND DEXAMETHASONE 1 DROP: 3; 1 SUSPENSION/ DROPS OPHTHALMIC at 23:25

## 2017-12-05 RX ADMIN — POLYVINYL ALCOHOL 1 DROP: 14 SOLUTION/ DROPS OPHTHALMIC at 00:46

## 2017-12-05 RX ADMIN — TAMSULOSIN HYDROCHLORIDE 0.4 MG: 0.4 CAPSULE ORAL at 17:11

## 2017-12-05 RX ADMIN — PIPERACILLIN SODIUM,TAZOBACTAM SODIUM 4.5 G: 4; .5 INJECTION, POWDER, FOR SOLUTION INTRAVENOUS at 12:46

## 2017-12-05 RX ADMIN — Medication 150 MG: at 08:51

## 2017-12-05 RX ADMIN — VANCOMYCIN HYDROCHLORIDE 1500 MG: 10 INJECTION, POWDER, LYOPHILIZED, FOR SOLUTION INTRAVENOUS at 03:46

## 2017-12-05 RX ADMIN — PIPERACILLIN SODIUM,TAZOBACTAM SODIUM 4.5 G: 4; .5 INJECTION, POWDER, FOR SOLUTION INTRAVENOUS at 17:11

## 2017-12-05 RX ADMIN — FLUCONAZOLE 400 MG: 2 INJECTION INTRAVENOUS at 21:30

## 2017-12-05 RX ADMIN — VANCOMYCIN HYDROCHLORIDE 1500 MG: 10 INJECTION, POWDER, LYOPHILIZED, FOR SOLUTION INTRAVENOUS at 14:35

## 2017-12-05 RX ADMIN — ACETAMINOPHEN 975 MG: 325 TABLET ORAL at 23:24

## 2017-12-05 RX ADMIN — HEPARIN SODIUM 5000 UNITS: 5000 INJECTION, SOLUTION INTRAVENOUS; SUBCUTANEOUS at 05:39

## 2017-12-05 RX ADMIN — ACETAMINOPHEN 975 MG: 325 TABLET ORAL at 08:51

## 2017-12-05 RX ADMIN — OXYCODONE HYDROCHLORIDE 10 MG: 10 TABLET ORAL at 14:35

## 2017-12-05 RX ADMIN — Medication 1 TABLET: at 21:21

## 2017-12-05 RX ADMIN — TOBRAMYCIN AND DEXAMETHASONE 1 DROP: 3; 1 SUSPENSION/ DROPS OPHTHALMIC at 12:46

## 2017-12-05 RX ADMIN — PIPERACILLIN SODIUM,TAZOBACTAM SODIUM 4.5 G: 4; .5 INJECTION, POWDER, FOR SOLUTION INTRAVENOUS at 00:46

## 2017-12-05 NOTE — PROGRESS NOTES
Patient complaining of eyelashes being crusted together and not being able to open eyes secondary to conjunctivitis  Noted to have white, stringy discharge in corners of eyes and in bottom eyelids  Eyelids easily opened with fingers  Tobramycin eyedrops administered early  Rosanna Wood with red surgery aware  Will order eye lubricating drops  Okay with tobramycin being administered early

## 2017-12-05 NOTE — PROGRESS NOTES
Patient care rounds were completed with the patient's nurse today, Kari Chilel  We discussed the plan is to continue his diet with protein supplement  Plan to change his chest tube to water-seal today and follow respiratory status as well as repeat chest x-ray tomorrow  Attempt to obtain peripheral IV access; if peripheral IV access obtained, we will remove the right IJ triple-lumen catheter  Continue iron drains x2 and percutaneous nephrostomy tube  I will further discuss his plan with Urology as his drain output continues to be high; he may need Thakur catheter insertion  We reviewed all of the invasive devices/lines/telemetry orders  Right IJ triple-lumen catheter  Attempt peripheral IV access and removal of catheter today  IR drains x2 to remain indefinitely  Per nephrostomy tube to remain indefinitely  I will be discussing the plan with Urology today  All questions and concerns were addressed  I spent greater than 20 minutes reviewing the plan with the patient and the nurse, and coordinating his care for the day      Lissett Flores PA-C  12/5/2017 11:29 AM

## 2017-12-05 NOTE — PROGRESS NOTES
Order for insertion of jiménez per urology  Unsuccessful placement and pt refused second attempt  Called Janae Hui to update her on situation  She will reassess in the morning

## 2017-12-05 NOTE — PHYSICAL THERAPY NOTE
Physical Therapy Cancellation Note  PT session cancelled this a m  Per patient request  Patient request if he could be seen in the p m  Will follow as able

## 2017-12-05 NOTE — SOCIAL WORK
Cm called [address] with [place] in [facility] [phone]  Reva referred cm to Kash Mandujano in [facility] [phone] and left a voice message   Cm awaiting return call to discuss discharge planning back to assisted   When medically clear fax number to [facility] administration is [phone]

## 2017-12-05 NOTE — PROGRESS NOTES
Progress Note - General Surgery   Duane Gray 43 y o  male MRN: [id number]  Unit/Bed#: [facility] 820-01 Encounter: [id number]    Assessment:  43y o -year-old male with spontaneous left kidney rupture     - s/p ex lap, left nephrectomy, abthera 11/1 Damon Farfan)  - s/p ex lap, removal of packs, ligation of left ureter, abdominal closure 11/2 Alejandro Sanchez)  - s/p right PCN w/ intraabdominal ascites samples 11/9 (IR)  - s/p 11/18 cystoscopy w/ fulguration of ureteral orifice, cystogram with injection of deflux, left retrograde pyelograam 11/18 Desiree Angela)  - s/p nephrostogram, PCN capping 11/21 (IR)  - s/p IR tube check 11/27 (IR)  - s/p drainage of left upper quadrant fluid collection 11/30 (IR)  -s/p Left CT insertion 12/3 (Mile Ruvalcaba)     Plan:  - continue regular diet   - tobramycin and lubricating eye drops  - f/u AM CXR  - possibly CT to H2O seal today  - SQH/SCDs  - PT/OT      Subjective/Objective   Subjective: eyes are bothering him, feel uncomfortable and scratchy  Otherwise tolerating regular diet, no acute events overnight  C/o pain at CT insertion    Objective:    Blood pressure 156/92, pulse 94, temperature 99 °F (37 2 °C), temperature source Oral, resp  rate 18, height 5' 6" (1 676 m), weight 79 2 kg (174 lb 9 7 oz), SpO2 95 %  ,Body mass index is 28 18 kg/m²  I/O last 24 hours:   In: 1797 5 [P O :340; I V :1457 5]  Out: 5030 [Urine:3675; Drains:955; Chest Tube:400]    Invasive Devices     Central Venous Catheter Line            CVC Central Lines 11/30/17 Triple 16cm 4 days          Drain            Nephrostomy Right 1 10 2 Fr  25 days    Closed/Suction Drain Left Back 8 Fr  4 days    Closed/Suction Drain Left;Lateral Abdomen 8 Fr  4 days    Chest Tube Left 1 day                Physical Exam:   NAD  Norm resp effort  RRR  Abd soft, NT/ND, THANH x2 serosang  CT to suction with no AL    Lab, Imaging and other studies:  Lab Results   Component Value Date    WBC 10 42 (H) 12/05/2017    HGB 7 9 (L) 12/05/2017    HCT 24 3 (L) 12/05/2017    MCV 89 12/05/2017     12/05/2017      Lab Results   Component Value Date    GLUCOSE 126 12/05/2017    CALCIUM 8 2 (L) 12/05/2017     12/05/2017    K 4 2 12/05/2017    CO2 32 12/05/2017     12/05/2017    BUN 10 12/05/2017    CREATININE 1 20 12/05/2017       VTE Pharmacologic Prophylaxis: Heparin  VTE Mechanical Prophylaxis: sequential compression device

## 2017-12-05 NOTE — CASE MANAGEMENT
[address] [place] [address] in the [facility] by Parrish Barry for 2017  Network Utilization Review Department  Phone: [phone]; Fax [phone]  ATTENTION: The Network Utilization Review Department is now centralized for our 7 Facilities  Make a note that we have a new phone and fax numbers for our Department  Please call with any questions or concerns to [phone] and carefully follow the prompts so that you are directed to the right person  All voicemails are confidential  Fax any determinations, approvals, denials, and requests for initial or continue stay review clinical to [phone]  Due to HIGH CALL volume, it would be easier if you could please send faxed requests to expedite your requests and in part, help us provide discharge notifications faster      Continued Stay Review    Date/POD#: 12/5 POD # 34    Vital Signs: /74   Pulse 87   Temp 98 3 °F (36 8 °C) (Oral)   Resp 18   Ht 5' 6" (1 676 m)   Wt 79 2 kg (174 lb 9 7 oz)   SpO2 95%   BMI 28 18 kg/m²     Medication:   Scheduled Meds:   acetaminophen 975 mg Oral Q8H   fluconazole 400 mg Intravenous Q24H   heparin (porcine) 5,000 Units Subcutaneous Q8H [address]   iron polysaccharides 150 mg Oral Daily   piperacillin-tazobactam 4 5 g Intravenous Q6H   polyethylene glycol 17 g Oral Daily   senna-docusate sodium 1 tablet Oral HS   tamsulosin 0 4 mg Oral Daily With Dinner   tobramycin-dexamethasone 1 drop Ophthalmic Q6H [address]   vancomycin 17 5 mg/kg Intravenous Q12H     PRN Meds: HYDROmorphone x2 12/5, 12/4 x 3    influenza vaccine    oxyCODONE 10 mg x 2 12/5, x5 12/4    oxyCODONE    polyvinyl alcohol x2  Abnormal Labs/Diagnostic Results:     Calcium 8 2  WBC 4 31 - 10 16 Thousand/uL 10 42     RBC 3 88 - 5 62 Million/uL 2 73     Hemoglobin 12 0 - 17 0 g/dL 7 9     Hematocrit 36 5 - 49 3 % 24 3     RDW 11 6 - 15 1 % 15 4     MPV 8 9 - 12 7 fL 8 7      Age/Sex: 43 y o  male     Assessment/Plan:   12/5 Surgery Progress Rounding Note  We discussed the plan is to continue his diet with protein supplement  Plan to change his chest tube to water-seal today and follow respiratory status as well as repeat chest x-ray tomorrow  Attempt to obtain peripheral IV access; if peripheral IV access obtained, we will remove the right IJ triple-lumen catheter  Continue iron drains x2 and percutaneous nephrostomy tube  I will further discuss his plan with Urology as his drain output continues to be high; he may need Thakur catheter insertion      We reviewed all of the invasive devices/lines/telemetry orders  Right IJ triple-lumen catheter  Attempt peripheral IV access and removal of catheter today  IR drains x2 to remain indefinitely  Per nephrostomy tube to remain indefinitely    I will be discussing the plan with Urology today   _______________________________  12/5 Surgery Progress Note   Assessment:  43y o -year-old male with spontaneous left kidney rupture     - s/p ex lap, left nephrectomy, abthera 11/1 Timothy Mnuiz)  - s/p ex lap, removal of packs, ligation of left ureter, abdominal closure 11/2 Eva Monte)  - s/p right PCN w/ intraabdominal ascites samples 11/9 (IR)  - s/p 11/18 cystoscopy w/ fulguration of ureteral orifice, cystogram with injection of deflux, left retrograde pyelograam 11/18 Abelardo Baron)  - s/p nephrostogram, PCN capping 11/21 (IR)  - s/p IR tube check 11/27 (IR)  - s/p drainage of left upper quadrant fluid collection 11/30 (IR)  -s/p Left CT insertion 12/3 (Willy Barbosa)      Plan:  - continue regular diet   - tobramycin and lubricating eye drops  - f/u AM CXR  - possibly CT to H2O seal today  - SQH/SCDs  - PT/OT    Discharge Plan: Back to nursing home at when medically stable

## 2017-12-05 NOTE — PHYSICAL THERAPY NOTE
Physical Therapy Cancellation Note    Cancelled PT session for the day as patient refused again in the p m  Reporting he is in pain and tired  RN confirmed patient was sitting on the chair and did get back in bed only a little while back

## 2017-12-06 ENCOUNTER — APPOINTMENT (INPATIENT)
Dept: RADIOLOGY | Facility: HOSPITAL | Age: 42
DRG: 659 | End: 2017-12-06
Payer: COMMERCIAL

## 2017-12-06 ENCOUNTER — GENERIC CONVERSION - ENCOUNTER (OUTPATIENT)
Dept: OTHER | Facility: OTHER | Age: 42
End: 2017-12-06

## 2017-12-06 LAB
ANION GAP SERPL CALCULATED.3IONS-SCNC: 6 MMOL/L (ref 4–13)
BASOPHILS # BLD AUTO: 0.01 THOUSANDS/ΜL (ref 0–0.1)
BASOPHILS NFR BLD AUTO: 0 % (ref 0–1)
BUN SERPL-MCNC: 10 MG/DL (ref 5–25)
CALCIUM SERPL-MCNC: 8.5 MG/DL (ref 8.3–10.1)
CHLORIDE SERPL-SCNC: 105 MMOL/L (ref 100–108)
CO2 SERPL-SCNC: 30 MMOL/L (ref 21–32)
CREAT FLD-MCNC: 1.15 MG/DL
CREAT SERPL-MCNC: 1.21 MG/DL (ref 0.6–1.3)
EOSINOPHIL # BLD AUTO: 0.65 THOUSAND/ΜL (ref 0–0.61)
EOSINOPHIL NFR BLD AUTO: 6 % (ref 0–6)
ERYTHROCYTE [DISTWIDTH] IN BLOOD BY AUTOMATED COUNT: 15.8 % (ref 11.6–15.1)
GFR SERPL CREATININE-BSD FRML MDRD: 73 ML/MIN/1.73SQ M
GLUCOSE SERPL-MCNC: 93 MG/DL (ref 65–140)
HCT VFR BLD AUTO: 25 % (ref 36.5–49.3)
HGB BLD-MCNC: 8.1 G/DL (ref 12–17)
LYMPHOCYTES # BLD AUTO: 1.54 THOUSANDS/ΜL (ref 0.6–4.47)
LYMPHOCYTES NFR BLD AUTO: 13 % (ref 14–44)
MAGNESIUM SERPL-MCNC: 2.2 MG/DL (ref 1.6–2.6)
MCH RBC QN AUTO: 28.8 PG (ref 26.8–34.3)
MCHC RBC AUTO-ENTMCNC: 32.4 G/DL (ref 31.4–37.4)
MCV RBC AUTO: 89 FL (ref 82–98)
MONOCYTES # BLD AUTO: 1.16 THOUSAND/ΜL (ref 0.17–1.22)
MONOCYTES NFR BLD AUTO: 10 % (ref 4–12)
NEUTROPHILS # BLD AUTO: 8.06 THOUSANDS/ΜL (ref 1.85–7.62)
NEUTS SEG NFR BLD AUTO: 71 % (ref 43–75)
NRBC BLD AUTO-RTO: 0 /100 WBCS
PLATELET # BLD AUTO: 495 THOUSANDS/UL (ref 149–390)
PMV BLD AUTO: 8.8 FL (ref 8.9–12.7)
POTASSIUM SERPL-SCNC: 4 MMOL/L (ref 3.5–5.3)
RBC # BLD AUTO: 2.81 MILLION/UL (ref 3.88–5.62)
SODIUM SERPL-SCNC: 141 MMOL/L (ref 136–145)
WBC # BLD AUTO: 11.48 THOUSAND/UL (ref 4.31–10.16)

## 2017-12-06 PROCEDURE — 0T9B70Z DRAINAGE OF BLADDER WITH DRAINAGE DEVICE, VIA NATURAL OR ARTIFICIAL OPENING: ICD-10-PCS | Performed by: UROLOGY

## 2017-12-06 PROCEDURE — 80048 BASIC METABOLIC PNL TOTAL CA: CPT | Performed by: SURGERY

## 2017-12-06 PROCEDURE — 97530 THERAPEUTIC ACTIVITIES: CPT

## 2017-12-06 PROCEDURE — 82570 ASSAY OF URINE CREATININE: CPT | Performed by: SURGERY

## 2017-12-06 PROCEDURE — 83735 ASSAY OF MAGNESIUM: CPT | Performed by: SURGERY

## 2017-12-06 PROCEDURE — 85025 COMPLETE CBC W/AUTO DIFF WBC: CPT | Performed by: SURGERY

## 2017-12-06 PROCEDURE — 71020 HB CHEST X-RAY 2VW FRONTAL&LATL: CPT

## 2017-12-06 PROCEDURE — 97116 GAIT TRAINING THERAPY: CPT

## 2017-12-06 RX ORDER — LORAZEPAM 2 MG/ML
1 INJECTION INTRAMUSCULAR ONCE
Status: COMPLETED | OUTPATIENT
Start: 2017-12-06 | End: 2017-12-06

## 2017-12-06 RX ORDER — LIDOCAINE HYDROCHLORIDE 20 MG/ML
JELLY TOPICAL ONCE
Status: COMPLETED | OUTPATIENT
Start: 2017-12-06 | End: 2017-12-06

## 2017-12-06 RX ADMIN — ACETAMINOPHEN 975 MG: 325 TABLET ORAL at 15:55

## 2017-12-06 RX ADMIN — OXYCODONE HYDROCHLORIDE 10 MG: 10 TABLET ORAL at 11:23

## 2017-12-06 RX ADMIN — TOBRAMYCIN AND DEXAMETHASONE 1 DROP: 3; 1 SUSPENSION/ DROPS OPHTHALMIC at 05:44

## 2017-12-06 RX ADMIN — LORAZEPAM 1 MG: 2 INJECTION INTRAMUSCULAR; INTRAVENOUS at 14:32

## 2017-12-06 RX ADMIN — TOBRAMYCIN AND DEXAMETHASONE 1 DROP: 3; 1 SUSPENSION/ DROPS OPHTHALMIC at 18:02

## 2017-12-06 RX ADMIN — TAMSULOSIN HYDROCHLORIDE 0.4 MG: 0.4 CAPSULE ORAL at 15:55

## 2017-12-06 RX ADMIN — PIPERACILLIN SODIUM,TAZOBACTAM SODIUM 4.5 G: 4; .5 INJECTION, POWDER, FOR SOLUTION INTRAVENOUS at 11:23

## 2017-12-06 RX ADMIN — ACETAMINOPHEN 975 MG: 325 TABLET ORAL at 23:23

## 2017-12-06 RX ADMIN — HEPARIN SODIUM 5000 UNITS: 5000 INJECTION, SOLUTION INTRAVENOUS; SUBCUTANEOUS at 14:32

## 2017-12-06 RX ADMIN — ACETAMINOPHEN 975 MG: 325 TABLET ORAL at 08:58

## 2017-12-06 RX ADMIN — PIPERACILLIN SODIUM,TAZOBACTAM SODIUM 4.5 G: 4; .5 INJECTION, POWDER, FOR SOLUTION INTRAVENOUS at 17:58

## 2017-12-06 RX ADMIN — OXYCODONE HYDROCHLORIDE 10 MG: 10 TABLET ORAL at 03:37

## 2017-12-06 RX ADMIN — VANCOMYCIN HYDROCHLORIDE 1500 MG: 10 INJECTION, POWDER, LYOPHILIZED, FOR SOLUTION INTRAVENOUS at 16:02

## 2017-12-06 RX ADMIN — HEPARIN SODIUM 5000 UNITS: 5000 INJECTION, SOLUTION INTRAVENOUS; SUBCUTANEOUS at 21:59

## 2017-12-06 RX ADMIN — OXYCODONE HYDROCHLORIDE 10 MG: 10 TABLET ORAL at 20:19

## 2017-12-06 RX ADMIN — FLUCONAZOLE 400 MG: 2 INJECTION INTRAVENOUS at 20:15

## 2017-12-06 RX ADMIN — TOBRAMYCIN AND DEXAMETHASONE 1 DROP: 3; 1 SUSPENSION/ DROPS OPHTHALMIC at 23:24

## 2017-12-06 RX ADMIN — VANCOMYCIN HYDROCHLORIDE 1500 MG: 10 INJECTION, POWDER, LYOPHILIZED, FOR SOLUTION INTRAVENOUS at 03:30

## 2017-12-06 RX ADMIN — PIPERACILLIN SODIUM,TAZOBACTAM SODIUM 4.5 G: 4; .5 INJECTION, POWDER, FOR SOLUTION INTRAVENOUS at 00:12

## 2017-12-06 RX ADMIN — TOBRAMYCIN AND DEXAMETHASONE 1 DROP: 3; 1 SUSPENSION/ DROPS OPHTHALMIC at 11:24

## 2017-12-06 RX ADMIN — PIPERACILLIN SODIUM,TAZOBACTAM SODIUM 4.5 G: 4; .5 INJECTION, POWDER, FOR SOLUTION INTRAVENOUS at 05:45

## 2017-12-06 RX ADMIN — Medication 150 MG: at 08:58

## 2017-12-06 RX ADMIN — PIPERACILLIN SODIUM,TAZOBACTAM SODIUM 4.5 G: 4; .5 INJECTION, POWDER, FOR SOLUTION INTRAVENOUS at 23:23

## 2017-12-06 RX ADMIN — LIDOCAINE HYDROCHLORIDE: 20 JELLY TOPICAL at 15:21

## 2017-12-06 RX ADMIN — HEPARIN SODIUM 5000 UNITS: 5000 INJECTION, SOLUTION INTRAVENOUS; SUBCUTANEOUS at 05:44

## 2017-12-06 NOTE — PROGRESS NOTES
Progress Note - Muna Deluca 43 y o  male MRN: [id number]    Unit/Bed#: [facility] 820-01 Encounter: [id number]      Assessment:  Mr Karlo Miranda is an incarcerated 51-year-old male admitted to Trauma team after suffering left renal fracture status post left nephrectomy  Patient is known to our group for history of bilateral hydronephrosis  Patient developed ureteral leak postoperatively; as well as, refractory right hydronephrosis status post Thakur catheter placement; in addition to right percutaneous nephrostomy as indicated for renal decompression  Creatinine was normalized since nephrostomy placement  Recent nephrogram on November 21st showed positive for distal right ureteral stricture without significant stenosis and therefore percutaneous nephrostomy was clamped for trial   The evening of planned nephrostomy removal, patient complained of abdominal pain and was re-scanned emergently with findings positive for large fluid collections  Now status post IR percutaneous drain placement  Given recurrent intra-abdominal urinoma patient requires continued maximum drainage    He is amenable Thakur catheter placement    Plan:    - will ask nursing staff to place a Thakur catheter today  - continued to observe urinoma drain output thereafter      Subjective:   No fever or chills    Objective:     Vitals: Blood pressure 141/86, pulse 103, temperature 100 °F (37 8 °C), temperature source Oral, resp  rate 20, height 5' 6" (1 676 m), weight 79 2 kg (174 lb 9 7 oz), SpO2 93 %  ,Body mass index is 28 18 kg/m²        Intake/Output Summary (Last 24 hours) at 12/06/17 0748  Last data filed at 12/06/17 0545   Gross per 24 hour   Intake              781 ml   Output             2550 ml   Net            -1769 ml       Physical Exam: General appearance: alert, appears stated age, cooperative and no distress  Head: Normocephalic, without obvious abnormality, atraumatic  Neck: no adenopathy, no carotid bruit, no JVD, supple, symmetrical,

## 2017-12-06 NOTE — PROGRESS NOTES
Still no call received as far as time when pt would have his CXRAY done, called radiology to inquire, stating they will put him in for 1230  Pt made aware of plan

## 2017-12-06 NOTE — PROGRESS NOTES
Jarrett Khoury, TOMAS here, made aware Dr Ayana Henderson had ordered pt to have jiménez placed, and per notes yesterday and speaking with pt today they were unsuccessful, and pt stating "he's always had it placed during procedure "  NP stating Laura Bhatt, NP will be in later today to attempt the jiménez, if she can not do it Dr Ayana Henderson is in house all day to notify him  Pt aware of plan

## 2017-12-06 NOTE — CASE MANAGEMENT
[address] [place] [address] in the [facility] by Parrish Barry for 2017  Network Utilization Review Department  Phone: [phone]; Fax [phone]  ATTENTION: The Network Utilization Review Department is now centralized for our 7 Facilities  Make a note that we have a new phone and fax numbers for our Department  Please call with any questions or concerns to [phone] and carefully follow the prompts so that you are directed to the right person  All voicemails are confidential  Fax any determinations, approvals, denials, and requests for initial or continue stay review clinical to [phone]  Due to HIGH CALL volume, it would be easier if you could please send faxed requests to expedite your requests and in part, help us provide discharge notifications faster      Continued Stay Review    Date: 12/6/17    Vital Signs: /89   Pulse 93   Temp 98 2 °F (36 8 °C) (Oral)   Resp 18   Ht 5' 6" (1 676 m)   Wt 79 2 kg (174 lb 9 7 oz)   SpO2 98%   BMI 28 18 kg/m²     Medications:   Scheduled Meds:   acetaminophen 975 mg Oral Q8H   fluconazole 400 mg Intravenous Q24H   heparin (porcine) 5,000 Units Subcutaneous Q8H [address]   iron polysaccharides 150 mg Oral Daily   piperacillin-tazobactam 4 5 g Intravenous Q6H   polyethylene glycol 17 g Oral Daily   senna-docusate sodium 1 tablet Oral HS   tamsulosin 0 4 mg Oral Daily With Dinner   tobramycin-dexamethasone 1 drop Ophthalmic Q6H [address]   vancomycin 17 5 mg/kg Intravenous Q12H     PRN Meds: HYDROmorphone    influenza vaccine    oxyCODONE 10 mg x 2 today , x 4 12/5    polyvinyl alcohol    Abnormal Labs/Diagnostic Results: 12/6  WBC 11 48     RBC 2 81     Hemoglobin 8 1     Hematocrit 25 0     RDW 15 8     MPV 8 8     Platelets 660     Lymphocytes Relative 13     Neutrophils Absolute 8 06     Eosinophils Absolute 0 65       Age/Sex: 43 y o  male     Assessment/Plan:   12/6 Surgery Progress Note  43y o -year-old male with spontaneous left kidney rupture     - s/p ex lap, left nephrectomy, abthera 11/1 Vaishnavi Stoll)  - s/p ex lap, removal of packs, ligation of left ureter, abdominal closure 11/2 Kylie Archuleta)  - s/p right PCN w/ intraabdominal ascites samples 11/9 (IR)  - s/p 11/18 cystoscopy w/ fulguration of ureteral orifice, cystogram with injection of deflux, left retrograde pyelograam 11/18 Rik Gallus)  - s/p nephrostogram, PCN capping 11/21 (IR)  - s/p IR tube check 11/27 (IR)  - s/p drainage of left upper quadrant fluid collection 11/30 (IR)  -s/p Left CT insertion 12/3 (Sara Farley)      Plan:  - CT to H2O seal, no air leak --> f/u AM CXR and plan to d/c CT  - will attempt to discuss with urology today regarding jiménez catheter or repeat imaging  - diet as tolerated  - analgesia  - vanc/zosyn fluconazole  - DVT ppx -- SQH  _____________________________  12/6 Urology Progress Note  Assessment:  Mr Reinaldo Joseph is an incarcerated 49-year-old male admitted to Trauma team after suffering left renal fracture status post left nephrectomy  Patient is known to our group for history of bilateral hydronephrosis  Patient developed ureteral leak postoperatively; as well as, refractory right hydronephrosis status post Jiménez catheter placement; in addition to right percutaneous nephrostomy as indicated for renal decompression  Creatinine was normalized since nephrostomy placement  Recent nephrogram on November 21st showed positive for distal right ureteral stricture without significant stenosis and therefore percutaneous nephrostomy was clamped for trial   The evening of planned nephrostomy removal, patient complained of abdominal pain and was re-scanned emergently with findings positive for large fluid collections    Now status post IR percutaneous drain placement        Given recurrent intra-abdominal urinoma patient requires continued maximum drainage     He is amenable Jiménez catheter placement     Plan:    - will ask nursing staff to place a Jiménez catheter today  - continued to observe urinoma drain output thereafter  ___________________________________    Discharge Plan: [facility] when ready and acceptable for care there

## 2017-12-06 NOTE — PHYSICAL THERAPY NOTE
Physical Therapy Progress Note     12/06/17 1415   Pain Assessment   Pain Assessment 0-10   Pain Score 6   Pain Type Acute pain   Pain Location Abdomen   Pain Orientation Left   Hospital Pain Intervention(s) Repositioned; Ambulation/increased activity; Emotional support   Response to Interventions Tolerated  Restrictions/Precautions   Other Precautions Pain; Fall Risk;Multiple lines   Subjective   Subjective The pt  notes that he continues to have pain, and that he has trouble more than sleeping at hour at a time at night  He expressed frustration with his continued medical situation  Transfers   Sit to Stand 5  Supervision   Additional items Increased time required   Stand to Sit 5  Supervision   Additional items Increased time required   Ambulation/Elevation   Gait pattern Excessively slow;Decreased foot clearance; Forward Flexion   Gait Assistance 5  Supervision   Additional items Verbal cues   Assistive Device Rolling walker   Distance 180 feet  Balance   Static Sitting Normal   Static Standing Fair   Ambulatory Fair -   Activity Tolerance   Activity Tolerance Patient tolerated treatment well   Nurse Alicia Darby RN  Exercises   TKR Sitting;Bilateral;AROM;10 reps   Assessment   Prognosis Good   Problem List Decreased strength;Decreased range of motion;Decreased endurance; Impaired balance;Decreased mobility; Decreased coordination;Decreased skin integrity;Orthopedic restrictions;Pain   Assessment The pt  was able to ambulate community distances, but he required extended time due to pain and his fear of increased pain  He did require two standing rests during walking due to his pain  He did have a much smoother gait quality today than in prior sessions  He does continue to remain limited which continued physical therapy will assist in his return to independence  Barriers to Discharge None   Goals   Patient Goals To get better     LTG Expiration Date 12/18/17   Treatment Day 8   Plan Treatment/Interventions Functional transfer training;LE strengthening/ROM; Therapeutic exercise; Endurance training;Patient/family training;Bed mobility;Gait training   Progress Improving as expected   PT Frequency 5x/wk   Recommendation   Recommendation Other (Comment)  (Return to facility )   Equipment Recommended Priya Pepe   PT - OK to Discharge Yes     Vikki Carpenter, PTA

## 2017-12-06 NOTE — PROGRESS NOTES
Progress Note - General Surgery   Chito Joseph 43 y o  male MRN: [id number]  Unit/Bed#: [facility] 820-01 Encounter: [id number]    Assessment:  43y o -year-old male with spontaneous left kidney rupture     - s/p ex lap, left nephrectomy, abthera 11/1 Conchisdeysi Tobias)  - s/p ex lap, removal of packs, ligation of left ureter, abdominal closure 11/2 Hardeep Burkett)  - s/p right PCN w/ intraabdominal ascites samples 11/9 (IR)  - s/p 11/18 cystoscopy w/ fulguration of ureteral orifice, cystogram with injection of deflux, left retrograde pyelograam 11/18 Derral Clara)  - s/p nephrostogram, PCN capping 11/21 (IR)  - s/p IR tube check 11/27 (IR)  - s/p drainage of left upper quadrant fluid collection 11/30 (IR)  -s/p Left CT insertion 12/3 ([place]hy[place]is [place])     Plan:  - CT to H2O seal, no air leak --> f/u AM CXR and plan to d/c CT  - will attempt to discuss with urology today regarding jiménez catheter or repeat imaging  - diet as tolerated  - analgesia  - vanc/zosyn fluconazole  - DVT ppx -- SQH    Subjective/Objective     Subjective: Patient reports abdominal discomfort  Inquiring about jiménez catheter      Objective:     Vitals: Blood pressure 141/86, pulse 103, temperature 100 °F (37 8 °C), temperature source Oral, resp  rate 20, height 5' 6" (1 676 m), weight 79 2 kg (174 lb 9 7 oz), SpO2 93 %  ,Body mass index is 28 18 kg/m²  I/O       12/04 0701 - 12/05 0700 12/05 0701 - 12/06 0700    P  O  220 781    I V  (mL/kg) 1457 5 (18 4)     Total Intake(mL/kg) 1677 5 (21 2) 781 (9 9)    Urine (mL/kg/hr) 2550 (1 3) 2100 (1 1)    Drains 900 (0 5) 150 (0 1)    Stool  0 (0)    Chest Tube 250 (0 1) 300 (0 2)    Total Output 3700 2550    Net -2022 5 -1769          Unmeasured Urine Occurrence  1 x    Unmeasured Stool Occurrence  2 x          Physical Exam:  GEN: NAD  HEENT: MMM  CV: RRR  Lung: Normal effort  Ab: Soft, NT/ND, drain x2 on L bloody sang  Extrem: No CCE  Neuro:  A+Ox3    Lab, Imaging and other studies: CBC with diff: No results found for: WBC, HGB, HCT, MCV, PLT, ADJUSTEDWBC, MCH, MCHC, RDW, MPV, NRBC, BMP/CMP: No results found for: NA, K, CL, CO2, ANIONGAP, BUN, CREATININE, GLUCOSE, CALCIUM, AST, ALT, ALKPHOS, PROT, ALBUMIN, BILITOT, EGFR, Magnesium: No components found for: MAG  VTE Pharmacologic Prophylaxis: Heparin  VTE Mechanical Prophylaxis: sequential compression device

## 2017-12-06 NOTE — PLAN OF CARE
GASTROINTESTINAL - ADULT     Minimal or absence of nausea and/or vomiting Adequate for Discharge     Maintains or returns to baseline bowel function Adequate for Discharge     Maintains adequate nutritional intake Adequate for Discharge          Nutrition/Hydration-ADULT     Nutrient/Hydration intake appropriate for improving, restoring or maintaining nutritional needs Completed          DISCHARGE PLANNING     Discharge to home or other facility with appropriate resources Progressing        DISCHARGE PLANNING - CARE MANAGEMENT     Discharge to post-acute care or home with appropriate resources Progressing        GENITOURINARY - ADULT     Maintains or returns to baseline urinary function Progressing     Absence of urinary retention Progressing     Urinary catheter remains patent Progressing        INFECTION - ADULT     Absence or prevention of progression during hospitalization Progressing        Knowledge Deficit     Patient/family/caregiver demonstrates understanding of disease process, treatment plan, medications, and discharge instructions Progressing        METABOLIC, FLUID AND ELECTROLYTES - ADULT     Electrolytes maintained within normal limits Progressing     Fluid balance maintained Progressing     Glucose maintained within target range Progressing        PAIN - ADULT     Verbalizes/displays adequate comfort level or baseline comfort level Progressing        Potential for Falls     Patient will remain free of falls Progressing        Prexisting or High Potential for Compromised Skin Integrity     Skin integrity is maintained or improved Progressing        SAFETY ADULT     Patient will remain free of falls Progressing     Maintain or return to baseline ADL function Progressing     Maintain or return mobility status to optimal level Progressing        SKIN/TISSUE INTEGRITY - ADULT     Skin integrity remains intact Progressing     Incision(s), wounds(s) or drain site(s) healing without S/S of infection Progressing     Oral mucous membranes remain intact Progressing

## 2017-12-07 LAB
BASOPHILS # BLD AUTO: 0.03 THOUSANDS/ΜL (ref 0–0.1)
BASOPHILS NFR BLD AUTO: 0 % (ref 0–1)
EOSINOPHIL # BLD AUTO: 0.64 THOUSAND/ΜL (ref 0–0.61)
EOSINOPHIL NFR BLD AUTO: 6 % (ref 0–6)
ERYTHROCYTE [DISTWIDTH] IN BLOOD BY AUTOMATED COUNT: 15.9 % (ref 11.6–15.1)
HCT VFR BLD AUTO: 25.1 % (ref 36.5–49.3)
HGB BLD-MCNC: 8.1 G/DL (ref 12–17)
LYMPHOCYTES # BLD AUTO: 1.58 THOUSANDS/ΜL (ref 0.6–4.47)
LYMPHOCYTES NFR BLD AUTO: 15 % (ref 14–44)
MCH RBC QN AUTO: 28.7 PG (ref 26.8–34.3)
MCHC RBC AUTO-ENTMCNC: 32.3 G/DL (ref 31.4–37.4)
MCV RBC AUTO: 89 FL (ref 82–98)
MONOCYTES # BLD AUTO: 1.26 THOUSAND/ΜL (ref 0.17–1.22)
MONOCYTES NFR BLD AUTO: 12 % (ref 4–12)
NEUTROPHILS # BLD AUTO: 7.2 THOUSANDS/ΜL (ref 1.85–7.62)
NEUTS SEG NFR BLD AUTO: 67 % (ref 43–75)
NRBC BLD AUTO-RTO: 0 /100 WBCS
PLATELET # BLD AUTO: 592 THOUSANDS/UL (ref 149–390)
PMV BLD AUTO: 9.4 FL (ref 8.9–12.7)
RBC # BLD AUTO: 2.82 MILLION/UL (ref 3.88–5.62)
WBC # BLD AUTO: 10.77 THOUSAND/UL (ref 4.31–10.16)

## 2017-12-07 PROCEDURE — 85025 COMPLETE CBC W/AUTO DIFF WBC: CPT | Performed by: SURGERY

## 2017-12-07 PROCEDURE — 97116 GAIT TRAINING THERAPY: CPT

## 2017-12-07 PROCEDURE — 97530 THERAPEUTIC ACTIVITIES: CPT

## 2017-12-07 RX ADMIN — VANCOMYCIN HYDROCHLORIDE 1500 MG: 10 INJECTION, POWDER, LYOPHILIZED, FOR SOLUTION INTRAVENOUS at 16:00

## 2017-12-07 RX ADMIN — TOBRAMYCIN AND DEXAMETHASONE 1 DROP: 3; 1 SUSPENSION/ DROPS OPHTHALMIC at 05:47

## 2017-12-07 RX ADMIN — ACETAMINOPHEN 975 MG: 325 TABLET ORAL at 08:44

## 2017-12-07 RX ADMIN — PIPERACILLIN SODIUM,TAZOBACTAM SODIUM 4.5 G: 4; .5 INJECTION, POWDER, FOR SOLUTION INTRAVENOUS at 18:19

## 2017-12-07 RX ADMIN — OXYCODONE HYDROCHLORIDE 10 MG: 10 TABLET ORAL at 13:35

## 2017-12-07 RX ADMIN — HEPARIN SODIUM 5000 UNITS: 5000 INJECTION, SOLUTION INTRAVENOUS; SUBCUTANEOUS at 22:04

## 2017-12-07 RX ADMIN — Medication 150 MG: at 08:44

## 2017-12-07 RX ADMIN — PIPERACILLIN SODIUM,TAZOBACTAM SODIUM 4.5 G: 4; .5 INJECTION, POWDER, FOR SOLUTION INTRAVENOUS at 23:52

## 2017-12-07 RX ADMIN — HYDROMORPHONE HYDROCHLORIDE 1 MG: 1 INJECTION, SOLUTION INTRAMUSCULAR; INTRAVENOUS; SUBCUTANEOUS at 06:38

## 2017-12-07 RX ADMIN — HEPARIN SODIUM 5000 UNITS: 5000 INJECTION, SOLUTION INTRAVENOUS; SUBCUTANEOUS at 05:47

## 2017-12-07 RX ADMIN — VANCOMYCIN HYDROCHLORIDE 1500 MG: 10 INJECTION, POWDER, LYOPHILIZED, FOR SOLUTION INTRAVENOUS at 02:59

## 2017-12-07 RX ADMIN — TAMSULOSIN HYDROCHLORIDE 0.4 MG: 0.4 CAPSULE ORAL at 15:59

## 2017-12-07 RX ADMIN — PIPERACILLIN SODIUM,TAZOBACTAM SODIUM 4.5 G: 4; .5 INJECTION, POWDER, FOR SOLUTION INTRAVENOUS at 05:46

## 2017-12-07 RX ADMIN — TOBRAMYCIN AND DEXAMETHASONE 1 DROP: 3; 1 SUSPENSION/ DROPS OPHTHALMIC at 11:36

## 2017-12-07 RX ADMIN — OXYCODONE HYDROCHLORIDE 10 MG: 10 TABLET ORAL at 20:35

## 2017-12-07 RX ADMIN — ACETAMINOPHEN 975 MG: 325 TABLET ORAL at 23:57

## 2017-12-07 RX ADMIN — TOBRAMYCIN AND DEXAMETHASONE 1 DROP: 3; 1 SUSPENSION/ DROPS OPHTHALMIC at 18:19

## 2017-12-07 RX ADMIN — HEPARIN SODIUM 5000 UNITS: 5000 INJECTION, SOLUTION INTRAVENOUS; SUBCUTANEOUS at 13:41

## 2017-12-07 RX ADMIN — ACETAMINOPHEN 975 MG: 325 TABLET ORAL at 16:00

## 2017-12-07 RX ADMIN — PIPERACILLIN SODIUM,TAZOBACTAM SODIUM 4.5 G: 4; .5 INJECTION, POWDER, FOR SOLUTION INTRAVENOUS at 11:34

## 2017-12-07 RX ADMIN — OXYCODONE HYDROCHLORIDE 10 MG: 10 TABLET ORAL at 03:03

## 2017-12-07 RX ADMIN — TOBRAMYCIN AND DEXAMETHASONE 1 DROP: 3; 1 SUSPENSION/ DROPS OPHTHALMIC at 23:52

## 2017-12-07 NOTE — PROGRESS NOTES
Progress Note - General Surgery   Stew Coleman 43 y o  male MRN: [id number]  Unit/Bed#: [facility] 820-01 Encounter: [id number]    Assessment:  43y o -year-old male with spontaneous left kidney rupture     - s/p ex lap, left nephrectomy, abthera 11/1 Leo Jacob)  - s/p ex lap, removal of packs, ligation of left ureter, abdominal closure 11/2 Vega Flash)  - s/p right PCN w/ intraabdominal ascites samples 11/9 (IR)  - s/p 11/18 cystoscopy w/ fulguration of ureteral orifice, cystogram with injection of deflux, left retrograde pyelograam 11/18 Ethel Tilley)  - s/p nephrostogram, PCN capping 11/21 (IR)  - s/p IR tube check 11/27 (IR)  - s/p drainage of left upper quadrant fluid collection 11/30 (IR)  -s/p Left CT insertion 12/3 (Rory Welch)     Plan:  - continue reg diet  - d/c CT today  - tobramycin and lubricating eye drops  - continue jiménez for bladder decompression  - discuss w/urology re: repeat imaging  - vanc/zosyn/fluconazole  - SQH/SCDs  - PT/OT      Subjective/Objective   Subjective: Pain stable, controlled with meds  Otherwise worried about CT coming out, if it will be painful  Passing gas     Objective:    Blood pressure 142/83, pulse 93, temperature 98 °F (36 7 °C), temperature source Oral, resp  rate 18, height 5' 6" (1 676 m), weight 79 2 kg (174 lb 9 7 oz), SpO2 95 %  ,Body mass index is 28 18 kg/m²  I/O last 24 hours:   In: 1 [P O :325; IV Piggyback:645]  Out: 7197 [Urine:3525; Drains:415; Chest Tube:130]    Invasive Devices     Drain            Nephrostomy Right 1 10 2 Fr  27 days    Closed/Suction Drain Left Back 8 Fr  6 days    Closed/Suction Drain Left;Lateral Abdomen 8 Fr  6 days    Chest Tube Left 3 days    Urethral Catheter Coude 16 Fr  less than 1 day                Physical Exam:   NAD  Norm resp effort  RRR  Abd soft, NT/ND, THANH x2 serosang  R PCN with clear fluid  CT to H2O seal with no AL    Lab, Imaging and other studies:  Lab Results   Component Value Date    WBC 10 77 (H) 12/07/2017    HGB 8 1 (L) 12/07/2017    HCT 25 1 (L) 12/07/2017    MCV 89 12/07/2017     (H) 12/07/2017      Lab Results   Component Value Date    GLUCOSE 93 12/06/2017    CALCIUM 8 5 12/06/2017     12/06/2017    K 4 0 12/06/2017    CO2 30 12/06/2017     12/06/2017    BUN 10 12/06/2017    CREATININE 1 21 12/06/2017       VTE Pharmacologic Prophylaxis: Heparin  VTE Mechanical Prophylaxis: sequential compression device

## 2017-12-07 NOTE — PROGRESS NOTES
Progress Note - Nancy Okeefe 43 y o  male MRN: [id number]    Unit/Bed#: [facility] 820-01 Encounter: [id number]      Assessment:  Mr Mai Nair is an incarcerated 41-year-old male admitted to Trauma team after suffering left renal fracture status post left nephrectomy  Patient is known to our group for history of bilateral hydronephrosis  Patient developed ureteral leak postoperatively; as well as, refractory right hydronephrosis status post Thakur catheter placement; in addition to right percutaneous nephrostomy as indicated for renal decompression  Creatinine was normalized since nephrostomy placement  Recent nephrogram on November 21st showed positive for distal right ureteral stricture without significant stenosis and therefore percutaneous nephrostomy was clamped for trial   The evening of planned nephrostomy removal, patient complained of abdominal pain and was re-scanned emergently with findings positive for large fluid collections  Now status post IR percutaneous drain placement  Patient was admitted briefly to the ICU with acute kidney injury and hemodynamic instability  Patient is now transferred to the medical surgical unit with multiple drains in place; including  right percutaneous nephrostomy to straight drainage, percutaneous abscess drain x2 and new chest tube for large pleural effusion per SICU  Right nephrostomy output is 550 mL for day shift  Percutaneous drains and Thakur are scant      Plan:  Reviewed complex case with surgical resident and Dr Hermann Bravo ( attending on site today)  Re-exploration would prove to be extremely challenging secondary to multiple open procedures  Deflux instillation has failed once and there is only small probability of success, if repeated  General surgery to contact attending urology , Dr Dipak Hardy for further input        Subjective:   No fever or chills    Objective:     Vitals: Blood pressure 139/71, pulse 77, temperature 97 6 °F (36 4 °C), temperature source Oral, resp  rate 18, height 5' 6" (1 676 m), weight 79 1 kg (174 lb 6 1 oz), SpO2 96 %  ,Body mass index is 28 15 kg/m²  Intake/Output Summary (Last 24 hours) at 12/07/17 1449  Last data filed at 12/07/17 1340   Gross per 24 hour   Intake             1105 ml   Output             2540 ml   Net            -1435 ml       Physical Exam: General appearance: alert, appears stated age, cooperative and no distress  Head: Normocephalic, without obvious abnormality, atraumatic  Neck: no adenopathy, no carotid bruit, no JVD, supple, symmetrical, trachea midline and thyroid not enlarged, symmetric, no tenderness/mass/nodules  Lungs: clear to auscultation bilaterally  Heart: regular rate and rhythm, S1, S2 normal, no murmur, click, rub or gallop  Abdomen: soft, non-tender; bowel sounds normal; no masses,  no organomegaly  Extremities: extremities normal, atraumatic, no cyanosis or edema  Pulses: 2+ and symmetric  Neurologic: Grossly normal  Thakur patent for clear sean urine     Invasive Devices     Peripheral Intravenous Line            Peripheral IV 12/07/17 Right Wrist less than 1 day          Drain            Nephrostomy Right 1 10 2 Fr  28 days    Closed/Suction Drain Left Back 8 Fr  6 days    Closed/Suction Drain Left;Lateral Abdomen 8 Fr  6 days    Chest Tube Left 4 days    Urethral Catheter Coude 16 Fr  less than 1 day              Lab Results   Component Value Date    WBC 10 77 (H) 12/07/2017    HGB 8 1 (L) 12/07/2017    HCT 25 1 (L) 12/07/2017    MCV 89 12/07/2017     (H) 12/07/2017     Lab Results   Component Value Date    GLUCOSE 93 12/06/2017    CALCIUM 8 5 12/06/2017     12/06/2017    K 4 0 12/06/2017    CO2 30 12/06/2017     12/06/2017    BUN 10 12/06/2017    CREATININE 1 21 12/06/2017       Lab, Imaging and other studies: I have personally reviewed pertinent reports

## 2017-12-07 NOTE — PLAN OF CARE
Problem: PHYSICAL THERAPY ADULT  Goal: Performs mobility at highest level of function for planned discharge setting  See evaluation for individualized goals  Treatment/Interventions: Functional transfer training, LE strengthening/ROM, Therapeutic exercise, Endurance training, Equipment eval/education, Bed mobility, Gait training, Spoke to nursing, OT  Equipment Recommended: Winstonila Connor (at this time)       See flowsheet documentation for full assessment, interventions and recommendations  Outcome: Progressing  Prognosis: Good  Problem List: Decreased strength, Impaired balance, Decreased mobility, Pain  Assessment: pt  contniues  to  have  back/flank pain   but is  motivated  to particiapte   and  improve  pt is able to  compelte upright  mob  c superv  he remains rw  reliant   mostly due to pain   pt  does though remains deconditoned   and  will need cont PT intervention   Barriers to Discharge: None     Recommendation:  (return to facility )     PT - OK to Discharge: Yes    See flowsheet documentation for full assessment

## 2017-12-07 NOTE — PHYSICAL THERAPY NOTE
Physical Therapy Progress Note     12/07/17 5527   Pain Assessment   Pain Assessment 0-10   Pain Score 5   Pain Type Acute pain   Pain Location Back   Pain Orientation Left   Hospital Pain Intervention(s) Ambulation/increased activity   Response to Interventions tolerated    Restrictions/Precautions   Weight Bearing Precautions Per Order No   Other Precautions Pain;Multiple lines   General   Chart Reviewed Yes   Response to Previous Treatment Patient with no complaints from previous session  Family/Caregiver Present No   Cognition   Overall Cognitive Status WFL   Arousal/Participation Alert; Cooperative   Orientation Level Oriented X4   Following Commands Follows all commands and directions without difficulty   Subjective   Subjective pt  has  back   pain    Bed Mobility   Supine to Sit 5  Supervision   Additional items Assist x 1;HOB elevated; Bedrails; Increased time required   Sit to Supine 5  Supervision   Additional items Assist x 1; Increased time required;Verbal cues   Transfers   Sit to Stand 5  Supervision   Additional items Assist x 1; Increased time required   Stand to Sit 5  Supervision   Additional items Assist x 1; Increased time required   Ambulation/Elevation   Gait pattern Narrow NANCY; Decreased foot clearance   Gait Assistance 5  Supervision   Additional items Verbal cues   Assistive Device Rolling walker   Distance [id number]   Balance   Static Sitting Normal   Static Standing Fair   Ambulatory Fair -   Endurance Deficit   Endurance Deficit Yes   Endurance Deficit Description pain   Activity Tolerance   Activity Tolerance Patient tolerated treatment well   Nurse Made Aware yes   Exercises   THR Supine;20 reps;AROM; Bilateral   Assessment   Prognosis Good   Problem List Decreased strength; Impaired balance;Decreased mobility;Pain   Assessment pt  contniues  to  have  back/flank pain   but is  motivated  to particiapte   and  improve  pt is able to  compelte upright  mob  c superv    he remains rw  reliant mostly due to pain   pt  does though remains deconditoned   and  will need cont PT intervention    Barriers to Discharge None   Goals   Patient Goals get better   LTG Expiration Date 12/18/17   Treatment Day 9   Plan   Treatment/Interventions Functional transfer training;LE strengthening/ROM; Bed mobility;Gait training;Spoke to nursing   Progress Improving as expected   PT Frequency 5x/wk   Recommendation   Recommendation (return to facility )   Equipment Recommended Rose Marie Fisher   PT - OK to Discharge (when med ready )     Rolanda Smoke, PTA

## 2017-12-07 NOTE — PLAN OF CARE
GASTROINTESTINAL - ADULT     Minimal or absence of nausea and/or vomiting Completed     Maintains or returns to baseline bowel function Completed     Maintains adequate nutritional intake Completed          DISCHARGE PLANNING     Discharge to home or other facility with appropriate resources Progressing        DISCHARGE PLANNING - CARE MANAGEMENT     Discharge to post-acute care or home with appropriate resources Progressing        GENITOURINARY - ADULT     Maintains or returns to baseline urinary function Progressing     Absence of urinary retention Progressing     Urinary catheter remains patent Progressing        INFECTION - ADULT     Absence or prevention of progression during hospitalization Progressing        Knowledge Deficit     Patient/family/caregiver demonstrates understanding of disease process, treatment plan, medications, and discharge instructions Progressing        METABOLIC, FLUID AND ELECTROLYTES - ADULT     Electrolytes maintained within normal limits Progressing     Fluid balance maintained Progressing     Glucose maintained within target range Progressing        PAIN - ADULT     Verbalizes/displays adequate comfort level or baseline comfort level Progressing        Potential for Falls     Patient will remain free of falls Progressing        Prexisting or High Potential for Compromised Skin Integrity     Skin integrity is maintained or improved Progressing        SAFETY ADULT     Patient will remain free of falls Progressing     Maintain or return to baseline ADL function Progressing     Maintain or return mobility status to optimal level Progressing        SKIN/TISSUE INTEGRITY - ADULT     Skin integrity remains intact Progressing     Incision(s), wounds(s) or drain site(s) healing without S/S of infection Progressing     Oral mucous membranes remain intact Progressing

## 2017-12-08 LAB
BASOPHILS # BLD AUTO: 0.02 THOUSANDS/ΜL (ref 0–0.1)
BASOPHILS NFR BLD AUTO: 0 % (ref 0–1)
EOSINOPHIL # BLD AUTO: 0.6 THOUSAND/ΜL (ref 0–0.61)
EOSINOPHIL NFR BLD AUTO: 6 % (ref 0–6)
ERYTHROCYTE [DISTWIDTH] IN BLOOD BY AUTOMATED COUNT: 15.9 % (ref 11.6–15.1)
HCT VFR BLD AUTO: 26.7 % (ref 36.5–49.3)
HGB BLD-MCNC: 8.5 G/DL (ref 12–17)
LYMPHOCYTES # BLD AUTO: 1.78 THOUSANDS/ΜL (ref 0.6–4.47)
LYMPHOCYTES NFR BLD AUTO: 18 % (ref 14–44)
MCH RBC QN AUTO: 28.3 PG (ref 26.8–34.3)
MCHC RBC AUTO-ENTMCNC: 31.8 G/DL (ref 31.4–37.4)
MCV RBC AUTO: 89 FL (ref 82–98)
MONOCYTES # BLD AUTO: 1.03 THOUSAND/ΜL (ref 0.17–1.22)
MONOCYTES NFR BLD AUTO: 10 % (ref 4–12)
NEUTROPHILS # BLD AUTO: 6.7 THOUSANDS/ΜL (ref 1.85–7.62)
NEUTS SEG NFR BLD AUTO: 66 % (ref 43–75)
NRBC BLD AUTO-RTO: 0 /100 WBCS
PLATELET # BLD AUTO: 683 THOUSANDS/UL (ref 149–390)
PMV BLD AUTO: 8.7 FL (ref 8.9–12.7)
RBC # BLD AUTO: 3 MILLION/UL (ref 3.88–5.62)
WBC # BLD AUTO: 10.19 THOUSAND/UL (ref 4.31–10.16)

## 2017-12-08 PROCEDURE — 97116 GAIT TRAINING THERAPY: CPT

## 2017-12-08 PROCEDURE — 97110 THERAPEUTIC EXERCISES: CPT

## 2017-12-08 PROCEDURE — 85025 COMPLETE CBC W/AUTO DIFF WBC: CPT | Performed by: SURGERY

## 2017-12-08 RX ORDER — OXYBUTYNIN CHLORIDE 5 MG/1
5 TABLET ORAL 3 TIMES DAILY
Status: DISCONTINUED | OUTPATIENT
Start: 2017-12-08 | End: 2017-12-18

## 2017-12-08 RX ADMIN — ACETAMINOPHEN 975 MG: 325 TABLET ORAL at 15:59

## 2017-12-08 RX ADMIN — PIPERACILLIN SODIUM,TAZOBACTAM SODIUM 4.5 G: 4; .5 INJECTION, POWDER, FOR SOLUTION INTRAVENOUS at 07:01

## 2017-12-08 RX ADMIN — TAMSULOSIN HYDROCHLORIDE 0.4 MG: 0.4 CAPSULE ORAL at 15:59

## 2017-12-08 RX ADMIN — OXYCODONE HYDROCHLORIDE 10 MG: 10 TABLET ORAL at 14:07

## 2017-12-08 RX ADMIN — VANCOMYCIN HYDROCHLORIDE 1500 MG: 10 INJECTION, POWDER, LYOPHILIZED, FOR SOLUTION INTRAVENOUS at 16:00

## 2017-12-08 RX ADMIN — VANCOMYCIN HYDROCHLORIDE 1500 MG: 10 INJECTION, POWDER, LYOPHILIZED, FOR SOLUTION INTRAVENOUS at 05:00

## 2017-12-08 RX ADMIN — PIPERACILLIN SODIUM,TAZOBACTAM SODIUM 4.5 G: 4; .5 INJECTION, POWDER, FOR SOLUTION INTRAVENOUS at 11:43

## 2017-12-08 RX ADMIN — HEPARIN SODIUM 5000 UNITS: 5000 INJECTION, SOLUTION INTRAVENOUS; SUBCUTANEOUS at 21:45

## 2017-12-08 RX ADMIN — TOBRAMYCIN AND DEXAMETHASONE 1 DROP: 3; 1 SUSPENSION/ DROPS OPHTHALMIC at 06:01

## 2017-12-08 RX ADMIN — TOBRAMYCIN AND DEXAMETHASONE 1 DROP: 3; 1 SUSPENSION/ DROPS OPHTHALMIC at 18:18

## 2017-12-08 RX ADMIN — ACETAMINOPHEN 975 MG: 325 TABLET ORAL at 08:05

## 2017-12-08 RX ADMIN — OXYBUTYNIN CHLORIDE 5 MG: 5 TABLET ORAL at 21:45

## 2017-12-08 RX ADMIN — HEPARIN SODIUM 5000 UNITS: 5000 INJECTION, SOLUTION INTRAVENOUS; SUBCUTANEOUS at 06:00

## 2017-12-08 RX ADMIN — TOBRAMYCIN AND DEXAMETHASONE 1 DROP: 3; 1 SUSPENSION/ DROPS OPHTHALMIC at 11:43

## 2017-12-08 RX ADMIN — Medication 150 MG: at 08:05

## 2017-12-08 RX ADMIN — PIPERACILLIN SODIUM,TAZOBACTAM SODIUM 4.5 G: 4; .5 INJECTION, POWDER, FOR SOLUTION INTRAVENOUS at 18:56

## 2017-12-08 RX ADMIN — OXYCODONE HYDROCHLORIDE 10 MG: 10 TABLET ORAL at 08:05

## 2017-12-08 RX ADMIN — OXYBUTYNIN CHLORIDE 5 MG: 5 TABLET ORAL at 18:18

## 2017-12-08 RX ADMIN — OXYCODONE HYDROCHLORIDE 10 MG: 10 TABLET ORAL at 01:37

## 2017-12-08 RX ADMIN — HEPARIN SODIUM 5000 UNITS: 5000 INJECTION, SOLUTION INTRAVENOUS; SUBCUTANEOUS at 14:07

## 2017-12-08 NOTE — PLAN OF CARE
DISCHARGE PLANNING     Discharge to home or other facility with appropriate resources Progressing        DISCHARGE PLANNING - CARE MANAGEMENT     Discharge to post-acute care or home with appropriate resources Progressing        GENITOURINARY - ADULT     Maintains or returns to baseline urinary function Progressing     Absence of urinary retention Progressing     Urinary catheter remains patent Progressing        INFECTION - ADULT     Absence or prevention of progression during hospitalization Progressing        Knowledge Deficit     Patient/family/caregiver demonstrates understanding of disease process, treatment plan, medications, and discharge instructions Progressing        METABOLIC, FLUID AND ELECTROLYTES - ADULT     Electrolytes maintained within normal limits Progressing     Fluid balance maintained Progressing     Glucose maintained within target range Progressing        PAIN - ADULT     Verbalizes/displays adequate comfort level or baseline comfort level Progressing        Potential for Falls     Patient will remain free of falls Progressing        Prexisting or High Potential for Compromised Skin Integrity     Skin integrity is maintained or improved Progressing        SAFETY ADULT     Patient will remain free of falls Progressing     Maintain or return to baseline ADL function Progressing     Maintain or return mobility status to optimal level Progressing        SKIN/TISSUE INTEGRITY - ADULT     Skin integrity remains intact Progressing     Incision(s), wounds(s) or drain site(s) healing without S/S of infection Progressing     Oral mucous membranes remain intact Progressing

## 2017-12-08 NOTE — PHYSICAL THERAPY NOTE
Physical Therapy Progress Note   12/08/17 1602   Pain Assessment   Pain Assessment 0-10   Pain Score 5   Pain Type Acute pain   Pain Location Abdomen   Pain Orientation Left   Pain Descriptors Aching;Dull   Pain Frequency Constant/continuous   Pain Onset Ongoing   Clinical Progression Gradually improving   Effect of Pain on Daily Activities SLOW RATE OF GAIT  Patient's Stated Pain Goal No pain   Hospital Pain Intervention(s) Ambulation/increased activity;Repositioned   Response to Interventions AGREEABLE TO MOBILIZE WITH MILD COAXING  Multiple Pain Sites No   Pain Rating: FLACC (Rest) - Face 1   Pain Rating: FLACC (Rest) - Legs 0   Pain Rating: FLACC (Rest) - Activity 0   Pain Rating: FLACC (Rest) - Cry 1   Pain Rating: FLACC (Rest) - Consolability 0   Score: FLACC (Rest) 2   Pain Rating: FLACC (Activity) - Face 1   Pain Rating: FLACC (Activity) - Legs 0   Pain Rating: FLACC (Activity) - Activity 0   Pain Rating: FLACC (Activity) - Cry 1   Pain Rating: FLACC (Activity) - Consolability 0   Score: FLACC (Activity) 2   Restrictions/Precautions   Weight Bearing Precautions Per Order No   Other Precautions Pain;Multiple lines  (CHEST TUBE TO WATER SEAL )   General   Chart Reviewed Yes   Response to Previous Treatment Patient with no complaints from previous session  Family/Caregiver Present No   Cognition   Overall Cognitive Status WFL   Arousal/Participation Alert; Cooperative   Attention Attends with cues to redirect   Orientation Level Oriented X4   Memory Within functional limits   Following Commands Follows all commands and directions without difficulty   Subjective   Subjective THE PT  REPORTS ONGOING ABDOMINAL DISCOMFORT BUT WILLING TO MOBILIZE  APPRECIATIVE OF THERAPIST CHECKING BACK  Bed Mobility   Supine to Sit 5  Supervision   Additional items Assist x 1;HOB elevated; Increased time required;Verbal cues   Sit to Supine 5  Supervision   Additional items Assist x 1; Increased time required;Verbal cues;LE management   Transfers   Sit to Stand 5  Supervision   Additional items Assist x 1; Increased time required   Stand to Sit 5  Supervision   Additional items Assist x 1; Increased time required   Ambulation/Elevation   Gait pattern Antalgic; Forward Flexion;Narrow NANCY   Gait Assistance 5  Supervision   Additional items Assist x 1;Verbal cues; Tactile cues  (POSTURE/PACING)   Assistive Device Rolling walker   Distance 180'X2   (STANDING REST WITH COMPLETION OF LOOP ON FLOOR )   Stair Management Assistance Not tested   Balance   Static Sitting Normal   Static Standing Fair   Ambulatory Fair -   Endurance Deficit   Endurance Deficit Yes   Endurance Deficit Description DECONDITIONING RELATED TO PAIN  Activity Tolerance   Activity Tolerance Patient limited by pain   Nurse Made Aware SPOKE WITH [place]   Exercises   TKR Sitting;10 reps;AROM; Bilateral   Assessment   Prognosis Good   Problem List Decreased endurance;Decreased mobility;Pain;Decreased strength   Assessment PRESENTLY THE PT  DEMONSTRATES ONGOING DECONDITIONING RELATED TO MEDICAL COURSE/PAIN  NO OVERT LOSS OF BALANCE NOTED ON LEVEL SURFACES  ANTICIPATE RETURN TO FACILITY UPON D/C  Barriers to Discharge None   Goals   Patient Goals TO FEEL BETTER   LTG Expiration Date 12/18/17   Treatment Day 10   Plan   Treatment/Interventions Functional transfer training;LE strengthening/ROM; Therapeutic exercise; Endurance training;Bed mobility; Compensatory technique education;Gait training   Progress Improving as expected   PT Frequency 5x/wk   Recommendation   Recommendation (RETURN TO FACILITY)   Equipment Recommended Jade WRIGHT)   PT - OK to Discharge Yes  (ONCE MEDICALLY CLEARED )     Karlie Aparicio PTA

## 2017-12-08 NOTE — PROGRESS NOTES
Spoke with Dr Dominga Hill In regards to patients complaints of bladder spasms, she will reorder oxybutynin

## 2017-12-08 NOTE — CASE MANAGEMENT
[address] [place] [address] in the [facility] by Parrish Barry for 2017  Network Utilization Review Department  Phone: [phone]; Fax [phone]  ATTENTION: The Network Utilization Review Department is now centralized for our 7 Facilities  Make a note that we have a new phone and fax numbers for our Department  Please call with any questions or concerns to [phone] and carefully follow the prompts so that you are directed to the right person  All voicemails are confidential  Fax any determinations, approvals, denials, and requests for initial or continue stay review clinical to [phone]  Due to HIGH CALL volume, it would be easier if you could please send faxed requests to expedite your requests and in part, help us provide discharge notifications faster      Continued Stay Review    Date/POD#: 12/8/17    Vital Signs: /86   Pulse 87   Temp 98 5 °F (36 9 °C) (Oral)   Resp 16   Ht 5' 6" (1 676 m)   Wt 79 kg (174 lb 2 6 oz)   SpO2 97%   BMI 28 11 kg/m²     Medication:   Scheduled Meds:   acetaminophen 975 mg Oral Q8H   heparin (porcine) 5,000 Units Subcutaneous Q8H [address]   iron polysaccharides 150 mg Oral Daily   piperacillin-tazobactam 4 5 g Intravenous Q6H   polyethylene glycol 17 g Oral Daily   senna-docusate sodium 1 tablet Oral HS   tamsulosin 0 4 mg Oral Daily With Dinner   tobramycin-dexamethasone 1 drop Ophthalmic Q6H [address]   vancomycin 17 5 mg/kg Intravenous Q12H     PRN Meds: HYDROmorphone    influenza vaccine    oxyCODONE x2    polyvinyl alcohol    Abnormal Labs/Diagnostic Results:   WBC 4 31 - 10 16 Thousand/uL 10 19     RBC 3 88 - 5 62 Million/uL 3 00     Hemoglobin 12 0 - 17 0 g/dL 8 5     Hematocrit 36 5 - 49 3 % 26 7     RDW 11 6 - 15 1 % 15 9     MPV 8 9 - 12 7 fL 8 7     Platelets 069 - 384 Thousands/uL 683       Age/Sex: 43 y o  male     Assessment/Plan:     43y o -year-old male with spontaneous left kidney rupture     - s/p ex lap, left nephrectomy, abthera 11/1 Karen Fung)  - s/p ex lap, removal of packs, ligation of left ureter, abdominal closure 11/2 Vladislav Richards)  - s/p right PCN w/ intraabdominal ascites samples 11/9 (IR)  - s/p 11/18 cystoscopy w/ fulguration of ureteral orifice, cystogram with injection of deflux, left retrograde pyelograam 11/18 Nuria Eid)  - s/p nephrostogram, PCN capping 11/21 (IR)  - s/p IR tube check 11/27 (IR)  - s/p drainage of left upper quadrant fluid collection 11/30 (IR)  -s/p Left CT insertion 12/3 ([facility])      Plan:  - d/c chest tube today  - continue jiménez  - diet as tolerated  - continue antibiotics  - analgesia  - DVT ppx  12/8 Attending physician statement  - Discontinue thoracostomy tube today  Decreased THANH drainage (ureteral stump disruption) with jiménez indwelling and open nephrostomy  Continue jiménez to gravity and track THANH drainage - drainage decreasing with collecting system decompressed   Need to discuss with Urology Service plans for long-term management      Discharge Plan: TBD in coordination with P

## 2017-12-08 NOTE — PLAN OF CARE
Problem: PHYSICAL THERAPY ADULT  Goal: Performs mobility at highest level of function for planned discharge setting  See evaluation for individualized goals  Treatment/Interventions: Functional transfer training, LE strengthening/ROM, Therapeutic exercise, Endurance training, Equipment eval/education, Bed mobility, Gait training, Spoke to nursing, OT  Equipment Recommended: Sherry Bautista (at this time)       See flowsheet documentation for full assessment, interventions and recommendations  Outcome: Progressing  Prognosis: Good  Problem List: Decreased endurance, Decreased mobility, Pain, Decreased strength  Assessment: PRESENTLY THE PT  DEMONSTRATES ONGOING DECONDITIONING RELATED TO MEDICAL COURSE/PAIN  NO OVERT LOSS OF BALANCE NOTED ON LEVEL SURFACES  ANTICIPATE RETURN TO FACILITY UPON D/C  Barriers to Discharge: None     Recommendation:  (RETURN TO FACILITY)     PT - OK to Discharge: (S) Yes (ONCE MEDICALLY CLEARED )    See flowsheet documentation for full assessment     Nano Shukla PTA

## 2017-12-08 NOTE — PROGRESS NOTES
Progress Note - General Surgery   Deneen Holguin 43 y o  male MRN: [id number]  Unit/Bed#: [facility] 820-01 Encounter: [id number]    Assessment:  43y o -year-old male with spontaneous left kidney rupture     - s/p ex lap, left nephrectomy, abthera 11/1 Gearld Officer)  - s/p ex lap, removal of packs, ligation of left ureter, abdominal closure 11/2 Denzel Bennett)  - s/p right PCN w/ intraabdominal ascites samples 11/9 (IR)  - s/p 11/18 cystoscopy w/ fulguration of ureteral orifice, cystogram with injection of deflux, left retrograde pyelograam 11/18 Romelia Passey)  - s/p nephrostogram, PCN capping 11/21 (IR)  - s/p IR tube check 11/27 (IR)  - s/p drainage of left upper quadrant fluid collection 11/30 (IR)  -s/p Left CT insertion 12/3 (Rustam Sesay)     Plan:  - d/c chest tube today  - continue jiménez  - diet as tolerated  - continue antibiotics  - analgesia  - DVT ppx    Subjective/Objective     Subjective: Patient reports left chest pain  No abdominal pain  Objective:     Vitals: Blood pressure 146/86, pulse 89, temperature 98 8 °F (37 1 °C), temperature source Oral, resp  rate 18, height 5' 6" (1 676 m), weight 79 kg (174 lb 2 6 oz), SpO2 97 %  ,Body mass index is 28 11 kg/m²  I/O       12/06 0701 - 12/07 0700 12/07 0701 - 12/08 0700    P  O  325 596    IV Piggyback 645 140    Total Intake(mL/kg) 970 (12 3) 736 (9 3)    Urine (mL/kg/hr) 2650 (1 4) 3500 (1 8)    Drains 340 (0 2) 50 (0)    Stool  0 (0)    Chest Tube 60 (0) 150 (0 1)    Total Output 3050 3700    Net -2080 -2964          Unmeasured Stool Occurrence  2 x          Physical Exam:  GEN: NAD  HEENT: MMM  CV: RRR  Lung: Normal effort  Ab: Soft, NT/ND  Extrem: No CCE  Neuro:  A+Ox3    Lab, Imaging and other studies: CBC with diff: No results found for: WBC, HGB, HCT, MCV, PLT, ADJUSTEDWBC, MCH, MCHC, RDW, MPV, NRBC, BMP/CMP: No results found for: NA, K, CL, CO2, ANIONGAP, BUN, CREATININE, GLUCOSE, CALCIUM, AST, ALT, ALKPHOS, PROT, ALBUMIN, BILITOT, EGFR, Magnesium: No components found for: MAG  VTE Pharmacologic Prophylaxis: Heparin  VTE Mechanical Prophylaxis: sequential compression device

## 2017-12-09 ENCOUNTER — GENERIC CONVERSION - ENCOUNTER (OUTPATIENT)
Dept: SURGERY | Facility: CLINIC | Age: 42
End: 2017-12-09

## 2017-12-09 RX ADMIN — OXYBUTYNIN CHLORIDE 5 MG: 5 TABLET ORAL at 09:04

## 2017-12-09 RX ADMIN — ACETAMINOPHEN 975 MG: 325 TABLET ORAL at 15:57

## 2017-12-09 RX ADMIN — OXYBUTYNIN CHLORIDE 5 MG: 5 TABLET ORAL at 21:16

## 2017-12-09 RX ADMIN — PIPERACILLIN SODIUM,TAZOBACTAM SODIUM 4.5 G: 4; .5 INJECTION, POWDER, FOR SOLUTION INTRAVENOUS at 00:28

## 2017-12-09 RX ADMIN — TOBRAMYCIN AND DEXAMETHASONE 1 DROP: 3; 1 SUSPENSION/ DROPS OPHTHALMIC at 06:50

## 2017-12-09 RX ADMIN — ACETAMINOPHEN 975 MG: 325 TABLET ORAL at 23:54

## 2017-12-09 RX ADMIN — HEPARIN SODIUM 5000 UNITS: 5000 INJECTION, SOLUTION INTRAVENOUS; SUBCUTANEOUS at 13:55

## 2017-12-09 RX ADMIN — OXYCODONE HYDROCHLORIDE 10 MG: 10 TABLET ORAL at 00:37

## 2017-12-09 RX ADMIN — TOBRAMYCIN AND DEXAMETHASONE 1 DROP: 3; 1 SUSPENSION/ DROPS OPHTHALMIC at 23:53

## 2017-12-09 RX ADMIN — OXYBUTYNIN CHLORIDE 5 MG: 5 TABLET ORAL at 15:57

## 2017-12-09 RX ADMIN — PIPERACILLIN SODIUM,TAZOBACTAM SODIUM 4.5 G: 4; .5 INJECTION, POWDER, FOR SOLUTION INTRAVENOUS at 06:50

## 2017-12-09 RX ADMIN — TOBRAMYCIN AND DEXAMETHASONE 1 DROP: 3; 1 SUSPENSION/ DROPS OPHTHALMIC at 11:54

## 2017-12-09 RX ADMIN — HEPARIN SODIUM 5000 UNITS: 5000 INJECTION, SOLUTION INTRAVENOUS; SUBCUTANEOUS at 21:17

## 2017-12-09 RX ADMIN — HYDROMORPHONE HYDROCHLORIDE 1 MG: 1 INJECTION, SOLUTION INTRAMUSCULAR; INTRAVENOUS; SUBCUTANEOUS at 03:42

## 2017-12-09 RX ADMIN — OXYCODONE HYDROCHLORIDE 10 MG: 10 TABLET ORAL at 23:53

## 2017-12-09 RX ADMIN — ACETAMINOPHEN 975 MG: 325 TABLET ORAL at 07:30

## 2017-12-09 RX ADMIN — TOBRAMYCIN AND DEXAMETHASONE 1 DROP: 3; 1 SUSPENSION/ DROPS OPHTHALMIC at 00:28

## 2017-12-09 RX ADMIN — HEPARIN SODIUM 5000 UNITS: 5000 INJECTION, SOLUTION INTRAVENOUS; SUBCUTANEOUS at 06:50

## 2017-12-09 RX ADMIN — Medication 150 MG: at 09:04

## 2017-12-09 RX ADMIN — TAMSULOSIN HYDROCHLORIDE 0.4 MG: 0.4 CAPSULE ORAL at 15:58

## 2017-12-09 RX ADMIN — PIPERACILLIN SODIUM,TAZOBACTAM SODIUM 4.5 G: 4; .5 INJECTION, POWDER, FOR SOLUTION INTRAVENOUS at 11:54

## 2017-12-09 RX ADMIN — VANCOMYCIN HYDROCHLORIDE 1500 MG: 10 INJECTION, POWDER, LYOPHILIZED, FOR SOLUTION INTRAVENOUS at 03:41

## 2017-12-09 RX ADMIN — TOBRAMYCIN AND DEXAMETHASONE 1 DROP: 3; 1 SUSPENSION/ DROPS OPHTHALMIC at 18:12

## 2017-12-09 NOTE — PROGRESS NOTES
Mr Selvin Mak is a 80-year-old male known to Dr Erik Song from the outpatient setting  He apparently has a history of bilateral hydronephrosis and bilateral vesicoureteral reflux  He was last seen in the outpatient setting in April 2016  Since that time he underwent a damage control traumatic left nephrectomy  He return to the operating room with the left ureter was reportedly ligated  Postprocedure he developed urinary ascites requiring drainage  Cystogram was consistent with reflux  He was taken to the operating room by Dr Molly Joseph recently at the end of Nov where he underwent injection of Deflux into the left siva ureteral orifice  He also underwent fulguration of his left ureter at that time  After the procedure the patient did not appear to reflux up the left ureter  The patient also has a right nephrostomy tube in place at this time  When his Thakur catheter was removed and the right nephrostomy tube was clamped, the patient appeared to leak more urine into his abdomen and subsequently underwent placement of intra-abdominal drained by interventional Radiology  His last CT scan was on November 30, 2017  I would consider repeating a CT scan  If there has been interval resolution of the intra-abdominal collections, I would consider another voiding trial with the right nephrostomy tube clamped  I would then maintain the intra-abdominal drains in place in continue to follow their output  If output picks up from the intra-abdominal drains with an elevated creatinine indicating persistent reflux and extravasation of urine from the left ureteral stump, I would then consider preperitoneal exploration with transvesical closure of the left ureteral orifice

## 2017-12-09 NOTE — PROGRESS NOTES
Progress Note - General Surgery   Mattie James 43 y o  male MRN: [id number]  Unit/Bed#: [facility] 820-01 Encounter: [id number]    Assessment:  43y o -year-old male with spontaneous left kidney rupture     - s/p ex lap, left nephrectomy, abthera 11/1 Bree Brenner)  - s/p ex lap, removal of packs, ligation of left ureter, abdominal closure 11/2 Jerjayna Cano)  - s/p right PCN w/ intraabdominal ascites samples 11/9 (IR)  - s/p 11/18 cystoscopy w/ fulguration of ureteral orifice, cystogram with injection of deflux, left retrograde pyelograam 11/18 Harsh Gage)  - s/p nephrostogram, PCN capping 11/21 (IR)  - s/p IR tube check 11/27 (IR)  - s/p drainage of left upper quadrant fluid collection 11/30 (IR)  -s/p Left CT insertion 12/3 ([facility])     Plan:  - continue reg diet  - likely d/c CT today  - tobramycin and lubricating eye drops for conjuctivitis  - continue jiménez for bladder decompression  - oxybutynin for bladder spasms  - discuss w/urology and attending re: repeat imaging  - vanc/zosyn/fluconazole  - SQH/SCDs  - PT/OT      Subjective/Objective   Subjective: passing flatus  Oxybutynin helping with pain related to bladder spasms, tolerating normal diet    Objective:    Blood pressure 107/57, pulse 80, temperature 98 4 °F (36 9 °C), temperature source Oral, resp  rate 18, height 5' 6" (1 676 m), weight 67 1 kg (147 lb 14 9 oz), SpO2 100 %  ,Body mass index is 23 88 kg/m²  I/O last 24 hours:   In: 471 [P O :686]  Out: 6476 [Urine:5475; Drains:50; Chest Tube:213]    Invasive Devices     Peripheral Intravenous Line            Peripheral IV 12/08/17 Left Wrist less than 1 day          Drain            Nephrostomy Right 1 10 2 Fr  29 days    Closed/Suction Drain Left Back 8 Fr  8 days    Closed/Suction Drain Left;Lateral Abdomen 8 Fr  8 days    Chest Tube Left 5 days    Urethral Catheter Coude 16 Fr  2 days                Physical Exam:   NAD  Norm resp effort  RRR  Abd soft, min tender in LLQ, ND, THANH x2 serosang  R PCN with clear pale yellow fluid  Thakur in place currently with no drainage in bag  CT to H2O seal with no AL    Lab, Imaging and other studies:  Lab Results   Component Value Date    WBC 10 19 (H) 12/08/2017    HGB 8 5 (L) 12/08/2017    HCT 26 7 (L) 12/08/2017    MCV 89 12/08/2017     (H) 12/08/2017      Lab Results   Component Value Date    GLUCOSE 93 12/06/2017    CALCIUM 8 5 12/06/2017     12/06/2017    K 4 0 12/06/2017    CO2 30 12/06/2017     12/06/2017    BUN 10 12/06/2017    CREATININE 1 21 12/06/2017       VTE Pharmacologic Prophylaxis: Heparin  VTE Mechanical Prophylaxis: sequential compression device

## 2017-12-10 ENCOUNTER — APPOINTMENT (INPATIENT)
Dept: RADIOLOGY | Facility: HOSPITAL | Age: 42
DRG: 659 | End: 2017-12-10
Payer: COMMERCIAL

## 2017-12-10 LAB
ABO GROUP BLD: NORMAL
ANION GAP SERPL CALCULATED.3IONS-SCNC: 7 MMOL/L (ref 4–13)
BLD GP AB SCN SERPL QL: NEGATIVE
BUN SERPL-MCNC: 12 MG/DL (ref 5–25)
CALCIUM SERPL-MCNC: 9.4 MG/DL (ref 8.3–10.1)
CHLORIDE SERPL-SCNC: 105 MMOL/L (ref 100–108)
CO2 SERPL-SCNC: 25 MMOL/L (ref 21–32)
CREAT SERPL-MCNC: 1.25 MG/DL (ref 0.6–1.3)
GFR SERPL CREATININE-BSD FRML MDRD: 71 ML/MIN/1.73SQ M
GLUCOSE SERPL-MCNC: 86 MG/DL (ref 65–140)
HCT VFR BLD AUTO: 28.6 % (ref 36.5–49.3)
HGB BLD-MCNC: 9.2 G/DL (ref 12–17)
LACTATE SERPL-SCNC: 1.4 MMOL/L (ref 0.5–2)
POTASSIUM SERPL-SCNC: 3.9 MMOL/L (ref 3.5–5.3)
RH BLD: POSITIVE
SODIUM SERPL-SCNC: 137 MMOL/L (ref 136–145)
SPECIMEN EXPIRATION DATE: NORMAL

## 2017-12-10 PROCEDURE — 04L43DZ OCCLUSION OF SPLENIC ARTERY WITH INTRALUMINAL DEVICE, PERCUTANEOUS APPROACH: ICD-10-PCS | Performed by: RADIOLOGY

## 2017-12-10 PROCEDURE — 77001 FLUOROGUIDE FOR VEIN DEVICE: CPT

## 2017-12-10 PROCEDURE — 37244 VASC EMBOLIZE/OCCLUDE BLEED: CPT

## 2017-12-10 PROCEDURE — 80048 BASIC METABOLIC PNL TOTAL CA: CPT | Performed by: SURGERY

## 2017-12-10 PROCEDURE — C1769 GUIDE WIRE: HCPCS

## 2017-12-10 PROCEDURE — C1751 CATH, INF, PER/CENT/MIDLINE: HCPCS

## 2017-12-10 PROCEDURE — 36246 INS CATH ABD/L-EXT ART 2ND: CPT

## 2017-12-10 PROCEDURE — 76937 US GUIDE VASCULAR ACCESS: CPT

## 2017-12-10 PROCEDURE — 74177 CT ABD & PELVIS W/CONTRAST: CPT

## 2017-12-10 PROCEDURE — 86900 BLOOD TYPING SEROLOGIC ABO: CPT | Performed by: EMERGENCY MEDICINE

## 2017-12-10 PROCEDURE — 75726 ARTERY X-RAYS ABDOMEN: CPT

## 2017-12-10 PROCEDURE — C1894 INTRO/SHEATH, NON-LASER: HCPCS

## 2017-12-10 PROCEDURE — 85018 HEMOGLOBIN: CPT | Performed by: EMERGENCY MEDICINE

## 2017-12-10 PROCEDURE — 85014 HEMATOCRIT: CPT | Performed by: EMERGENCY MEDICINE

## 2017-12-10 PROCEDURE — 86901 BLOOD TYPING SEROLOGIC RH(D): CPT | Performed by: EMERGENCY MEDICINE

## 2017-12-10 PROCEDURE — 71260 CT THORAX DX C+: CPT

## 2017-12-10 PROCEDURE — 99152 MOD SED SAME PHYS/QHP 5/>YRS: CPT

## 2017-12-10 PROCEDURE — C1887 CATHETER, GUIDING: HCPCS

## 2017-12-10 PROCEDURE — 99153 MOD SED SAME PHYS/QHP EA: CPT

## 2017-12-10 PROCEDURE — 86850 RBC ANTIBODY SCREEN: CPT | Performed by: EMERGENCY MEDICINE

## 2017-12-10 PROCEDURE — 36556 INSERT NON-TUNNEL CV CATH: CPT

## 2017-12-10 PROCEDURE — 83605 ASSAY OF LACTIC ACID: CPT | Performed by: SURGERY

## 2017-12-10 PROCEDURE — C1760 CLOSURE DEV, VASC: HCPCS

## 2017-12-10 RX ORDER — SODIUM CHLORIDE 9 MG/ML
84 INJECTION, SOLUTION INTRAVENOUS CONTINUOUS
Status: DISCONTINUED | OUTPATIENT
Start: 2017-12-10 | End: 2017-12-11

## 2017-12-10 RX ORDER — FENTANYL CITRATE 50 UG/ML
INJECTION, SOLUTION INTRAMUSCULAR; INTRAVENOUS CODE/TRAUMA/SEDATION MEDICATION
Status: COMPLETED | OUTPATIENT
Start: 2017-12-10 | End: 2017-12-10

## 2017-12-10 RX ORDER — MIDAZOLAM HYDROCHLORIDE 1 MG/ML
INJECTION INTRAMUSCULAR; INTRAVENOUS CODE/TRAUMA/SEDATION MEDICATION
Status: COMPLETED | OUTPATIENT
Start: 2017-12-10 | End: 2017-12-10

## 2017-12-10 RX ORDER — CEFAZOLIN SODIUM 1 G/3ML
INJECTION, POWDER, FOR SOLUTION INTRAMUSCULAR; INTRAVENOUS CODE/TRAUMA/SEDATION MEDICATION
Status: COMPLETED | OUTPATIENT
Start: 2017-12-10 | End: 2017-12-10

## 2017-12-10 RX ADMIN — IODIXANOL 30 ML: 320 INJECTION, SOLUTION INTRAVASCULAR at 20:38

## 2017-12-10 RX ADMIN — HEPARIN SODIUM 5000 UNITS: 5000 INJECTION, SOLUTION INTRAVENOUS; SUBCUTANEOUS at 05:32

## 2017-12-10 RX ADMIN — ACETAMINOPHEN 975 MG: 325 TABLET ORAL at 09:44

## 2017-12-10 RX ADMIN — TOBRAMYCIN AND DEXAMETHASONE 1 DROP: 3; 1 SUSPENSION/ DROPS OPHTHALMIC at 05:32

## 2017-12-10 RX ADMIN — FENTANYL CITRATE 50 MCG: 50 INJECTION INTRAMUSCULAR; INTRAVENOUS at 17:14

## 2017-12-10 RX ADMIN — OXYBUTYNIN CHLORIDE 5 MG: 5 TABLET ORAL at 21:40

## 2017-12-10 RX ADMIN — ACETAMINOPHEN 975 MG: 325 TABLET ORAL at 23:35

## 2017-12-10 RX ADMIN — OXYCODONE HYDROCHLORIDE 5 MG: 5 TABLET ORAL at 19:46

## 2017-12-10 RX ADMIN — FENTANYL CITRATE 50 MCG: 50 INJECTION INTRAMUSCULAR; INTRAVENOUS at 16:15

## 2017-12-10 RX ADMIN — MIDAZOLAM 0.5 MG: 1 INJECTION INTRAMUSCULAR; INTRAVENOUS at 17:25

## 2017-12-10 RX ADMIN — IODIXANOL 100 ML: 320 INJECTION, SOLUTION INTRAVASCULAR at 08:05

## 2017-12-10 RX ADMIN — Medication 150 MG: at 09:45

## 2017-12-10 RX ADMIN — TOBRAMYCIN AND DEXAMETHASONE 1 DROP: 3; 1 SUSPENSION/ DROPS OPHTHALMIC at 23:36

## 2017-12-10 RX ADMIN — HEPARIN SODIUM 5000 UNITS: 5000 INJECTION, SOLUTION INTRAVENOUS; SUBCUTANEOUS at 21:39

## 2017-12-10 RX ADMIN — SODIUM CHLORIDE 84 ML/HR: 0.9 INJECTION, SOLUTION INTRAVENOUS at 18:26

## 2017-12-10 RX ADMIN — CEFAZOLIN 1000 MG: 1 INJECTION, POWDER, FOR SOLUTION INTRAVENOUS at 16:35

## 2017-12-10 RX ADMIN — FENTANYL CITRATE 50 MCG: 50 INJECTION INTRAMUSCULAR; INTRAVENOUS at 16:42

## 2017-12-10 RX ADMIN — FENTANYL CITRATE 50 MCG: 50 INJECTION INTRAMUSCULAR; INTRAVENOUS at 17:25

## 2017-12-10 RX ADMIN — OXYBUTYNIN CHLORIDE 5 MG: 5 TABLET ORAL at 09:45

## 2017-12-10 RX ADMIN — TOBRAMYCIN AND DEXAMETHASONE 1 DROP: 3; 1 SUSPENSION/ DROPS OPHTHALMIC at 11:27

## 2017-12-10 RX ADMIN — MIDAZOLAM 1 MG: 1 INJECTION INTRAMUSCULAR; INTRAVENOUS at 16:42

## 2017-12-10 RX ADMIN — HEPARIN SODIUM 5000 UNITS: 5000 INJECTION, SOLUTION INTRAVENOUS; SUBCUTANEOUS at 14:27

## 2017-12-10 RX ADMIN — MIDAZOLAM 1 MG: 1 INJECTION INTRAMUSCULAR; INTRAVENOUS at 16:15

## 2017-12-10 NOTE — PROGRESS NOTES
Red sx up and states pt has to go to IR immediantly and then go to the unit  States he has a large bleed around spleen that could rupture at any minute  Asked staff to place #16 iv's and draw blood  Able to get #18 in left ac and draw some of labs off of site  Then transported pt in bed to IR

## 2017-12-10 NOTE — PROGRESS NOTES
Progress Note - General Surgery   Deneen Holguin 43 y o  male MRN: [id number]  Unit/Bed#: [facility] 820-01 Encounter: [id number]    Assessment:  43y o -year-old male with spontaneous left kidney rupture     - s/p ex lap, left nephrectomy, abthera 11/1 Gearld Officer)  - s/p ex lap, removal of packs, ligation of left ureter, abdominal closure 11/2 Denzel Bennett)  - s/p right PCN w/ intraabdominal ascites samples 11/9 (IR)  - s/p 11/18 cystoscopy w/ fulguration of ureteral orifice, cystogram with injection of deflux, left retrograde pyelograam 11/18 Romelia Passey)  - s/p nephrostogram, PCN capping 11/21 (IR)  - s/p IR tube check 11/27 (IR)  - s/p drainage of left upper quadrant fluid collection 11/30 (IR)  -s/p Left CT insertion 12/3 (Rustam Sesay)     Plan:  - diet as tolerated  - f/u CT CAP today  - likely d/c chest tube following imaging  - if fluid collections improved, plan to cap nephrostomy and d/c jiménez  - if fluid collections persistent or worsen, will follow up with urology for surgical intervention  - OOB, ambulate  - DVT ppx    Subjective/Objective     Subjective: Patient complains of L chest pain  No SOB      Objective:     Vitals: Blood pressure 123/72, pulse 82, temperature 98 1 °F (36 7 °C), temperature source Oral, resp  rate 16, height 5' 6" (1 676 m), weight 67 kg (147 lb 11 3 oz), SpO2 97 %  ,Body mass index is 23 84 kg/m²  I/O       12/08 0701 - 12/09 0700 12/09 0701 - 12/10 0700 12/10 0701 - 12/11 0700    P  O  686 530     IV Piggyback       Total Intake(mL/kg) 686 (10 2) 530 (7 9)     Urine (mL/kg/hr) 2575 (1 6) 2375 (1 5)     Drains  100 (0 1)     Stool  0 (0)     Chest Tube 163 (0 1) 123 (0 1)     Total Output 2738 2598      Net -2052 -2068             Unmeasured Stool Occurrence  1 x           Physical Exam:  GEN: NAD  HEENT: MMM  CV: RRR  Lung: Normal effort  Ab: Soft, NT/ND, L drains serosang  Extrem: No CCE  Neuro:  A+Ox3    Lab, Imaging and other studies:   CBC with diff: No results found for: WBC, HGB, HCT, MCV, PLT, ADJUSTEDWBC, MCH, MCHC, RDW, MPV, NRBC, BMP/CMP:   Lab Results   Component Value Date     12/10/2017    K 3 9 12/10/2017     12/10/2017    CO2 25 12/10/2017    ANIONGAP 7 12/10/2017    BUN 12 12/10/2017    CREATININE 1 25 12/10/2017    GLUCOSE 86 12/10/2017    CALCIUM 9 4 12/10/2017    EGFR 71 12/10/2017   , Magnesium: No components found for: MAG  VTE Pharmacologic Prophylaxis: Heparin  VTE Mechanical Prophylaxis: sequential compression device

## 2017-12-10 NOTE — MEDICAL STUDENT
Progress Note - General Surgery   Angelito Salinas 43 y o  male MRN: [id number]  Unit/Bed#: [facility] 820-01 Encounter: [id number]    Assessment:  42 yo male s/p ex lap, left nephrectomy 11/1, ligation of left ureter and abdominal closure 11/2; cystoscopy 11/18    Plan:  -d/c left chest tube  -repeat CT per urology rec - if fluid resolved, cap nephrostomy  -continue reg diet  -PT/OT    Subjective/Objective     Subjective: Passing flatus/BM  Minimal CT output  Reports mild pain in LUQ after eating  Objective:     Blood pressure 142/92, pulse 88, temperature 98 6 °F (37 °C), temperature source Oral, resp  rate 18, height 5' 6" (1 676 m), weight 67 kg (147 lb 11 3 oz), SpO2 97 %  ,Body mass index is 23 84 kg/m²        Intake/Output Summary (Last 24 hours) at 12/10/17 1465  Last data filed at 12/10/17 0536   Gross per 24 hour   Intake              530 ml   Output             2598 ml   Net            -2068 ml       Invasive Devices     Peripheral Intravenous Line            Peripheral IV 12/08/17 Left Wrist 1 day    Peripheral IV 12/09/17 Right;Upper Arm less than 1 day          Drain            Nephrostomy Right 1 10 2 Fr  30 days    Closed/Suction Drain Left Back 8 Fr  9 days    Closed/Suction Drain Left;Lateral Abdomen 8 Fr  9 days    Chest Tube Left 6 days    Urethral Catheter Coude 16 Fr  3 days                Physical Exam: General appearance: alert, appears stated age and cooperative  Lungs: normal respiratory effort  Chest wall: left-sided chest tube in place, c/d/i  Abdomen: midline incision with staples intact; soft, non tender  Neurologic: Grossly normal    Lab, Imaging and other studies:  Lab Results   Component Value Date    WBC 10 19 (H) 12/08/2017    HGB 8 5 (L) 12/08/2017    HCT 26 7 (L) 12/08/2017    MCV 89 12/08/2017     (H) 12/08/2017     Lab Results   Component Value Date    GLUCOSE 86 12/10/2017    CALCIUM 9 4 12/10/2017     12/10/2017    K 3 9 12/10/2017    CO2 25 12/10/2017     12/10/2017    BUN 12 12/10/2017    CREATININE 1 25 12/10/2017       Kyle Moses

## 2017-12-10 NOTE — BRIEF OP NOTE (RAD/CATH)
Arteriogram Procedure Note    PATIENT NAME: Aleisha Rice  : 1975  MRN: [id number]     Pre-op Diagnosis:   1  Laceration of left kidney, initial encounter    2  Hx of unilateral nephrectomy    3  Other hydronephrosis    4  Severe sepsis (HCC)    5  Eye pain    6  Pleural effusion on left      Post-op Diagnosis:   1  Laceration of left kidney, initial encounter    2  Hx of unilateral nephrectomy    3  Other hydronephrosis    4  Severe sepsis (HCC)    5  Eye pain    6  Pleural effusion on left        Surgeon:   Sahil Virk MD  Assistants:     No qualified resident was available, Resident is only observing    Estimated Blood Loss: Minimal  Findings: Splenic arteriogram and embolization of the distal main splenic artery      Specimens: none    Complications:  none    Anesthesia: Conscious sedation and Local    Sahil Virk MD     Date: 12/10/2017  Time: 5:42 PM

## 2017-12-11 ENCOUNTER — APPOINTMENT (INPATIENT)
Dept: RADIOLOGY | Facility: HOSPITAL | Age: 42
DRG: 659 | End: 2017-12-11
Payer: COMMERCIAL

## 2017-12-11 PROBLEM — S36.029A: Status: ACTIVE | Noted: 2017-12-11

## 2017-12-11 LAB
ANION GAP SERPL CALCULATED.3IONS-SCNC: 8 MMOL/L (ref 4–13)
BASOPHILS # BLD AUTO: 0.02 THOUSANDS/ΜL (ref 0–0.1)
BASOPHILS NFR BLD AUTO: 0 % (ref 0–1)
BUN SERPL-MCNC: 11 MG/DL (ref 5–25)
CALCIUM SERPL-MCNC: 8.9 MG/DL (ref 8.3–10.1)
CHLORIDE SERPL-SCNC: 105 MMOL/L (ref 100–108)
CO2 SERPL-SCNC: 25 MMOL/L (ref 21–32)
CREAT SERPL-MCNC: 1.03 MG/DL (ref 0.6–1.3)
EOSINOPHIL # BLD AUTO: 0.38 THOUSAND/ΜL (ref 0–0.61)
EOSINOPHIL NFR BLD AUTO: 2 % (ref 0–6)
ERYTHROCYTE [DISTWIDTH] IN BLOOD BY AUTOMATED COUNT: 15.5 % (ref 11.6–15.1)
GFR SERPL CREATININE-BSD FRML MDRD: 89 ML/MIN/1.73SQ M
GLUCOSE SERPL-MCNC: 84 MG/DL (ref 65–140)
HCT VFR BLD AUTO: 28.1 % (ref 36.5–49.3)
HCT VFR BLD AUTO: 28.3 % (ref 36.5–49.3)
HGB BLD-MCNC: 9.1 G/DL (ref 12–17)
HGB BLD-MCNC: 9.1 G/DL (ref 12–17)
LYMPHOCYTES # BLD AUTO: 1.79 THOUSANDS/ΜL (ref 0.6–4.47)
LYMPHOCYTES NFR BLD AUTO: 11 % (ref 14–44)
MCH RBC QN AUTO: 28.7 PG (ref 26.8–34.3)
MCHC RBC AUTO-ENTMCNC: 32.4 G/DL (ref 31.4–37.4)
MCV RBC AUTO: 89 FL (ref 82–98)
MONOCYTES # BLD AUTO: 1.39 THOUSAND/ΜL (ref 0.17–1.22)
MONOCYTES NFR BLD AUTO: 9 % (ref 4–12)
NEUTROPHILS # BLD AUTO: 12.62 THOUSANDS/ΜL (ref 1.85–7.62)
NEUTS SEG NFR BLD AUTO: 78 % (ref 43–75)
NRBC BLD AUTO-RTO: 0 /100 WBCS
PLATELET # BLD AUTO: 849 THOUSANDS/UL (ref 149–390)
PMV BLD AUTO: 8.2 FL (ref 8.9–12.7)
POTASSIUM SERPL-SCNC: 4 MMOL/L (ref 3.5–5.3)
RBC # BLD AUTO: 3.17 MILLION/UL (ref 3.88–5.62)
SODIUM SERPL-SCNC: 138 MMOL/L (ref 136–145)
WBC # BLD AUTO: 16.27 THOUSAND/UL (ref 4.31–10.16)

## 2017-12-11 PROCEDURE — 71010 HB CHEST X-RAY 1 VIEW FRONTAL (PORTABLE): CPT

## 2017-12-11 PROCEDURE — 85025 COMPLETE CBC W/AUTO DIFF WBC: CPT | Performed by: SURGERY

## 2017-12-11 PROCEDURE — 80048 BASIC METABOLIC PNL TOTAL CA: CPT | Performed by: SURGERY

## 2017-12-11 RX ADMIN — TOBRAMYCIN AND DEXAMETHASONE 1 DROP: 3; 1 SUSPENSION/ DROPS OPHTHALMIC at 05:03

## 2017-12-11 RX ADMIN — TAMSULOSIN HYDROCHLORIDE 0.4 MG: 0.4 CAPSULE ORAL at 17:01

## 2017-12-11 RX ADMIN — OXYBUTYNIN CHLORIDE 5 MG: 5 TABLET ORAL at 17:01

## 2017-12-11 RX ADMIN — OXYBUTYNIN CHLORIDE 5 MG: 5 TABLET ORAL at 09:16

## 2017-12-11 RX ADMIN — OXYCODONE HYDROCHLORIDE 10 MG: 10 TABLET ORAL at 23:14

## 2017-12-11 RX ADMIN — ACETAMINOPHEN 975 MG: 325 TABLET ORAL at 14:57

## 2017-12-11 RX ADMIN — ACETAMINOPHEN 975 MG: 325 TABLET ORAL at 09:16

## 2017-12-11 RX ADMIN — HEPARIN SODIUM 5000 UNITS: 5000 INJECTION, SOLUTION INTRAVENOUS; SUBCUTANEOUS at 21:19

## 2017-12-11 RX ADMIN — TOBRAMYCIN AND DEXAMETHASONE 1 DROP: 3; 1 SUSPENSION/ DROPS OPHTHALMIC at 23:14

## 2017-12-11 RX ADMIN — Medication 150 MG: at 09:16

## 2017-12-11 RX ADMIN — OXYCODONE HYDROCHLORIDE 5 MG: 5 TABLET ORAL at 09:40

## 2017-12-11 RX ADMIN — HEPARIN SODIUM 5000 UNITS: 5000 INJECTION, SOLUTION INTRAVENOUS; SUBCUTANEOUS at 14:57

## 2017-12-11 RX ADMIN — TOBRAMYCIN AND DEXAMETHASONE 1 DROP: 3; 1 SUSPENSION/ DROPS OPHTHALMIC at 11:30

## 2017-12-11 RX ADMIN — OXYBUTYNIN CHLORIDE 5 MG: 5 TABLET ORAL at 21:18

## 2017-12-11 RX ADMIN — HEPARIN SODIUM 5000 UNITS: 5000 INJECTION, SOLUTION INTRAVENOUS; SUBCUTANEOUS at 05:03

## 2017-12-11 RX ADMIN — TOBRAMYCIN AND DEXAMETHASONE 1 DROP: 3; 1 SUSPENSION/ DROPS OPHTHALMIC at 17:02

## 2017-12-11 RX ADMIN — POLYETHYLENE GLYCOL 3350 17 G: 17 POWDER, FOR SOLUTION ORAL at 09:15

## 2017-12-11 RX ADMIN — Medication 1 TABLET: at 21:18

## 2017-12-11 RX ADMIN — ACETAMINOPHEN 975 MG: 325 TABLET ORAL at 23:14

## 2017-12-11 NOTE — PLAN OF CARE
Problem: PAIN - ADULT  Goal: Verbalizes/displays adequate comfort level or baseline comfort level  Interventions:  - Encourage patient to monitor pain and request assistance  - Assess pain using appropriate pain scale  - Administer analgesics based on type and severity of pain and evaluate response  - Implement non-pharmacological measures as appropriate and evaluate response  - Consider cultural and social influences on pain and pain management  - Notify physician/advanced practitioner if interventions unsuccessful or patient reports new pain   Outcome: Progressing      Problem: INFECTION - ADULT  Goal: Absence or prevention of progression during hospitalization  INTERVENTIONS:  - Assess and monitor for signs and symptoms of infection  - Monitor lab/diagnostic results  - Monitor all insertion sites, i e  indwelling lines, tubes, and drains  - Monitor endotracheal (as able) and nasal secretions for changes in amount and color  - [place] appropriate cooling/warming therapies per order  - Administer medications as ordered  - Instruct and encourage patient and family to use good hand hygiene technique  - Identify and instruct in appropriate isolation precautions for identified infection/condition   Outcome: Progressing      Problem: SAFETY ADULT  Goal: Patient will remain free of falls  INTERVENTIONS:  - Assess patient frequently for physical needs  -  Identify cognitive and physical deficits and behaviors that affect risk of falls    -  [place] fall precautions as indicated by assessment   - Educate patient/family on patient safety including physical limitations  - Instruct patient to call for assistance with activity based on assessment  - Modify environment to reduce risk of injury  - Consider OT/PT consult to assist with strengthening/mobility    Outcome: Progressing      Problem: Prexisting or High Potential for Compromised Skin Integrity  Goal: Skin integrity is maintained or improved  INTERVENTIONS:  - Identify patients at risk for skin breakdown  - Assess and monitor skin integrity  - Assess and monitor nutrition and hydration status  - Monitor labs (i e  albumin)  - Assess for incontinence   - Turn and reposition patient  - Assist with mobility/ambulation  - Relieve pressure over bony prominences  - Avoid friction and shearing  - Provide appropriate hygiene as needed including keeping skin clean and dry  - Evaluate need for skin moisturizer/barrier cream  - Collaborate with interdisciplinary team (i e  Nutrition, Rehabilitation, etc )   - Patient/family teaching   Outcome: Progressing      Problem: Potential for Falls  Goal: Patient will remain free of falls  INTERVENTIONS:  - Assess patient frequently for physical needs  -  Identify cognitive and physical deficits and behaviors that affect risk of falls    -  [place] fall precautions as indicated by assessment   - Educate patient/family on patient safety including physical limitations  - Instruct patient to call for assistance with activity based on assessment  - Modify environment to reduce risk of injury  - Consider OT/PT consult to assist with strengthening/mobility    Outcome: Progressing      Problem: GENITOURINARY - ADULT  Goal: Urinary catheter remains patent  INTERVENTIONS:  - Assess patency of urinary catheter  - If patient has a chronic jiménez, consider changing catheter if non-functioning  - Follow guidelines for intermittent irrigation of non-functioning urinary catheter   Outcome: Progressing

## 2017-12-11 NOTE — PROGRESS NOTES
Progress Note - General Surgery   Lenora Rios 43 y o  male MRN: [id number]  [facility] En[facility]: [id number]    Assessment:  43y o -year-old male with spontaneous left kidney rupture     - s/p ex lap, left nephrectomy, abthera 11/1 Chari Herrmann)  - s/p ex lap, removal of packs, ligation of left ureter, abdominal closure 11/2 [facility])  - s/p right PCN w/ intraabdominal ascites samples 11/9 (IR)  - s/p 11/18 cystoscopy w/ fulguration of ureteral orifice, cystogram with injection of deflux, left retrograde pyelograam 11/18 Brielle Mcmahon)  - s/p nephrostogram, PCN capping 11/21 (IR)  - s/p IR tube check 11/27 (IR)  - s/p drainage of left upper quadrant fluid collection 11/30 (IR)  -s/p Left CT insertion 12/3 (Bhavana Query)   -s/p Splenic agram and embolization (IR)     Plan:  - diet as tolerated  - DC IVF  - check CXR today new AL appreciated this am   - continue jiménez and nephrostomy to drainage   - if fluid collections persistent or worsen, will follow up with urology for surgical intervention  - OOB, ambulate  - DVT ppx    Subjective/Objective   Chief Complaint:     Subjective: splenic a gram and embolization yesterday  No nausea/vomiting  Hemoglobin stable     Objective:     Blood pressure 138/71, pulse 74, temperature 98 1 °F (36 7 °C), resp  rate 22, height 5' 6" (1 676 m), weight 70 2 kg (154 lb 12 2 oz), SpO2 97 %  ,Body mass index is 24 98 kg/m²        Intake/Output Summary (Last 24 hours) at 12/11/17 0545  Last data filed at 12/11/17 0502   Gross per 24 hour   Intake           1030 4 ml   Output             3070 ml   Net          -2039 6 ml       Invasive Devices     Central Venous Catheter Line            CVC Central Lines 12/10/17 Triple Right Internal jugular less than 1 day          Peripheral Intravenous Line            Peripheral IV 12/09/17 Right;Upper Arm 1 day    Peripheral IV 12/10/17 Left Antecubital less than 1 day          Drain            Nephrostomy Right 1 10 2 Fr  31 days    Closed/Suction Drain Left Back 8 Fr  10 days    Closed/Suction Drain Left;Lateral Abdomen 8 Fr  10 days    Chest Tube Left 7 days    Urethral Catheter Coude 16 Fr  4 days                Physical Exam: General: AAOx3  Respiratory: BS b/l  Abdomen: Soft, NT, no rebound/guarding  Heart: RRR, S1s2  Ext: Warm no cyanosis       Lab, Imaging and other studies:  I have personally reviewed pertinent lab results    , CBC:   Lab Results   Component Value Date    WBC 16 27 (H) 12/11/2017    HGB 9 1 (L) 12/11/2017    HCT 28 1 (L) 12/11/2017    MCV 89 12/11/2017     (H) 12/11/2017    MCH 28 7 12/11/2017    MCHC 32 4 12/11/2017    RDW 15 5 (H) 12/11/2017    MPV 8 2 (L) 12/11/2017    NRBC 0 12/11/2017   , CMP: No results found for: NA, K, CL, CO2, ANIONGAP, BUN, CREATININE, GLUCOSE, CALCIUM, AST, ALT, ALKPHOS, PROT, ALBUMIN, BILITOT, EGFR  VTE Pharmacologic Prophylaxis: Sequential compression device (Venodyne)   VTE Mechanical Prophylaxis: sequential compression device

## 2017-12-11 NOTE — PROGRESS NOTES
Progress Note - Critical Care   Yvette Earl 43 y o  male MRN: [id number]  Unit/Bed#: MICU 02 Encounter: [id number]    Assessment:   43y o -year-old male with spontaneous left kidney rupture     - s/p ex lap, left nephrectomy, abthera 11/1 Danae Connor)  - s/p ex lap, removal of packs, ligation of left ureter, abdominal closure 11/2 Justyna Banerjee)  - s/p right PCN w/ intraabdominal ascites samples 11/9 (IR)  - s/p 11/18 cystoscopy w/ fulguration of ureteral orifice, cystogram with injection of deflux, left retrograde pyelograam 11/18 Katey Wiley)  - s/p nephrostogram, PCN capping 11/21 (IR)  - s/p IR tube check 11/27 (IR)  - s/p drainage of left upper quadrant fluid collection 11/30 (IR)  -s/p Left CT insertion 12/3 (Rhoda Rios)   -s/p Splenic agram and embolization (IR)      Plan:     Neuro:   Pain control- reasonably controlled   - Scheduled tylenol 975mg q8   - PRN dilauydid 1 mg q3   - Oxy 5/10 PRN     CV:   Hemodynamically stable, -150s  HR 70s       Lung: No SOB, on RA  Pleural effusion s/p L CT placement 12/3- 80 cc output overnight   - New AL seen this AM   - Will defer to surgery on timing of removal- was planned for today however new AL   - Obtain AM CXR      GI:   S/P IR splenic embolization- was kept NPO overnight s/p embolization   - Regular diet today   - Daily Hb   - Continue THANH x 2 to back  FEN:   Fluids- NS @ 84 while NPO, will discontinue  Electrolytes: K 4, Cr 1 03   Continue daily labs  Nutrition- restart regular diet as Hb stable      :   S/P L nephrectomy, R PCN, jiménez placement   - Continue current lines/tubes   - Urology following   - Arleene Rouge for bladder spasms   - Monitor Cr, UOP      ID:   S/P splenic embolization   - Will need splenic vaccinations before DC    Leukocyotsis   - Likely reactive, 16 3 today   - Check WBC daily     Heme:   S/P splenic embolization for large perisplenic colletion   - Hb 9 2>9 1>9 1   - Daily Hb check   - On SQH     Endo: Stable, continue to monitor BS Msk/Skin: OOB w/ PT/OT     Disposition: Transfer to [place] per surgery    Chief Complaint: None    HPI/24hr events:   - States pain is "okay"  - Underwent IR embolization of spleen yesterday 12/10 for large perisplenic colletcion seen on CT    Physical Exam:   Physical Exam:   Gen: A&O, NAD  Cardio: RRR  Lungs: CTAB  Abd: Soft, non distended  Mildly tender  Drain x 2 to LUQ, 1 bloody, 1 serous  : Thakur w/ urine      Vitals:    17 0206 17 0306 17 0406 17 0506   BP: 145/68 138/71 129/65 152/70   Pulse: 70 74 66 74   Resp: (!) 25 22 22 (!) 24   Temp:   98 4 °F (36 9 °C)    TempSrc:   Oral    SpO2: 98% 97% 97% 99%   Weight:       Height:         Arterial Line BP: 140/78  Arterial Line MAP (mmHg): 98 mmHg    Temperature:   Temp (24hrs), Av 1 °F (36 7 °C), Min:97 6 °F (36 4 °C), Max:98 4 °F (36 9 °C)    Current: Temperature: 98 4 °F (36 9 °C)    Weights:   IBW: 63 8 kg    Body mass index is 24 98 kg/m²  Weight (last 2 days)     Date/Time   Weight    12/10/17 1800  70 2 (154 76)    12/10/17 0538  67 (147 71)    17 0624  67 1 (147 93)              Hemodynamic Monitoring:  N/A     Non-Invasive/Invasive Ventilation Settings:  Respiratory    Lab Data (Last 4 hours)    None         O2/Vent Data (Last 4 hours)    None              No results found for: PHART, FXX8FHB, PO2ART, KWD7UUS, P8SDDGHY, BEART, SOURCE  SpO2: SpO2: 99 %    Intake and Outputs:  I/O       701 - 12/10 0700 12/10 0701 -  07 -  07    P  O  530 120     I V  (mL/kg)  910 4 (13)     Total Intake(mL/kg) 530 (7 9) 1030 4 (14 7)     Urine (mL/kg/hr) 2375 (1 5) 2975 (1 8)     Drains 100 (0 1) 15 (0)     Stool 0 (0)      Chest Tube 123 (0 1) 80 (0)     Total Output 2598 3070      Net - - 6             Unmeasured Stool Occurrence 1 x            Nutrition:        Diet Orders            Start     Ordered    17 1646  Dietary nutrition supplements  Once     Question Answer Comment   Select Supplement: Ensure Enlive-Chocolate    Frequency Breakfast        11/22/17 1645        Labs:     Results from last 7 days  Lab Units 12/11/17  0510 12/10/17  2355 12/10/17  2005 12/08/17  0535 12/07/17  0453   WBC Thousand/uL 16 27*  --   --  10 19* 10 77*   HEMOGLOBIN g/dL 9 1* 9 1* 9 2* 8 5* 8 1*   HEMATOCRIT % 28 1* 28 3* 28 6* 26 7* 25 1*   PLATELETS Thousands/uL 849*  --   --  683* 592*   NEUTROS PCT % 78*  --   --  66 67   MONOS PCT % 9  --   --  10 12      Results from last 7 days  Lab Units 12/11/17  0510 12/10/17  0550 12/06/17  0827   SODIUM mmol/L 138 137 141   POTASSIUM mmol/L 4 0 3 9 4 0   CHLORIDE mmol/L 105 105 105   CO2 mmol/L 25 25 30   BUN mg/dL 11 12 10   CREATININE mg/dL 1 03 1 25 1 21   CALCIUM mg/dL 8 9 9 4 8 5   GLUCOSE RANDOM mg/dL 84 86 93       Results from last 7 days  Lab Units 12/06/17  0827   MAGNESIUM mg/dL 2 2                Results from last 7 days  Lab Units 12/10/17  1616   LACTIC ACID mmol/L 1 4       0  Lab Value Date/Time   TROPONINI <0 04 04/23/2015 2325       Imaging:   CT chest abdomen pelvis w contrast   Final Result by Lindsay Wilson MD (12/10 3065)      Despite the presence of a pigtail drainage catheter, there has been significant interval increase in the size of splenic collection, presumably large subcapsular and pericapsular splenic hematoma  There is been marked interval decrease in the size of the previously noted collection in the upper left nephrectomy space  Along with decompression of this collection, there has been marked interval decrease in the size of the left ureter which is now    only mildly dilated  A left-sided chest tube passing along the border of the anterior left hemidiaphragm, then the border of the left heart, finally passes into the anterior mediastinum with its tip located between the sternum and the anterior aortic arch  There is a small    left effusion and compressive atelectasis at the left base                          Workstation performed: [id number]         XR chest pa & lateral   Final Result by Lashonda Marrero MD (12/06 1325)      Stable left pleural effusion and associated left lung base opacity  Workstation performed: [id number]         XR chest portable   Final Result by Randi Wilkinson MD (12/05 0432)      No significant interval change from previous examination  Workstation performed: [id number]         XR chest portable   Final Result by Terry Marcano MD (12/04 4403)      Stable left pleural effusion with overlying patchy opacity, likely atelectasis  No pneumothorax with chest tube in place  Workstation performed: [id number]         XR chest portable   Final Result by Eleazar Maurer MD (12/03 1320)      Diminished left pleural effusion after chest tube placement  No pneumothorax  Hazy right apical lung density  Could not exclude developing pneumonia  Recommend continued follow-up         Workstation performed: [id number]         XR chest portable   Final Result by Yokasta Tipton MD (12/03 1020)      Persistent left basilar opacity likely a large pleural effusion  Superimposed pneumonia and/or atelectasis would be difficult to exclude  Workstation performed: [id number]         XR chest portable   Final Result by Jackie Mills MD (28/93 4437)   Persistent large left pleural effusion         Workstation performed: [id number]         XR chest portable   Final Result by Yokasta Tipton MD (12/01 1612)         1  No pneumothorax  Repositioned IJ catheter tip at the cavoatrial junction   2  Left-sided opacity possibly a moderate to large pleural effusion versus atelectasis and/or pneumonia redemonstrated  Workstation performed: [id number]         XR chest portable ICU   Final Result by Wallace Richmond MD (12/01 4321)      Persistent consolidation throughout the left lower lobe and moderate-sized pleural effusion  Lung volumes have slightly diminished        Right IJ catheter terminates in the right atrium  No pneumothorax  Workstation performed: [id number]         XR chest portable ICU   Final Result by Patsy Felix MD (43/22 2541)      Right central line in place tip projects over expected location inferior right atrium  No pneumothorax  Progressive left lower lobe airspace disease may represent pneumonia, aspiration, and/or subsegmental atelectasis  Moderate-sized left pleural effusion  Workstation performed: [id number]         IR image guided aspiration / drainage   Final Result by Gabby Trejo MD (11/30 2514)   Impression:    Successful image-guided percutaneous drainage of left abdominal flank collection with placement of 2 drains          Workstation performed: [id number]         CT abdomen pelvis wo contrast   Final Result by Danial Baig MD (11/30 3612)   1  Side decreased size of the mass/collection in the left renal fossa which is suboptimally evaluated without intravenous contrast   However, this collection is in communication with a larger collection bordering the spleen  The 11 2 x 4 8 cm    collection bordering the spleen has significantly increased in size  Presence of gas within the collection raises the possibility of abscess  2   New severe left and moderate right hydroureter  3   Right percutaneous of prostate tube is in place, without hydronephrosis  4   Unchanged small left pleural effusion with associated passive atelectasis  Findings are consistent with the preliminary report from [facility] which was provided shortly after completion of the exam                       Workstation performed: [id number]         NM kidney w flow and function w pharmacological intervention   Final Result by Emily Piper MD (11/28 5638)      1  Slow overall excretion in the solitary right kidney  T-1/2 of Lasix washout is 23 5 minutes suspicious for obstruction           Workstation performed: [id number]         IR tube check   Final Result by Mily Mcgrath MD (11/28 1609)   Impression:     1   Ectatic right renal collecting system and ureter  2   Holdup of contrast at the right UVJ and slow passage of contrast into the bladder  3   Dr Moise Rios plans to evaluate with nuclear medicine Lasix scan  If the study is negative, the right nephrostomy catheter will be removed  Workstation performed: [id number]         FL cystogram   Final Result by Aleks Buchanan MD (11/24 1750)      No evidence of reflux or leak  Workstation performed: [id number]         IR tube check   Final Result by Bronwyn Robins MD (11/21 1714)   Impression:      Distal right ureteral stricture with drainage into the bladder and trial of internal drainage  Workstation performed: [id number]         [facility] cystogram   Final Result by Naida Fernandez MD (11/19 1349)      Status post cystogram   Please see procedure report for further details  Workstation performed: [id number]         FL retrograde pyelogram   Final Result by Naida Fernandez MD (11/19 1346)      Marked left hydroureter, status post ureteral stent placement  Please see procedure report for further details  Workstation performed: [id number]         CT renal protocol   Final Result by George Keating DO (11/17 1913)   1  Persistent air and fluid in the abdomen and retroperitoneum, likely related to incomplete ligation of the ureteric stump with reflux from the urinary bladder  2   Heterogeneously enhancing soft tissue in the retroperitoneum, similar to the prior study  Residual renal tissue in the nephrectomy bed not excluded  3   Slight enlarging moderate-sized left pleural effusion  cfsl   I personally discussed this result with Dr Yani Leigh on 11/17/2017 7:02 PM          Workstation performed: [id number]         XR chest pa & lateral   Final Result by Wilda Hurley MD (11/15 1630)      Left basilar effusion versus atelectasis and/or pneumonia  Workstation performed: [id number]         IR tube check   Final Result by Calixto Bautista MD (11/14 1500)   Impression:     1  Right antegrade nephrostogram showed critical stenosis of the distal 1 cm of the ureter  There was UVJ obstruction  2   Cystogram via the Thakur catheter showed contracted, small volume bladder  The bladder was only minimally distensible  At approximately 10 mL volume, there was reflux of contrast into the left ureteral stump  No leakage of contrast was seen into    the peritoneal cavity  Workstation performed: [id number]         [facility] cystogram   Final Result by Anjum Cabrera DO (11/14 1317)   Bilateral vesicoureteral reflux  Unremarkable right ureter  Leakage of contrast from patulous tortuous left ureter into the left nephrectomy bed  ##cfslh   I personally discussed this result with Will Maldonado on 11/14/2017 12:15 PM    ##         Workstation performed: [id number]         IR tube placement nephrostomy   Final Result by Micheal Barroso MD (06/88 3566)   Impression:    Successful right percutaneous nephrostomy with 10  [place] drainage catheter placement  Right UVJ obstruction         Workstation performed: [id number]         XR chest 1 view   Final Result by Michael Quintanilla MD (11/11 4262)      Left lower lobe consolidation representing atelectasis or pneumonia  Workstation performed: [id number]         XR chest pa & lateral   Final Result by Dixie Siu DO (11/09 1820)      Minimal subsegmental atelectasis versus scarring within the right lung base  Workstation performed: [id number]         CT abdomen pelvis wo contrast   Final Result by Dixie Siu DO (11/08 0364)      Interval development of extensive abdominal and pelvic ascites, the majority of which appears simple  Given recent surgery, findings may represent urine leaking into the abdominal cavity        There is a moderate-sized hematoma identified within the left renal fossa status post left nephrectomy, markedly improved compared to the prior examination  Moderate posterior layering left effusion with adjacent compressive atelectasis  ##cfslh   I personally discussed this result with the surgery resident  on 11/8/2017 5:01 PM    ##         Workstation performed: Kris Swanson kidney and bladder   Final Result by Patric Harris DO (11/06 1700)      1  Mild right hydronephrosis identified which has significantly improved since the prior CT scan status post fall catheter placement  2   Status post left refractory  Fluid and echogenic debris in the left nephrectomy bed likely is representative of postoperative change/hemorrhage  3   There is echogenic nonshadowing debris in the gallbladder potentially representing sludge  Workstation performed: [id number]         XR chest portable   Final Result by Anupam Macedo MD (11/03 6417)         1  Status post removal of endotracheal tube and nasogastric tube  2   Unchanged mild pulmonary vascular congestion and mild cardiomegaly  Workstation performed: [id number]         X-ray chest 1 view   Final Result by Maki Webster DO (11/02 3951)   Diminished lung volumes with vascular crowding  Mild pulmonary edema  Endotracheal tube just above lauren, suggest retraction x 2 cm for more optimal position  ##cfslh   I personally discussed this result with Yaya Juarez on 11/2/2017 9:24 AM    ##                Workstation performed: [id number]         IR visceral angiography / intervention    (Results Pending)   IR central line placement    (Results Pending)   XR chest portable    (Results Pending)      I have personally reviewed pertinent reports  EKG:     Micro:  Lab Results   Component Value Date    BLOODCX No Growth After 5 Days  11/30/2017    BLOODCX No Growth After 5 Days  11/30/2017    BLOODCX No Growth After 5 Days   11/01/2017    BLOODCX No Growth After 5 Days  11/01/2017    URINECX No Growth <1000 cfu/mL 11/09/2017    URINECX >100,000 cfu/ml Mixed Contaminants X3 02/15/2016       Allergies: No Known Allergies    Medications:   Scheduled Meds:  acetaminophen 975 mg Oral Q8H   heparin (porcine) 5,000 Units Subcutaneous Q8H [address]   iron polysaccharides 150 mg Oral Daily   oxybutynin 5 mg Oral TID   polyethylene glycol 17 g Oral Daily   senna-docusate sodium 1 tablet Oral HS   tamsulosin 0 4 mg Oral Daily With Dinner   tobramycin-dexamethasone 1 drop Ophthalmic Q6H [address]     Continuous Infusions:  sodium chloride 84 mL/hr Last Rate: Stopped (12/11/17 2234)     PRN Meds:    HYDROmorphone 1 mg Q3H PRN   influenza vaccine 0 5 mL Prior to discharge   oxyCODONE 10 mg Q4H PRN   oxyCODONE 5 mg Q4H PRN   polyvinyl alcohol 1 drop Q3H PRN       VTE Pharmacologic Prophylaxis: Heparin  VTE Mechanical Prophylaxis: sequential compression device    Invasive lines and devices: Invasive Devices     Central Venous Catheter Line            CV[facility] Central Lines 12/10/17 Triple Right Internal jugular less than 1 day          Peripheral Intravenous Line            Peripheral IV 12/09/17 Right;Upper Arm 1 day    Peripheral IV 12/10/17 Left Antecubital less than 1 day          Drain            Nephrostomy Right 1 10 2 Fr  31 days    Closed/Suction Drain Left Back 8 Fr  10 days    Closed/Suction Drain Left;Lateral Abdomen 8 Fr  10 days    Chest Tube Left 7 days    Urethral Catheter Coude 16 Fr  4 days                   Counseling / Coordination of Care  Total Critical Care time spent 45 minutes excluding procedures, teaching and family updates  Code Status: Level 1 - Full Code     Portions of the record may have been created with voice recognition software  Occasional wrong word or "sound a like" substitutions may have occurred due to the inherent limitations of voice recognition software  Read the chart carefully and recognize, using context, where substitutions have occurred       Frank Farias Citlalli Cabrales MD

## 2017-12-11 NOTE — PROGRESS NOTES
CT scan from December 10, 2017 reviewed  Increasing subcapsular splenic collection consistent with hematoma identified  The patient returned to interventional Radiology for drainage  The fluid collection in the left nephrectomy bed is now notably improved from prior  The left ureteral stump is significantly decompressed as compared to prior, however, the patient has been managed with a Thakur catheter and nephrostomy tube so he has not been able to potentially reflux urine  At this time, I would maintain the Thakur catheter nephrostomy tube to gravity drainage until the patient has resolution of his splenic collection and need for his chest tube  I do not see the utility to be overly aggressive with a voiding trial and risk additional extravasation of urine while the patient has multiple other systemic issues occurring simultaneously  For now, maintain right nephrostomy tube and Thakur catheter to gravity

## 2017-12-11 NOTE — PROGRESS NOTES
Progress Note - Critical Care   Lenora Rios 43 y o  male MRN: [id number]  Unit/Bed#: [facility] 02 Encounter: [id number]    Assessment: 35yo M with h/o L kidney laceration and nephrectomy on 11/1 with multiple drains, most recently he had IR embolization of a distal splenic artery because of a large splenic hematoma, was the ICU service was consulted  Plan:          Neuro: Patient's pain control prior to embolization included tylenol 975mg q8, dilaudid 1mg pq3 prn, oxycodone 10mg/5mg IR  Patient complaining of pain post-procedure but had not received his medications as they were on hold  CV: No complaints at this time, patient has maintained BP's >120's, not tachy, MAP >93                 Lung: CTA bilaterally, no complaints of SOB, patient has Chest tube draining 40mL past 24hr for effusion, plan per surgery was to d/c tube tomorrow  GI: Patient currently NPO post-procedure, will continue to monitor Hb q4 and will restart diet if normal in the morning                 FEN: unremarkable labs, most recent lactic 1 4, NS running at 84mL/hr                 : No complaints at this time, adequate UOP, 2,075 in past 24hrs  ID: Patient on zosyn for abx prophylaxis, patient will need splenectomy immunizations  Heme: Patient had low Hb before procedure, increased after procedure, will continue to check H&H q4hr overnight and resume am labs, will transfuse as needed  Endo: Sugars have been normal, will continue to monitor                            Msk/Skin: Frequent turning and OOB as possible with PT/OT                 Disposition: Continued ICU stay, possible d/c to the floor tomorrow  Chief Complaint: hemodynamic monitoring post-op  HPI/24hr events: IR embolization of spleen    Physical Exam:   Physical Exam   Constitutional: He is oriented to person, place, and time  He appears well-developed and well-nourished     HENT:   Head: Normocephalic  Eyes: Conjunctivae and EOM are normal    Cardiovascular: Normal rate, regular rhythm and normal heart sounds  Pulmonary/Chest: Effort normal and breath sounds normal    Abdominal: Soft  There is tenderness  Musculoskeletal: Normal range of motion  Neurological: He is alert and oriented to person, place, and time  Skin: Skin is warm and dry  Nursing note and vitals reviewed  Vitals:    12/10/17 1945 12/10/17 2000 12/10/17 2046 12/10/17 2106   BP: 136/77 156/83 129/77 142/75   Pulse: 72 82 74 76   Resp: (!) 28 (!) 29 (!) 28 (!) 29   Temp:       TempSrc:       SpO2: 98% 97% 98% 98%   Weight:       Height:         Arterial Line BP: 140/78  Arterial Line MAP (mmHg): 98 mmHg    Temperature:   Temp (24hrs), Av 2 °F (36 8 °C), Min:97 6 °F (36 4 °C), Max:98 6 °F (37 °C)    Current: Temperature: 98 3 °F (36 8 °C)    Weights:   IBW: 63 8 kg    Body mass index is 24 98 kg/m²  Weight (last 2 days)     Date/Time   Weight    12/10/17 1800  70 2 (154 76)    12/10/17 0538  67 (147 71)    17 0624  67 1 (147 93)    17 0600  79 (174 16)              Hemodynamic Monitoring:  PAP:       Non-Invasive/Invasive Ventilation Settings:  Respiratory    Lab Data (Last 4 hours)    None         O2/Vent Data (Last 4 hours)    None              No results found for: PHART, ADN1PUF, PO2ART, WKV4ITI, G3BEPMKL, BEART, SOURCE  SpO2: SpO2: 98 %    Intake and Outputs:  I/O       701 - 12/10 0700 12/10 07 -  0700    P  O  530 120    Total Intake(mL/kg) 530 (7 9) 120 (1 7)    Urine (mL/kg/hr) 2375 (1 5) 1875 (1 9)    Drains 100 (0 1) 0 (0)    Stool 0 (0)     Chest Tube 123 (0 1) 40 (0)    Total Output 2598 1915    Net - -179          Unmeasured Stool Occurrence 1 x         UOP: 86 45/hour   Nutrition:        Diet Orders            Start     Ordered    17 1793  Dietary nutrition supplements  Once     Question Answer Comment   Select Supplement: Ensure Enlive-Chocolate Frequency Breakfast        11/22/17 1645          Labs:     Results from last 7 days  Lab Units 12/10/17  2005 12/08/17  0535 12/07/17  0453 12/06/17  0828   WBC Thousand/uL  --  10 19* 10 77* 11 48*   HEMOGLOBIN g/dL 9 2* 8 5* 8 1* 8 1*   HEMATOCRIT % 28 6* 26 7* 25 1* 25 0*   PLATELETS Thousands/uL  --  683* 592* 495*   NEUTROS PCT %  --  66 67 71   MONOS PCT %  --  10 12 10        Results from last 7 days  Lab Units 12/10/17  0550 12/06/17  0827 12/05/17  0342   SODIUM mmol/L 137 141 141   POTASSIUM mmol/L 3 9 4 0 4 2   CHLORIDE mmol/L 105 105 106   CO2 mmol/L 25 30 32   BUN mg/dL 12 10 10   CREATININE mg/dL 1 25 1 21 1 20   CALCIUM mg/dL 9 4 8 5 8 2*   GLUCOSE RANDOM mg/dL 86 93 126       Results from last 7 days  Lab Units 12/06/17  0827 12/04/17  0401   MAGNESIUM mg/dL 2 2 2 3       Results from last 7 days  Lab Units 12/04/17  0401   PHOSPHORUS mg/dL 3 5            Results from last 7 days  Lab Units 12/10/17  1616   LACTIC ACID mmol/L 1 4       0  Lab Value Date/Time   TROPONINI <0 04 04/23/2015 2325       Imaging:  I have personally reviewed pertinent reports  Micro:  Lab Results   Component Value Date    BLOODCX No Growth After 5 Days  11/30/2017    BLOODCX No Growth After 5 Days  11/30/2017    BLOODCX No Growth After 5 Days  11/01/2017    BLOODCX No Growth After 5 Days   11/01/2017    URINECX No Growth <1000 cfu/mL 11/09/2017    URINECX >100,000 cfu/ml Mixed Contaminants X3 02/15/2016       Allergies: No Known Allergies    Medications:   Scheduled Meds:    acetaminophen 975 mg Oral Q8H   heparin (porcine) 5,000 Units Subcutaneous Q8H [facility]   iron polysaccharides 150 mg Oral Daily   oxybutynin 5 mg Oral TID   polyethylene glycol 17 g Oral Daily   senna-docusate sodium 1 tablet Oral HS   tamsulosin 0 4 mg Oral Daily With Dinner   tobramycin-dexamethasone 1 drop Ophthalmic Q6H [facility]     Continuous Infusions:    sodium chloride 84 mL/hr Last Rate: 84 mL/hr (12/10/17 9866)     PRN Meds:    HYDROmorphone 1 mg Q3H PRN   influenza vaccine 0 5 mL Prior to discharge   oxyCODONE 10 mg Q4H PRN   oxyCODONE 5 mg Q4H PRN   polyvinyl alcohol 1 drop Q3H PRN       VTE Pharmacologic Prophylaxis: Reason for no pharmacologic prophylaxis ruling out intraabdominal bleed  VTE Mechanical Prophylaxis: sequential compression device    Invasive lines and devices: Invasive Devices     Central Venous Catheter Line            CVC Central Lines 12/10/17 Triple Right Internal jugular less than 1 day          Peripheral Intravenous Line            Peripheral IV 12/09/17 Right;Upper Arm 1 day    Peripheral IV 12/10/17 Left Antecubital less than 1 day          Drain            Nephrostomy Right 1 10 2 Fr  31 days    Closed/Suction Drain Left Back 8 Fr  10 days    Closed/Suction Drain Left;Lateral Abdomen 8 Fr  10 days    Chest Tube Left 7 days    Urethral Catheter Coude 16 Fr  4 days                   Counseling / Coordination of Care  Total Critical Care time spent 20 minutes excluding procedures, teaching and family updates  Code Status: Level 1 - Full Code     Portions of the record may have been created with voice recognition software  Occasional wrong word or "sound a like" substitutions may have occurred due to the inherent limitations of voice recognition software  Read the chart carefully and recognize, using context, where substitutions have occurred       Deyanira Pablo MD

## 2017-12-11 NOTE — CASE MANAGEMENT
[address] [facility] in the [facility] by Parrish Barry for 2017  Network Utilization Review Department  Phone: [phone]; Fax [phone]  ATTENTION: The Network Utilization Review Department is now centralized for our 7 Facilities  Make a note that we have a new phone and fax numbers for our Department  Please call with any questions or concerns to [phone] and carefully follow the prompts so that you are directed to the right person  All voicemails are confidential  Fax any determinations, approvals, denials, and requests for initial or continue stay review clinical to [phone]  Due to HIGH CALL volume, it would be easier if you could please send faxed requests to expedite your requests and in part, help us provide discharge notifications faster      Continued Stay Review    Date: 12/11/17    Vital Signs: /71   Pulse 72   Temp 98 7 °F (37 1 °C) (Oral)   Resp 19   Ht 5' 6" (1 676 m)   Wt 70 2 kg (154 lb 12 2 oz)   SpO2 96%   BMI 24 98 kg/m²     Medications:   Scheduled Meds:   acetaminophen 975 mg Oral Q8H   heparin (porcine) 5,000 Units Subcutaneous Q8H [address]   iron polysaccharides 150 mg Oral Daily   oxybutynin 5 mg Oral TID   polyethylene glycol 17 g Oral Daily   senna-docusate sodium 1 tablet Oral HS   tamsulosin 0 4 mg Oral Daily With Dinner   tobramycin-dexamethasone 1 drop Ophthalmic Q6H [address]     Continuous Infusions:    PRN Meds: HYDROmorphone    influenza vaccine    oxyCODONE 5 mg x1 12/10, x1 12/11    oxyCODONE    polyvinyl alcohol    Abnormal Labs/Diagnostic Results:    Ref Range & Units 12/11/17 0510   WBC 4 31 - 10 16 Thousand/uL 16 27     RBC 3 88 - 5 62 Million/uL 3 17     Hemoglobin 12 0 - 17 0 g/dL 9 1     Hematocrit 36 5 - 49 3 % 28 1     RDW 11 6 - 15 1 % 15 5     MPV 8 9 - 12 7 fL 8 2     Platelets 627 - 486 Thousands/uL 849     Neutrophils Relative 43 - 75 % 78     Lymphocytes Relative 14 - 44 % 11     Neutrophils Absolute 1 85 - 7 62 Thousands/µL 12 62     Lymphocytes Absolute 0 60 - 4 47 Thousands/µL 1 79    Monocytes Absolute 0 17 - 1 22 Thousand/µL 1 39     12/11 CXR 1  Unchanged appearance of left-sided pleural effusion and left lower lobe atelectasis or pneumonia  2   No pneumothorax  3   Central line tip at cavoatrial junction    Age/Sex: 43 y o  male     Assessment/Plan:   12/9 Surgery Progress Note  43y o -year-old male with spontaneous left kidney rupture     - s/p ex lap, left nephrectomy, abthera 11/1 Zandra Huggins)  - s/p ex lap, removal of packs, ligation of left ureter, abdominal closure 11/2 Babak Carty)  - s/p right PCN w/ intraabdominal ascites samples 11/9 (IR)  - s/p 11/18 cystoscopy w/ fulguration of ureteral orifice, cystogram with injection of deflux, left retrograde pyelograam 11/18 Navarro Meeks)  - s/p nephrostogram, PCN capping 11/21 (IR)  - s/p IR tube check 11/27 (IR)  - s/p drainage of left upper quadrant fluid collection 11/30 (IR)  -s/p Left CT insertion 12/3 (Estevann Covert)      Plan:  - continue reg diet  - likely d/c CT today  - tobramycin and lubricating eye drops for conjuctivitis  - continue jiménez for bladder decompression  - oxybutynin for bladder spasms  - discuss w/urology and attending re: repeat imaging  - vanc/zosyn/fluconazole  - SQH/SCDs  - PT/OT  ________________________  12/9 Urology Progress Note  Mr Guille Rush is a 58-year-old male known to Dr Doreen Andrew from the outpatient setting  He apparently has a history of bilateral hydronephrosis and bilateral vesicoureteral reflux  He was last seen in the outpatient setting in April 2016  Since that time he underwent a damage control traumatic left nephrectomy  He return to the operating room with the left ureter was reportedly ligated  Postprocedure he developed urinary ascites requiring drainage  Cystogram was consistent with reflux    He was taken to the operating room by Dr Radha Dawn recently at the end of Nov where he underwent injection of Deflux into the left siva ureteral orifice  He also underwent fulguration of his left ureter at that time  After the procedure the patient did not appear to reflux up the left ureter  The patient also has a right nephrostomy tube in place at this time  When his Jiménez catheter was removed and the right nephrostomy tube was clamped, the patient appeared to leak more urine into his abdomen and subsequently underwent placement of intra-abdominal drained by interventional Radiology  His last CT scan was on November 30, 2017  I would consider repeating a CT scan  If there has been interval resolution of the intra-abdominal collections, I would consider another voiding trial with the right nephrostomy tube clamped  I would then maintain the intra-abdominal drains in place in continue to follow their output    If output picks up from the intra-abdominal drains with an elevated creatinine indicating persistent reflux and extravasation of urine from the left ureteral stump, I would then consider preperitoneal exploration with transvesical closure of the left ureteral orifice   _______________________________  12/10 Surgery Progress Note  Assessment:  43y o -year-old male with spontaneous left kidney rupture     - s/p ex lap, left nephrectomy, abthera 11/1 Damon Farfan)  - s/p ex lap, removal of packs, ligation of left ureter, abdominal closure 11/2 Alejandro Sanchez)  - s/p right PCN w/ intraabdominal ascites samples 11/9 (IR)  - s/p 11/18 cystoscopy w/ fulguration of ureteral orifice, cystogram with injection of deflux, left retrograde pyelograam 11/18 Desiree Angela)  - s/p nephrostogram, PCN capping 11/21 (IR)  - s/p IR tube check 11/27 (IR)  - s/p drainage of left upper quadrant fluid collection 11/30 (IR)  -s/p Left CT insertion 12/3 (Mile Ruvalcaba)      Plan:  - diet as tolerated  - f/u CT CAP today  - likely d/c chest tube following imaging  - if fluid collections improved, plan to cap nephrostomy and d/c jiménez  - if fluid collections persistent or worsen, will follow up with urology for surgical intervention  - OOB, ambulate  - DVT ppx  ____________________________  12/10 Arteriogram Procedure Note     PATIENT NAME: Ally Regalado  : 1975  MRN: [id number]     Pre-op Diagnosis:   1  Laceration of left kidney, initial encounter    2  Hx of unilateral nephrectomy    3  Other hydronephrosis    4  Severe sepsis (HCC)    5  Eye pain    6  Pleural effusion on left       Post-op Diagnosis:   1  Laceration of left kidney, initial encounter    2  Hx of unilateral nephrectomy    3  Other hydronephrosis    4  Severe sepsis (HCC)    5  Eye pain    6  Pleural effusion on left    ____________________________________  12/10 Critical Care Progress Note  Assessment: 37yo M with h/o L kidney laceration and nephrectomy on  with multiple drains, most recently he had IR embolization of a distal splenic artery because of a large splenic hematoma, was the ICU service was consulted       Plan:           Neuro: Patient's pain control prior to embolization included tylenol 975mg q8, dilaudid 1mg pq3 prn, oxycodone 10mg/5mg IR   Patient complaining of pain post-procedure but had not received his medications as they were on hold                   CV: No complaints at this time, patient has maintained BP's >120's, not tachy, MAP >93                 Lung: CTA bilaterally, no complaints of SOB, patient has Chest tube draining 40mL past 24hr for effusion, plan per surgery was to d/c tube tomorrow                   GI: Patient currently NPO post-procedure, will continue to monitor Hb q4 and will restart diet if normal in the morning                 FEN: unremarkable labs, most recent lactic 1 4, NS running at 84mL/hr                 : No complaints at this time, adequate UOP, 2,075 in past 24hrs                   ID: Patient on zosyn for abx prophylaxis, patient will need splenectomy immunizations                   Heme: Patient had low Hb before procedure, increased after procedure, will continue to check H&H q4hr overnight and resume am labs, will transfuse as needed                   Endo: Sugars have been normal, will continue to monitor                            Msk/Skin: Frequent turning and OOB as possible with PT/OT                 Disposition: Continued ICU stay, possible d/c to the floor tomorrow    ________________________________  12/11 Surgery Progress Note  Assessment:  43y o -year-old male with spontaneous left kidney rupture     - s/p ex lap, left nephrectomy, abthera 11/1 Cinda Rhiannon)  - s/p ex lap, removal of packs, ligation of left ureter, abdominal closure 11/2 Malcom Blackman)  - s/p right PCN w/ intraabdominal ascites samples 11/9 (IR)  - s/p 11/18 cystoscopy w/ fulguration of ureteral orifice, cystogram with injection of deflux, left retrograde pyelograam 11/18 Kleber Husain)  - s/p nephrostogram, PCN capping 11/21 (IR)  - s/p IR tube check 11/27 (IR)  - s/p drainage of left upper quadrant fluid collection 11/30 (IR)  -s/p Left CT insertion 12/3 (Providence Goldberg)   -s/p Splenic agram and embolization (IR)     Plan:  - diet as tolerated  - DC IVF  - check CXR today new AL appreciated this am   - continue jiménez and nephrostomy to drainage   - if fluid collections persistent or worsen, will follow up with urology for surgical intervention  - OOB, ambulate  - DVT ppx  _________________________________  12/11 Critical Care Progress Note  Assessment:   43y o -year-old male with spontaneous left kidney rupture     - s/p ex lap, left nephrectomy, abthera 11/1 Cinda Rhiannon)  - s/p ex lap, removal of packs, ligation of left ureter, abdominal closure 11/2 Rudolfo Hiral)  - s/p right PCN w/ intraabdominal ascites samples 11/9 (IR)  - s/p 11/18 cystoscopy w/ fulguration of ureteral orifice, cystogram with injection of deflux, left retrograde pyelograam 11/18 Kleber Husain)  - s/p nephrostogram, PCN capping 11/21 (IR)  - s/p IR tube check 11/27 (IR)  - s/p drainage of left upper quadrant fluid collection 11/30 (IR)  -s/p Left CT [facility]3 [facility]   -s/p Splenic agram and embolization (IR)        Plan:      Neuro:   Pain control- reasonably controlled             - Scheduled tylenol 975mg q8             - PRN dilauydid 1 mg q3             - Oxy 5/10 PRN     CV:   Hemodynamically stable, -150s  HR 70s        Lung: No SOB, on RA  Pleural effusion s/p L CT placement 12/3- 80 cc output overnight             - New AL seen this AM             - Will defer to surgery on timing of removal- was planned for today however new AL             - Obtain AM CXR      GI:   S/P IR splenic embolization- was kept NPO overnight s/p embolization             - Regular diet today             - Daily Hb             - Continue THANH x 2 to back  FEN:   Fluids- NS @ 84 while NPO, will discontinue  Electrolytes: K 4, Cr 1 03  Continue daily labs  Nutrition- restart regular diet as Hb stable      :   S/P L nephrectomy, R PCN, jiménez placement             - Continue current lines/tubes             - Urology following             - Francisco J Macias for bladder spasms             - Monitor Cr, UOP      ID:   S/P splenic embolization             - Will need splenic vaccinations before DC     Leukocyotsis             - Likely reactive, 16 3 today             - Check WBC daily     Heme:   S/P splenic embolization for large perisplenic colletion             - Hb 9 2>9 1>9 1             - Daily Hb check             - On SQH     Endo: Stable, continue to monitor BS                Msk/Skin: OOB w/ PT/OT     Disposition: Transfer to [place] per surgery     Chief Complaint: None     HPI/24hr events:   - States pain is "okay"  - Underwent IR embolization of spleen yesterday 12/10 for large perisplenic colletcion seen on CT  _________________________________  12/11 Urology Progress Note  CT scan from December 10, 2017 reviewed  Increasing subcapsular splenic collection consistent with hematoma identified  The patient returned to interventional Radiology for drainage   The fluid collection in the left nephrectomy bed is now notably improved from prior  The left ureteral stump is significantly decompressed as compared to prior, however, the patient has been managed with a Thakur catheter and nephrostomy tube so he has not been able to potentially reflux urine  At this time, I would maintain the Thakur catheter nephrostomy tube to gravity drainage until the patient has resolution of his splenic collection and need for his chest tube  I do not see the utility to be overly aggressive with a voiding trial and risk additional extravasation of urine while the patient has multiple other systemic issues occurring simultaneously  For now, maintain right nephrostomy tube and Thakur catheter to gravity  Discharge Plan: TBD between physicians and LCP

## 2017-12-12 LAB
ANION GAP SERPL CALCULATED.3IONS-SCNC: 7 MMOL/L (ref 4–13)
BUN SERPL-MCNC: 12 MG/DL (ref 5–25)
CALCIUM SERPL-MCNC: 9.2 MG/DL (ref 8.3–10.1)
CHLORIDE SERPL-SCNC: 104 MMOL/L (ref 100–108)
CO2 SERPL-SCNC: 27 MMOL/L (ref 21–32)
CREAT SERPL-MCNC: 1.13 MG/DL (ref 0.6–1.3)
ERYTHROCYTE [DISTWIDTH] IN BLOOD BY AUTOMATED COUNT: 15.7 % (ref 11.6–15.1)
GFR SERPL CREATININE-BSD FRML MDRD: 80 ML/MIN/1.73SQ M
GLUCOSE SERPL-MCNC: 84 MG/DL (ref 65–140)
HCT VFR BLD AUTO: 30.6 % (ref 36.5–49.3)
HGB BLD-MCNC: 9.8 G/DL (ref 12–17)
MCH RBC QN AUTO: 28.5 PG (ref 26.8–34.3)
MCHC RBC AUTO-ENTMCNC: 32 G/DL (ref 31.4–37.4)
MCV RBC AUTO: 89 FL (ref 82–98)
PLATELET # BLD AUTO: 867 THOUSANDS/UL (ref 149–390)
PMV BLD AUTO: 8.3 FL (ref 8.9–12.7)
POTASSIUM SERPL-SCNC: 4.2 MMOL/L (ref 3.5–5.3)
RBC # BLD AUTO: 3.44 MILLION/UL (ref 3.88–5.62)
SODIUM SERPL-SCNC: 138 MMOL/L (ref 136–145)
WBC # BLD AUTO: 14.81 THOUSAND/UL (ref 4.31–10.16)

## 2017-12-12 PROCEDURE — 85027 COMPLETE CBC AUTOMATED: CPT | Performed by: SURGERY

## 2017-12-12 PROCEDURE — 80048 BASIC METABOLIC PNL TOTAL CA: CPT | Performed by: SURGERY

## 2017-12-12 RX ADMIN — Medication 1 TABLET: at 21:07

## 2017-12-12 RX ADMIN — TOBRAMYCIN AND DEXAMETHASONE 1 DROP: 3; 1 SUSPENSION/ DROPS OPHTHALMIC at 18:49

## 2017-12-12 RX ADMIN — TAMSULOSIN HYDROCHLORIDE 0.4 MG: 0.4 CAPSULE ORAL at 15:49

## 2017-12-12 RX ADMIN — OXYBUTYNIN CHLORIDE 5 MG: 5 TABLET ORAL at 21:07

## 2017-12-12 RX ADMIN — HEPARIN SODIUM 5000 UNITS: 5000 INJECTION, SOLUTION INTRAVENOUS; SUBCUTANEOUS at 13:20

## 2017-12-12 RX ADMIN — HEPARIN SODIUM 5000 UNITS: 5000 INJECTION, SOLUTION INTRAVENOUS; SUBCUTANEOUS at 21:07

## 2017-12-12 RX ADMIN — OXYCODONE HYDROCHLORIDE 10 MG: 10 TABLET ORAL at 23:56

## 2017-12-12 RX ADMIN — ACETAMINOPHEN 975 MG: 325 TABLET ORAL at 09:20

## 2017-12-12 RX ADMIN — ACETAMINOPHEN 975 MG: 325 TABLET ORAL at 15:49

## 2017-12-12 RX ADMIN — TOBRAMYCIN AND DEXAMETHASONE 1 DROP: 3; 1 SUSPENSION/ DROPS OPHTHALMIC at 13:20

## 2017-12-12 RX ADMIN — OXYBUTYNIN CHLORIDE 5 MG: 5 TABLET ORAL at 09:20

## 2017-12-12 RX ADMIN — TOBRAMYCIN AND DEXAMETHASONE 1 DROP: 3; 1 SUSPENSION/ DROPS OPHTHALMIC at 06:04

## 2017-12-12 RX ADMIN — TOBRAMYCIN AND DEXAMETHASONE 1 DROP: 3; 1 SUSPENSION/ DROPS OPHTHALMIC at 23:53

## 2017-12-12 RX ADMIN — HEPARIN SODIUM 5000 UNITS: 5000 INJECTION, SOLUTION INTRAVENOUS; SUBCUTANEOUS at 06:04

## 2017-12-12 RX ADMIN — Medication 150 MG: at 09:20

## 2017-12-12 RX ADMIN — ACETAMINOPHEN 975 MG: 325 TABLET ORAL at 23:53

## 2017-12-12 RX ADMIN — OXYCODONE HYDROCHLORIDE 10 MG: 10 TABLET ORAL at 09:21

## 2017-12-12 RX ADMIN — OXYBUTYNIN CHLORIDE 5 MG: 5 TABLET ORAL at 15:49

## 2017-12-12 NOTE — SOCIAL WORK
Cm reviewed patient during care coordination rounds  Patient is not medically stable for d/c today  Patient to maintain drains and chest tube  Patient to d/c back to [facility] when medically stable

## 2017-12-12 NOTE — PLAN OF CARE
DISCHARGE PLANNING     Discharge to home or other facility with appropriate resources Progressing        DISCHARGE PLANNING - CARE MANAGEMENT     Discharge to post-acute care or home with appropriate resources Progressing        GENITOURINARY - ADULT     Maintains or returns to baseline urinary function Progressing     Absence of urinary retention Progressing     Urinary catheter remains patent Progressing        INFECTION - ADULT     Absence or prevention of progression during hospitalization Progressing        Knowledge Deficit     Patient/family/caregiver demonstrates understanding of disease process, treatment plan, medications, and discharge instructions Progressing        METABOLIC, FLUID AND ELECTROLYTES - ADULT     Electrolytes maintained within normal limits Progressing     Fluid balance maintained Progressing     Glucose maintained within target range Progressing        Nutrition/Hydration-ADULT     Nutrient/Hydration intake appropriate for improving, restoring or maintaining nutritional needs Progressing        PAIN - ADULT     Verbalizes/displays adequate comfort level or baseline comfort level Progressing        Potential for Falls     Patient will remain free of falls Progressing        Prexisting or High Potential for Compromised Skin Integrity     Skin integrity is maintained or improved Progressing        SAFETY ADULT     Patient will remain free of falls Progressing     Maintain or return to baseline ADL function Progressing     Maintain or return mobility status to optimal level Progressing        SKIN/TISSUE INTEGRITY - ADULT     Skin integrity remains intact Progressing     Incision(s), wounds(s) or drain site(s) healing without S/S of infection Progressing     Oral mucous membranes remain intact Progressing

## 2017-12-12 NOTE — PROGRESS NOTES
Progress Note - General Surgery   Suzy Mayo 43 y o  male MRN: [id number]  Unit/Bed#: [place] 925-01 Encounter: [id number]    Assessment:  43y o -year-old male with spontaneous left kidney rupture     - s/p ex lap, left nephrectomy, abthera 11/1 McNeal Hind)  - s/p ex lap, removal of packs, ligation of left ureter, abdominal closure 11/2 Caresse Oar)  - s/p right PCN w/ intraabdominal ascites samples 11/9 (IR)  - s/p 11/18 cystoscopy w/ fulguration of ureteral orifice, cystogram with injection of deflux, left retrograde pyelograam 11/18 Tasha Allegra)  - s/p nephrostogram, PCN capping 11/21 (IR)  - s/p IR tube check 11/27 (IR)  - s/p drainage of left upper quadrant fluid collection 11/30 (IR)  -s/p Left CT insertion 12/3 (Doyal Parody)   -s/p Splenic agram and embolization (IR)    Plan:  - regular diet  - continue perc nephrostomy to drainage per urology  - continue jiménez catheter per urology  - keep chest tube for today  - f/u post pull CXR  - OOB, ambulate  - analgesia  - splenic vaccines prior to d/c  - DVT ppx -- [facility]    Subjective/Objective     Subjective: Patient denies pain  Continues to have a small air leak with coughing  Objective:     Vitals: Blood pressure 133/73, pulse 70, temperature 98 °F (36 7 °C), temperature source Oral, resp  rate 18, height 5' 6" (1 676 m), weight 70 2 kg (154 lb 12 2 oz), SpO2 99 %  ,Body mass index is 24 98 kg/m²  I/O       12/10 0701 - 12/11 0700 12/11 0701 - 12/12 0700    P  O  120 120    I V  (mL/kg) 910 4 (13)     Total Intake(mL/kg) 1030 4 (14 7) 120 (1 7)    Urine (mL/kg/hr) 2975 (1 8) 1975 (1 2)    Drains 15 (0) 25 (0)    Chest Tube 80 (0) 35 (0)    Total Output 3070 2035    Net -[phone]                Physical Exam:  GEN: NAD  HEENT: MMM  CV: RRR  Lung: Normal effort, CT to sxn, + air leak  Ab: Soft, NT/ND  Extrem: No CCE  Neuro:  A+Ox3    Lab, Imaging and other studies: CBC with diff: No results found for: WBC, HGB, HCT, MCV, PLT, ADJUSTEDWBC, MCH, MCHC, RDW, MPV, NRBC, BMP/CMP: No results found for: NA, K, CL, CO2, ANIONGAP, BUN, CREATININE, GLUCOSE, CALCIUM, AST, ALT, ALKPHOS, PROT, ALBUMIN, BILITOT, EGFR, Magnesium: No components found for: MAG  VTE Pharmacologic Prophylaxis: Heparin  VTE Mechanical Prophylaxis: sequential compression device

## 2017-12-12 NOTE — CASE MANAGEMENT
[address] [facility] in the [facility] by Parrish Barry for 2017  Network Utilization Review Department  Phone: [phone]; Fax [phone]  ATTENTION: The Network Utilization Review Department is now centralized for our 7 Facilities  Make a note that we have a new phone and fax numbers for our Department  Please call with any questions or concerns to [phone] and carefully follow the prompts so that you are directed to the right person  All voicemails are confidential  Fax any determinations, approvals, denials, and requests for initial or continue stay review clinical to [phone]  Due to HIGH CALL volume, it would be easier if you could please send faxed requests to expedite your requests and in part, help us provide discharge notifications faster      Continued Stay Review    Date: 12/12/17    Vital Signs: /66   Pulse 93   Temp 98 2 °F (36 8 °C) (Oral)   Resp 16   Ht 5' 6" (1 676 m)   Wt 64 3 kg (141 lb 12 1 oz)   SpO2 98%   BMI 22 88 kg/m²     Medications:   Scheduled Meds:   acetaminophen 975 mg Oral Q8H   heparin (porcine) 5,000 Units Subcutaneous Q8H [address]   iron polysaccharides 150 mg Oral Daily   oxybutynin 5 mg Oral TID   polyethylene glycol 17 g Oral Daily   senna-docusate sodium 1 tablet Oral HS   tamsulosin 0 4 mg Oral Daily With Dinner   tobramycin-dexamethasone 1 drop Ophthalmic Q6H [address]     PRN Meds: haemophilus B vaccine, tetanus toxoid conjugate    HYDROmorphone    influenza vaccine    meningococcal quadrivalent conjugated vaccine    oxyCODONE x1    pneumococcal 13-valent conjugate vaccine    polyvinyl alcohol    Abnormal Labs/Diagnostic Results:   WBC 4 31 - 10 16 Thousand/uL 14 81     RBC 3 88 - 5 62 Million/uL 3 44     Hemoglobin 12 0 - 17 0 g/dL 9 8     Hematocrit 36 5 - 49 3 % 30 6     MCV 82 - 98 fL 89    MCH 26 8 - 34 3 pg 28 5    MCHC 31 4 - 37 4 g/dL 32 0    RDW 11 6 - 15 1 % 15 7     Platelets 302 - 747 Thousands/uL 867 MPV 8 9 - 12 7 fL 8 3       Age/Sex: 43 y o  male     Assessment/Plan:   12/12 Surgery Progress Note  43y o -year-old male with spontaneous left kidney rupture     - s/p ex lap, left nephrectomy, abthera 11/1 Niantic Hind)  - s/p ex lap, removal of packs, ligation of left ureter, abdominal closure 11/2 Caresse Oar)  - s/p right PCN w/ intraabdominal ascites samples 11/9 (IR)  - s/p 11/18 cystoscopy w/ fulguration of ureteral orifice, cystogram with injection of deflux, left retrograde pyelograam 11/18 Tasha Allegra)  - s/p nephrostogram, PCN capping 11/21 (IR)  - s/p IR tube check 11/27 (IR)  - s/p drainage of left upper quadrant fluid collection 11/30 (IR)  -s/p Left CT insertion 12/3 (Doyal Parody)   -s/p Splenic agram and embolization (IR)     Plan:  - regular diet  - continue perc nephrostomy to drainage per urology  - continue jiménez catheter per urology  - keep chest tube for today  - f/u post pull CXR  - OOB, ambulate  - analgesia  - splenic vaccines prior to d/c  - DVT ppx -- [facility]     Discharge Plan: TBD

## 2017-12-12 NOTE — PHYSICAL THERAPY NOTE
PT CANCEL    Pt CURRENTLY WITH [facility]EDREST ORDERS  WILL FOLLOW UP AS MEDICALLY APPROPRIATE  REQUESTED IN PHYSICIAN "STICKY NOTE" FOR UPDATED ORDERS      Margaret Hyatt, PT

## 2017-12-13 LAB
ANION GAP SERPL CALCULATED.3IONS-SCNC: 6 MMOL/L (ref 4–13)
BASOPHILS # BLD AUTO: 0.01 THOUSANDS/ΜL (ref 0–0.1)
BASOPHILS NFR BLD AUTO: 0 % (ref 0–1)
BUN SERPL-MCNC: 14 MG/DL (ref 5–25)
CALCIUM SERPL-MCNC: 9.5 MG/DL (ref 8.3–10.1)
CHLORIDE SERPL-SCNC: 102 MMOL/L (ref 100–108)
CO2 SERPL-SCNC: 27 MMOL/L (ref 21–32)
CREAT SERPL-MCNC: 1.17 MG/DL (ref 0.6–1.3)
EOSINOPHIL # BLD AUTO: 0.36 THOUSAND/ΜL (ref 0–0.61)
EOSINOPHIL NFR BLD AUTO: 3 % (ref 0–6)
ERYTHROCYTE [DISTWIDTH] IN BLOOD BY AUTOMATED COUNT: 15.6 % (ref 11.6–15.1)
GFR SERPL CREATININE-BSD FRML MDRD: 76 ML/MIN/1.73SQ M
GLUCOSE SERPL-MCNC: 96 MG/DL (ref 65–140)
HCT VFR BLD AUTO: 31.9 % (ref 36.5–49.3)
HGB BLD-MCNC: 10.3 G/DL (ref 12–17)
LYMPHOCYTES # BLD AUTO: 2 THOUSANDS/ΜL (ref 0.6–4.47)
LYMPHOCYTES NFR BLD AUTO: 15 % (ref 14–44)
MCH RBC QN AUTO: 28.7 PG (ref 26.8–34.3)
MCHC RBC AUTO-ENTMCNC: 32.3 G/DL (ref 31.4–37.4)
MCV RBC AUTO: 89 FL (ref 82–98)
MONOCYTES # BLD AUTO: 1.23 THOUSAND/ΜL (ref 0.17–1.22)
MONOCYTES NFR BLD AUTO: 10 % (ref 4–12)
NEUTROPHILS # BLD AUTO: 9.32 THOUSANDS/ΜL (ref 1.85–7.62)
NEUTS SEG NFR BLD AUTO: 72 % (ref 43–75)
NRBC BLD AUTO-RTO: 0 /100 WBCS
PLATELET # BLD AUTO: 887 THOUSANDS/UL (ref 149–390)
PMV BLD AUTO: 8.5 FL (ref 8.9–12.7)
POTASSIUM SERPL-SCNC: 4.2 MMOL/L (ref 3.5–5.3)
RBC # BLD AUTO: 3.59 MILLION/UL (ref 3.88–5.62)
SODIUM SERPL-SCNC: 135 MMOL/L (ref 136–145)
WBC # BLD AUTO: 13 THOUSAND/UL (ref 4.31–10.16)

## 2017-12-13 PROCEDURE — 85025 COMPLETE CBC W/AUTO DIFF WBC: CPT | Performed by: SURGERY

## 2017-12-13 PROCEDURE — 80048 BASIC METABOLIC PNL TOTAL CA: CPT | Performed by: SURGERY

## 2017-12-13 RX ADMIN — HEPARIN SODIUM 5000 UNITS: 5000 INJECTION, SOLUTION INTRAVENOUS; SUBCUTANEOUS at 06:52

## 2017-12-13 RX ADMIN — HEPARIN SODIUM 5000 UNITS: 5000 INJECTION, SOLUTION INTRAVENOUS; SUBCUTANEOUS at 21:22

## 2017-12-13 RX ADMIN — Medication 150 MG: at 09:25

## 2017-12-13 RX ADMIN — TOBRAMYCIN AND DEXAMETHASONE 1 DROP: 3; 1 SUSPENSION/ DROPS OPHTHALMIC at 06:50

## 2017-12-13 RX ADMIN — ACETAMINOPHEN 975 MG: 325 TABLET ORAL at 09:25

## 2017-12-13 RX ADMIN — TAMSULOSIN HYDROCHLORIDE 0.4 MG: 0.4 CAPSULE ORAL at 17:47

## 2017-12-13 RX ADMIN — HEPARIN SODIUM 5000 UNITS: 5000 INJECTION, SOLUTION INTRAVENOUS; SUBCUTANEOUS at 14:17

## 2017-12-13 RX ADMIN — TOBRAMYCIN AND DEXAMETHASONE 1 DROP: 3; 1 SUSPENSION/ DROPS OPHTHALMIC at 12:45

## 2017-12-13 RX ADMIN — OXYBUTYNIN CHLORIDE 5 MG: 5 TABLET ORAL at 17:47

## 2017-12-13 RX ADMIN — OXYBUTYNIN CHLORIDE 5 MG: 5 TABLET ORAL at 20:42

## 2017-12-13 RX ADMIN — TOBRAMYCIN AND DEXAMETHASONE 1 DROP: 3; 1 SUSPENSION/ DROPS OPHTHALMIC at 17:47

## 2017-12-13 RX ADMIN — ACETAMINOPHEN 975 MG: 325 TABLET ORAL at 17:47

## 2017-12-13 RX ADMIN — OXYBUTYNIN CHLORIDE 5 MG: 5 TABLET ORAL at 09:25

## 2017-12-13 NOTE — PROGRESS NOTES
Progress Note - General Surgery   Duane Gray 43 y o  male MRN: [id number]  Unit/Bed#: [facility] 925-01 Encounter: [id number]    Assessment:  43y o -year-old male with spontaneous left kidney rupture     - s/p ex lap, left nephrectomy, abthera 11/1 Damon Farfan)  - s/p ex lap, removal of packs, ligation of left ureter, abdominal closure 11/2 Alejandro Sanchez)  - s/p right PCN w/ intraabdominal ascites samples 11/9 (IR)  - s/p 11/18 cystoscopy w/ fulguration of ureteral orifice, cystogram with injection of deflux, left retrograde pyelograam 11/18 Desiree Angela)  - s/p nephrostogram, PCN capping 11/21 (IR)  - s/p IR tube check 11/27 (IR)  - s/p drainage of left upper quadrant fluid collection 11/30 (IR)  -s/p Left CT insertion 12/3 (Mile Snide)   -s/p Splenic agram and embolization (IR)    Plan:  - diet as tolerated  - continue jiménez and perc nephrostomy  - CT to sxn  - OOB, ambulate  - f/u PT/OT  - splenic vaccines prior to d/c  - DVT ppx -- [facility]    Subjective/Objective     Subjective: Patient feels well  No complaints  Objective:     Vitals: Blood pressure 139/87, pulse 80, temperature 97 9 °F (36 6 °C), temperature source Oral, resp  rate 18, height 5' 6" (1 676 m), weight 63 6 kg (140 lb 3 4 oz), SpO2 97 %  ,Body mass index is 22 63 kg/m²  I/O       12/11 0701 - 12/12 0700 12/12 0701 - 12/13 0700 12/13 0701 - 12/14 0700    P  O  495 1175     I V  (mL/kg)       Total Intake(mL/kg) 495 (7 7) 1175 (18 5)     Urine (mL/kg/hr) 2200 (1 4) 1450 (0 9)     Drains 25 (0) 5 (0)     Chest Tube 45 (0) 135 (0 1)     Total Output 2270 1590      Net -1773 -415                   Physical Exam:  GEN: NAD  HEENT: MMM  CV: RRR  Lung: Normal effort, CT to sxn, no air leak  Ab: Soft, NT/ND  Extrem: No CCE  Neuro:  A+Ox3    Lab, Imaging and other studies:   CBC with diff:   Lab Results   Component Value Date    WBC 13 00 (H) 12/13/2017    HGB 10 3 (L) 12/13/2017    HCT 31 9 (L) 12/13/2017    MCV 89 12/13/2017     (H) 12/13/2017    MCH 28 7 12/13/2017 MCHC 32 3 12/13/2017    RDW 15 6 (H) 12/13/2017    MPV 8 5 (L) 12/13/2017    NRBC 0 12/13/2017   , BMP/CMP:   Lab Results   Component Value Date     (L) 12/13/2017    K 4 2 12/13/2017     12/13/2017    CO2 27 12/13/2017    ANIONGAP 6 12/13/2017    BUN 14 12/13/2017    CREATININE 1 17 12/13/2017    GLUCOSE 96 12/13/2017    CALCIUM 9 5 12/13/2017    EGFR 76 12/13/2017   , Magnesium: No components found for: MAG  VTE Pharmacologic Prophylaxis: Heparin  VTE Mechanical Prophylaxis: sequential compression device

## 2017-12-13 NOTE — PHYSICAL THERAPY NOTE
PT PROGRESS  Pt WITH LOS OF 41 DAYS AT THIS TIME  CURRENTLY, Pt WITH ACTIVITY AS TOLERATED ORDERS AND CHEST TUBE TO SUCTION  Pt APPROPRIATE FOR OOB IN CHAIR WITH NURSING STAFF  WILL HOLD RE-EVAL UNTIL PATIENT ABLE TO HAVE CHEST TUBE TO WATER SEAL   THEN, WILL RE-EVAL AND FOLLOW AS APPROPRIATE,    Afsaneh Sosa, PT

## 2017-12-13 NOTE — CASE MANAGEMENT
[address] [facility] in the [facility] by Parrish Barry for 2017  Network Utilization Review Department  Phone: [phone]; Fax [phone]  ATTENTION: The Network Utilization Review Department is now centralized for our 7 Facilities  Make a note that we have a new phone and fax numbers for our Department  Please call with any questions or concerns to [phone] and carefully follow the prompts so that you are directed to the right person  All voicemails are confidential  Fax any determinations, approvals, denials, and requests for initial or continue stay review clinical to [phone]  Due to HIGH CALL volume, it would be easier if you could please send faxed requests to expedite your requests and in part, help us provide discharge notifications faster      Continued Stay Review    Date: 12/13/17    Vital Signs: /87   Pulse 80   Temp 97 9 °F (36 6 °C) (Oral)   Resp 18   Ht 5' 6" (1 676 m)   Wt 63 6 kg (140 lb 3 4 oz)   SpO2 97%   BMI 22 63 kg/m²     Medications:   Scheduled Meds:   acetaminophen 975 mg Oral Q8H   heparin (porcine) 5,000 Units Subcutaneous Q8H [address]   iron polysaccharides 150 mg Oral Daily   oxybutynin 5 mg Oral TID   polyethylene glycol 17 g Oral Daily   senna-docusate sodium 1 tablet Oral HS   tamsulosin 0 4 mg Oral Daily With Dinner   tobramycin-dexamethasone 1 drop Ophthalmic Q6H [address]     PRN Meds: haemophilus B vaccine, tetanus toxoid conjugate    HYDROmorphone    influenza vaccine    meningococcal quadrivalent conjugated vaccine    oxyCODONE    oxyCODONE    pneumococcal 13-valent conjugate vaccine    polyvinyl alcohol    Abnormal Labs/Diagnostic Results:   Na 135  WBC 4 31 - 10 16 Thousand/uL 13 00     RBC 3 88 - 5 62 Million/uL 3 59     Hemoglobin 12 0 - 17 0 g/dL 10 3     Hematocrit 36 5 - 49 3 % 31 9     RDW 11 6 - 15 1 % 15 6     MPV 8 9 - 12 7 fL 8 5     Platelets 570 - 506 Thousands/uL 887     Neutrophils Absolute 1 85 - 7 62 Thousands/µL 9 32     Monocytes Absolute 0 17 - 1 22 Thousand/µL 1 23       Age/Sex: 43 y o  male     Assessment/Plan:   43y o -year-old male with spontaneous left kidney rupture     - s/p ex lap, left nephrectomy, abthera 11/1 Nimco Garcia)  - s/p ex lap, removal of packs, ligation of left ureter, abdominal closure 11/2 Wilber Myers)  - s/p right PCN w/ intraabdominal ascites samples 11/9 (IR)  - s/p 11/18 cystoscopy w/ fulguration of ureteral orifice, cystogram with injection of deflux, left retrograde pyelograam 11/18 Casey Herron)  - s/p nephrostogram, PCN capping 11/21 (IR)  - s/p IR tube check 11/27 (IR)  - s/p drainage of left upper quadrant fluid collection 11/30 (IR)  -s/p Left CT insertion 12/3 (Alisson Ochoa)   -s/p Splenic agram and embolization (IR)     Plan:  - diet as tolerated  - continue jiménez and perc nephrostomy  - CT to sxn  - OOB, ambulate  - f/u PT/OT  - splenic vaccines prior to d/c  - DVT ppx -- [facility]    Discharge Plan: TBD

## 2017-12-14 ENCOUNTER — APPOINTMENT (INPATIENT)
Dept: RADIOLOGY | Facility: HOSPITAL | Age: 42
DRG: 659 | End: 2017-12-14
Payer: COMMERCIAL

## 2017-12-14 PROCEDURE — 71010 HB CHEST X-RAY 1 VIEW FRONTAL: CPT

## 2017-12-14 RX ADMIN — OXYBUTYNIN CHLORIDE 5 MG: 5 TABLET ORAL at 16:33

## 2017-12-14 RX ADMIN — HEPARIN SODIUM 5000 UNITS: 5000 INJECTION, SOLUTION INTRAVENOUS; SUBCUTANEOUS at 05:37

## 2017-12-14 RX ADMIN — ACETAMINOPHEN 975 MG: 325 TABLET ORAL at 10:32

## 2017-12-14 RX ADMIN — OXYBUTYNIN CHLORIDE 5 MG: 5 TABLET ORAL at 10:31

## 2017-12-14 RX ADMIN — TAMSULOSIN HYDROCHLORIDE 0.4 MG: 0.4 CAPSULE ORAL at 18:48

## 2017-12-14 RX ADMIN — HEPARIN SODIUM 5000 UNITS: 5000 INJECTION, SOLUTION INTRAVENOUS; SUBCUTANEOUS at 13:30

## 2017-12-14 RX ADMIN — TOBRAMYCIN AND DEXAMETHASONE 1 DROP: 3; 1 SUSPENSION/ DROPS OPHTHALMIC at 13:30

## 2017-12-14 RX ADMIN — TOBRAMYCIN AND DEXAMETHASONE 1 DROP: 3; 1 SUSPENSION/ DROPS OPHTHALMIC at 18:49

## 2017-12-14 RX ADMIN — HEPARIN SODIUM 5000 UNITS: 5000 INJECTION, SOLUTION INTRAVENOUS; SUBCUTANEOUS at 22:05

## 2017-12-14 RX ADMIN — ACETAMINOPHEN 975 MG: 325 TABLET ORAL at 02:10

## 2017-12-14 RX ADMIN — HYDROMORPHONE HYDROCHLORIDE 1 MG: 1 INJECTION, SOLUTION INTRAMUSCULAR; INTRAVENOUS; SUBCUTANEOUS at 15:27

## 2017-12-14 RX ADMIN — OXYBUTYNIN CHLORIDE 5 MG: 5 TABLET ORAL at 22:04

## 2017-12-14 RX ADMIN — Medication 150 MG: at 10:31

## 2017-12-14 RX ADMIN — TOBRAMYCIN AND DEXAMETHASONE 1 DROP: 3; 1 SUSPENSION/ DROPS OPHTHALMIC at 02:33

## 2017-12-14 NOTE — CASE MANAGEMENT
[address] [facility] in the [facility] by Parrish Barry for 2017  Network Utilization Review Department  Phone: [phone]; Fax [phone]  ATTENTION: The Network Utilization Review Department is now centralized for our 7 Facilities  Make a note that we have a new phone and fax numbers for our Department  Please call with any questions or concerns to [phone] and carefully follow the prompts so that you are directed to the right person  All voicemails are confidential  Fax any determinations, approvals, denials, and requests for initial or continue stay review clinical to [phone]  Due to HIGH CALL volume, it would be easier if you could please send faxed requests to expedite your requests and in part, help us provide discharge notifications faster  Continued Stay Review    Date:  12/14/17 Thursday ACUTE MED SURG LEVEL OF CARE     Vital Signs: /74   Pulse 65   Temp 98 °F (36 7 °C) (Oral)   Resp 18   Ht 5' 6" (1 676 m)   Wt 62 4 kg (137 lb 9 1 oz)   SpO2 98%   BMI 22 20 kg/m²      Vitals: Blood pressure 133/81, pulse 78, temperature 98 7 °F (37 1 °C), temperature source Oral, resp  rate 18, height 5' 6" (1 676 m), weight 62 4 kg (137 lb 9 1 oz), SpO2 98 %  ,Body mass index is 22 2 kg/m²             I/O        12/11 0701 - 12/12 0700 12/12 0701 - 12/13 0700 12/13 0701 - 12/14 0700     P O  495 1175       I V  (mL/kg)           Total Intake(mL/kg) 495 (7 7) 1175 (18 5)       Urine (mL/kg/hr) 2200 (1 4) 1450 (0 9)       Drains 25 (0) 5 (0)       Chest Tube 45 (0) 135 (0 1)       Total Output 2270 1590       Net -1752 -717       Diet Regular; Regular House     Dietary nutrition supplements Ensure Enlive Daaily with Breakfast    IV ACCESS    Medications:   Scheduled Meds:   acetaminophen 975 mg Oral Q8H   heparin (porcine) 5,000 Units Subcutaneous Q8H [facility]   iron polysaccharides 150 mg Oral Daily   oxybutynin 5 mg Oral TID   polyethylene glycol 17 g Oral Daily   senna-docusate sodium 1 tablet Oral HS   tamsulosin 0 4 mg Oral Daily With Dinner   tobramycin-dexamethasone 1 drop Ophthalmic Q6H [facility]     PRN Meds:     HYDROmorphone    influenza vaccine    meningococcal quadrivalent conjugated vaccine    oxyCODONE    polyvinyl alcohol    LABS/Diagnostic Results:   CBC with diff:         Lab Results   Component Value Date     WBC 13 00 (H) 12/13/2017     HGB 10 3 (L) 12/13/2017     HCT 31 9 (L) 12/13/2017     MCV 89 12/13/2017      (H) 12/13/2017     MCH 28 7 12/13/2017     MCHC 32 3 12/13/2017     RDW 15 6 (H) 12/13/2017     MPV 8 5 (L) 12/13/2017     NRBC 0 12/13/2017     CMP:         Lab Results   Component Value Date      (L) 12/13/2017     K 4 2 12/13/2017      12/13/2017     CO2 27 12/13/2017     ANIONGAP 6 12/13/2017     BUN 14 12/13/2017     CREATININE 1 17 12/13/2017     GLUCOSE 96 12/13/2017     CALCIUM 9 5 12/13/2017     EGFR 76 12/13/2017       Age/Sex: 43 y o  male     Assessment/ Plan:  Assessment:  43y o -year-old male with spontaneous left kidney rupture   - s/p ex lap, left nephrectomy, abthera 11/1 Chelly Hooper)  - s/p ex lap, removal of packs, ligation of left ureter, abdominal closure 11/2 (Real Starling)  - s/p right PCN w/ intraabdominal ascites samples 11/9 (IR)  - s/p 11/18 cystoscopy w/ fulguration of ureteral orifice, cystogram with injection of deflux, left retrograde pyelograam 11/18 Goetz Wallace)  - s/p nephrostogram, PCN capping 11/21 (IR)  - s/p IR tube check 11/27 (IR)  - s/p drainage of left upper quadrant fluid collection 11/30 (IR)  -s/p Left CT insertion 12/3 (Real Starling)   -s/p Splenic agram and embolization (IR)     Plan:  - diet as tolerated  - continue jiménez and perc nephrostomy  - CT to waterseal for 6 hours today & then possibly pull   - plan to d/c left THANH drains tomorrow   - OOB, ambulate  - f/u PT/OT  - splenic vaccines prior to d/c  - DVT ppx -- H        Discharge Plan:   ANTICIPATE DISCHARGE BACK TO [facility] senior living  WHEN MEDICALLY CLEARED    CASE MANAGEMENT FOLLOWING CLOSELY FOR ALL DISCHARGE NEEDS

## 2017-12-14 NOTE — PROGRESS NOTES
Red Surgery Procedure / Intervention Note    Patient's left-sided chest tube was pulled at bedside  Insertion site was covered with petroleum gauze, 4 x 4 and Tegaderm     The patient tolerated the procedure well and there were no complications    Post pull chest x-ray is pending    Jannie Peters  4:00 PM  12/14/17

## 2017-12-14 NOTE — PROGRESS NOTES
Progress Note - General Surgery   Duane Gray 43 y o  male MRN: [id number]  Unit/Bed#: [facility] 925-01 Encounter: [id number]    Assessment:  43y o -year-old male with spontaneous left kidney rupture     - s/p ex lap, left nephrectomy, abthera 11/1 Damon Farfan)  - s/p ex lap, removal of packs, ligation of left ureter, abdominal closure 11/2 Alejandro Sanchez)  - s/p right PCN w/ intraabdominal ascites samples 11/9 (IR)  - s/p 11/18 cystoscopy w/ fulguration of ureteral orifice, cystogram with injection of deflux, left retrograde pyelograam 11/18 Desiree Angela)  - s/p nephrostogram, PCN capping 11/21 (IR)  - s/p IR tube check 11/27 (IR)  - s/p drainage of left upper quadrant fluid collection 11/30 (IR)  -s/p Left CT insertion 12/3 (Mile Snide)   -s/p Splenic agram and embolization (IR)    Plan:  - diet as tolerated  - continue jiménez and perc nephrostomy  - CT to waterseal for 6 hours today & then possibly pull   - plan to d/c left THANH drains tomorrow   - OOB, ambulate  - f/u PT/OT  - splenic vaccines prior to d/c  - DVT ppx -- [facility]    Subjective/Objective     Subjective: Patient feels well  No complaints  Eating well - passing gas & having BMs - denies SOB or chest / abd pain       Objective:     Vitals: Blood pressure 133/81, pulse 78, temperature 98 7 °F (37 1 °C), temperature source Oral, resp  rate 18, height 5' 6" (1 676 m), weight 62 4 kg (137 lb 9 1 oz), SpO2 98 %  ,Body mass index is 22 2 kg/m²  I/O       12/11 0701 - 12/12 0700 12/12 0701 - 12/13 0700 12/13 0701 - 12/14 0700    P  O  495 1175     I V  (mL/kg)       Total Intake(mL/kg) 495 (7 7) 1175 (18 5)     Urine (mL/kg/hr) 2200 (1 4) 1450 (0 9)     Drains 25 (0) 5 (0)     Chest Tube 45 (0) 135 (0 1)     Total Output 2270 1590      Net -7952 -656                   Physical Exam:  GEN: NAD  HEENT: MMM  CV: RRR  Lung: Normal effort, CT to sxn, no air leak  Ab: Soft, NT/ND  L drain 1 : serous   L drain 2 : bloody   Extrem: No CCE  Neuro:  A+Ox3    Lab, Imaging and other studies:   CBC with diff:   No results found for: WBC, HGB, HCT, MCV, PLT, ADJUSTEDWBC, MCH, MCHC, RDW, MPV, NRBC, BMP/CMP:   No results found for: NA, K, CL, CO2, ANIONGAP, BUN, CREATININE, GLUCOSE, CALCIUM, AST, ALT, ALKPHOS, PROT, ALBUMIN, BILITOT, EGFR, Magnesium: No components found for: MAG  VTE Pharmacologic Prophylaxis: Heparin  VTE Mechanical Prophylaxis: sequential compression device

## 2017-12-14 NOTE — SOCIAL WORK
Cm reviewed patient during care coordination rounds  Patient is not medically stable for d/c today  When stable, patient to return to long-term

## 2017-12-14 NOTE — PROGRESS NOTES
Vascular and Interventional Radiology brief note    Request today by surgical service to remove the patient's left drainage catheters  Patient has had minimal output from the catheters  Both catheters are likely placed within splenic hematoma  Discussed the scenario with Bryant Gao MD   After our discussion, It is my understanding that the patient is being kept in the hospital until next mid week  There are plans to remove the patient's chest tube in the next few days  In order to return to his prior facility, the patient may not have drains in place  The patient is going to be allowed to be discharged with a nephrostomy tube in place if it is capped, and there is an arrangement by Urology to complete a capping trial early next week  The limited drainage from both percutaneous catheters is likely related to the fact there placed within hematoma that has not yet liquified  This hematoma, especially given its size, is at increased risk for superinfection which will require drainage  Recommend that the drainage catheters be left in place until the patient is ready for discharge  Agree that the patient should not be discharged with these drainage catheters in place back to his facility given the safety of his environment  Concern for removing the drains too early is the risk of super infection that would only require repeat placement of the drain  If the patient is truly to be discharged next week, would recommend the drainage catheters be left in place for now  If however the patient is to be discharged in the next 24 hours, then would recommend removing the catheters to prevent exposing them to his facilities environment  This would be done with the understanding that he is at increased risk for superinfection of the large splenic hematoma, potentially requiring reintervention  The plan and recommendations were discussed in detail with Dr Bryant Gao

## 2017-12-15 ENCOUNTER — APPOINTMENT (INPATIENT)
Dept: RADIOLOGY | Facility: HOSPITAL | Age: 42
DRG: 659 | End: 2017-12-15
Payer: COMMERCIAL

## 2017-12-15 LAB
BASOPHILS # BLD AUTO: 0.02 THOUSANDS/ΜL (ref 0–0.1)
BASOPHILS NFR BLD AUTO: 0 % (ref 0–1)
EOSINOPHIL # BLD AUTO: 0.24 THOUSAND/ΜL (ref 0–0.61)
EOSINOPHIL NFR BLD AUTO: 2 % (ref 0–6)
ERYTHROCYTE [DISTWIDTH] IN BLOOD BY AUTOMATED COUNT: 15.4 % (ref 11.6–15.1)
HCT VFR BLD AUTO: 34.5 % (ref 36.5–49.3)
HGB BLD-MCNC: 11.3 G/DL (ref 12–17)
INR PPP: 1.03 (ref 0.86–1.16)
LYMPHOCYTES # BLD AUTO: 1.97 THOUSANDS/ΜL (ref 0.6–4.47)
LYMPHOCYTES NFR BLD AUTO: 18 % (ref 14–44)
MCH RBC QN AUTO: 29 PG (ref 26.8–34.3)
MCHC RBC AUTO-ENTMCNC: 32.8 G/DL (ref 31.4–37.4)
MCV RBC AUTO: 89 FL (ref 82–98)
MONOCYTES # BLD AUTO: 1.09 THOUSAND/ΜL (ref 0.17–1.22)
MONOCYTES NFR BLD AUTO: 10 % (ref 4–12)
NEUTROPHILS # BLD AUTO: 7.89 THOUSANDS/ΜL (ref 1.85–7.62)
NEUTS SEG NFR BLD AUTO: 70 % (ref 43–75)
NRBC BLD AUTO-RTO: 0 /100 WBCS
PLATELET # BLD AUTO: 953 THOUSANDS/UL (ref 149–390)
PMV BLD AUTO: 8.4 FL (ref 8.9–12.7)
PROTHROMBIN TIME: 13.5 SECONDS (ref 12.1–14.4)
RBC # BLD AUTO: 3.89 MILLION/UL (ref 3.88–5.62)
WBC # BLD AUTO: 11.25 THOUSAND/UL (ref 4.31–10.16)

## 2017-12-15 PROCEDURE — 49424 ASSESS CYST CONTRAST INJECT: CPT

## 2017-12-15 PROCEDURE — 85025 COMPLETE CBC W/AUTO DIFF WBC: CPT | Performed by: SURGERY

## 2017-12-15 PROCEDURE — 76080 X-RAY EXAM OF FISTULA: CPT

## 2017-12-15 PROCEDURE — 0WPFX0Z REMOVAL OF DRAINAGE DEVICE FROM ABDOMINAL WALL, EXTERNAL APPROACH: ICD-10-PCS | Performed by: RADIOLOGY

## 2017-12-15 PROCEDURE — BW111ZZ FLUOROSCOPY OF ABDOMEN AND PELVIS USING LOW OSMOLAR CONTRAST: ICD-10-PCS | Performed by: RADIOLOGY

## 2017-12-15 PROCEDURE — 85610 PROTHROMBIN TIME: CPT | Performed by: SURGERY

## 2017-12-15 RX ADMIN — ACETAMINOPHEN 975 MG: 325 TABLET ORAL at 09:15

## 2017-12-15 RX ADMIN — HEPARIN SODIUM 5000 UNITS: 5000 INJECTION, SOLUTION INTRAVENOUS; SUBCUTANEOUS at 14:03

## 2017-12-15 RX ADMIN — OXYBUTYNIN CHLORIDE 5 MG: 5 TABLET ORAL at 19:51

## 2017-12-15 RX ADMIN — TOBRAMYCIN AND DEXAMETHASONE 1 DROP: 3; 1 SUSPENSION/ DROPS OPHTHALMIC at 23:53

## 2017-12-15 RX ADMIN — Medication 150 MG: at 09:12

## 2017-12-15 RX ADMIN — TAMSULOSIN HYDROCHLORIDE 0.4 MG: 0.4 CAPSULE ORAL at 19:51

## 2017-12-15 RX ADMIN — OXYCODONE HYDROCHLORIDE 10 MG: 10 TABLET ORAL at 00:08

## 2017-12-15 RX ADMIN — HEPARIN SODIUM 5000 UNITS: 5000 INJECTION, SOLUTION INTRAVENOUS; SUBCUTANEOUS at 23:53

## 2017-12-15 RX ADMIN — OXYCODONE HYDROCHLORIDE 10 MG: 10 TABLET ORAL at 09:11

## 2017-12-15 RX ADMIN — OXYBUTYNIN CHLORIDE 5 MG: 5 TABLET ORAL at 23:53

## 2017-12-15 RX ADMIN — OXYBUTYNIN CHLORIDE 5 MG: 5 TABLET ORAL at 09:11

## 2017-12-15 RX ADMIN — TOBRAMYCIN AND DEXAMETHASONE 1 DROP: 3; 1 SUSPENSION/ DROPS OPHTHALMIC at 11:17

## 2017-12-15 RX ADMIN — HEPARIN SODIUM 5000 UNITS: 5000 INJECTION, SOLUTION INTRAVENOUS; SUBCUTANEOUS at 06:01

## 2017-12-15 RX ADMIN — TOBRAMYCIN AND DEXAMETHASONE 1 DROP: 3; 1 SUSPENSION/ DROPS OPHTHALMIC at 06:02

## 2017-12-15 RX ADMIN — ACETAMINOPHEN 975 MG: 325 TABLET ORAL at 19:51

## 2017-12-15 RX ADMIN — IOHEXOL 11 ML: 300 INJECTION, SOLUTION INTRAVENOUS at 16:36

## 2017-12-15 RX ADMIN — TOBRAMYCIN AND DEXAMETHASONE 1 DROP: 3; 1 SUSPENSION/ DROPS OPHTHALMIC at 00:08

## 2017-12-15 RX ADMIN — TOBRAMYCIN AND DEXAMETHASONE 1 DROP: 3; 1 SUSPENSION/ DROPS OPHTHALMIC at 19:51

## 2017-12-15 NOTE — CASE MANAGEMENT
Continued Stay Review    Date: 12/15/2017    Vital Signs: /78   Pulse 75   Temp 98 1 °F (36 7 °C) (Oral)   Resp 18   Ht 5' 6" (1 676 m)   Wt 62 4 kg (137 lb 9 1 oz)   SpO2 100%   BMI 22 20 kg/m²     Medications:   Scheduled Meds:   acetaminophen 975 mg Oral Q8H   heparin (porcine) 5,000 Units Subcutaneous Q8H [address]   iron polysaccharides 150 mg Oral Daily   oxybutynin 5 mg Oral TID   polyethylene glycol 17 g Oral Daily   senna-docusate sodium 1 tablet Oral HS   tamsulosin 0 4 mg Oral Daily With Dinner   tobramycin-dexamethasone 1 drop Ophthalmic Q6H [address]     Continuous Infusions:    PRN Meds: haemophilus B vaccine, tetanus toxoid conjugate    HYDROmorphone    influenza vaccine    meningococcal quadrivalent conjugated vaccine    oxyCODONE    oxyCODONE    pneumococcal 13-valent conjugate vaccine    polyvinyl alcohol    Abnormal Labs/Diagnostic Results:  Wbc 11 25--hgb 11 3/34  5--platlets 953    Age/Sex: 43 y o  male     Assessment/Plan: Assessment:  43y o -year-old male with spontaneous left kidney rupture     - s/p ex lap, left nephrectomy, abthera 11/1 Andra )  - s/p ex lap, removal of packs, ligation of left ureter, abdominal closure 11/2 Arlice Setting)  - s/p right PCN w/ intraabdominal ascites samples 11/9 (IR)  - s/p 11/18 cystoscopy w/ fulguration of ureteral orifice, cystogram with injection of deflux, left retrograde pyelograam 11/18 Donejayna Chambers)  - s/p nephrostogram, PCN capping 11/21 (IR)  - s/p IR tube check 11/27 (IR)  - s/p drainage of left upper quadrant fluid collection 11/30 (IR)  -s/p Left CT insertion 12/3 (Elda Freeman)   -s/p Splenic agram and embolization (IR)  - s/p CT removal 12/14     Plan:  - diet as tolerated  - d/c staples  - continue jiménez and perc nephrostomy  - d/c left THANH drains at discharge  - OOB, ambulate  - PT/OT today now that CT removed  - splenic vaccines prior to d/c  - [facility]       Discharge Plan: not stable for d/c yet

## 2017-12-15 NOTE — PROGRESS NOTES
Vascular and Interventional Radiology Pre Procedure Note    Diagnosis:  Splenic hematoma    Procedure:  Tube check with possible removal    HPI: 63-year-old male with splenic hematoma, now status post placement 2 percutaneous drainage catheters and embolization of splenic artery  Patient is being prepared for discharge and cannot be discharged to his facility with drainage catheters in place  Given the large size of the splenic hematoma, there is significant risk for infection  This was discussed in detail with the patient and with the Surgical service, specifically Dr Esteban Rayo  The risk of infection requiring additional procedure such as drainage catheter placement was discussed in detail with the team and the patient  Dr Daron Osman states that if the splenic hematoma becomes infected, surgery would not be requesting a drain, but would proceed to splenectomy  Given this clinical situation and plan, in order to facilitate patient's discharge, tube checks will be performed with possible removal of both drains  I discussed with the patient the difficulty with draining a hematoma  He expresses understanding  Patient denies abdominal pain at this time  Denies fever, chills, nausea or vomiting  Patient reports no output from either abdominal drain  Exam:  General:  Awake, alert, oriented x3, no acute distress  Neck:  Soft, supple, nontender  Chest:  Nontender, no deformity  Abdomen:  Soft, nontender, nondistended, no guarding, no rebound  Back/flank:  Nontender, no deformity, no costovertebral angle tenderness, both drains in place without leakage or bleeding  Labs:  Hematology:  WBC 11 25, hemoglobin 11 3, hematocrit 34 5, platelets 375  Chemistry:  Sodium 135, potassium 4 2, chloride 102, carbon dioxide 27, BUN 14, creatinine 1 17, glucose 96  Coagulation:  INR 1 03, prothrombin time 13 5    Imaging:  CT scan dated 12/10/2017 reviewed    Tubes in place, draining splenic hematoma, likely with small connection between the 2 drains  This is more apparent on the prior CT scan of November 30, 2017    Plan:  Image guided tube check with possible drain removal     I discussed the procedure, its details, risks, benefits and alternatives with the patient  I did give the patient the alternative of leaving the tubes in place  I also discussed the risk of infection with and without the tube in place, the risk for additional procedures, and the risk of reaccumulation of fluid  All questions were answered  Patient elects to proceed with the procedure  H&P was reviewed

## 2017-12-15 NOTE — PHYSICAL THERAPY NOTE
Physical Therapy Cancellation Note:    Pt currently off floor  Cancel PT services for today, will cont to follow as able to cont therapy intervention sessions (re-eval)

## 2017-12-15 NOTE — PROGRESS NOTES
Progress Note - General Surgery   Angelito Salinas 43 y o  male MRN: [id number]  Unit/Bed#: [facility] 925-01 Encounter: [id number]    Assessment:  43y o -year-old male with spontaneous left kidney rupture     - s/p ex lap, left nephrectomy, abthera 11/1 Nazario Thorne)  - s/p ex lap, removal of packs, ligation of left ureter, abdominal closure 11/2 Suhail Rouleau)  - s/p right PCN w/ intraabdominal ascites samples 11/9 (IR)  - s/p 11/18 cystoscopy w/ fulguration of ureteral orifice, cystogram with injection of deflux, left retrograde pyelograam 11/18 Yvophyllis Sommers)  - s/p nephrostogram, PCN capping 11/21 (IR)  - s/p IR tube check 11/27 (IR)  - s/p drainage of left upper quadrant fluid collection 11/30 (IR)  -s/p Left CT insertion 12/3 (Ruth Hernandez)   -s/p Splenic agram and embolization (IR)  - s/p CT removal 12/14    Plan:  - diet as tolerated  - d/c staples  - continue jiménez and perc nephrostomy  - d/c left THANH drains at discharge  - OOB, ambulate  - PT/OT today now that CT removed  - splenic vaccines prior to d/c  - SQH      Subjective/Objective     Subjective: Patient doing well, pain improving, tolerating normal diet and having BM  CT removed yesterday      Objective:     Vitals: Blood pressure 140/67, pulse 81, temperature 98 2 °F (36 8 °C), temperature source Oral, resp  rate 18, height 5' 6" (1 676 m), weight 62 4 kg (137 lb 9 1 oz), SpO2 98 %  ,Body mass index is 22 2 kg/m²  I/O       12/11 0701 - 12/12 0700 12/12 0701 - 12/13 0700 12/13 0701 - 12/14 0700    P  O  495 1175     I V  (mL/kg)       Total Intake(mL/kg) 495 (7 7) 1175 (18 5)     Urine (mL/kg/hr) 2200 (1 4) 1450 (0 9)     Drains 25 (0) 5 (0)     Chest Tube 45 (0) 135 (0 1)     Total Output 2270 1590      Net -3143 -636                   PE:  NAD  Norm resp effort  RRR  Abd soft min tender in L abd, ND  Incision cdi closed with staples  Jiménez in PCN in draining clear yellow pale    Lab, Imaging and other studies:   CBC with diff:   No results found for: WBC, HGB, HCT, MCV, PLT, ADJUSTEDWBC, MCH, MCHC, RDW, MPV, NRBC, BMP/CMP:   No results found for: NA, K, CL, CO2, ANIONGAP, BUN, CREATININE, GLUCOSE, CALCIUM, AST, ALT, ALKPHOS, PROT, ALBUMIN, BILITOT, EGFR, Magnesium: No components found for: MAG  VTE Pharmacologic Prophylaxis: Heparin  VTE Mechanical Prophylaxis: sequential compression device

## 2017-12-15 NOTE — SOCIAL WORK
Cm reviewed patient during care coordination rounds  Patient is not medically stable for d/c today  Cm informed that patient's skilled nursing guards were no longer in patient's room  Cm contacted [facility] [phone]  Cm informed by Rajiv Solis that patient was released from police custody as of 55:75SV yesterday  Hugh informed that patient's medical insurance should be billed from this point on and its anticipated that the patient will not be returning to skilled nursing  Rajiv Solis advised that the skilled nursing be contacted once patient's close to being medically stable  At this time per PT/OT, patient should be able to d/c home  Will await medical clearance about drains and jiménez care

## 2017-12-16 LAB
ANION GAP SERPL CALCULATED.3IONS-SCNC: 7 MMOL/L (ref 4–13)
BASOPHILS # BLD AUTO: 0.04 THOUSANDS/ΜL (ref 0–0.1)
BASOPHILS NFR BLD AUTO: 0 % (ref 0–1)
BUN SERPL-MCNC: 18 MG/DL (ref 5–25)
CALCIUM SERPL-MCNC: 9.6 MG/DL (ref 8.3–10.1)
CHLORIDE SERPL-SCNC: 102 MMOL/L (ref 100–108)
CO2 SERPL-SCNC: 28 MMOL/L (ref 21–32)
CREAT SERPL-MCNC: 1.21 MG/DL (ref 0.6–1.3)
EOSINOPHIL # BLD AUTO: 0.38 THOUSAND/ΜL (ref 0–0.61)
EOSINOPHIL NFR BLD AUTO: 4 % (ref 0–6)
ERYTHROCYTE [DISTWIDTH] IN BLOOD BY AUTOMATED COUNT: 15.3 % (ref 11.6–15.1)
GFR SERPL CREATININE-BSD FRML MDRD: 73 ML/MIN/1.73SQ M
GLUCOSE SERPL-MCNC: 94 MG/DL (ref 65–140)
HCT VFR BLD AUTO: 32.2 % (ref 36.5–49.3)
HGB BLD-MCNC: 10.3 G/DL (ref 12–17)
LYMPHOCYTES # BLD AUTO: 1.75 THOUSANDS/ΜL (ref 0.6–4.47)
LYMPHOCYTES NFR BLD AUTO: 16 % (ref 14–44)
MCH RBC QN AUTO: 28.5 PG (ref 26.8–34.3)
MCHC RBC AUTO-ENTMCNC: 32 G/DL (ref 31.4–37.4)
MCV RBC AUTO: 89 FL (ref 82–98)
MONOCYTES # BLD AUTO: 1.14 THOUSAND/ΜL (ref 0.17–1.22)
MONOCYTES NFR BLD AUTO: 10 % (ref 4–12)
NEUTROPHILS # BLD AUTO: 7.59 THOUSANDS/ΜL (ref 1.85–7.62)
NEUTS SEG NFR BLD AUTO: 70 % (ref 43–75)
NRBC BLD AUTO-RTO: 0 /100 WBCS
PLATELET # BLD AUTO: 762 THOUSANDS/UL (ref 149–390)
PMV BLD AUTO: 8.3 FL (ref 8.9–12.7)
POTASSIUM SERPL-SCNC: 4.5 MMOL/L (ref 3.5–5.3)
RBC # BLD AUTO: 3.62 MILLION/UL (ref 3.88–5.62)
SODIUM SERPL-SCNC: 137 MMOL/L (ref 136–145)
WBC # BLD AUTO: 10.95 THOUSAND/UL (ref 4.31–10.16)

## 2017-12-16 PROCEDURE — G8979 MOBILITY GOAL STATUS: HCPCS

## 2017-12-16 PROCEDURE — 90648 HIB PRP-T VACCINE 4 DOSE IM: CPT | Performed by: SURGERY

## 2017-12-16 PROCEDURE — 97116 GAIT TRAINING THERAPY: CPT

## 2017-12-16 PROCEDURE — 97164 PT RE-EVAL EST PLAN CARE: CPT

## 2017-12-16 PROCEDURE — 85025 COMPLETE CBC W/AUTO DIFF WBC: CPT | Performed by: SURGERY

## 2017-12-16 PROCEDURE — 80048 BASIC METABOLIC PNL TOTAL CA: CPT | Performed by: SURGERY

## 2017-12-16 PROCEDURE — G8978 MOBILITY CURRENT STATUS: HCPCS

## 2017-12-16 PROCEDURE — 90734 MENACWYD/MENACWYCRM VACC IM: CPT | Performed by: SURGERY

## 2017-12-16 RX ADMIN — ACETAMINOPHEN 975 MG: 325 TABLET ORAL at 11:44

## 2017-12-16 RX ADMIN — OXYBUTYNIN CHLORIDE 5 MG: 5 TABLET ORAL at 16:20

## 2017-12-16 RX ADMIN — NEISSERIA MENINGITIDIS GROUP A CAPSULAR POLYSACCHARIDE DIPHTHERIA TOXOID CONJUGATE ANTIGEN, NEISSERIA MENINGITIDIS GROUP C CAPSULAR POLYSACCHARIDE DIPHTHERIA TOXOID CONJUGATE ANTIGEN, NEISSERIA MENINGITIDIS GROUP Y CAPSULAR POLYSACCHARIDE DIPHTHERIA TOXOID CONJUGATE ANTIGEN, AND NEISSERIA MENINGITIDIS GROUP W-135 CAPSULAR POLYSACCHARIDE DIPHTHERIA TOXOID CONJUGATE ANTIGEN 0.5 ML: 4; 4; 4; 4 INJECTION, SOLUTION INTRAMUSCULAR at 11:46

## 2017-12-16 RX ADMIN — OXYBUTYNIN CHLORIDE 5 MG: 5 TABLET ORAL at 08:05

## 2017-12-16 RX ADMIN — TOBRAMYCIN AND DEXAMETHASONE 1 DROP: 3; 1 SUSPENSION/ DROPS OPHTHALMIC at 05:00

## 2017-12-16 RX ADMIN — HEPARIN SODIUM 5000 UNITS: 5000 INJECTION, SOLUTION INTRAVENOUS; SUBCUTANEOUS at 05:00

## 2017-12-16 RX ADMIN — HAEMOPHILUS B POLYSACCHARIDE CONJUGATE VACCINE FOR INJ 0.5 ML: RECON SOLN at 11:45

## 2017-12-16 RX ADMIN — TOBRAMYCIN AND DEXAMETHASONE 1 DROP: 3; 1 SUSPENSION/ DROPS OPHTHALMIC at 11:54

## 2017-12-16 RX ADMIN — Medication 150 MG: at 08:05

## 2017-12-16 RX ADMIN — TOBRAMYCIN AND DEXAMETHASONE 1 DROP: 3; 1 SUSPENSION/ DROPS OPHTHALMIC at 18:14

## 2017-12-16 RX ADMIN — TAMSULOSIN HYDROCHLORIDE 0.4 MG: 0.4 CAPSULE ORAL at 16:20

## 2017-12-16 RX ADMIN — ACETAMINOPHEN 975 MG: 325 TABLET ORAL at 20:17

## 2017-12-16 RX ADMIN — HEPARIN SODIUM 5000 UNITS: 5000 INJECTION, SOLUTION INTRAVENOUS; SUBCUTANEOUS at 14:25

## 2017-12-16 RX ADMIN — HEPARIN SODIUM 5000 UNITS: 5000 INJECTION, SOLUTION INTRAVENOUS; SUBCUTANEOUS at 21:10

## 2017-12-16 RX ADMIN — ACETAMINOPHEN 975 MG: 325 TABLET ORAL at 05:00

## 2017-12-16 RX ADMIN — OXYBUTYNIN CHLORIDE 5 MG: 5 TABLET ORAL at 20:17

## 2017-12-16 NOTE — PLAN OF CARE
Problem: PHYSICAL THERAPY ADULT  Goal: Performs mobility at highest level of function for planned discharge setting  See evaluation for individualized goals  Treatment/Interventions: Functional transfer training, LE strengthening/ROM, Therapeutic exercise, Endurance training, Equipment eval/education, Bed mobility, Gait training, Spoke to nursing, OT  Equipment Recommended: Vini Rodríguez (at this time)       See flowsheet documentation for full assessment, interventions and recommendations  Outcome: Progressing  Prognosis: Good  Problem List: Decreased strength, Decreased endurance, Decreased mobility, Pain, Decreased skin integrity  Assessment: Pt is a 43y o -year-old male with spontaneous left kidney rupture: s/p ex lap, left nephrectomy, abthera 11/1, s/p ex lap, removal of packs, ligation of left ureter, abdominal closure 11/2, s/p right PCN w/ intraabdominal ascites samples 11/9, s/p 11/18 cystoscopy w/ fulguration of ureteral orifice, cystogram with injection of deflux, left retrograde pyelograam 11/18, s/p nephrostogram, PCN capping 11/21, s/p IR tube check 11/27, s/p drainage of left upper quadrant fluid collection 11/30, s/p Left CT insertion 12/3, s/p Splenic agram and embolization  Pt seen today for PT re-evaluation  Per SW documentation, pt is no longer in police custody and will therefore not be returning to longterm when he's medically cleared for discharge  Pt reports he is not sure where he's discharging  Pt agreeable to re-assessment  Pt mod I for bed mobility and transfers  Supervision for ambulation of 120 ft with RW  He required intermittent brief rest periods due to pain, but was able to continue with re-evaluation  Pt progressed to 6 stairs up/down using B handrail, supervision  Recommend home with family support and RW for ambulation     Barriers to Discharge: None     Recommendation: Home with family support     PT - OK to Discharge: (S) Yes (ONCE MEDICALLY CLEARED )    See flowsheet documentation for full assessment

## 2017-12-16 NOTE — PROGRESS NOTES
Patient seen and examined  All intraperitoneal drains have been removed  The patient is doing well clinically and getting close to discharge  At this point we will attempt capping his nephrostomy tube and removing his Thakur catheter  The nursing staff is to keep a close eye on his postvoid residuals with bladder scanning after every void  We will monitor his creatinine to make sure does not rise indicating intra-abdominal urine leak  If patient does well we will remove nephrostomy prior to discharge  I will follow up in the morning

## 2017-12-16 NOTE — PLAN OF CARE
Problem: PHYSICAL THERAPY ADULT  Goal: Performs mobility at highest level of function for planned discharge setting  See evaluation for individualized goals  Treatment/Interventions: Functional transfer training, LE strengthening/ROM, Therapeutic exercise, Endurance training, Equipment eval/education, Bed mobility, Gait training, Spoke to nursing, OT  Equipment Recommended: Alexandra Ovalle (at this time)       See flowsheet documentation for full assessment, interventions and recommendations  Prognosis: Good  Problem List: Decreased strength, Decreased endurance, Decreased mobility, Pain, Decreased skin integrity  Assessment: Pt requesting more ambulation  Supervision for 250 ft with RW  Pt continues to require very slow pace and rest breaks  When given cues for upright posture pt reported increased pan ("pulling" sensation in abdomen)  Will continue to follow  Barriers to Discharge: None     Recommendation: Home with family support     PT - OK to Discharge: Yes (when d/c destination is determined)    See flowsheet documentation for full assessment

## 2017-12-16 NOTE — PHYSICAL THERAPY NOTE
PHYSICAL THERAPY NOTE      Patient Name: Cassandra TORRES Date: 12/16/2017 12/16/17 7317   Pain Assessment   Pain Assessment No/denies pain   Pain Score 2   Pain Type Acute pain   Pain Location Generalized  (reports pain/pressure around previous drain sites)   Restrictions/Precautions   Weight Bearing Precautions Per Order No   Other Precautions Fall Risk;Pain   General   Chart Reviewed Yes   Response to Previous Treatment Patient with no complaints from previous session  Family/Caregiver Present No   Cognition   Overall Cognitive Status WFL   Arousal/Participation Alert; Cooperative   Attention Within functional limits   Orientation Level Oriented X4   Memory Within functional limits   Following Commands Follows all commands and directions without difficulty   Subjective   Subjective "Can we walk more "   Bed Mobility   Sit to Supine 6  Modified independent   Additional items Increased time required   Transfers   Stand to Sit 6  Modified independent   Additional items Increased time required   Ambulation/Elevation   Gait pattern Forward Flexion; Excessively slow; Short stride; Antalgic   Gait Assistance 5  Supervision   Additional items Assist x 1;Verbal cues  (cues for upright posture, but c/o inc  pain (abdomen))   Assistive Device Rolling walker   Distance 250 ft   Balance   Static Sitting Fair +   Dynamic Sitting Fair   Static Standing Fair   Dynamic Standing Fair -   Endurance Deficit   Endurance Deficit Yes   Endurance Deficit Description rest breaks in standing due to pain   Activity Tolerance   Activity Tolerance Patient tolerated treatment well  (despite pain/fatigue)   Assessment   Prognosis Good   Problem List Decreased strength;Decreased endurance;Decreased mobility;Pain;Decreased skin integrity   Assessment Pt requesting more ambulation  Supervision for 250 ft with RW  Pt continues to require very slow pace and rest breaks   When given cues for upright posture pt reported increased pan ("pulling" sensation in abdomen)  Will continue to follow  Goals   Patient Goals to return to PLOF  Pt reports he used to be so agile and is frustrated  STG Expiration Date 12/23/17   Plan   Treatment/Interventions Elevations; Therapeutic exercise; Endurance training;Gait training;Equipment eval/education;Patient/family training   PT Frequency 5x/wk   Recommendation   Recommendation Home with family support   Equipment Recommended Walker   PT - OK to Discharge Yes  (when d/c destination is determined)   Elaina Vyas, PT

## 2017-12-16 NOTE — PHYSICAL THERAPY NOTE
PHYSICAL THERAPY RE-EVALUATION      Patient Name: Muna Deluca  TJDBD'L Date: 12/16/2017       Patient Active Problem List   Diagnosis    Kidney laceration, left    Acute blood loss anemia    Pleural effusion on left    Renal abscess, left    Spleen hematoma without rupture of capsule       Past Medical History:   Diagnosis Date    Enlarged prostate     UTI (urinary tract infection)        Past Surgical History:   Procedure Laterality Date    CYSTOSCOPY CYSTOGRAM W/ INJECTION DEFLUX Left 11/18/2017    Procedure: CYSTOSCOPY Fulguration of ureteral orifice CYSTOGRAM W/ INJECTION DEFLUX, LEFT RETROGRADE, PYLOGRAM AND LEFT URETEROSCOPY;  Surgeon: David Dickerson MD;  Location: [facility];  Service: Urology    LAPAROTOMY N/A 11/2/2017    Procedure: LAPAROTOMY EXPLORATORY 2nd LOOK, ligation of left ureter, I/D with closure;  Surgeon: Reymundo Mejia MD;  Location: [facility];  Service: General        12/16/17 2342   Note Type   Note type Re-eval   Pain Assessment   Pain Assessment 0-10   Pain Score 2   Pain Type Acute pain   Pain Location Generalized  (reports pain/pressure around previous drain sites)   Home Living   Type of Home (pt reports unsure where he's discharging to)   Home Layout (see above)   Home Equipment (none)   Additional Comments Per SW note, pt has been released from police custody     Prior Function   Level of [place] Independent with ADLs and functional mobility   ADL Assistance Independent   IADLs Independent   Restrictions/Precautions   Weight Bearing Precautions Per Order No   Other Precautions Fall Risk;Pain  (cont pulse ox)   General   Family/Caregiver Present No   Cognition   Overall Cognitive Status WFL   Arousal/Participation Cooperative   Attention Within functional limits   Orientation Level Oriented X4   Memory Within functional limits   Following Commands Follows all commands and directions without difficulty   RUE Assessment   RUE Assessment WFL   LUE Assessment   LUE Assessment WFL   RLE Assessment   RLE Assessment X  (AROM WFL)   Strength RLE   RLE Overall Strength 4+/5   LLE Assessment   LLE Assessment X  (AROM WFL)   Strength LLE   LLE Overall Strength 4+/5   Coordination   Sensation WFL   Light Touch   RLE Light Touch Grossly intact   LLE Light Touch Grossly intact   Bed Mobility   Supine to Sit 6  Modified independent   Additional items Increased time required   Transfers   Sit to Stand 6  Modified independent   Additional items Increased time required   Stand to Sit 6  Modified independent   Additional items Increased time required   Ambulation/Elevation   Gait pattern Forward Flexion; Excessively slow; Short stride   Gait Assistance 5  Supervision   Additional items Assist x 1;Verbal cues   Assistive Device Rolling walker   Distance 120 ft   Stair Management Assistance 5  Supervision   Additional items Assist x 1;Verbal cues; Increased time required   Stair Management Technique Two rails; Foreward;Reciprocal   Number of Stairs 6   Balance   Static Sitting Fair +   Dynamic Sitting Fair   Static Standing Fair   Dynamic Standing Fair -   Ambulatory Fair -   Endurance Deficit   Endurance Deficit Yes   Endurance Deficit Description occasional rest breaks due to pain   Activity Tolerance   Activity Tolerance Patient tolerated treatment well  (despite pain/fatigue)   Nurse Made Aware yes   Assessment   Prognosis Good   Problem List Decreased strength;Decreased endurance;Decreased mobility;Pain;Decreased skin integrity   Assessment Pt is a 43y o -year-old male with spontaneous left kidney rupture: s/p ex lap, left nephrectomy, abthera 11/1, s/p ex lap, removal of packs, ligation of left ureter, abdominal closure 11/2, s/p right PCN w/ intraabdominal ascites samples 11/9, s/p 11/18 cystoscopy w/ fulguration of ureteral orifice, cystogram with injection of deflux, left retrograde pyelograam 11/18, s/p nephrostogram, PCN capping 11/21, s/p IR tube check 11/27, s/p drainage of left upper quadrant fluid collection 11/30, s/p Left CT insertion 12/3, s/p Splenic agram and embolization  Pt seen today for PT re-evaluation  Per  documentation, pt is no longer in police custody and will therefore not be returning to long term when he's medically cleared for discharge  Pt reports he is not sure where he's discharging  Pt agreeable to re-assessment  Pt mod I for bed mobility and transfers  Supervision for ambulation of 120 ft with RW  He required intermittent brief rest periods due to pain, but was able to continue with re-evaluation  Pt progressed to 6 stairs up/down using B handrail, supervision  Recommend home with family support and RW for ambulation  Goals   Patient Goals to return to PLOF   STG Expiration Date 12/23/17   Short Term Goal #1 In 7 days: Pt will amb 300 ft with RW  Pt will ascend/descend flight of stairs mod I     Plan   Treatment/Interventions Elevations; Therapeutic exercise; Endurance training;Gait training;Spoke to MD;Equipment eval/education;Patient/family training   PT Frequency 5x/wk   Recommendation   Recommendation Home with family support   Equipment Recommended Walker   Barthel Index   Feeding 10   Bathing 0   Grooming Score 5   Dressing Score 5   Bladder Score 10   Bowels Score 10   Toilet Use Score 10   Transfers (Bed/Chair) Score 15   Mobility (Level Surface) Score 15   Stairs Score 10   Barthel Index Score 90       Víctor Hernadez, PT

## 2017-12-16 NOTE — PROGRESS NOTES
Progress Note - General Surgery   Angelito Salinas 43 y o  male MRN: [id number]  Unit/Bed#: [facility] 925-01 Encounter: [id number]    Assessment:  43y o -year-old male with spontaneous left kidney rupture     - s/p ex lap, left nephrectomy, abthera 11/1 Nazario Thorne)  - s/p ex lap, removal of packs, ligation of left ureter, abdominal closure 11/2 Suhail Rouleau)  - s/p right PCN w/ intraabdominal ascites samples 11/9 (IR)  - s/p 11/18 cystoscopy w/ fulguration of ureteral orifice, cystogram with injection of deflux, left retrograde pyelograam 11/18 Yjossue Sommers)  - s/p nephrostogram, PCN capping 11/21 (IR)  - s/p IR tube check 11/27 (IR)  - s/p drainage of left upper quadrant fluid collection 11/30 (IR)  -s/p Left CT insertion 12/3 (Ruth Hernandez)   -s/p Splenic agram and embolization (IR)  - s/p CT removal 12/14  - s/p IR drain x 2 removal     Plan:  - diet as tolerated  - continue jiménez and perc nephrostomy  - OOB, ambulate  - PT/OT - rec home?   - splenic vaccines will  be given today  - [facility]  - follow up w/ urology regarding possibly d/c-ing nephrostomy tube   - follow up with CM on dispo plan       Subjective/Objective     Subjective: Patient had his IR drains removed yesterday  Hasn't gotten his splenic vaccines yet  He worked w/ PT/OT yesterday  Nephrostomy tube and jiménez are still in place     Objective:     Vitals: Blood pressure 127/71, pulse 67, temperature 98 °F (36 7 °C), temperature source Oral, resp  rate 18, height 5' 6" (1 676 m), weight 60 5 kg (133 lb 4 8 oz), SpO2 98 %  ,Body mass index is 21 52 kg/m²  I/O       12/11 0701 - 12/12 0700 12/12 0701 - 12/13 0700 12/13 0701 - 12/14 0700    P  O  495 1175     I V  (mL/kg)       Total Intake(mL/kg) 495 (7 7) 1175 (18 5)     Urine (mL/kg/hr) 2200 (1 4) 1450 (0 9)     Drains 25 (0) 5 (0)     Chest Tube 45 (0) 135 (0 1)     Total Output 2270 1590      Net -1774 -415                   PE:  NAD  Norm resp effort  RRR  Abd soft min tender in L abd, ND  Incision cdi   Jiménez & PCN in draining clear yellow pale  Left flank - prior drain insertion sites are CDI     Lab, Imaging and other studies:   CBC with diff:   Lab Results   Component Value Date    WBC 10 95 (H) 12/16/2017    HGB 10 3 (L) 12/16/2017    HCT 32 2 (L) 12/16/2017    MCV 89 12/16/2017     (H) 12/16/2017    MCH 28 5 12/16/2017    MCHC 32 0 12/16/2017    RDW 15 3 (H) 12/16/2017    MPV 8 3 (L) 12/16/2017    NRBC 0 12/16/2017   , BMP/CMP:   Lab Results   Component Value Date     12/16/2017    K 4 5 12/16/2017     12/16/2017    CO2 28 12/16/2017    ANIONGAP 7 12/16/2017    BUN 18 12/16/2017    CREATININE 1 21 12/16/2017    GLUCOSE 94 12/16/2017    CALCIUM 9 6 12/16/2017    EGFR 73 12/16/2017   , Magnesium: No components found for: MAG  VTE Pharmacologic Prophylaxis: Heparin  VTE Mechanical Prophylaxis: sequential compression device

## 2017-12-16 NOTE — BRIEF OP NOTE (RAD/CATH)
Please note that yesterday's Post procedure note appears to have been lost or deleted  This is a second/replacement note for the procedure completed 12/15/17    Tube check and drain removal  Procedure Note    PATIENT NAME: Nisa Tay  : 1975  MRN: [id number]     Pre-op Diagnosis:   1  Laceration of left kidney, initial encounter    2  Hx of unilateral nephrectomy    3  Other hydronephrosis    4  Severe sepsis (HCC)    5  Eye pain    6  Pleural effusion on left      Post-op Diagnosis:   1  Laceration of left kidney, initial encounter    2  Hx of unilateral nephrectomy    3  Other hydronephrosis    4  Severe sepsis (HCC)    5  Eye pain    6  Pleural effusion on left        Surgeon:   Evette Amato MD  Assistants:     No qualified resident was available, Resident is only observing    Estimated Blood Loss: None  Findings: Both drains in place  No evidence of fistula following injection of both tubes  Tubes removed without incident  As discussed with the surgical service and the patient, the patient remains at risk for infection of these large hematomas  Surgical service has stated the plan is for splenectomy, not drainage, should infection occur  This plan, and today's tube check findings, were discussed in detail with the patient who is in agreement      Specimens: None    Complications:  Nothing immediate    Anesthesia: None    Evette Amato MD     Date: 2017  Time: 7:20 AM

## 2017-12-17 LAB
ANION GAP SERPL CALCULATED.3IONS-SCNC: 8 MMOL/L (ref 4–13)
BUN SERPL-MCNC: 20 MG/DL (ref 5–25)
CALCIUM SERPL-MCNC: 9.6 MG/DL (ref 8.3–10.1)
CHLORIDE SERPL-SCNC: 103 MMOL/L (ref 100–108)
CO2 SERPL-SCNC: 25 MMOL/L (ref 21–32)
CREAT SERPL-MCNC: 1.19 MG/DL (ref 0.6–1.3)
GFR SERPL CREATININE-BSD FRML MDRD: 75 ML/MIN/1.73SQ M
GLUCOSE SERPL-MCNC: 114 MG/DL (ref 65–140)
POTASSIUM SERPL-SCNC: 3.9 MMOL/L (ref 3.5–5.3)
SODIUM SERPL-SCNC: 136 MMOL/L (ref 136–145)

## 2017-12-17 PROCEDURE — 80048 BASIC METABOLIC PNL TOTAL CA: CPT | Performed by: SURGERY

## 2017-12-17 RX ADMIN — HEPARIN SODIUM 5000 UNITS: 5000 INJECTION, SOLUTION INTRAVENOUS; SUBCUTANEOUS at 13:52

## 2017-12-17 RX ADMIN — TOBRAMYCIN AND DEXAMETHASONE 1 DROP: 3; 1 SUSPENSION/ DROPS OPHTHALMIC at 05:00

## 2017-12-17 RX ADMIN — TAMSULOSIN HYDROCHLORIDE 0.4 MG: 0.4 CAPSULE ORAL at 16:11

## 2017-12-17 RX ADMIN — OXYBUTYNIN CHLORIDE 5 MG: 5 TABLET ORAL at 08:28

## 2017-12-17 RX ADMIN — Medication 150 MG: at 08:28

## 2017-12-17 RX ADMIN — HEPARIN SODIUM 5000 UNITS: 5000 INJECTION, SOLUTION INTRAVENOUS; SUBCUTANEOUS at 05:00

## 2017-12-17 RX ADMIN — TOBRAMYCIN AND DEXAMETHASONE 1 DROP: 3; 1 SUSPENSION/ DROPS OPHTHALMIC at 17:41

## 2017-12-17 RX ADMIN — TOBRAMYCIN AND DEXAMETHASONE 1 DROP: 3; 1 SUSPENSION/ DROPS OPHTHALMIC at 12:26

## 2017-12-17 RX ADMIN — ACETAMINOPHEN 975 MG: 325 TABLET ORAL at 16:58

## 2017-12-17 RX ADMIN — ACETAMINOPHEN 975 MG: 325 TABLET ORAL at 04:46

## 2017-12-17 RX ADMIN — OXYBUTYNIN CHLORIDE 5 MG: 5 TABLET ORAL at 21:30

## 2017-12-17 RX ADMIN — HEPARIN SODIUM 5000 UNITS: 5000 INJECTION, SOLUTION INTRAVENOUS; SUBCUTANEOUS at 21:30

## 2017-12-17 RX ADMIN — OXYBUTYNIN CHLORIDE 5 MG: 5 TABLET ORAL at 16:11

## 2017-12-17 RX ADMIN — TOBRAMYCIN AND DEXAMETHASONE 1 DROP: 3; 1 SUSPENSION/ DROPS OPHTHALMIC at 00:35

## 2017-12-17 NOTE — PROGRESS NOTES
Progress Note - General Surgery   Roque Dodd 43 y o  male MRN: [id number]  Unit/Bed#: [facility] 925-01 Encounter: [id number]    Assessment:  43y o -year-old male with spontaneous left kidney rupture     - s/p ex lap, left nephrectomy, abthera 11/1 Cinda Jurado)  - s/p ex lap, removal of packs, ligation of left ureter, abdominal closure 11/2 Malcom Blackman)  - s/p right PCN w/ intraabdominal ascites samples 11/9 (IR)  - s/p 11/18 cystoscopy w/ fulguration of ureteral orifice, cystogram with injection of deflux, left retrograde pyelograam 11/18 Kleber Husain)  - s/p nephrostogram, PCN capping 11/21 (IR)  - s/p IR tube check 11/27 (IR)  - s/p drainage of left upper quadrant fluid collection 11/30 (IR)  -s/p Left CT insertion 12/3 (Providence Goldberg)   -s/p Splenic agram and embolization (IR)  - s/p CT removal 12/14  - s/p IR drain x 2 removal     Plan:  - diet as tolerated  - continue to cap perc nephrostomy  - OOB, ambulate  - PT/OT - rec home  - SQH & venodynes for DVT ppx     Subjective/Objective     Subjective: Patient had his jiménez d/c-ed yesterday and his nephrostomy tbe was capped  He states that he is nervous to go home  Has been ambulating the halls - tolerating det and having normal bowel function     Objective:     Vitals: Blood pressure 128/62, pulse 74, temperature 98 6 °F (37 °C), temperature source Oral, resp  rate 16, height 5' 6" (1 676 m), weight 60 5 kg (133 lb 4 8 oz), SpO2 97 %  ,Body mass index is 21 52 kg/m²  I/O       12/11 0701 - 12/12 0700 12/12 0701 - 12/13 0700 12/13 0701 - 12/14 0700    P  O  495 1175     I V  (mL/kg)       Total Intake(mL/kg) 495 (7 7) 1175 (18 5)     Urine (mL/kg/hr) 2200 (1 4) 1450 (0 9)     Drains 25 (0) 5 (0)     Chest Tube 45 (0) 135 (0 1)     Total Output 2270 1590      Net -1775 -415                   PE:  NAD  Norm resp effort  RRR  Abd soft min tender in L abd, ND  Incision cdi   PCN drain is capped - small amt of clear yellow urine   Left flank - prior drain insertion sites are CDI     Lab, Imaging and other studies:   CBC with diff:   No results found for: WBC, HGB, HCT, MCV, PLT, ADJUSTEDWBC, MCH, MCHC, RDW, MPV, NRBC, BMP/CMP:   Lab Results   Component Value Date     12/17/2017    K 3 9 12/17/2017     12/17/2017    CO2 25 12/17/2017    ANIONGAP 8 12/17/2017    BUN 20 12/17/2017    CREATININE 1 19 12/17/2017    GLUCOSE 114 12/17/2017    CALCIUM 9 6 12/17/2017    EGFR 75 12/17/2017   , Magnesium: No components found for: MAG  VTE Pharmacologic Prophylaxis: Heparin  VTE Mechanical Prophylaxis: sequential compression device

## 2017-12-17 NOTE — CASE MANAGEMENT
Notification of Inpatient Admission/Inpatient Authorization Request  This is a Notification of Inpatient Admission/Request for Inpatient Authorization to our facility [facility]  Please be advised that this patient is currently in our facility under Inpatient Status  Below you will find the Attending Physician and Facilitys information including NPI# and contact information for the Utilization  assigned to the [facility] where the patient is receiving services  Please feel free to contact the Utilization Review Department with any questions  Patient Information:  PATIENT NAME: Marco Nicolas  MRN: [id number]  YOB: 1975    PRESENTATION DATE: 11/2/2017 12:43 AM  IP ADMISSION DATE: 11/2/17 0343  DISCHARGE DATE: No discharge date for patient encounter  [facility]    Attending Physician:  ELIEZER Naranjo  Specialty- General Surgery,   [facility] ID- [id number]  [address] [facility] [address]  Phone 1: [phone]  Fax: [phone]    Facility:  [address] [place] [address] [address], [address] [facility] [phone]  NPI: [id number]  TAX ID# [id number]  MEDICARE ID: [id number]    [address] [facility] in the [facility] by Parrish Barry for 2017  Network Utilization Review Department  Phone: [phone]; Fax [phone]  ATTENTION: The Network Utilization Review Department is now centralized for our 7 Facilities  Make a note that we have a new phone and fax numbers for our Department  Please call with any questions or concerns to [phone] and carefully follow the prompts so that you are directed to the right person  All voicemails are confidential  Fax any determinations, approvals, denials, and requests for initial or continue stay review clinical to [phone]   Due to HIGH CALL volume, it would be easier if you could please send faxed requests to expedite your requests and in part, help us provide discharge notifications faster

## 2017-12-18 LAB
ANION GAP SERPL CALCULATED.3IONS-SCNC: 5 MMOL/L (ref 4–13)
BASOPHILS # BLD AUTO: 0.03 THOUSANDS/ΜL (ref 0–0.1)
BASOPHILS NFR BLD AUTO: 0 % (ref 0–1)
BUN SERPL-MCNC: 20 MG/DL (ref 5–25)
CALCIUM SERPL-MCNC: 9.8 MG/DL (ref 8.3–10.1)
CHLORIDE SERPL-SCNC: 102 MMOL/L (ref 100–108)
CO2 SERPL-SCNC: 29 MMOL/L (ref 21–32)
CREAT SERPL-MCNC: 1.23 MG/DL (ref 0.6–1.3)
EOSINOPHIL # BLD AUTO: 0.33 THOUSAND/ΜL (ref 0–0.61)
EOSINOPHIL NFR BLD AUTO: 3 % (ref 0–6)
ERYTHROCYTE [DISTWIDTH] IN BLOOD BY AUTOMATED COUNT: 14.8 % (ref 11.6–15.1)
GFR SERPL CREATININE-BSD FRML MDRD: 72 ML/MIN/1.73SQ M
GLUCOSE SERPL-MCNC: 100 MG/DL (ref 65–140)
HCT VFR BLD AUTO: 31.6 % (ref 36.5–49.3)
HGB BLD-MCNC: 10.1 G/DL (ref 12–17)
LYMPHOCYTES # BLD AUTO: 1.59 THOUSANDS/ΜL (ref 0.6–4.47)
LYMPHOCYTES NFR BLD AUTO: 16 % (ref 14–44)
MCH RBC QN AUTO: 28.5 PG (ref 26.8–34.3)
MCHC RBC AUTO-ENTMCNC: 32 G/DL (ref 31.4–37.4)
MCV RBC AUTO: 89 FL (ref 82–98)
MONOCYTES # BLD AUTO: 1.08 THOUSAND/ΜL (ref 0.17–1.22)
MONOCYTES NFR BLD AUTO: 11 % (ref 4–12)
NEUTROPHILS # BLD AUTO: 7.08 THOUSANDS/ΜL (ref 1.85–7.62)
NEUTS SEG NFR BLD AUTO: 70 % (ref 43–75)
NRBC BLD AUTO-RTO: 0 /100 WBCS
PLATELET # BLD AUTO: 681 THOUSANDS/UL (ref 149–390)
PMV BLD AUTO: 8.1 FL (ref 8.9–12.7)
POTASSIUM SERPL-SCNC: 4.2 MMOL/L (ref 3.5–5.3)
RBC # BLD AUTO: 3.55 MILLION/UL (ref 3.88–5.62)
SODIUM SERPL-SCNC: 136 MMOL/L (ref 136–145)
WBC # BLD AUTO: 10.16 THOUSAND/UL (ref 4.31–10.16)

## 2017-12-18 PROCEDURE — 97110 THERAPEUTIC EXERCISES: CPT

## 2017-12-18 PROCEDURE — 85025 COMPLETE CBC W/AUTO DIFF WBC: CPT | Performed by: SURGERY

## 2017-12-18 PROCEDURE — 97116 GAIT TRAINING THERAPY: CPT

## 2017-12-18 PROCEDURE — 97530 THERAPEUTIC ACTIVITIES: CPT

## 2017-12-18 PROCEDURE — 80048 BASIC METABOLIC PNL TOTAL CA: CPT | Performed by: SURGERY

## 2017-12-18 RX ORDER — TAMSULOSIN HYDROCHLORIDE 0.4 MG/1
0.8 CAPSULE ORAL
Status: DISCONTINUED | OUTPATIENT
Start: 2017-12-18 | End: 2017-12-23 | Stop reason: HOSPADM

## 2017-12-18 RX ADMIN — TOBRAMYCIN AND DEXAMETHASONE 1 DROP: 3; 1 SUSPENSION/ DROPS OPHTHALMIC at 17:35

## 2017-12-18 RX ADMIN — TOBRAMYCIN AND DEXAMETHASONE 1 DROP: 3; 1 SUSPENSION/ DROPS OPHTHALMIC at 00:12

## 2017-12-18 RX ADMIN — ACETAMINOPHEN 975 MG: 325 TABLET ORAL at 05:08

## 2017-12-18 RX ADMIN — HEPARIN SODIUM 5000 UNITS: 5000 INJECTION, SOLUTION INTRAVENOUS; SUBCUTANEOUS at 05:08

## 2017-12-18 RX ADMIN — TAMSULOSIN HYDROCHLORIDE 0.8 MG: 0.4 CAPSULE ORAL at 17:35

## 2017-12-18 RX ADMIN — TOBRAMYCIN AND DEXAMETHASONE 1 DROP: 3; 1 SUSPENSION/ DROPS OPHTHALMIC at 07:57

## 2017-12-18 RX ADMIN — TOBRAMYCIN AND DEXAMETHASONE 1 DROP: 3; 1 SUSPENSION/ DROPS OPHTHALMIC at 12:34

## 2017-12-18 RX ADMIN — OXYBUTYNIN CHLORIDE 5 MG: 5 TABLET ORAL at 08:10

## 2017-12-18 RX ADMIN — Medication 150 MG: at 09:52

## 2017-12-18 RX ADMIN — HEPARIN SODIUM 5000 UNITS: 5000 INJECTION, SOLUTION INTRAVENOUS; SUBCUTANEOUS at 21:33

## 2017-12-18 NOTE — PROGRESS NOTES
Patient re-examined  Pain somewhat improved  Still having some discomfort  Voiding  No dysuria  Post void residuals are approximately 70-100cc  Tenderness less over suprapubic area improved   RLQ and LLQ   Will continue to observe

## 2017-12-18 NOTE — PROGRESS NOTES
Called to examine patient by nursing staff  Patient noted to have increased abdominal pain and pelvic  Vitals stable  Abdomen soft, tender suprapubically and RLQ  No peritonitis     Discussed case with patient  Patient thought hat his pain may be secondary to increased ambulation  Will request patient rest for 2 hours and obtain pain medication and reassess

## 2017-12-18 NOTE — PROGRESS NOTES
Patient care rounds were completed with the patient's nurse today, Cheryl Goodman  We discussed the plan is to keep him on his current diet and protein supplement  Continue to monitor his creatinine with the nephrostomy tube capped  Continue to monitor urinary output and nursing to check postvoid residuals after every void  Plan for repeat CT scan on 12/19/2017 prior to tentative discharge  Awaiting final discharge plan regarding nephrostomy tube from Urology  I did speak with Urology and they will evaluate the patient later today  We reviewed all of the invasive devices/lines/telemetry orders  Right-sided percutaneous nephrostomy tube; tentatively to be discontinued prior to discharge pending further Urology to evaluation and repeat CT scan  All questions and concerns were addressed  I spent greater than 15 minutes reviewing the plan with the patient and the nurse, and coordinating his care for the day      Bela Bustos PA-C  12/18/2017 10:58 AM

## 2017-12-18 NOTE — SOCIAL WORK
Cm reviewed patient during care coordination rounds  Patient is not medically stable for d/c today  Patient is anticipated for d/c tomorrow per medical team  Cm met with patient to discuss d/c needs  Cm informed that patient may d/c with nephrostomy tube and would need RN services at d/c  Cm met with patient to discuss VNA choices  However, patient states that he does not have an address to return to at this time  He reported that after his 8month incarceration time, he lost his job and his apartment  He stated that he was thinking of discharging to the [facility]  Cm inquired if patient has any friends or family he can stay with during this time  Per patient he has no family in the area, and was cautious of asking others for assistance  Cm counseled patient on the importance of finding a place that would allow him to keep his tube clean  He stated that he would try to reach out to a friend, that might be able to assist, but he was not too sure

## 2017-12-18 NOTE — CASE MANAGEMENT
Continued Stay Review    Date: 12/18/2017    Vital Signs: /74   Pulse 63   Temp 97 8 °F (36 6 °C) (Oral)   Resp 16   Ht 5' 6" (1 676 m)   Wt 60 5 kg (133 lb 4 8 oz)   SpO2 97%   BMI 21 52 kg/m²     Medications:   Scheduled Meds:   acetaminophen 975 mg Oral Q8H   heparin (porcine) 5,000 Units Subcutaneous Q8H [facility]   iron polysaccharides 150 mg Oral Daily   polyethylene glycol 17 g Oral Daily   senna-docusate sodium 1 tablet Oral HS   tamsulosin 0 8 mg Oral Daily With Dinner   tobramycin-dexamethasone 1 drop Ophthalmic Q6H [facility]     Continuous Infusions:    PRN Meds: HYDROmorphone    influenza vaccine    oxyCODONE    oxyCODONE    pneumococcal 23-valent polysaccharide vaccine    polyvinyl alcohol    Abnormal Labs/Diagnostic Results:hgb 10 1/31 6--tammi 681    Age/Sex: 43 y o  male     Assessment/Plan: Assessment:--urology  Mr Reinaldo Joseph is a 40-year-old incarcerated male with protracted hospital stay beginning with left renal fracture status post left nephrectomy and resultant left ureteral leak with fluid collection status post percutaneous drainage x2; as well as cystoscopy, left Deflux and right percutaneous nephrostomy for right hydronephrosis  Thakur catheter was removed over the weekend while right percutaneous nephrostomy has been clamped  Patient was voiding all weekend long spontaneously  Postvoid residual max 200 mL per nursing documentation         Plan:  Repeat CT scan of the abdomen and pelvis  Maintain percutaneous nephrostomy tube clamped for the time being  Monitor void  Added 2nd dose of Flomax and discontinued Ditropan  Tentative percutaneous nephrostomy removal contingent on CT findings and if patient can continue to void well within next 24 hours    surgery  We discussed the plan is to keep him on his current diet and protein supplement  Continue to monitor his creatinine with the nephrostomy tube capped    Continue to monitor urinary output and nursing to check postvoid residuals after every void  Plan for repeat CT scan on 12/19/2017 prior to tentative discharge  Awaiting final discharge plan regarding nephrostomy tube from Urology  I did speak with Urology and they will evaluate the patient later today      We reviewed all of the invasive devices/lines/telemetry orders    Right-sided percutaneous nephrostomy tube; tentatively to be discontinued prior to discharge pending further Urology to evaluation and repeat CT scan    Plan:   - follow-up on a m  labs  - diet as tolerated  - continue to cap perc nephrostomy  - OOB, ambulate  - PT/OT - rec home  - SQH & venodynes for DVT ppx   - will obtain CT scan before discharge to exclude recurrent urinoma  - Will follow up with urology to discuss long term plan   Discharge Plan: back to [facility]

## 2017-12-18 NOTE — PHYSICAL THERAPY NOTE
Physical Therapy Progress Note     12/18/17 1510   Pain Assessment   Pain Assessment No/denies pain   Pain Score No Pain   Hospital Pain Intervention(s) Repositioned; Ambulation/increased activity; Emotional support   Response to Interventions Satisfied  Restrictions/Precautions   Other Precautions Pain   Subjective   Subjective The pt  states that he is doing better, and that he may leave tomorrow  Bed Mobility   Supine to Sit 6  Modified independent   Sit to Supine 6  Modified independent   Transfers   Sit to Stand 6  Modified independent   Stand to Sit 6  Modified independent   Ambulation/Elevation   Gait pattern Forward Flexion; Excessively slow   Gait Assistance 5  Supervision   Assistive Device Rolling walker   Distance 110 feet  Balance   Static Sitting Normal   Dynamic Sitting Good   Static Standing Fair +   Ambulatory Fair   Activity Tolerance   Activity Tolerance Patient tolerated treatment well;Patient limited by fatigue   Nurse [address], RN  Exercises   TKR Supine;Sitting;Bilateral;AROM;15 reps   Assessment   Prognosis Good   Problem List Decreased mobility; Decreased endurance;Pain   Assessment The pt  is ambulating beyond household distances without assistance  He was able to get in and out of bed without difficulty and without pain  He was able to perform therapeutic exercise with only instruction  He does remain limited from his independent baseline which continued physical therapy will assist with  Barriers to Discharge None   Goals   Patient Goals To get his life turned around  STG Expiration Date 12/23/17   Treatment Day 1   Plan   Treatment/Interventions Functional transfer training;LE strengthening/ROM; Elevations; Therapeutic exercise; Endurance training;Patient/family training;Bed mobility;Gait training   Progress Improving as expected   PT Frequency 5x/wk   Recommendation   Recommendation Home with family support   Equipment Recommended Walker  (Rolling walker  )   PT - OK to Discharge Yes     Karel Saravia, PTA

## 2017-12-18 NOTE — PLAN OF CARE
Problem: PHYSICAL THERAPY ADULT  Goal: Performs mobility at highest level of function for planned discharge setting  See evaluation for individualized goals  Treatment/Interventions: Functional transfer training, LE strengthening/ROM, Therapeutic exercise, Endurance training, Equipment eval/education, Bed mobility, Gait training, Spoke to nursing, OT  Equipment Recommended: [place] Boot (at this time)       See flowsheet documentation for full assessment, interventions and recommendations  Outcome: Adequate for Discharge  Prognosis: Good  Problem List: Decreased mobility, Decreased endurance, Pain  Assessment: The pt  is ambulating beyond household distances without assistance  He was able to get in and out of bed without difficulty and without pain  He was able to perform therapeutic exercise with only instruction  He does remain limited from his independent baseline which continued physical therapy will assist with  Barriers to Discharge: None     Recommendation: Home with family support     PT - OK to Discharge: Yes    See flowsheet documentation for full assessment

## 2017-12-18 NOTE — PROGRESS NOTES
Progress Note - Lenora Rios 43 y o  male MRN: [id number]    Unit/Bed#: [facility] 925-01 Encounter: [id number]      Assessment:  Mr Ju Haynes is a 72-year-old incarcerated male with protracted hospital stay beginning with left renal fracture status post left nephrectomy and resultant left ureteral leak with fluid collection status post percutaneous drainage x2; as well as cystoscopy, left Deflux and right percutaneous nephrostomy for right hydronephrosis  Thakur catheter was removed over the weekend while right percutaneous nephrostomy has been clamped  Patient was voiding all weekend long spontaneously  Postvoid residual max 200 mL per nursing documentation  Plan:  Repeat CT scan of the abdomen and pelvis  Maintain percutaneous nephrostomy tube clamped for the time being  Monitor void  Added 2nd dose of Flomax and discontinued Ditropan  Tentative percutaneous nephrostomy removal contingent on CT findings and if patient can continue to void well within next 24 hours  Subjective:   Denies fever or chills    Objective:     Vitals: Blood pressure 120/74, pulse 63, temperature 97 8 °F (36 6 °C), temperature source Oral, resp  rate 16, height 5' 6" (1 676 m), weight 60 5 kg (133 lb 4 8 oz), SpO2 97 %  ,Body mass index is 21 52 kg/m²        Intake/Output Summary (Last 24 hours) at 12/18/17 1157  Last data filed at 12/18/17 0900   Gross per 24 hour   Intake              740 ml   Output             1000 ml   Net             -260 ml       Physical Exam: General appearance: alert, appears stated age, cooperative and no distress  Head: Normocephalic, without obvious abnormality, atraumatic  Neck: no adenopathy, no carotid bruit, no JVD, supple, symmetrical, trachea midline and thyroid not enlarged, symmetric, no tenderness/mass/nodules  Lungs: clear to auscultation bilaterally  Heart: regular rate and rhythm, S1, S2 normal, no murmur, click, rub or gallop  Abdomen: soft, non-tender; bowel sounds normal; no masses,  no organomegaly and Healing incision  Extremities: extremities normal, atraumatic, no cyanosis or edema  Pulses: 2+ and symmetric  Neurologic: Grossly normal  Right percutaneous nephrostomy tube--clamped     Invasive Devices     Peripheral Intravenous Line            Peripheral IV 12/18/17 Left Forearm less than 1 day          Drain            Nephrostomy Right 1 10 2 Fr  38 days              Lab Results   Component Value Date    WBC 10 16 12/18/2017    HGB 10 1 (L) 12/18/2017    HCT 31 6 (L) 12/18/2017    MCV 89 12/18/2017     (H) 12/18/2017     Lab Results   Component Value Date    GLUCOSE 100 12/18/2017    CALCIUM 9 8 12/18/2017     12/18/2017    K 4 2 12/18/2017    CO2 29 12/18/2017     12/18/2017    BUN 20 12/18/2017    CREATININE 1 23 12/18/2017       Lab, Imaging and other studies: I have personally reviewed pertinent reports

## 2017-12-18 NOTE — PROGRESS NOTES
Progress Note - General Surgery   Ally Chaudhari 43 y o  male MRN: [id number]  Unit/Bed#: [facility] 925-01 Encounter: [id number]    Assessment:  43y o -year-old male with spontaneous left kidney rupture     - s/p ex lap, left nephrectomy, abthera 11/1 Nimco Garcia)  - s/p ex lap, removal of packs, ligation of left ureter, abdominal closure 11/2 Wilber Myers)  - s/p right PCN w/ intraabdominal ascites samples 11/9 (IR)  - s/p 11/18 cystoscopy w/ fulguration of ureteral orifice, cystogram with injection of deflux, left retrograde pyelograam 11/18 Casey Herron)  - s/p nephrostogram, PCN capping 11/21 (IR)  - s/p IR tube check 11/27 (IR)  - s/p drainage of left upper quadrant fluid collection 11/30 (IR)  -s/p Left CT insertion 12/3 (Alisson Ochoa)   -s/p Splenic agram and embolization (IR)  - s/p CT removal 12/14  - s/p IR drain x 2 removal     Plan:   - follow-up on a m  labs  - diet as tolerated  - continue to cap perc nephrostomy  - OOB, ambulate  - PT/OT - rec home  - SQH & venodynes for DVT ppx   - will obtain CT scan before discharge to exclude recurrent urinoma  - Will follow up with urology to discuss long term plan     Subjective/Objective     Subjective: Patient complaining of some right lower quadrant and suprapubic pain yesterday - thought to be related to increased ambulation  He does have a small area in his right lower quadrant that has ecchymoses which appears to be related to his DVT prophylaxis shots  tolerating det and having normal bowel function     Objective:     Vitals: Blood pressure 133/65, pulse 79, temperature 98 4 °F (36 9 °C), temperature source Oral, resp  rate 17, height 5' 6" (1 676 m), weight 60 5 kg (133 lb 4 8 oz), SpO2 98 %  ,Body mass index is 21 52 kg/m²  I/O       12/11 0701 - 12/12 0700 12/12 0701 - 12/13 0700 12/13 0701 - 12/14 0700    P  O  495 1175     I V  (mL/kg)       Total Intake(mL/kg) 495 (7 7) 1175 (18 5)     Urine (mL/kg/hr) 2200 (1 4) 1450 (0 9)     Drains 25 (0) 5 (0)     Chest Tube 45 (0) 135 (0 1)     Total Output 2270 1590      Net -1832 -028                   PE:  NAD  Norm resp effort  RRR  Abd soft min tender in RLQ w/ ecchymosis noted   ND  Incision cdi   PCN drain is capped  Left flank - prior drain insertion sites are CDI     Lab, Imaging and other studies:   CBC with diff:   No results found for: WBC, HGB, HCT, MCV, PLT, ADJUSTEDWBC, MCH, MCHC, RDW, MPV, NRBC, BMP/CMP:   No results found for: NA, K, CL, CO2, ANIONGAP, BUN, CREATININE, GLUCOSE, CALCIUM, AST, ALT, ALKPHOS, PROT, ALBUMIN, BILITOT, EGFR, Magnesium: No components found for: MAG  VTE Pharmacologic Prophylaxis: Heparin  VTE Mechanical Prophylaxis: sequential compression device

## 2017-12-19 ENCOUNTER — APPOINTMENT (INPATIENT)
Dept: RADIOLOGY | Facility: HOSPITAL | Age: 42
DRG: 659 | End: 2017-12-19
Payer: COMMERCIAL

## 2017-12-19 PROCEDURE — 97110 THERAPEUTIC EXERCISES: CPT

## 2017-12-19 PROCEDURE — 74177 CT ABD & PELVIS W/CONTRAST: CPT

## 2017-12-19 PROCEDURE — 97116 GAIT TRAINING THERAPY: CPT

## 2017-12-19 RX ADMIN — TOBRAMYCIN AND DEXAMETHASONE 1 DROP: 3; 1 SUSPENSION/ DROPS OPHTHALMIC at 06:18

## 2017-12-19 RX ADMIN — TOBRAMYCIN AND DEXAMETHASONE 1 DROP: 3; 1 SUSPENSION/ DROPS OPHTHALMIC at 00:24

## 2017-12-19 RX ADMIN — IODIXANOL 85 ML: 320 INJECTION, SOLUTION INTRAVASCULAR at 16:28

## 2017-12-19 RX ADMIN — HEPARIN SODIUM 5000 UNITS: 5000 INJECTION, SOLUTION INTRAVENOUS; SUBCUTANEOUS at 06:18

## 2017-12-19 RX ADMIN — TOBRAMYCIN AND DEXAMETHASONE 1 DROP: 3; 1 SUSPENSION/ DROPS OPHTHALMIC at 12:22

## 2017-12-19 RX ADMIN — TAMSULOSIN HYDROCHLORIDE 0.8 MG: 0.4 CAPSULE ORAL at 17:18

## 2017-12-19 RX ADMIN — TOBRAMYCIN AND DEXAMETHASONE 1 DROP: 3; 1 SUSPENSION/ DROPS OPHTHALMIC at 17:18

## 2017-12-19 RX ADMIN — TOBRAMYCIN AND DEXAMETHASONE 1 DROP: 3; 1 SUSPENSION/ DROPS OPHTHALMIC at 23:09

## 2017-12-19 RX ADMIN — HEPARIN SODIUM 5000 UNITS: 5000 INJECTION, SOLUTION INTRAVENOUS; SUBCUTANEOUS at 21:12

## 2017-12-19 RX ADMIN — Medication 150 MG: at 08:28

## 2017-12-19 RX ADMIN — HEPARIN SODIUM 5000 UNITS: 5000 INJECTION, SOLUTION INTRAVENOUS; SUBCUTANEOUS at 14:03

## 2017-12-19 NOTE — PLAN OF CARE
Problem: PHYSICAL THERAPY ADULT  Goal: Performs mobility at highest level of function for planned discharge setting  See evaluation for individualized goals  Treatment/Interventions: Functional transfer training, LE strengthening/ROM, Therapeutic exercise, Endurance training, Equipment eval/education, Bed mobility, Gait training, Spoke to nursing, OT  Equipment Recommended: Jessee Diallo (at this time)       See flowsheet documentation for full assessment, interventions and recommendations  Outcome: Progressing  Prognosis: Good  Problem List: Decreased strength, Decreased range of motion, Decreased endurance, Decreased mobility, Pain  Assessment: Patient is able to ambulate increased distance with RW  Patient feels more stable ambulating with RW  Attempted ambulating 5ft without AD and patient reported he feels unsteady  patient reported he has abdominal tighteness and discomfort but no pain  Patient ambulates with forward flexion and at a very slow pace  Patient performs all activities with increased time  Performed manual heelcord,/hamstring stretching 20 sec x 3  Continue with IP rehab to maximize functional independence  Barriers to Discharge: None     Recommendation: Home with family support     PT - OK to Discharge: Yes    See flowsheet documentation for full assessment

## 2017-12-19 NOTE — PROGRESS NOTES
Progress Note - General Surgery   Teresa Stephen 43 y o  male MRN: [id number]  Unit/Bed#: [facility] 925-01 Encounter: [id number]    Assessment:  43y o -year-old male with spontaneous left kidney rupture     - s/p ex lap, left nephrectomy, abthera 11/1 Tayler Hoots)  - s/p ex lap, removal of packs, ligation of left ureter, abdominal closure 11/2 Mandy Valladares)  - s/p right PCN w/ intraabdominal ascites samples 11/9 (IR)  - s/p 11/18 cystoscopy w/ fulguration of ureteral orifice, cystogram with injection of deflux, left retrograde pyelograam 11/18 Abby Goyal)  - s/p nephrostogram, PCN capping 11/21 (IR)  - s/p IR tube check 11/27 (IR)  - s/p drainage of left upper quadrant fluid collection 11/30 (IR)  -s/p Left CT insertion 12/3 (Sabrina Nieves)   -s/p Splenic agram and embolization (IR)  - s/p CT removal 12/14  - s/p IR drain x 2 removal     Plan:  - diet as tolerated  - plan to rescan prior to discharge and d/c nephrostomy tube  - at this point, patient has no home or facility for discharge -- will follow up with CM regarding placement at possible shelter  - analgesia  - OOB, ambulate  - DVT ppx -- SQ    Subjective/Objective     Subjective: Patient tearful today, worried about leaving hospital and having no job or home  Denies pain at this time  Objective:     Vitals: Blood pressure 131/69, pulse 98, temperature 98 5 °F (36 9 °C), temperature source Oral, resp  rate 18, height 5' 6" (1 676 m), weight 60 5 kg (133 lb 4 8 oz), SpO2 99 %  ,Body mass index is 21 52 kg/m²  I/O       12/17 0701 - 12/18 0700 12/18 0701 - 12/19 0700 12/19 0701 - 12/20 0700    P  O  680 1500     Total Intake(mL/kg) 680 (11 2) 1500 (24 8)     Urine (mL/kg/hr) 900 (0 6) 1285 (0 9)     Total Output 900 1285      Net -220 +215             Unmeasured Urine Occurrence 2 x 1 x           Physical Exam:  GEN: NAD  HEENT: MMM  CV: RRR  Lung: Normal effort  Ab: Soft, NT/ND  Extrem: No CCE  Neuro:  A+Ox3    Lab, Imaging and other studies: CBC with diff: No results found for: WBC, HGB, HCT, MCV, PLT, ADJUSTEDWBC, MCH, MCHC, RDW, MPV, NRBC, BMP/CMP: No results found for: NA, K, CL, CO2, ANIONGAP, BUN, CREATININE, GLUCOSE, CALCIUM, AST, ALT, ALKPHOS, PROT, ALBUMIN, BILITOT, EGFR, Magnesium: No components found for: MAG  VTE Pharmacologic Prophylaxis: Heparin  VTE Mechanical Prophylaxis: sequential compression device

## 2017-12-19 NOTE — PROGRESS NOTES
Progress Note - Brandi Pal 43 y o  male MRN: [id number]    Unit/Bed#: [facility] 925-01 Encounter: [id number]      Assessment:  Mr Justina Rico is a 70-year-old incarcerated male with protracted hospital stay beginning with left renal fracture status post left nephrectomy and resultant left ureteral leak with fluid collection status post percutaneous drainage x2; as well as cystoscopy, left Deflux and right percutaneous nephrostomy for right hydronephrosis  Thakur catheter was removed over the weekend while right percutaneous nephrostomy has been clamped  Patient was voiding all weekend long spontaneously  Postvoid residual shows significant improvement with double strength Flomax  Plan:  Monitor PVRs  Awaiting CT of the abdomen and pelvis with contrast   The team is  hopeful to remove percutaneous nephrostomy  Discharge planning in progress  Subjective:   Denies fever or chills    Objective:     Vitals: Blood pressure 131/69, pulse 98, temperature 98 5 °F (36 9 °C), temperature source Oral, resp  rate 18, height 5' 6" (1 676 m), weight 60 5 kg (133 lb 4 8 oz), SpO2 99 %  ,Body mass index is 21 52 kg/m²        Intake/Output Summary (Last 24 hours) at 12/19/17 1507  Last data filed at 12/19/17 1300   Gross per 24 hour   Intake              900 ml   Output             1090 ml   Net             -190 ml       Physical Exam: General appearance: alert, appears stated age, cooperative and no distress  Head: Normocephalic, without obvious abnormality, atraumatic  Neck: no adenopathy, no carotid bruit, no JVD, supple, symmetrical, trachea midline and thyroid not enlarged, symmetric, no tenderness/mass/nodules  Lungs: clear to auscultation bilaterally  Heart: regular rate and rhythm, S1, S2 normal, no murmur, click, rub or gallop  Abdomen: soft, non-tender; bowel sounds normal; no masses,  no organomegaly and Healing incision  Extremities: extremities normal, atraumatic, no cyanosis or edema  Pulses: 2+ and symmetric  Neurologic: Grossly normal  Right percutaneous nephrostomy tube--clamped     Invasive Devices     Peripheral Intravenous Line            Peripheral IV 12/18/17 Left Forearm 1 day          Drain            Nephrostomy Right 1 10 2 Fr  40 days              Lab Results   Component Value Date    WBC 10 16 12/18/2017    HGB 10 1 (L) 12/18/2017    HCT 31 6 (L) 12/18/2017    MCV 89 12/18/2017     (H) 12/18/2017     Lab Results   Component Value Date    GLUCOSE 100 12/18/2017    CALCIUM 9 8 12/18/2017     12/18/2017    K 4 2 12/18/2017    CO2 29 12/18/2017     12/18/2017    BUN 20 12/18/2017    CREATININE 1 23 12/18/2017       Lab, Imaging and other studies: I have personally reviewed pertinent reports

## 2017-12-19 NOTE — PHYSICAL THERAPY NOTE
Physical Therapy Progress Note         12/19/17 1420   Pain Assessment   Pain Assessment No/denies pain   Restrictions/Precautions   Weight Bearing Precautions Per Order No   Other Precautions Pain   General   Chart Reviewed Yes   Family/Caregiver Present No   Cognition   Overall Cognitive Status WFL   Subjective   Subjective Patient agreed to participate in tehrapy  Bed Mobility   Supine to Sit 6  Modified independent   Additional items Increased time required   Sit to Supine 6  Modified independent   Additional items Increased time required   Transfers   Sit to Stand 6  Modified independent   Additional items Increased time required   Stand to Sit 6  Modified independent   Additional items Increased time required   Ambulation/Elevation   Gait pattern Foward flexed; Short stride; Excessively slow;Decreased foot clearance   Gait Assistance 5  Supervision   Additional items Assist x 1   Assistive Device Rolling walker   Distance 400ft   Endurance Deficit   Endurance Deficit No   Activity Tolerance   Activity Tolerance Patient tolerated treatment well   Nurse Made Aware Yes   Exercises   Heel Cord Stretch Sitting;AAROM; Bilateral  (20 sec x 2)   TKR Sitting;Standing;15 reps;Bilateral;AROM   Assessment   Prognosis Good   Problem List Decreased strength;Decreased range of motion;Decreased endurance;Decreased mobility;Pain   Assessment Patient is able to ambulate increased distance with RW  Patient feels more stable ambulating with RW  Attempted ambulating 5ft without AD and patient reported he feels unsteady  patient reported he has abdominal tighteness and discomfort but no pain  Patient ambulates with forward flexion and at a very slow pace  Patient performs all activities with increased time  Performed manual heelcord,/hamstring stretching 20 sec x 3  Continue with IP rehab to maximize functional independence     Barriers to Discharge None   Goals   Patient Goals None reported   STG Expiration Date 12/23/17   Plan Treatment/Interventions Therapeutic exercise;LE strengthening/ROM; Functional transfer training;Equipment eval/education;Patient/family training;Bed mobility;Gait training;Spoke to nursing   Progress Improving as expected   PT Frequency 5x/wk   Recommendation   Recommendation Home with family support   Equipment Recommended Walker   PT - OK to Discharge Yes     Faye Rome, PTA

## 2017-12-19 NOTE — RESTORATIVE TECHNICIAN NOTE
Restorative Specialist Mobility Note       Activity:  (Pt refused OOB ambulation at this time, agreed to get one more walk in later this evening )

## 2017-12-19 NOTE — SOCIAL WORK
Cm reviewed patient during care coordination rounds  Cm met with patient to discuss discharge plan  Pt reported that his bail was posted due to an agreement between the  and his   Pt explained that Marshall Smith, [phone], from [facility] (who works with the court to help people reach bail)  CM followed up with  Pre-Trial Services and spoke with Denver Colander who stated that patient is free to go upon discharge and doesn't need to check in anywhere  Pt reported that he is anticipating going to the [facility] upon discharge  Pt stated he is also reaching out to family/friends to see if he can stay with anyone temporarily  CM informed medical team   Awaiting direction for d/c plan

## 2017-12-19 NOTE — CASE MANAGEMENT
Gregorio Santos RN BSN Registered Nurse Signed   Case Management Date of Service: 11/3/2017 12:02 PM         []Hide copied text  [address] [facility] in the [facility] by [address] 2017  Network Utilization Review Department  Phone: [phone]; Fax [phone]  ATTENTION: The Network Utilization Review Department is now centralized for our 7 Facilities  Please call with any questions or concerns to [phone] TNK carefully follow the prompts so that you are directed to the right person  All voicemails are confidential  Fax any determinations, approvals, denials, and requests for initial or continue stay review clinical to [phone]  Due to HIGH CALL volume, it would be easier if you could please send faxed requests to expedite your requests and in part, help us provide discharge notifications faster   /////////////////////////////////////////////////////////////////////////////////////////////////////////////////     Initial Clinical Review     Admission: Date/Time/Statement: 11/2/17 @ 0343            Orders Placed This Encounter   Procedures    Inpatient Admission       Standing Status:   Standing       Number of Occurrences:   1       Order Specific Question:   Admitting Physician       Answer:   Margy Staley [[id number]]       Order Specific Question:   Level of Care       Answer:   Critical Care [15]       Order Specific Question:   Estimated length of stay       Answer:   More than 2 Midnights       Order Specific Question:   Certification       Answer:   I certify that inpatient services are medically necessary for this patient for a duration of greater than two midnights   See H&P and MD Progress Notes for additional information about the patient's course of treatment             ED: Date/Time/Mode of Arrival:             ED Arrival Information      Expected Arrival Acuity Means of Arrival Escorted By Service Admission Type     - 11/2/2017 00:43 [facility]     Arrival Complaint     Left flank pain, hematuria, chest pain             Chief Complaint:       Chief Complaint   Patient presents with    Flank Pain         History of Illness: 42M who is currently incarcerated and with hx of chronic bilateral hydronephrosis who presented to OSH with left flank pain  He denied any trauma but was found to have severe bilateral hydronephrosis with left GIV renal injury with significant hyperdense fluid in the collection system  He was hemodynamically stable at OSH but became hypotensive en route (44J systolic)  His initial Hb was 10 3 at OSH but had declined to 6 5 in the trauma bay with a base deficit of -7  He was pale and diaphoretic with +FAST  Given these acute findings , he was taken emergently to the OR for exploratory laparotomy       Transfused 2U PRBC en route to Or and subsequently underwent ex-lap, L nephrectomy, temporary abdominal closure  See operative report  Attempts to place 3 way jiménez (18Fr) unsuccessful and a 16Fr coude was placed with drainage of mir blood  This began clearing up to clear yellow urine by the end of the case without clots noted  He was transfused a total of 7U PRBC (+2 pre-op), 6U FFP, 2U Plt and 260 mL cellsaver  EBL 2000 mL   He was subsequently admitted to the ICU for post-operative care      ED Vital Signs:            ED Triage Vitals   Temperature Pulse Respirations Blood Pressure SpO2   11/02/17 0048 11/02/17 0048 11/02/17 0048 11/02/17 0048 11/02/17 0048   (!) 100 8 °F (38 2 °C) 102 16 109/60 92 %       Temp Source Heart Rate Source Patient Position - Orthostatic VS BP Location FiO2 (%)   11/02/17 0048 11/02/17 0048 11/02/17 0048 -- 11/02/17 0400   Tympanic Monitor Lying   40       Pain Score           11/02/17 0426           Worst Possible Pain                Wt Readings from Last 1 Encounters:   11/02/17 89 2 kg (196 lb 10 4 oz)      ED Treatment:              Medication Administration from 11/02/2017 0032 to 11/02/2017 0977        Date/Time Order Dose Route Action Action by Comments       11/02/2017 0055 sodium chloride 0 9 % bolus 2,000 mL Intravenous Given Wilberto Barros RN               Past Medical/Surgical History: Active Ambulatory Problems     Diagnosis Date Noted    No Active Ambulatory Problems           Resolved Ambulatory Problems     Diagnosis Date Noted    No Resolved Ambulatory Problems           Past Medical History:   Diagnosis Date    Enlarged prostate      UTI (urinary tract infection)           Admitting Diagnosis: Chest pain [R07 9]  Laceration of left kidney, initial encounter [S37 032A]        Assessment/Plan   Trauma Alert: Level A trauma transfer [facility]     1  Left-sided grade IV fracture of kidney  2  Hematuria  3  Hypotension  4  History of bladder atony and b/l hydroureteronephrosis  5  Incarcerated     Trauma Plan:  1   To OR for emergent ex-lap     Admission Orders:  Scheduled Meds:   acetaminophen 975 mg Oral Q8H   amLODIPine 5 mg Oral Daily   [START ON 11/4/2017] famotidine 20 mg Oral Daily   heparin (porcine) 5,000 Units Subcutaneous Q8H [address]   oxyCODONE 5 mg Oral Q8H   senna-docusate sodium 1 tablet Oral BID   tamsulosin 0 4 mg Oral Daily With Dinner      11/2  Taken to OR for ex lap and left nephrectomy with Maru Ledesma after being found to have spontaneous rupture of left kidney and massive retroperitoneal hemorrhage      LAPAROTOMY EXPLORATORY ;LEFT NEPHRECTOMY; LEFT URETERECTOMY; APPLICATION OF ABTHERA VAC       Findings:   Spontaneous left kidney rupture  Massive retroperitoneal hemorrhage     Estimated Blood Loss: 2L  7u PRBC, 6 FFP, 1 PLT  Abthera vac dressing placed and patient transported to ICU in guarded, but stable condition     11/3  Surgery  Assessment:  43y o -year-old male with spontaneous left kidney rupture   - s/p ex lap, left nephrectomy, abthera 11/1 Gearld Officer)  - s/p ex lap, removal of packs, ligation of left ureter, abdominal closure 11/2 Denzel Bennett)   Plan:  - ok for sips  - f/u with urology regarding hydronephrosis of left kidney-->may need stent? Creatinine is stable right now 2 2  - OOB/ambulate  - ok for oral pain meds     11/3  Critical Care     Neuro:   - Post Operative pain currently has Dilaudid PRN, will institute scheduled oxycodone 5mg q 8hrs  - GCS 15, had vague "blurry vision" but normal exam, normal finger counting, motor/sensory exam  - Extubated O/N; therefore fentanyl/propofol gtt is off  - Nicotine patch  - Dilaudid 1 mg q 2 hrs has used x 3  - Tylenol 975 mg q 8 hrs     CV: Has been HTNsive goal per Op notes is <160, has been needing PRN hydralazine x 1/Labetalol x 1  - States has been told he has HTN; not on any meds controlled by watching "Salt"     Lung:   - Extubated overnight - doing well, on NC will transition to RA  - CXR in AM as crackles in lungs - IS (Poor Insp film but possibly increased congestion/infiltrate/atelectasis LLL)     GI:   - Per Surgery allowed to have sips with meds  - Ex lap with nephrectomy second look yesterday  - Stress ulcer ppx till allowed sig PO - Pepcid     FEN:   - End pts have cleared  - Is hyperchloremic and Hypernatremic is on  ml/hr until cleared for PO  - Calcium replete     :   - S/P left nephrectomy has underlying right hydronephrosis  - Thakur present ---> can remove today  - Renal fxn 2 47----->2 12---->2 18---->2 17 (Baseline 1 13)  - Takes flomax at home chronically will restart when able to have PO  - Sig urination 3-4 ml/kg/hr  - Overall -15 mL likely negative much more due to OR and open abd     ID:   - Has been persistently febrile overnight started scheduled tylenol fevers from Tmax 102 9 now to 100 8   Etiology possibly SIRS vs transfusion related vs infectious   Had contaminated urine but appears chronic in samples    CXR initially unremarkable     - WBC 7 18------>11 41  - Will hold off on antibiotics and monitor fever curve     Heme:   - ABLA has now stabilized received multiple blood products in OR (See prior notes)  - HGB 8 9----->9 3----->10 3  - Restart anti-coagulation will need heparin has WOLF     Endo: Glucose controlled, no active issues                Msk/Skin:   - OOB today per Surgery  - PT/OT     Disposition: Possibly transfer out of ICU today if remains HD stable

## 2017-12-20 RX ORDER — PHENAZOPYRIDINE HYDROCHLORIDE 100 MG/1
100 TABLET, FILM COATED ORAL
Status: DISCONTINUED | OUTPATIENT
Start: 2017-12-20 | End: 2017-12-23 | Stop reason: HOSPADM

## 2017-12-20 RX ADMIN — TOBRAMYCIN AND DEXAMETHASONE 1 DROP: 3; 1 SUSPENSION/ DROPS OPHTHALMIC at 06:02

## 2017-12-20 RX ADMIN — HEPARIN SODIUM 5000 UNITS: 5000 INJECTION, SOLUTION INTRAVENOUS; SUBCUTANEOUS at 23:18

## 2017-12-20 RX ADMIN — TOBRAMYCIN AND DEXAMETHASONE 1 DROP: 3; 1 SUSPENSION/ DROPS OPHTHALMIC at 23:43

## 2017-12-20 RX ADMIN — TOBRAMYCIN AND DEXAMETHASONE 1 DROP: 3; 1 SUSPENSION/ DROPS OPHTHALMIC at 17:59

## 2017-12-20 RX ADMIN — PHENAZOPYRIDINE HYDROCHLORIDE 100 MG: 100 TABLET ORAL at 17:59

## 2017-12-20 RX ADMIN — TAMSULOSIN HYDROCHLORIDE 0.8 MG: 0.4 CAPSULE ORAL at 17:59

## 2017-12-20 RX ADMIN — TOBRAMYCIN AND DEXAMETHASONE 1 DROP: 3; 1 SUSPENSION/ DROPS OPHTHALMIC at 12:22

## 2017-12-20 RX ADMIN — Medication 150 MG: at 08:27

## 2017-12-20 NOTE — PROGRESS NOTES
Thakur catheter removed  Nephrostomy tube clamped  Patient appears to be voiding spontaneously  A repeat CT scan from December 19, 2017 was performed and reviewed  The left ureteral stump appears dilated all the way up to the level of the nephrectomy bed, however, there does not appear to be extravasation of fluid  It is likely that with the Thakur catheter out in the nephrostomy tube clamped that he continues to reflux urine bilaterally  The capacious remaining left ureter can act as a pop off mechanism to relieve bladder pressure when voiding  As long as he does not leak urine from the ureteral stump I would continue to monitor him conservatively  I would hold on open transvesical closure of the left ureteral orifice  As long as the nephrostomy tube has been clamped for at least 3 days and the patient continues to void without abdominal pain, distention, or fever, and his creatinine and white blood cell count remained normal, I would consider removing the right nephrostomy tube prior to discharge

## 2017-12-20 NOTE — PROGRESS NOTES
Progress Note - General Surgery   Vasu Mcgregor 43 y o  male MRN: [id number]  Unit/Bed#: [facility] 925-01 Encounter: [id number]    Assessment:  43y o -year-old male with spontaneous left kidney rupture     - s/p ex lap, left nephrectomy, abthera 11/1 Chelly Hooper)  - s/p ex lap, removal of packs, ligation of left ureter, abdominal closure 11/2 Vinay Rice)  - s/p right PCN w/ intraabdominal ascites samples 11/9 (IR)  - s/p 11/18 cystoscopy w/ fulguration of ureteral orifice, cystogram with injection of deflux, left retrograde pyelograam 11/18 [place] Luis Fernando)  - s/p nephrostogram, PCN capping 11/21 (IR)  - s/p IR tube check 11/27 (IR)  - s/p drainage of left upper quadrant fluid collection 11/30 (IR)  -s/p Left CT insertion 12/3 (Real Starling)   -s/p Splenic agram and embolization (IR)  - s/p CT removal 12/14  - s/p IR drain x 2 removal     Plan:  - diet as tolerated  - analgesia  - f/u CM for placement -- poss to [facility] 73, ambulate  - appreciate urology recs for penile pain    Subjective/Objective     Subjective: Patient reports pain on initiation of urination  Objective:     Vitals: Blood pressure 140/87, pulse 92, temperature 98 2 °F (36 8 °C), temperature source Oral, resp  rate 18, height 5' 6" (1 676 m), weight 60 5 kg (133 lb 4 8 oz), SpO2 98 %  ,Body mass index is 21 52 kg/m²  I/O       12/18 0701 - 12/19 0700 12/19 0701 - 12/20 0700    P  O  1500 180    Total Intake(mL/kg) 1500 (24 8) 180 (3)    Urine (mL/kg/hr) 1285 (0 9) 755 (0 5)    Total Output 1285 755    Net +215 -575          Unmeasured Urine Occurrence 1 x           Physical Exam:  GEN: NAD  HEENT: MMM  CV: RRR  Lung: Normal effort  Ab: Soft, NT/ND  Extrem: No CCE  Neuro:  A+Ox3    Lab, Imaging and other studies: CBC with diff: No results found for: WBC, HGB, HCT, MCV, PLT, ADJUSTEDWBC, MCH, MCHC, RDW, MPV, NRBC, BMP/CMP: No results found for: NA, K, CL, CO2, ANIONGAP, BUN, CREATININE, GLUCOSE, CALCIUM, AST, ALT, ALKPHOS, PROT, ALBUMIN, BILITOT, EGFR, Magnesium: No components found for: MAG  VTE Pharmacologic Prophylaxis: Heparin  VTE Mechanical Prophylaxis: sequential compression device

## 2017-12-20 NOTE — PHYSICAL THERAPY NOTE
Physical Therapy Cancellation Note    PT session cancelled as patient refused reporting he is tired now  He said he just came back from a walk  Saw patient ambulating in the hallway before

## 2017-12-20 NOTE — CASE MANAGEMENT
[address] [facility] in the [facility] by Parrish Barry for 2017  Network Utilization Review Department  Phone: [phone]; Fax [phone]  ATTENTION: The Network Utilization Review Department is now centralized for our 7 Facilities  Make a note that we have a new phone and fax numbers for our Department  Please call with any questions or concerns to [phone] and carefully follow the prompts so that you are directed to the right person  All voicemails are confidential  Fax any determinations, approvals, denials, and requests for initial or continue stay review clinical to [phone]  Due to HIGH CALL volume, it would be easier if you could please send faxed requests to expedite your requests and in part, help us provide discharge notifications faster  Continued Stay Review    Date: 12/20/17 Wednesday ACUTE MED SURG LEVEL OF CARE      Vital Signs: /86   Pulse 91   Temp 99 1 °F (37 3 °C) (Oral)   Resp 18   Ht 5' 6" (1 676 m)   Wt 60 9 kg (134 lb 4 2 oz)   SpO2 98%   BMI 21 67 kg/m²     Vitals: Blood pressure 140/87, pulse 92, temperature 98 2 °F (36 8 °C), temperature source Oral, resp  rate 18, height 5' 6" (1 676 m), weight 60 5 kg (133 lb 4 8 oz), SpO2 98 %  ,Body mass index is 21 52 kg/m²            I/O        12/18 0701 - 12/19 0700 12/19 0701 - 12/20 0700     P O  1500 180     Total Intake(mL/kg) 1500 (24 8) 180 (3)     Urine (mL/kg/hr) 1285 (0 9) 755 (0 5)     Total Output 1285 755     Net +215 -575               Unmeasured Urine Occurrence 1 x             Diet Regular; Regular House       Dietary nutrition supplements Ensure Enlive Daily with Breakfast        IV ACCESS    Medications:   Scheduled Meds:   acetaminophen 975 mg Oral Q8H   heparin (porcine) 5,000 Units Subcutaneous Q8H [address]   iron polysaccharides 150 mg Oral Daily   polyethylene glycol 17 g Oral Daily   senna-docusate sodium 1 tablet Oral HS   tamsulosin 0 8 mg Oral Daily With Dinner   tobramycin-dexamethasone 1 drop Ophthalmic Q6H [address]     PRN Meds:     HYDROmorphone    influenza vaccine    oxyCODONE    oxyCODONE    pneumococcal 23-valent polysaccharide vaccine    polyvinyl alcohol    LABS/Diagnostic Results:         Age/Sex: 43 y o  male     Assessment/Plan:   Assessment:  43y o -year-old male with spontaneous left kidney rupture     - s/p ex lap, left nephrectomy, abthera 11/1 Timothy Muniz)  - s/p ex lap, removal of packs, ligation of left ureter, abdominal closure 11/2 Eva Monte)  - s/p right PCN w/ intraabdominal ascites samples 11/9 (IR)  - s/p 11/18 cystoscopy w/ fulguration of ureteral orifice, cystogram with injection of deflux, left retrograde pyelograam 11/18 Abelardo Baron)  - s/p nephrostogram, PCN capping 11/21 (IR)  - s/p IR tube check 11/27 (IR)  - s/p drainage of left upper quadrant fluid collection 11/30 (IR)  -s/p Left CT insertion 12/3 (Willy Pass)   -s/p Splenic agram and embolization (IR)  - s/p CT removal 12/14  - s/p IR drain x 2 removal      Plan:  - diet as tolerated  - analgesia  - f/u CM for placement -- poss to [facility] 73, ambulate  - appreciate urology recs for penile pain        Discharge Plan:   ANTICIPATE DISCHARGE HOME WHEN MEDICALLY CLEARED    PER PT 12/19/17  Plan   Treatment/Interventions Functional transfer training;LE strengthening/ROM; Elevations; Therapeutic exercise; Endurance training;Patient/family training;Bed mobility;Gait training   Progress Improving as expected   PT Frequency 5x/wk   Recommendation   Recommendation Home with family support       CASE MANAGEMENT FOLLOWING CLOSELY FOR ALL DISCHARGE NEEDS

## 2017-12-21 PROCEDURE — 97116 GAIT TRAINING THERAPY: CPT

## 2017-12-21 PROCEDURE — 97110 THERAPEUTIC EXERCISES: CPT

## 2017-12-21 RX ADMIN — TOBRAMYCIN AND DEXAMETHASONE 1 DROP: 3; 1 SUSPENSION/ DROPS OPHTHALMIC at 18:32

## 2017-12-21 RX ADMIN — TOBRAMYCIN AND DEXAMETHASONE 1 DROP: 3; 1 SUSPENSION/ DROPS OPHTHALMIC at 23:23

## 2017-12-21 RX ADMIN — PHENAZOPYRIDINE HYDROCHLORIDE 100 MG: 100 TABLET ORAL at 11:14

## 2017-12-21 RX ADMIN — PHENAZOPYRIDINE HYDROCHLORIDE 100 MG: 100 TABLET ORAL at 18:32

## 2017-12-21 RX ADMIN — HEPARIN SODIUM 5000 UNITS: 5000 INJECTION, SOLUTION INTRAVENOUS; SUBCUTANEOUS at 05:02

## 2017-12-21 RX ADMIN — PHENAZOPYRIDINE HYDROCHLORIDE 100 MG: 100 TABLET ORAL at 08:36

## 2017-12-21 RX ADMIN — TAMSULOSIN HYDROCHLORIDE 0.8 MG: 0.4 CAPSULE ORAL at 18:32

## 2017-12-21 RX ADMIN — Medication 150 MG: at 08:36

## 2017-12-21 RX ADMIN — TOBRAMYCIN AND DEXAMETHASONE 1 DROP: 3; 1 SUSPENSION/ DROPS OPHTHALMIC at 05:02

## 2017-12-21 RX ADMIN — TOBRAMYCIN AND DEXAMETHASONE 1 DROP: 3; 1 SUSPENSION/ DROPS OPHTHALMIC at 11:14

## 2017-12-21 NOTE — PROGRESS NOTES
Progress Note - Muna Deluca 43 y o  male MRN: [id number]    Unit/Bed#: [facility] 925-01 Encounter: [id number]      Assessment:  Mr Karlo Miranda is a 51-year-old male with protracted hospital stay beginning with left renal fracture status post left nephrectomy and resultant left ureteral leak with fluid collection status post percutaneous drainage x2; as well as cystoscopy, left Deflux and right percutaneous nephrostomy for right hydronephrosis  Thakur catheter was removed while right percutaneous nephrostomy has been clamped for 1 week  Patient was voiding well on double Flomax with BladderScan maximum volume of 118 mL    Plan: Will request IR to perform final right percutaneous nephrostogram   If there is evidence of free flowing contrast passage of contrast to bladder, may remove percutaneous nephrostomy  Continue Flomax  Subjective:   Denies fever or chills    Objective:     Vitals: Blood pressure 131/80, pulse 69, temperature 98 8 °F (37 1 °C), temperature source Oral, resp  rate 18, height 5' 6" (1 676 m), weight 61 5 kg (135 lb 9 3 oz), SpO2 98 %  ,Body mass index is 21 88 kg/m²        Intake/Output Summary (Last 24 hours) at 12/21/17 1422  Last data filed at 12/21/17 1242   Gross per 24 hour   Intake             1440 ml   Output              450 ml   Net              990 ml       Physical Exam: General appearance: alert, appears stated age, cooperative and no distress  Head: Normocephalic, without obvious abnormality, atraumatic  Neck: no adenopathy, no carotid bruit, no JVD, supple, symmetrical, trachea midline and thyroid not enlarged, symmetric, no tenderness/mass/nodules  Lungs: clear to auscultation bilaterally  Heart: regular rate and rhythm, S1, S2 normal, no murmur, click, rub or gallop  Abdomen: soft, non-tender; bowel sounds normal; no masses,  no organomegaly and Healing incision  Extremities: extremities normal, atraumatic, no cyanosis or edema  Pulses: 2+ and symmetric  Neurologic: Grossly normal  Right percutaneous nephrostomy tube--clamped     Invasive Devices     Peripheral Intravenous Line            Peripheral IV 12/18/17 Left Forearm 3 days          Drain            Nephrostomy Right 1 10 2 Fr  42 days              Lab Results   Component Value Date    WBC 10 16 12/18/2017    HGB 10 1 (L) 12/18/2017    HCT 31 6 (L) 12/18/2017    MCV 89 12/18/2017     (H) 12/18/2017     Lab Results   Component Value Date    GLUCOSE 100 12/18/2017    CALCIUM 9 8 12/18/2017     12/18/2017    K 4 2 12/18/2017    CO2 29 12/18/2017     12/18/2017    BUN 20 12/18/2017    CREATININE 1 23 12/18/2017       Lab, Imaging and other studies: I have personally reviewed pertinent reports

## 2017-12-21 NOTE — PROGRESS NOTES
Progress Note - General Surgery   Nancy Okeefe 43 y o  male MRN: [id number]  Unit/Bed#: [facility] 925-01 Encounter: [id number]    Assessment:  43y o -year-old male with spontaneous left kidney rupture     - s/p ex lap, left nephrectomy, abthera 11/1 Carolee Coronel)  - s/p ex lap, removal of packs, ligation of left ureter, abdominal closure 11/2 Barb Simpson)  - s/p right PCN w/ intraabdominal ascites samples 11/9 (IR)  - s/p 11/18 cystoscopy w/ fulguration of ureteral orifice, cystogram with injection of deflux, left retrograde pyelograam 11/18 Heather Rayne)  - s/p nephrostogram, PCN capping 11/21 (IR)  - s/p IR tube check 11/27 (IR)  - s/p drainage of left upper quadrant fluid collection 11/30 (IR)  -s/p Left CT insertion 12/3 ([facility])   -s/p Splenic agram and embolization (IR)  - s/p CT removal 12/14  - s/p IR drain x 2 removal     Plan:  - regular diet  - OOB, ambulate  - plan to d/c R nephrostomy tube per urology  - dispo: needs placement at shelter    Subjective/Objective     Subjective: Patient feels well  Urethral pain on initiation resolved with flomax  Tolerating diet  Objective:     Vitals: Blood pressure 120/72, pulse 83, temperature 98 9 °F (37 2 °C), temperature source Oral, resp  rate 16, height 5' 6" (1 676 m), weight 60 9 kg (134 lb 4 2 oz), SpO2 98 %  ,Body mass index is 21 67 kg/m²  I/O       12/19 0701 - 12/20 0700 12/20 0701 - 12/21 0700    P  O  780 1440    Total Intake(mL/kg) 780 (12 8) 1440 (23 6)    Urine (mL/kg/hr) 955 (0 7) 575 (0 4)    Total Output 955 575    Net -175 +865                Physical Exam:  GEN: NAD  HEENT: MMM  CV: RRR  Lung: Normal effort  Ab: Soft, NT/ND, incision C/D/I  Extrem: No CCE  Neuro:  A+Ox3    Lab, Imaging and other studies: CBC with diff: No results found for: WBC, HGB, HCT, MCV, PLT, ADJUSTEDWBC, MCH, MCHC, RDW, MPV, NRBC, BMP/CMP: No results found for: NA, K, CL, CO2, ANIONGAP, BUN, CREATININE, GLUCOSE, CALCIUM, AST, ALT, ALKPHOS, PROT, ALBUMIN, BILITOT, EGFR, Magnesium: No components found for: MAG  VTE Pharmacologic Prophylaxis: Heparin  VTE Mechanical Prophylaxis: sequential compression device

## 2017-12-21 NOTE — PHYSICAL THERAPY NOTE
Physical Therapy Progress Note         12/21/17 1020   Pain Assessment   Pain Assessment No/denies pain   Restrictions/Precautions   Weight Bearing Precautions Per Order No   Other Precautions (Abdominal area discomfort)   General   Chart Reviewed Yes   Family/Caregiver Present No   Cognition   Overall Cognitive Status WFL   Subjective   Subjective Agreed to partcipate in therapy  Bed Mobility   Supine to Sit 6  Modified independent   Additional items Increased time required   Sit to Supine 6  Modified independent   Additional items Increased time required   Transfers   Sit to Stand 6  Modified independent   Additional items Increased time required   Stand to Sit 6  Modified independent   Additional items Increased time required   Stand pivot 6  Modified independent   Ambulation/Elevation   Gait pattern Excessively slow;Decreased foot clearance   Gait Assistance 5  Supervision   Additional items Verbal cues  (for postural corection)   Assistive Device None   Distance 500ft   Stair Management Assistance 5  Supervision   Additional items Assist x 1;Verbal cues   Stair Management Technique One rail R;Step to pattern; Alternating pattern;Nonreciprocal;Reciprocal;Foreward   Number of Stairs 21   Balance   Static Sitting Normal   Dynamic Sitting Normal   Static Standing Fair +   Dynamic Standing Fair -   Ambulatory Fair   Endurance Deficit   Endurance Deficit Yes   Endurance Deficit Description Fatigue   Activity Tolerance   Activity Tolerance Patient tolerated treatment well   Exercises   TKR Standing;Bilateral;AROM  (x30)   Assessment   Prognosis Good   Problem List Decreased strength;Decreased endurance; Impaired balance;Decreased mobility   Assessment Patient tolerated the PTb session well  patient has been walking on the unit independently without AD   This session patient performed BM and transfers with Mod I  Negotiated 21 steps with 1 handrail and with no handrail, with reciprocal and non reciprocal steps with S  No overt LOB noted  Standing LE TE's performed without UE support  Standing alt  marches, ham curls, HR/TR, hip flex, ext and abduction, and standing hamstring stretch 60 sec x 2  Patient noted to have made gains in functional mobilityn and attained all goals that were set  After discussing with attending PT lanny patient to be D/C from PT  Reviwed HEP with patient and patient showed good understanding of the same  Recommend D/c home with family support  Barriers to Discharge None   Goals   Patient Goals None reproted   STG Expiration Date 12/23/17   Plan   Treatment/Interventions Functional transfer training;LE strengthening/ROM; Elevations; Therapeutic exercise; Endurance training;Patient/family training;Gait training;Bed mobility;Spoke to nursing  (PT)   Progress Discontinue PT   Recommendation   Recommendation Home with family support   Equipment Recommended Other (Comment)  (None)   PT - OK to Discharge Yes     Ramon Phi, PTA

## 2017-12-21 NOTE — PLAN OF CARE
Problem: PHYSICAL THERAPY ADULT  Goal: Performs mobility at highest level of function for planned discharge setting  See evaluation for individualized goals  Treatment/Interventions: Functional transfer training, LE strengthening/ROM, Therapeutic exercise, Endurance training, Equipment eval/education, Bed mobility, Gait training, Spoke to nursing, OT  Equipment Recommended: Tino Guzman (at this time)       See flowsheet documentation for full assessment, interventions and recommendations  Outcome: Progressing  Prognosis: Good  Problem List: Decreased strength, Decreased endurance, Impaired balance, Decreased mobility  Assessment: Patient tolerated the PTb session well  patient has been walking on the unit independently without AD  This session patient performed BM and transfers with Mod I  Negotiated 21 steps with 1 handrail and with no handrail, with reciprocal and non reciprocal steps with S  No overt LOB noted  Standing LE TE's performed without UE support  Standing alt  marches, ham curls, HR/TR, hip flex, ext and abduction, and standing hamstring stretch 60 sec x 2  Patient noted to have made gains in functional mobilityn and attained all goals that were set  After discussing with attending PT lanny patient to be D/C from PT  Reviwed HEP with patient and patient showed good understanding of the same  Recommend D/c home with family support  Barriers to Discharge: None     Recommendation: Home with family support     PT - OK to Discharge: Yes    See flowsheet documentation for full assessment

## 2017-12-22 ENCOUNTER — GENERIC CONVERSION - ENCOUNTER (OUTPATIENT)
Dept: OTHER | Facility: OTHER | Age: 42
End: 2017-12-22

## 2017-12-22 ENCOUNTER — APPOINTMENT (INPATIENT)
Dept: RADIOLOGY | Facility: HOSPITAL | Age: 42
DRG: 659 | End: 2017-12-22
Payer: COMMERCIAL

## 2017-12-22 VITALS
DIASTOLIC BLOOD PRESSURE: 78 MMHG | BODY MASS INDEX: 21.72 KG/M2 | HEART RATE: 78 BPM | HEIGHT: 66 IN | OXYGEN SATURATION: 98 % | TEMPERATURE: 98.6 F | WEIGHT: 135.14 LBS | RESPIRATION RATE: 17 BRPM | SYSTOLIC BLOOD PRESSURE: 127 MMHG

## 2017-12-22 PROCEDURE — 50431 NJX PX NFROSGRM &/URTRGRM: CPT

## 2017-12-22 PROCEDURE — 0TP5X0Z REMOVAL OF DRAINAGE DEVICE FROM KIDNEY, EXTERNAL APPROACH: ICD-10-PCS | Performed by: RADIOLOGY

## 2017-12-22 RX ORDER — ACETAMINOPHEN 325 MG/1
650 TABLET ORAL EVERY 6 HOURS PRN
Qty: 30 TABLET | Refills: 0 | Status: CANCELLED | OUTPATIENT
Start: 2017-12-22

## 2017-12-22 RX ORDER — TAMSULOSIN HYDROCHLORIDE 0.4 MG/1
0.8 CAPSULE ORAL
Qty: 60 CAPSULE | Refills: 0 | Status: SHIPPED | OUTPATIENT
Start: 2017-12-22 | End: 2018-01-30 | Stop reason: SDUPTHER

## 2017-12-22 RX ADMIN — TAMSULOSIN HYDROCHLORIDE 0.8 MG: 0.4 CAPSULE ORAL at 16:33

## 2017-12-22 RX ADMIN — IOHEXOL 15 ML: 300 INJECTION, SOLUTION INTRAVENOUS at 18:41

## 2017-12-22 RX ADMIN — Medication 150 MG: at 08:26

## 2017-12-22 RX ADMIN — PHENAZOPYRIDINE HYDROCHLORIDE 100 MG: 100 TABLET ORAL at 16:33

## 2017-12-22 RX ADMIN — TOBRAMYCIN AND DEXAMETHASONE 1 DROP: 3; 1 SUSPENSION/ DROPS OPHTHALMIC at 11:39

## 2017-12-22 RX ADMIN — TOBRAMYCIN AND DEXAMETHASONE 1 DROP: 3; 1 SUSPENSION/ DROPS OPHTHALMIC at 05:59

## 2017-12-22 RX ADMIN — PHENAZOPYRIDINE HYDROCHLORIDE 100 MG: 100 TABLET ORAL at 11:39

## 2017-12-22 RX ADMIN — PHENAZOPYRIDINE HYDROCHLORIDE 100 MG: 100 TABLET ORAL at 08:26

## 2017-12-22 NOTE — PROGRESS NOTES
Progress Note - Chito Joseph 43 y o  male MRN: [id number]    Unit/Bed#: [facility] 925-01 Encounter: [id number]      Assessment:  Mr Dante Miller is a 59-year-old male with protracted hospital stay beginning with left renal fracture status post left nephrectomy and resultant left ureteral leak with fluid collection status post percutaneous drainage x2; as well as cystoscopy, left Deflux and right percutaneous nephrostomy for right hydronephrosis  Thakur catheter was removed while right percutaneous nephrostomy has been clamped for 1 week  Patient was voiding well on double Flomax with BladderScan maximum volume of 118 mL    Plan:  Awaiting nephrostogram which is scheduled within the hour  As prior, if contrast is free flowing into bladder on nephrostogram, recommend removal of nephrostomy  Patient is pending discharge and can follow up in our office within 2 weeks  Office and trauma team have been in communication to arrange for patient's next appointment  No further  intervention is indicated during this hospital stay  Please contact our office with any questions or new  concerns  Subjective:   Denies fever or chills    Objective:     Vitals: Blood pressure 133/86, pulse 82, temperature 98 3 °F (36 8 °C), temperature source Oral, resp  rate 18, height 5' 6" (1 676 m), weight 61 3 kg (135 lb 2 3 oz), SpO2 98 %  ,Body mass index is 21 81 kg/m²        Intake/Output Summary (Last 24 hours) at 12/22/17 1347  Last data filed at 12/22/17 1252   Gross per 24 hour   Intake             1020 ml   Output              500 ml   Net              520 ml       Physical Exam: General appearance: alert, appears stated age, cooperative and no distress  Head: Normocephalic, without obvious abnormality, atraumatic  Neck: no adenopathy, no carotid bruit, no JVD, supple, symmetrical, trachea midline and thyroid not enlarged, symmetric, no tenderness/mass/nodules  Lungs: clear to auscultation bilaterally  Heart: regular rate and rhythm, S1, S2 normal, no murmur, click, rub or gallop  Abdomen: soft, non-tender; bowel sounds normal; no masses,  no organomegaly and Healing incision  Extremities: extremities normal, atraumatic, no cyanosis or edema  Pulses: 2+ and symmetric  Neurologic: Grossly normal  Right percutaneous nephrostomy tube--clamped     Invasive Devices     Peripheral Intravenous Line            Peripheral IV 12/18/17 Left Forearm 4 days          Drain            Nephrostomy Right 1 10 2 Fr  43 days              Lab Results   Component Value Date    WBC 10 16 12/18/2017    HGB 10 1 (L) 12/18/2017    HCT 31 6 (L) 12/18/2017    MCV 89 12/18/2017     (H) 12/18/2017     Lab Results   Component Value Date    GLUCOSE 100 12/18/2017    CALCIUM 9 8 12/18/2017     12/18/2017    K 4 2 12/18/2017    CO2 29 12/18/2017     12/18/2017    BUN 20 12/18/2017    CREATININE 1 23 12/18/2017       Lab, Imaging and other studies: I have personally reviewed pertinent reports

## 2017-12-22 NOTE — PROGRESS NOTES
Progress Note - General Surgery   Fernandez Bryant 43 y o  male MRN: [id number]  Unit/Bed#: [facility] 925-01 Encounter: [id number]    Assessment:  43y o -year-old male with spontaneous left kidney rupture     - s/p ex lap, left nephrectomy, abthera 11/1 Aman Prow)  - s/p ex lap, removal of packs, ligation of left ureter, abdominal closure 11/2 Sarah Smith)  - s/p right PCN w/ intraabdominal ascites samples 11/9 (IR)  - s/p 11/18 cystoscopy w/ fulguration of ureteral orifice, cystogram with injection of deflux, left retrograde pyelograam 11/18 Fiona Osman)  - s/p nephrostogram, PCN capping 11/21 (IR)  - s/p IR tube check 11/27 (IR)  - s/p drainage of left upper quadrant fluid collection 11/30 (IR)  -s/p Left CT insertion 12/3 (Lillie Bullard)   -s/p Splenic agram and embolization (IR)  - s/p CT removal 12/14  - s/p IR drain x 2 removal     Plan:  - regular diet  - plan to d/c R nephrostomy tube per urology, will f/u IR today  - prn pain control  - dispo: needs placement at shelter      Subjective/Objective     Subjective: tolerating PO, urinating with some blood      Objective:     Vitals: Blood pressure 134/68, pulse 87, temperature 98 7 °F (37 1 °C), temperature source Oral, resp  rate 18, height 5' 6" (1 676 m), weight 61 3 kg (135 lb 2 3 oz), SpO2 99 %  ,Body mass index is 21 81 kg/m²  I/O       12/19 0701 - 12/20 0700 12/20 0701 - 12/21 0700    P  O  780 1440    Total Intake(mL/kg) 780 (12 8) 1440 (23 6)    Urine (mL/kg/hr) 955 (0 7) 575 (0 4)    Total Output 955 575    Net -175 +865                Physical Exam:  NAD  Norm resp effort  RRR  Abd soft NT ND, incision cdi    Lab, Imaging and other studies: CBC with diff: No results found for: WBC, HGB, HCT, MCV, PLT, ADJUSTEDWBC, MCH, MCHC, RDW, MPV, NRBC, BMP/CMP: No results found for: NA, K, CL, CO2, ANIONGAP, BUN, CREATININE, GLUCOSE, CALCIUM, AST, ALT, ALKPHOS, PROT, ALBUMIN, BILITOT, EGFR, Magnesium: No components found for: MAG  VTE Pharmacologic Prophylaxis: Heparin  VTE Mechanical Prophylaxis: sequential compression device

## 2017-12-22 NOTE — SOCIAL WORK
CM reviewed Pt in care coordination rounds, Pt will go to IR for possible removal of Nephrostomy tube  If able to discontinue tube Pt will D/C home  CM met with the Pt at bedside who states he will D/C to the [facility] shelter  Pt states he has transportation for time of D/C  Pt requesting documentation describing hospital course, RADHA explained that AVS will have all necessary follow up and activity instructions

## 2017-12-22 NOTE — DISCHARGE INSTRUCTIONS
Drainage Tube Removal    Your drainage tube was removed today  What you need know at home:   Keep a clean dry dressing at the tube site until the small opening closes  It will take twenty four to forty eight hours  Keep the site dry until it heals  A small amount of drainage on your dressing is normal  Resume your normal diet  Small sips of flat soda will help with any nausea  Contact Interventional Radiology for any of the following: You have pain, fever greater than 101, shaking chills  If you have increased redness or swelling at the site  I the drainage from your site does not stop  If the site drains pus or has a bad odor  Contact Interventional Radiology at [phone]   Aj PATIENTS: Contact Interventional Radiology at [phone]) Ladona Perish PATIENTS: Contact Interventional Radiology at [phone]) if:       ARTERIOGRAM    WHAT YOU SHOULD KNOW:   An angiogram is a procedure to look at arteries in your body  Arteries are the blood vessels that carry blood from your heart to your body  AFTER YOU LEAVE:     Self-care:   · Limit activity: Rest for the remainder of the day of your procedure  Have some one with you until the next morning  Keep your arm or leg straight as much as possible  Rest as much as possible, sitting lying or reclining  Walk only to go to the bathroom, to bed or to eat  If the angiogram catheter was put in your leg, use the stairs as little as possible  No driving  · Keep your wound clean and dry  You may shower 24 hours after your procedure  The bandage you have on should fall off in 2-3 days  If there is any drainage from the puncture site, you should put on a clean bandage  · Watch for bleeding and bruising: It is normal to have a bruise and soreness where the angiogram catheter went in    · Diet:   · You may resume your regular diet, Sips of flat soda will help with mild nausea  · Drink more liquids than usual for the next 24 hours      · IMMEDIATELY Contact Interventional Radiology at [phone] Aj PATIENTS: Contact Interventional Radiology at [phone]) Abimael Serrano PATIENTS: Contact Interventional Radiology at [phone]) if any of the following occur:  · If your bruise gets larger or if you notice any active bleeding  APPLY DIRECT PRESSURE TO THE BLEEDING SITE  · If you notice increased swelling or have increased pain at the puncture site   · If you have any numbness or pain in the extremity of the puncture site   · If that extremity seems cold or pale  · You have fever greater than 101  · Persistent nausea or vomitting    Follow up with your primary healthcare provider  as directed: Write down your questions so you remember to ask them during your visits               ARTERIOGRAM    WHAT YOU SHOULD KNOW:   An angiogram is a procedure to look at arteries in your body  Arteries are the blood vessels that carry blood from your heart to your body  AFTER YOU LEAVE:     Self-care:   · Limit activity: Rest for the remainder of the day of your procedure  Have some one with you until the next morning  Keep your arm or leg straight as much as possible  Rest as much as possible, sitting lying or reclining  Walk only to go to the bathroom, to bed or to eat  If the angiogram catheter was put in your leg, use the stairs as little as possible  No driving  · Keep your wound clean and dry  You may shower 24 hours after your procedure  The bandage you have on should fall off in 2-3 days  If there is any drainage from the puncture site, you should put on a clean bandage  · Watch for bleeding and bruising: It is normal to have a bruise and soreness where the angiogram catheter went in  · Diet:   · You may resume your regular diet, Sips of flat soda will help with mild nausea    · Drink more liquids than usual for the next 24 hours      · IMMEDIATELY Contact Interventional Radiology at [phone] Aj PATIENTS: Contact Interventional Radiology at [phone]) Isac Lewis PATIENTS: Contact Interventional Radiology at [phone]) if any of the following occur:  · If your bruise gets larger or if you notice any active bleeding  APPLY DIRECT PRESSURE TO THE BLEEDING SITE  · If you notice increased swelling or have increased pain at the puncture site   · If you have any numbness or pain in the extremity of the puncture site   · If that extremity seems cold or pale  · You have fever greater than 101  · Persistent nausea or vomitting    Follow up with your primary healthcare provider  as directed: Write down your questions so you remember to ask them during your visits              Nephrostomy Tube Care     WHAT YOU NEED TO KNOW:   A nephrostomy tube is a catheter (thin plastic tube) that is inserted through your skin and into your kidney  The nephrostomy tube drains urine from your kidney into a collecting bag outside your body  You may need a nephrostomy tube when something is blocking the normal flow of urine  A nephrostomy tube may be used for a short or a long period of time  The nephrostomy tube comes out of your back, so you will need someone to help care for your nephrostomy tube  DISCHARGE INSTRUCTIONS:      How to clean the skin around the nephrostomy tube and change the bandage:  Since the nephrostomy tube comes out of your back, you will not be able to care for it by yourself  Ask someone to follow the general directions below to check and care for your nephrostomy tube  Gather the items you will need  Disposable (single use) under-pad, and a clean washcloth  ¨ Plain soap, warm water, and new medical gloves  ¨ Sterile gauze bandages  ¨ Clear adhesive dressing or medical tape  ¨ Skin barrier  ¨ Protective skin film  ¨ Trash bag  · Remove the old bandage, and check the tube entry site      ¨ Have the patient lie on his side with the nephrostomy tube entry site facing up  Place the under-pad where it will catch drainage as you are working with the nephrostomy tube  ¨ Wash your hands with soap and water  Put on new medical gloves  ¨ Gently remove the old bandage, without pulling on the tube  Do this by holding the skin beside the tube with one hand  With the other hand, gently remove sticky tape and the skin barrier by pulling in the same direction as hair growth  Do not touch the side of the bandage that is placed over or around the tube  Throw the bandage and skin barrier away in a trash bag  ¨ Look for signs of infection, such as skin redness and swelling  Report any skin changes to healthcare providers  ¨ Clean the tube entry site  ¨ Hold the tube in place to keep it from being pulled out while you are cleaning around it  ¨ You will need to clean the area twice  For the first cleaning, wet a new gauze bandage with soap and water  Begin at the entry site of the tube  Wipe the skin in circles, moving away from the entry site  Remove blood and any other material with the gauze  Do this as often as needed  Use a new gauze bandage each time you clean the area, moving away from the entry site  ¨ For the second cleaning, wet a new gauze bandage with water  Begin at the entry site of the tube  Wipe the skin in circles, moving away from the entry site  Use a new gauze bandage each time you clean the area, moving away from the entry site  ¨ Gently pat the skin with a clean washcloth to dry it  · Apply the skin barrier and bandages  ¨ Roll up a bandage to make it thick, and place it under  the place where the tube enters the skin  Place it to support the tube, and stop it from kinking or bending  Tape the bandage in place, and apply more bandages if directed by a healthcare provider  ¨ Bring the tubing forward to the front and tape it to the skin  Do not stretch the tube tight, because this may pull the nephrostomy tube out  How often to change the bandage  Change the bandage around the tube, every other day  If your bandages  get dirty or wet, change them right away, and as often as needed  If your nephrostomy tube is to be used for a long period of time, the tube needs to be changed every 2 to 3 months  Healthcare providers will tell you when you need to make an appointment to have your tube changed  How to care for the urine drainage bag:   · Ask if you need to measure and write down how much urine is in the bag before you empty it  Drain urine out of the drainage bag when it is ½ to ? full  Open the spout at the bottom of the bag to empty the urine into the toilet  · You may need to detach the drainage bag from the nephrostomy tube to change it    If so, attach a new drainage bag tightly to the nephrostomy tube  ·   How to prevent problems with your nephrostomy tube:   · Change bandages, directed  This helps to prevent infection  Throw away or clean your drainage bag as directed by your healthcare provider  · Wipe the connecting ends of the drainage bag with alcohol before you reconnect the bag to the tube  This helps prevent infection  Keep the tube taped to your skin and connected to a drainage bag placed below the level of your kidneys  This helps prevent urine from backing up into your kidneys  You may wear a small drainage bag strapped to your leg to let you move around more easily  · Check the catheter to be sure it is in place after you change your clothes or do other activities  Do not wear tight clothing over the tube  Place the tubing over your thigh rather than under it when you are sitting down  Be sure that nothing is pulling on the nephrostomy tube when you move around  · Change positions if you see little or no urine in your drainage bag  Check to see if the urine tube is twisted or bent  Be sure that you are not sitting or lying on the tube   If there are no kinks and there is little or no urine in the drainage bag, tell your healthcare provider  · Flush out the tube as directed  Some tubes get flushed one time a day with 10 mls of NSS You will be given a prescription for the flushes  To flush the nephrostomy tube, clean both connections with alcohol swap  Twist off the drainage bag tube and twist the saline syringe into the nephrostomy tube and flush briskly  Remove the syringe and twist the drainage bag tube back into the nephrostomy tube  · Keep the site covered while you shower  Tape a piece of clear adhesive plastic over the dressing to keep it dry while you shower  Do not take tub baths  Contact Interventional Radiology at [phone] Aj PATIENTS: Contact Interventional Radiology at [phone]) Omkar Rojo PATIENTS: Contact Interventional Radiology at [phone]) if:  · The skin around the nephrostomy tube is red, swollen, itches, or has a rash  · You have a fever greater than 101 or chills  · You have lower back or hip pain  · There are changes in how your urine looks or smells  · You have little or no urine draining from the nephrostomy tube  · You have nausea and are vomiting  · The black fernando on your tube has moved, or the tube is longer than when it was put in    · You have questions or concerns about your condition or care  · The nephrostomy tube comes out completely  · There is blood, pus, or a bad smell coming from the place where the tube enters your skin  · Urine is leaking around the tube  The following pharmacies carry the flush syringes         [facility] [address] [facility]                    [id number] [place] [address]  Phone [phone]            Phone [place] [address]                                                                                                   [phone]  [facility]             [facility]                             [address] [facility] [address]                      [address]                                 [phone]  Phone [phone]            Phone [phone]    [facility]                                                                         [facility] [phone]  [address]  [facility]   Phone [phone]           TUBE CARE INSTRUCTIONS    Care after your procedure:    Resume your normal diet  Small sips of flat soda will help with nausea  1  The properly functioning catheter should be forward flushed once (1x) daily with 10ml of normal saline using clean technique  You will be given a prescription for flushes  To flush the tube, clean both connections with alcohol swab  Twist off the drainage bag/ bulb  tubing and twist the saline syringe into the drainage tube and flush  Remove the syringe and twist the drainage bag / bulb tubing tubing back on     2  The drainage bag/bulb may be emptied as necessary  Keep a record of the amount of fluid you drain from your tube  This should be done with clean technique as well  3  A fresh dressing should be applied daily over the tube insertion site  4  As the tube is secured to the skin with only a suture,try not to pull on your tube  Tub baths are not permitted  Showers are permitted if the patient's skin entry site is prevented from getting wet  Similarly, washcloth "baths" are acceptable  Contact Interventional Radiology at [phone] Aj PATIENTS: Contact Interventional Radiology at [phone]) Carmen Espinoza PATIENTS: Contact Interventional Radiology at [phone]) if:    1  Leakage or large amounts of liquid around the catheter  2  Fever of 101 degrees lasting several hours without other obvious cause (such as sore throat, flu, etc)  3  Persistent nausea or vomiting      4  Diminished drainage, which may be associated with pressure or pain  5  Catheter pulled back or falls out  The following pharmacies carry the flush syringes  [facility]                     [facility]  [id number] [facility]  Phone [phone]            Phone [phone] [address]             [address]                                [phone]  [address] [facility] [address]                      [address] [facility]  Phone [phone]            Phone [phone]                      [facility]                                                                                                          [phone]  [facility]  [facility] [address]    [address]  Phone [phone]

## 2017-12-22 NOTE — PROCEDURES
Procedures  Interventional Radiology Procedure Note    PATIENT NAME: Jose Botello  : 1975  MRN: [id number]    Procedure:  Right nephrostogram    Estimated Blood Loss:  None     Findings:  Internal drainage is present into the bladder  Right PCN was removed      Specimens:  None    Complications:  None    Anesthesia: none    Aundria Osgood, MD     Date: 2017  Time: 6:36 PM

## 2017-12-23 NOTE — DISCHARGE SUMMARY
Discharge Summary - Nancy Okeefe 43 y o  male MRN: [id number]    Unit/Bed#: [facility] 925-01 Encounter: [id number]    Admission Date: 11/2/2017     Admitting Diagnosis: Chest pain [R07 9]  Laceration of left kidney, initial encounter [S37 032A]  Severe sepsis ([address] ) [A41 9, R65 20]    HPI: This is a 42M who is currently incarcerated and with hx of chronic bilateral hydronephrosis who presented to OSH with left flank pain  He denied any trauma but was found to have severe bilateral hydronephrosis with left GIV renal injury with significant hyperdense fluid in the collection system  He was therefore transferred for trauma evaluation  He was hemodynamically stable at OSH but became hypotensive en route (16K systolic)  His initial Hb was 10 3 at OSH but had declined to 6 5 in the trauma bay with a base deficit of -7  He was pale and diaphoretic with +FAST  Given these acute findings , he was taken emergently to the OR for exploratory laparotomy       Transfused 2U PRBC en route to Or and subsequently underwent ex-lap, L nephrectomy, temporary abdominal closure  See operative report  Attempts to place 3 way jiménez (18Fr) unsuccessful and a 16Fr coude was placed with drainage of mir blood  This began clearing up to clear yellow urine by the end of the case without clots noted  He was transfused a total of 7U PRBC (+2 pre-op), 6U FFP, 2U Plt and 260 mL cellsaver  EBL 2000 mL  He was subsequently admitted to the ICU for post-operative care      Procedures Performed:   Orders Placed This Encounter   Procedures   [place] Hospital Course: Patient had a complex and long hospital course please refer to charting for accurate description of course  Patient will have follow-ups with the surgical team, urology team, and PCP  These appointments are scheduled within 2 weeks of discharge       Significant Findings, Care, Treatment and Services Provided:   Patient had an extensive amount of imaging that was performed during his hospital stay please refer to his chart regarding his images at this time  Complications: No complications during this hospital course  Discharge Diagnosis:  - s/p ex lap, left nephrectomy, abthera 11/1 Eileen Suazo)  - s/p ex lap, removal of packs, ligation of left ureter, abdominal closure 11/2 Vicky Judi)  - s/p right PCN w/ intraabdominal ascites samples 11/9 (IR)  - s/p 11/18 cystoscopy w/ fulguration of ureteral orifice, cystogram with injection of deflux, left retrograde pyelograam 11/18 Judy Jason)  - s/p nephrostogram, PCN capping 11/21 (IR)  - s/p IR tube check 11/27 (IR)  - s/p drainage of left upper quadrant fluid collection 11/30 (IR)  -s/p Left CT insertion 12/3 Vicky Judi)   -s/p Splenic agram and embolization (IR)  - s/p CT removal 12/14  - s/p IR drain x 2 removal  - s/p PCN removal     Condition at Discharge: fair     Discharge instructions/Information to patient and family:   See after visit summary for information provided to patient and family  Provisions for Follow-Up Care:  See after visit summary for information related to follow-up care and any pertinent home health orders  Disposition: Home    Planned Readmission: Yes potentially regarding outpatient plan with urology/general surgery moving forward  Discharge Statement   I spent 30 minutes discharging the patient  This time was spent on the day of discharge  I had direct contact with the patient on the day of discharge  Additional documentation is required if more than 30 minutes were spent on discharge  Discharge Medications:  See after visit summary for reconciled discharge medications provided to patient and family

## 2018-01-04 ENCOUNTER — GENERIC CONVERSION - ENCOUNTER (OUTPATIENT)
Dept: OTHER | Facility: OTHER | Age: 43
End: 2018-01-04

## 2018-01-11 ENCOUNTER — GENERIC CONVERSION - ENCOUNTER (OUTPATIENT)
Dept: OTHER | Facility: OTHER | Age: 43
End: 2018-01-11

## 2018-01-11 ENCOUNTER — TRANSCRIBE ORDERS (OUTPATIENT)
Dept: ADMINISTRATIVE | Facility: HOSPITAL | Age: 43
End: 2018-01-11

## 2018-01-11 DIAGNOSIS — R33.9 INCOMPLETE BLADDER EMPTYING: Primary | ICD-10-CM

## 2018-01-15 NOTE — PROCEDURES
Procedures by Jie Marrero DO at 11/30/2017  11:00 PM      Author:  Jie Marrero DO Service:  Critical Care/ICU Author Type:  Resident    Filed:  11/30/2017 11:02 PM Date of Service:  11/30/2017 11:00 PM Status:  Attested    :  Jie Marrero DO (Resident)  Cosigner:  Kimberly Ayala MD at 12/1/2017 12:50 AM      Procedure Orders:       1  CENTRAL LINE [[id number]] ordered by Jie Marrero DO at 11/30/17 2300                 Post-procedure Diagnoses:       1  Severe sepsis ([facility] [address] ) [A41 9, R65 20]              Attestation signed by Kimberly Ayala MD at 12/1/2017 12:50 AM      I was present and supervised Dr Tanesha Calle for the entire procedure  Central Line  Insertion  Date/Time: 11/30/2017 11:00 PM  Performed by: Sophie Sosa by: Cherelle Whitmore     Patient location:  Bedside  Other Assisting Provider:  No    Consent:     Consent obtained:  Written and verbal    Consent given by:  Patient    Risks discussed:  Arterial puncture, incorrect placement, infection, bleeding, nerve damage and pneumothorax    Alternatives discussed:  No treatment  Universal protocol:     Procedure explained and questions answered to patient or proxy's satisfaction: yes      Relevant documents present and verified: yes      Test results available and properly labeled: yes       Imaging studies available: yes      Required blood products, implants, devices, and special equipment available: yes      Site/side marked: yes      Immediately prior to procedure, a time out was called: yes      Patient identity confirmed:  Arm band and verbally with patient  Pre-procedure details:     Hand hygiene: Hand hygiene performed prior to insertion      Sterile barrier technique:  All elements of maximal sterile technique followed      Skin preparation:  2% chlorhexidine and ChloraPrep    Skin preparation agent: Skin preparation agent completely dried prior to procedure    Indications:     Central line indications: medications requiring central line, hemodynamic monitoring and no peripheral vascular access      Site selection rationale:  Right IJ  Anesthesia (see MAR for exact dosages): Anesthesia method:  Local infiltration    Local anesthetic:  Lidocaine 1% w/o epi  Procedure details:     Location:  Right internal jugular    Vessel type: vein      Laterality:  Right    Approach: percutaneous technique used      Patient position:  Flat    Catheter type:  Triple lumen 16cm    Catheter size:  7 Fr    Landmarks identified: yes      Ultrasound guidance: yes      Sterile ultrasound techniques: Sterile gel and sterile probe covers were used      Number of attempts:  1    Successful placement: yes    Post-procedure details:     Post-procedure:  Dressing applied and line sutured    Assessment:  Blood return through all ports, no pneumothorax on x-ray and placement verified by x-ray    Post-procedure complications: none      Patient tolerance of procedure:   Tolerated well, no immediate complications  Comments:      Slightly deep passing into IVC, not in heart therefore adequate placement for CVC                     Received for:Provider  EPIC   Dec  1 2017 12:51AM [facility] Standard Time

## 2018-01-16 ENCOUNTER — GENERIC CONVERSION - ENCOUNTER (OUTPATIENT)
Dept: OTHER | Facility: OTHER | Age: 43
End: 2018-01-16

## 2018-01-18 ENCOUNTER — GENERIC CONVERSION - ENCOUNTER (OUTPATIENT)
Dept: OTHER | Facility: OTHER | Age: 43
End: 2018-01-18

## 2018-01-18 ENCOUNTER — HOSPITAL ENCOUNTER (OUTPATIENT)
Dept: RADIOLOGY | Facility: HOSPITAL | Age: 43
Discharge: HOME | End: 2018-01-18
Payer: COMMERCIAL

## 2018-01-18 DIAGNOSIS — R33.9 INCOMPLETE BLADDER EMPTYING: ICD-10-CM

## 2018-01-18 PROCEDURE — 51798 US URINE CAPACITY MEASURE: CPT

## 2018-01-23 NOTE — PROCEDURES
Procedures by Haven Hernandez MD at 2017  6:36 PM      Author:  Haven Hernandez MD Service:  Interventional Radiology  Author Type:  Physician    Filed:  2017  6:37 PM Date of Service:  2017  6:36 PM Status:  Signed    :  Haven Hernandez MD (Physician)         Procedures  Interventional Radiology Procedure Note    PATIENT NAME: Rima Isaacs  : 1975  MRN: [id number]    Procedure:  Right nephrostogram    Estimated Blood Loss:  None     Findings:  Internal drainage is present into the bladder  Right PCN was removed      Specimens:  None    Complications:  None    Anesthesia: none    Sammy Moritz, MD     Date: 2017  Time: 6:36 PM           Received Jess Marmolejo MD  Dec 22 2017  6:37PM [place] Standard Time

## 2018-01-23 NOTE — PROCEDURES
Procedures by Kathy Rehman MD at  12/3/2017  1:19 PM      Author:  Kathy Rehman MD Service:  Critical Care/ICU Author Type:  Resident    Filed:  12/3/2017  1:20 PM Date of Service:  12/3/2017  1:19 PM Status:  Attested    :  Kathy Rehman MD (Resident)  Cosigner:  Thor Banegas MD at 12/3/2017  9:52 PM      Procedure Orders:       1  CHEST TUBE INSERTION [[id number]] ordered by Kathy Rehman MD at 12/03/17 1319                 Post-procedure Diagnoses:       1  Pleural effusion on left [J90]              Attestation signed by Thor Banegas MD at 12/3/2017  9:52 PM      I was present and supervised this procedure                   Chest Tube Insertion  Date/Time: 12/3/2017 1:19 PM  Performed by: Alvaro Cisneros by: Suman Law     Patient location:  Bedside  Other Assisting Provider:  Yes (comment)    Consent:     Consent obtained:  Verbal and written    Consent given by:  Patient    Risks discussed:  Bleeding, pain, infection, incomplete drainage and damage to surrounding structures    Alternatives discussed:  No treatment and delayed treatment  Universal protocol:     Imaging studies available: yes      Immediately prior to procedure a time out was called: yes      Patient identity confirmed:  Verbally with patient and hospital-assigned identification number  Pre-procedure details:     Skin preparation:  ChloraPrep  Indications:     Indications: pleural effusion    Sedation:     Sedation type: Anxiolysis  Anesthesia (see MAR for exact dosages):      Anesthesia method:  Local infiltration    Local anesthetic:  Lidocaine 1% w/o epi  Procedure details:     Placement location:  Lateral    Laterality:  Left    Approach:  Open    Scalpel size:  10    Tube size (Fr):  32    Dissection instrument:  Finger and Ally clamp    Ultrasound guidance: no      Tension pneumothorax: no      Needle Decompression: no      Tube connected to:  Suction    Drainage characteristics:  Serosanguinous and yellow    Suture material:  2-0 silk    Dressing:  4x4 sterile gauze  Post-procedure details:     Post-insertion x-ray findings: tube in good position      Patient tolerance of procedure: Tolerated well, no immediate complications                     Received Dao COOL    Dec  3 2017  9:53PM [facility] Standard Time

## 2018-01-23 NOTE — PROCEDURES
Procedures by JANICE Combs at 12/6/2017  3:14 PM      Author:  JANICE Combs Service:  Urology Author Type:  Nurse Practitioner    Filed:  12/6/2017  3:17 PM Date of Service:  12/6/2017  3:14 PM Status:  Signed    :  JANICE Combs (Nurse Practitioner)  Cosigner:  Saintclair Lah, MD at 12/7/2017 11:34 AM      Pre-procedure Diagnoses:       1  Atonic bladder [N31 2]       2  Ureteral leak, left [R39 0]                Post-procedure Diagnoses:       1  Atonic bladder [N31 2]       2  Ureteral leak, left [R39 0]                Procedures:       1  INSERT URINARY CATHETER [DYK414 (Custom)]                 BEDSIDE PROCEDURE  Indwelling Catheter PROCEDURE NOTE    Pre-operative Diagnosis: ureteral leak      Post-operative Diagnosis: ureteral leak    INDICATION   44 y/o male with ureteral stump leak and difficult catheterization after multiple unsuccessful  attempts by nursing staff  Requested to perform Thakur Catheter Placement  TIME OUT: Correct patient identity  PROCEDURE   Consent: Patient was agreeable  Formal  Informed consent however, not applicable  Under sterile conditions the patient was positioned  Betadine solution and sterile drapes were utilized  Non- Petroleum based gel was used to lubricate urethral meatus insertion site  Utilized 16 FR Coude Catheter  Urine was obtained without any difficulties and  Without evidence of hematuria or trauma  Findings  100 ml of clear yellow urine was obtained  Complications:  None; patient tolerated the procedure well  Condition: stable    Plan  Maintain Thakur to straight drainage  Do not remove  Flush as needed using Genny syringe and 120 ml NSS        JANICE Combs        Attending Attestation: Not Applicable                 [facility]  Dec  7 2017 11:34AM [facility] Standard Time

## 2018-01-24 VITALS
SYSTOLIC BLOOD PRESSURE: 142 MMHG | BODY MASS INDEX: 25.63 KG/M2 | DIASTOLIC BLOOD PRESSURE: 90 MMHG | HEIGHT: 66 IN | WEIGHT: 159.5 LBS | HEART RATE: 70 BPM

## 2018-01-24 VITALS — BODY MASS INDEX: 25.88 KG/M2 | WEIGHT: 161 LBS | HEIGHT: 66 IN

## 2018-01-24 VITALS
WEIGHT: 160 LBS | HEART RATE: 72 BPM | SYSTOLIC BLOOD PRESSURE: 120 MMHG | BODY MASS INDEX: 25.71 KG/M2 | DIASTOLIC BLOOD PRESSURE: 80 MMHG | HEIGHT: 66 IN

## 2018-01-30 DIAGNOSIS — N13.8 BPH WITH OBSTRUCTION/LOWER URINARY TRACT SYMPTOMS: Primary | ICD-10-CM

## 2018-01-30 DIAGNOSIS — N40.1 BPH WITH OBSTRUCTION/LOWER URINARY TRACT SYMPTOMS: Primary | ICD-10-CM

## 2018-01-30 RX ORDER — TAMSULOSIN HYDROCHLORIDE 0.4 MG/1
0.8 CAPSULE ORAL
Qty: 60 CAPSULE | Refills: 0 | Status: SHIPPED | OUTPATIENT
Start: 2018-01-30 | End: 2018-02-21 | Stop reason: SDUPTHER

## 2018-02-01 ENCOUNTER — TELEPHONE (OUTPATIENT)
Dept: UROLOGY | Facility: AMBULATORY SURGERY CENTER | Age: 43
End: 2018-02-01

## 2018-02-01 NOTE — TELEPHONE ENCOUNTER
[place] [employer] sent signed medical record request  Records faxed, confirmation to be scanned

## 2018-02-21 DIAGNOSIS — N13.8 BPH WITH OBSTRUCTION/LOWER URINARY TRACT SYMPTOMS: ICD-10-CM

## 2018-02-21 DIAGNOSIS — N40.1 BPH WITH OBSTRUCTION/LOWER URINARY TRACT SYMPTOMS: ICD-10-CM

## 2018-02-21 RX ORDER — TAMSULOSIN HYDROCHLORIDE 0.4 MG/1
0.8 CAPSULE ORAL
Qty: 60 CAPSULE | Refills: 6 | Status: SHIPPED | OUTPATIENT
Start: 2018-02-21 | End: 2018-03-01 | Stop reason: SDUPTHER

## 2018-02-21 NOTE — TELEPHONE ENCOUNTER
Patient called, states he lost his prescription bottle for his tamsulosin, requesting new script, as there were no refills on the last prescription either  Patient states he has been taking two 0 4 mg Tamsulosin daily  Patient requesting script be sent to [facility] on [facility]

## 2018-03-01 RX ORDER — TAMSULOSIN HYDROCHLORIDE 0.4 MG/1
0.8 CAPSULE ORAL
Qty: 60 CAPSULE | Refills: 6 | Status: SHIPPED | OUTPATIENT
Start: 2018-03-01

## 2018-03-01 NOTE — TELEPHONE ENCOUNTER
Patient L/M stating script was not available for him at the [facility] on [facility]   Resending script to [facility]

## 2018-10-10 NOTE — PROCEDURES
BEDSIDE PROCEDURE  Indwelling Catheter PROCEDURE NOTE    Pre-operative Diagnosis: ureteral leak      Post-operative Diagnosis: ureteral leak    INDICATION   42 y/o male with ureteral stump leak and difficult catheterization after multiple unsuccessful attempts by nursing staff  Requested to perform Thakur Catheter Placement  TIME OUT: Correct patient identity  PROCEDURE   Consent: Patient was agreeable  Formal  Informed consent however, not applicable  Under sterile conditions the patient was positioned  Betadine solution and sterile drapes were utilized  Non- Petroleum based gel was used to lubricate urethral meatus insertion site  Utilized 16FR Coude Catheter  Urine was obtained without any difficulties and  Without evidence of hematuria or trauma  Findings  100 ml of clear yellow urine was obtained  Complications:  None; patient tolerated the procedure well  Condition: stable    Plan  Maintain Thakur to straight drainage  Do not remove  Flush as needed using Genny syringe and 120 ml NSS        JANICE Robb        Attending Attestation: Not Applicable Pt mother called and said daughter suspects she lost her mucous plug and is in a lot of pain. I advised her to go to L&D

## 2019-10-29 ENCOUNTER — HOSPITAL ENCOUNTER (EMERGENCY)
Facility: HOSPITAL | Age: 44
Discharge: HOME | End: 2019-10-29
Attending: EMERGENCY MEDICINE | Admitting: EMERGENCY MEDICINE

## 2019-10-29 VITALS
RESPIRATION RATE: 18 BRPM | OXYGEN SATURATION: 96 % | HEART RATE: 87 BPM | SYSTOLIC BLOOD PRESSURE: 136 MMHG | WEIGHT: 170 LBS | DIASTOLIC BLOOD PRESSURE: 71 MMHG | BODY MASS INDEX: 27.44 KG/M2 | TEMPERATURE: 97.8 F

## 2019-10-29 DIAGNOSIS — N39.0 COMPLICATED UTI (URINARY TRACT INFECTION): Primary | ICD-10-CM

## 2019-10-29 DIAGNOSIS — T83.9XXA PROBLEM WITH FOLEY CATHETER, INITIAL ENCOUNTER (HCC): ICD-10-CM

## 2019-10-29 LAB
BACTERIA UR QL AUTO: ABNORMAL /HPF
BILIRUB UR QL STRIP: NEGATIVE
BILIRUB UR QL STRIP: NEGATIVE
CLARITY UR: ABNORMAL
CLARITY UR: ABNORMAL
COLOR UR: YELLOW
COLOR UR: YELLOW
GLUCOSE UR STRIP-MCNC: NEGATIVE MG/DL
GLUCOSE UR STRIP-MCNC: NEGATIVE MG/DL
HGB UR QL STRIP.AUTO: ABNORMAL
HGB UR QL STRIP.AUTO: ABNORMAL
KETONES UR STRIP-MCNC: NEGATIVE MG/DL
KETONES UR STRIP-MCNC: NEGATIVE MG/DL
LEUKOCYTE ESTERASE UR QL STRIP: ABNORMAL
LEUKOCYTE ESTERASE UR QL STRIP: ABNORMAL
NITRITE UR QL STRIP: POSITIVE
NITRITE UR QL STRIP: POSITIVE
NON-SQ EPI CELLS URNS QL MICRO: ABNORMAL /HPF
PH UR STRIP.AUTO: 6 [PH]
PH UR STRIP.AUTO: 6 [PH] (ref 4.5–8)
PROT UR STRIP-MCNC: >=300 MG/DL
PROT UR STRIP-MCNC: ABNORMAL MG/DL
RBC #/AREA URNS AUTO: ABNORMAL /HPF
SP GR UR STRIP.AUTO: 1.01 (ref 1–1.03)
SP GR UR STRIP.AUTO: 1.02 (ref 1–1.03)
UROBILINOGEN UR QL STRIP.AUTO: 0.2 E.U./DL
UROBILINOGEN UR QL STRIP.AUTO: 0.2 E.U./DL
WBC #/AREA URNS AUTO: ABNORMAL /HPF

## 2019-10-29 PROCEDURE — 99283 EMERGENCY DEPT VISIT LOW MDM: CPT

## 2019-10-29 PROCEDURE — 96372 THER/PROPH/DIAG INJ SC/IM: CPT

## 2019-10-29 PROCEDURE — 81001 URINALYSIS AUTO W/SCOPE: CPT | Performed by: EMERGENCY MEDICINE

## 2019-10-29 PROCEDURE — 87086 URINE CULTURE/COLONY COUNT: CPT | Performed by: EMERGENCY MEDICINE

## 2019-10-29 PROCEDURE — 99284 EMERGENCY DEPT VISIT MOD MDM: CPT | Performed by: EMERGENCY MEDICINE

## 2019-10-29 RX ORDER — PHENAZOPYRIDINE HYDROCHLORIDE 100 MG/1
100 TABLET, FILM COATED ORAL ONCE
Status: COMPLETED | OUTPATIENT
Start: 2019-10-29 | End: 2019-10-29

## 2019-10-29 RX ORDER — CEPHALEXIN 250 MG/1
500 CAPSULE ORAL ONCE
Status: COMPLETED | OUTPATIENT
Start: 2019-10-29 | End: 2019-10-29

## 2019-10-29 RX ORDER — CEPHALEXIN 500 MG/1
500 CAPSULE ORAL EVERY 12 HOURS SCHEDULED
Qty: 24 CAPSULE | Refills: 0 | Status: SHIPPED | OUTPATIENT
Start: 2019-10-29 | End: 2019-11-10

## 2019-10-29 RX ORDER — ACETAMINOPHEN 325 MG/1
975 TABLET ORAL ONCE
Status: COMPLETED | OUTPATIENT
Start: 2019-10-29 | End: 2019-10-29

## 2019-10-29 RX ADMIN — PHENAZOPYRIDINE 100 MG: 100 TABLET ORAL at 17:31

## 2019-10-29 RX ADMIN — ACETAMINOPHEN 975 MG: 325 TABLET ORAL at 17:31

## 2019-10-29 RX ADMIN — CEFTRIAXONE 1000 MG: 1 INJECTION, POWDER, FOR SOLUTION INTRAMUSCULAR; INTRAVENOUS at 18:07

## 2019-10-29 RX ADMIN — CEPHALEXIN 500 MG: 250 CAPSULE ORAL at 18:06

## 2019-10-29 NOTE — ED PROVIDER NOTES
History  Chief Complaint   Patient presents with    Urinary Catheter Problem     R sided kidney pain  Limited urine draining from catheter  +thick, yellow urine  Alise Garcia is a 40y o  year old male with PMH of urinary retention with Jiménez catheter in place presenting to the [facility] ED for right flank pain  Patient has had 1 day of worsening suprapubic abdominal pain  Pain is crampy in nature and does not radiate to the back or groin  One hour ago pain started in the right flank  Patient denies fever/chills  No nausea/vomiting and tolerating PO intake  The patient has not taken any over-the-counter medications at home for the pain  Patient states output from his Jiménez bag has been purulent, thick and malodorous over the past 2 weeks  Patient states he has had left nephrectomy s/p abscess  Chronic indwelling jiménez catheter last changed two months ago  Patient states the bag on his fully tract earlier today and will need to be replaced  Patient denies pain in the penis/scrotum/testicles  History provided by:  Patient   used: No    Flank Pain   Pain location:  R flank  Pain quality: cramping    Pain radiates to:  Does not radiate  Pain severity:  Moderate  Onset quality:  Gradual  Duration:  1 day  Timing:  Constant  Progression:  Worsening  Chronicity:  New  Context: previous surgery    Ineffective treatments:  None tried  Associated symptoms: no anorexia, no chest pain, no chills, no cough, no diarrhea, no dysuria, no fatigue, no fever, no hematuria, no nausea, no shortness of breath and no vomiting        Prior to Admission Medications   Prescriptions Last Dose Informant Patient Reported?  Taking?   tamsulosin (FLOMAX) 0 4 mg   No Yes   Sig: Take 2 capsules (0 8 mg total) by mouth daily with dinner      Facility-Administered Medications: None       Past Medical History:   Diagnosis Date    Enlarged prostate     UTI (urinary tract infection)        Past Surgical History:   Procedure Laterality Date    CYSTOSCOPY CYSTOGRAM W/ INJECTION DEFLUX Left 11/18/2017    Procedure: CYSTOSCOPY Fulguration of ureteral orifice CYSTOGRAM W/ INJECTION DEFLUX, LEFT RETROGRADE, PYLOGRAM AND LEFT URETEROSCOPY;  Surgeon: Vanita Chopra MD;  [facility]: [facility];  Service: Urology    LAPAROTOMY N/A 11/2/2017    Procedure: LAPAROTOMY EXPLORATORY 2nd LOOK, ligation of left ureter, I/D with closure;  Surgeon: Theo Fischer MD;  Location: [facility];  Service: General       History reviewed  No pertinent family history  I have reviewed and agree with the history as documented  Social History     Tobacco Use    Smoking status: Former Smoker    Smokeless tobacco: Never Used   Substance Use Topics    Alcohol use: Not Currently     Comment: "socially"    Drug use: No        Review of Systems   Constitutional: Negative for chills, fatigue and fever  HENT: Negative for congestion, nosebleeds and rhinorrhea  Eyes: Negative for visual disturbance  Respiratory: Negative for cough, chest tightness, shortness of breath and wheezing  Cardiovascular: Negative for chest pain, palpitations and leg swelling  Gastrointestinal: Positive for abdominal pain  Negative for abdominal distention, anorexia, diarrhea, nausea and vomiting  Endocrine: Negative for polyuria  Genitourinary: Positive for flank pain  Negative for discharge, dysuria, genital sores, hematuria, penile pain, penile swelling, scrotal swelling and testicular pain  Musculoskeletal: Negative for arthralgias, gait problem and joint swelling  Skin: Negative for rash  Neurological: Negative for seizures, syncope, weakness, light-headedness and headaches  Hematological: Does not bruise/bleed easily  Psychiatric/Behavioral: Negative for behavioral problems and confusion  All other systems reviewed and are negative        Physical Exam  ED Triage Vitals [10/29/19 1543]   Temperature Pulse Respirations Blood Pressure SpO2 97 8 °F (36 6 °C) 103 18 (!) 209/92 98 %      Temp Source Heart Rate Source Patient Position - Orthostatic VS BP Location FiO2 (%)   Tympanic Monitor Sitting Left arm --      Pain Score       7             Orthostatic Vital Signs  Vitals:    10/29/19 1543 10/29/19 1745   BP: (!) 209/92 136/71   Pulse: 103 87   Patient Position - Orthostatic VS: Sitting Lying       Physical Exam   Constitutional: He is oriented to person, place, and time  He appears well-developed and well-nourished  No distress  Appears uncomfortable   HENT:   Head: Normocephalic and atraumatic  Eyes: Conjunctivae are normal    Neck: Neck supple  Cardiovascular: Normal rate and regular rhythm  Pulmonary/Chest: Effort normal and breath sounds normal  No respiratory distress  He has no wheezes  He has no rales  Abdominal: Soft  Normal appearance and bowel sounds are normal  He exhibits no distension  There is tenderness in the suprapubic area  There is CVA tenderness (right)  There is no rigidity, no rebound and no guarding  Hernia confirmed negative in the right inguinal area and confirmed negative in the left inguinal area  Genitourinary: Penis normal  Right testis shows no swelling and no tenderness  Left testis shows no swelling and no tenderness  No penile erythema or penile tenderness  No discharge found  Genitourinary Comments: No perineal rash or crepitus   Neurological: He is alert and oriented to person, place, and time  Skin: Skin is warm  Capillary refill takes less than 2 seconds  No rash noted  He is not diaphoretic  Psychiatric: He has a normal mood and affect  Nursing note and vitals reviewed        ED Medications  Medications   phenazopyridine (PYRIDIUM) tablet 100 mg (100 mg Oral Given 10/29/19 1731)   acetaminophen (TYLENOL) tablet 975 mg (975 mg Oral Given 10/29/19 1731)   cefTRIAXone (ROCEPHIN) 1,000 mg in sterile water IM only syringe (1,000 mg Intramuscular Given 10/29/19 1807)   cephalexin (KEFLEX) capsule 500 mg (500 mg Oral Given 10/29/19 1806)       Diagnostic Studies  Results Reviewed     Procedure Component Value Units Date/Time    Urine culture [[id number]] Collected:  10/29/19 1711    Lab Status:  Final result Specimen:  Urine, Indwelling Thakur Catheter Updated:  10/30/19 1636     Urine Culture 70,000-79,000 cfu/ml     Urine Microscopic [[id number]]  (Abnormal) Collected:  10/29/19 1711    Lab Status:  Final result Specimen:  Urine, Indwelling Thakur Catheter Updated:  10/29/19 1858     RBC, UA Innumerable /hpf      WBC, UA Innumerable /hpf      Epithelial Cells None Seen /hpf      Bacteria, UA Innumerable /hpf     UA w Reflex to Microscopic w Reflex to Culture [[id number]]  (Abnormal) Collected:  10/29/19 1711    Lab Status:  Final result Specimen:  Urine, Indwelling Thakur Catheter Updated:  10/29/19 1725     Color, UA Yellow     Clarity, UA Turbid     Specific [place], UA 1 015     pH, UA 6 0     Leukocytes, UA Large     Nitrite, UA Positive     Protein,  (2+) mg/dl      Glucose, UA Negative mg/dl      Ketones, UA Negative mg/dl      Urobilinogen, UA 0 2 E U /dl      Bilirubin, UA Negative     Blood, UA Large    Urine Microscopic [[id number]] Collected:  10/29/19 1712    Lab Status:  No result Specimen:  Urine, Indwelling Thakur Catheter     ED Urine Macroscopic [[id number]]  (Abnormal) Collected:  10/29/19 1712    Lab Status:  Final result Specimen:  Urine Updated:  10/29/19 1709     Color, UA Yellow     Clarity, UA Turbid     pH, UA 6 0     Leukocytes, UA Large     Nitrite, UA Positive     Protein, UA >=300 mg/dl      Glucose, UA Negative mg/dl      Ketones, UA Negative mg/dl      Urobilinogen, UA 0 2 E U /dl      Bilirubin, UA Negative     Blood, UA Moderate     Specific Gravity, UA 1 025    Narrative:       CLINITEK RESULT                 No orders to display         Procedures  Procedures        ED Course                               MDM  Number of Diagnoses or Management Options  Complicated UTI (urinary tract infection):   Problem with Thakur catheter, initial encounter [facility]):   Diagnosis management comments: 40 y o  male presenting for right flank pain  UA: Consistent with UTI  New Thakur bag placed  Treated with ceftriaxone and first dose keflex in ED  Patient instructed to take prescribed keflex as outpatient and follow up with urology by calling number provided  Patient instructed to RTED immediately if they develop fevers, nausea/vomiting, worsening abdominal pain, diarrhea  I have discussed with the patient our plan to discharge them from the ED and the patient is in agreement with this plan  I have educated the patient on pertinent lab/imaging results and answered their questions  Return to the ED precautions given  I have also discussed with the patient plans for follow up with PCP and Urology  Amount and/or Complexity of Data Reviewed  Clinical lab tests: ordered and reviewed  Review and summarize past medical records: yes    Patient Progress  Patient progress: stable      Disposition  Final diagnoses:   Complicated UTI (urinary tract infection)   Problem with Thakur catheter, initial encounter [facility])     Time reflects when diagnosis was documented in both MDM as applicable and the Disposition within this note     Time User Action Codes Description Comment    10/29/2019  5:13 PM Rory Sears [X49 9] Complicated UTI (urinary tract infection)     10/29/2019  5:13 PM Kj Paniagua, [address]  9XXA] Problem with Thakur catheter, initial encounter [facility])       ED Disposition     ED Disposition Condition Date/Time Comment    Discharge Stable Tue Oct 29, 2019  5:12 PM Rufino Thomas discharge to home/self care  Follow-up Information     Follow up With Specialties Details Why Contact Info Additional [address] [facility] Schedule an appointment as soon as possible for a visit  To make appointment for reevaluation in 3-5 days   [address] [place] [address] [address] [facility] [address] 803  [facility], [address] [place], [place], [place], [address]   [facility] Internal Medicine Schedule an appointment as soon as possible for a visit  To establish care  [address] [facility] [address]  [address] [place] [address], [address] [place] [address], [facility], [place], [place], [address] [phone]          Discharge Medication List as of 10/29/2019  5:52 PM      START taking these medications    Details   cephalexin (KEFLEX) 500 mg capsule Take 1 capsule (500 mg total) by mouth every 12 (twelve) hours for 12 days, Starting Tue 10/29/2019, Until Sun 11/10/2019, Print         CONTINUE these medications which have NOT CHANGED    Details   tamsulosin (FLOMAX) 0 4 mg Take 2 capsules (0 8 mg total) by mouth daily with dinner, Starting Thu 3/1/2018, Print           No discharge procedures on file  ED Provider  Attending physically available and evaluated Hernando Khan I managed the patient along with the ED Attending      Electronically Signed by Hans Kaufman, [address], DO  10/31/19 6392

## 2019-10-29 NOTE — DISCHARGE INSTRUCTIONS
You have been seen for a UTI  Take cephalexin/keflex for your UTI  Follow up with your PCP  Return if you have persistent fevers, vomiting, worsening abdominal pain

## 2019-10-29 NOTE — ED NOTES
Pt reports catheter bag broke at home and the catheter has not been changed for 1 5 months     Vitor Sanchez RN  10/29/19 7922

## 2019-10-29 NOTE — ED ATTENDING ATTESTATION
10/29/2019  Toy Gant DO, saw and evaluated the patient  I have discussed the patient with the resident/non-physician practitioner and agree with the resident's/non-physician practitioner's findings, Plan of Care, and MDM as documented in the resident's/non-physician practitioner's note, except where noted  All available labs and Radiology studies were reviewed  I was present for key portions of any procedure(s) performed by the resident/non-physician practitioner and I was immediately available to provide assistance  At this point I agree with the current assessment done in the Emergency Department  I have conducted an independent evaluation of this patient a history and physical is as follows:    41 yo male w/hx of urinary obstruction resulting in renal abscess  Has chronic jiménez, has been c/o 1wk "foul smelling" urine, suprapubic tenderness, and 1 hr of R flank pain  Denies f/c/n/v  Jiémnez is draining but states this AM the "bag cracked"  He presents with a sudhakar spring water bottle containing some urine  Imp:  pain, change in character of urine   Likely pyelonephritis Plan: UA     ED Course         Critical Care Time  Procedures

## 2019-10-30 LAB — BACTERIA UR CULT: NORMAL

## 2019-11-06 ENCOUNTER — TELEPHONE (OUTPATIENT)
Dept: UROLOGY | Facility: MEDICAL CENTER | Age: 44
End: 2019-11-06

## 2019-11-06 NOTE — TELEPHONE ENCOUNTER
[facility] ER visit  Patient followed by Deven Burnham at the [place] office  Patient seen at [facility] AND [facility] ER  Called in regard to removing his catheter    Please call to discuss at [phone]  Thank you

## 2019-11-06 NOTE — TELEPHONE ENCOUNTER
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  Call placed to patient  He advised that he was recently incarcerated and during that time the indwelling catheter was placed (September 2018)  While incarcerated the jiménez was being changed monthly  He was released a few weeks ago and catheter had not been changed  Patient seen in ER last week and jiménez changed then  Patient states jiménez draining well at this point  Patient scheduled for overdue follow up in office with [facility]  Will determine plan for jiménez  Advised patient that if he is to maintain jiménez going forward he will need to be seen for jiménez change with nurse every 4 weeks  Patient verbalized understanding and agrees with plan

## 2019-11-12 ENCOUNTER — OFFICE VISIT (OUTPATIENT)
Dept: UROLOGY | Facility: AMBULATORY SURGERY CENTER | Age: 44
End: 2019-11-12
Payer: COMMERCIAL

## 2019-11-12 VITALS
BODY MASS INDEX: 27.32 KG/M2 | WEIGHT: 170 LBS | DIASTOLIC BLOOD PRESSURE: 80 MMHG | SYSTOLIC BLOOD PRESSURE: 160 MMHG | HEART RATE: 85 BPM | HEIGHT: 66 IN

## 2019-11-12 DIAGNOSIS — R33.9 URINARY RETENTION: Primary | ICD-10-CM

## 2019-11-12 DIAGNOSIS — Z12.5 PROSTATE CANCER SCREENING: ICD-10-CM

## 2019-11-12 PROCEDURE — 99214 OFFICE O/P EST MOD 30 MIN: CPT | Performed by: NURSE PRACTITIONER

## 2019-11-12 RX ORDER — TOLTERODINE TARTRATE 1 MG/1
1 TABLET, EXTENDED RELEASE ORAL 2 TIMES DAILY
COMMUNITY
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
## 2019-12-03 ENCOUNTER — PROCEDURE VISIT (OUTPATIENT)
Dept: UROLOGY | Facility: AMBULATORY SURGERY CENTER | Age: 44
End: 2019-12-03
Payer: COMMERCIAL

## 2019-12-03 VITALS
HEIGHT: 66 IN | BODY MASS INDEX: 31.76 KG/M2 | SYSTOLIC BLOOD PRESSURE: 130 MMHG | HEART RATE: 64 BPM | WEIGHT: 197.6 LBS | DIASTOLIC BLOOD PRESSURE: 78 MMHG

## 2019-12-03 DIAGNOSIS — R33.9 URINARY RETENTION: Primary | ICD-10-CM

## 2019-12-03 DIAGNOSIS — Z87.448 H/O URETHRAL STRICTURE: ICD-10-CM

## 2019-12-03 PROCEDURE — 99211 OFF/OP EST MAY X REQ PHY/QHP: CPT

## 2019-12-03 NOTE — PROGRESS NOTES
12/3/2019    Mariela Soto is a 40 y o  male  [id number]    Diagnosis:  Chief Complaint     Urinary Retention; chronic indwelling catheter          Patient presents for jiménez removal and Clean intermittent catheterization teaching managed by Dr Clydell Prader:  · Patient provided with catheter samples and a copy of written instructions  · Patient will catheterize 6 x daily or every 4 hours  · Catheter ordered submitted today to [facility] for size 16F coude  · Patient will Follow up as scheduled for cystoscopy and bring a log of voiding vs  Catheterized residuals if able to void  · Patient knows to call the office with any concerns, problems with supplies or difficulty passing catheter  · Patient instructed to stop tolerodine now that jiménez has been removed (it was prescribed for bladder spasm prevention with indwelling catheter )      Vitals:    12/03/19 1128   BP: 130/78   BP Location: Left arm   Patient Position: Sitting   Cuff Size: Standard   Pulse: 64   Weight: 89 6 kg (197 lb 9 6 oz)   Height: 5' 6" (1 676 m)     History:    Patient with history of Atonic bladder and urethral stricture  He was recently incarcerated, and unable to do CIC at that time so jiménez catheter was placed  Per patient it has been in and routinely changed for the past 15 months, approximately  He was comfortable with CIC previously though he admits once he started urinating he stopped doing CIC and did not measure residuals  Patient would be interested in returning to CIC at this time  Dr Magaly Hdz agrees with this plan  Procedure: Jiménez catheter present and plugged  Catheter removed after deflation of an intact balloon  Patient tolerated well  Did not drain the bladder prior to cath removal in order to aide in cic teaching  Teaching:    Patient familiar with technique  We reviewed proper technique for CIC, written instructions were provided as well    He was provided with samples of 16 fr coude catheters, reviewed difference and specifics for each type of catheter  Ultimately patient able to demonstrate proper technique for cic under nursing supervision in the office today  He was instructed to start by attempting to void every 4 hours  If unable to void he should catheterize himself  If able to void he should measure and record that amount and then catheterize himself to measure and record his residual   If he is able to void well he will call the office to discuss residuals and possibly decreasing the frequency of catheterizing  He does not need to wake over night to catheterize himself unless he wakes with the urge to urinate and cannot  Stressed the importance of not stopping CIC without the recommendation of the provider  Reviewed that retention and inadequate emptying of the bladder can lead to recurrent UTI, hydronephrosis and kidney issues  Since patient is s/p left nephrectomy I stressed the importance of protecting his remaining kidney  Patient verbalized understanding and agree to plan          DIPESH Espinosa Che, BSN

## 2019-12-03 NOTE — PATIENT INSTRUCTIONS
How to Catheterize Yourself (Man)   WHAT YOU NEED TO KNOW:   How do I catheterize myself? · Try to urinate before you catheterize yourself  · Gather all the items you will need:  Ask your healthcare provider where to get the supplies to catheterize yourself  ¨ A clean catheter    ¨ Water-based lubricating jelly    ¨ Container to collect urine    ¨ Bowl of warm water, soap, washcloth, and hand towel    ¨ Waterproof pad or bath towel    · Wash your hands with warm water and soap  · Get into position for inserting your catheter:  Lie or sit down with your knees bent  Put a towel or waterproof pad under your penis  You may also  front of the toilet  Make sure the other end of the catheter is pointed into a container or down toward the toilet  · Clean yourself:  Wash your penis with soap, warm water, and a washcloth  If you are not circumcised, pull back the foreskin  Wash the head and the urinary meatus (the opening where urine comes out)  Rinse and dry your penis  Put the container close to you to collect the urine  · Put water-based lubricating jelly on the first 7 to 10 inches of the catheter: This will help decrease discomfort during the procedure  · Insert the catheter:      ¨ With one hand, hold your penis straight out from your body  With your other hand, slowly put the catheter into the urinary meatus  ¨ Gently push the catheter about 7 to 10 inches into your penis until urine begins to come out  Once urine starts to flow, push the catheter up 1 inch more and hold it in place until the urine stops  · Remove the catheter when you are finished:  When urine no longer comes out of the catheter, pinch it closed with the hand that was holding your penis  Gently and slowly pull the catheter out  Keep the end of the catheter up to prevent dribbling of urine  If you are not circumcised, pull the foreskin down over the head of the penis  · Clean the catheter:   If your catheter is reusable, follow your healthcare provider's instructions to clean it  If your catheter is a single-use catheter, throw it away  When should I catheterize myself? Catheterize yourself at least 4 times each day and at bedtime  How can I help prevent an infection? · Wash your hands:  Always wash your hands with soap and water before you catheterize yourself  · Clean and dry reusable catheters:  Clean all reusable catheters with soap and warm water after every use  Sterilize all reusable catheters in a pan of boiling water for 20 minutes  Set the catheters on a clean paper towel to dry  · Store catheters correctly:  Store dry catheters in a clean plastic bag  Throw away torn, hardened, or cracked catheters  · Wear cotton boxers or underwear: These allow airflow and keep your genital area dry  · Drink plenty of liquids:  Ask your healthcare provider how much liquid to drink each day and which liquids are best for you  This helps to keep you from getting a urinary infection  What catheter problems may I have? · No urine comes out of the catheter:  Gently rotate the catheter in case it is blocked  Try gently pushing the catheter a little further up into the penis or pulling it back  Check also that the catheter opening is not blocked by lubricant or mucus  · Urine leakage between catheterizations: You may have some urine leakage if you have been drinking more liquids than usual, especially those containing caffeine or alcohol  It could also mean that you have a bladder infection  If you are having a problem with urine leakage, try catheterizing yourself more often  If you think you have an infection, contact your healthcare provider  · Difficulty inserting or removing the catheter: If you have pain or discomfort when you insert your catheter, use more lubricant  It is common to meet some resistance when you are pushing the catheter past your prostate   The prostate is the gland that makes semen  Take a deep breath and try to relax before you push the catheter in further  Breathe in, then continue pushing the catheter in as you slowly let your breath out  · Blood on the catheter or in your urine: This may happen if your meatus or urethra is too dry  Try using more lubricating jelly to prevent irritating your meatus and urethra  Make sure you drink enough liquids  Blood in the urine could also mean you have an infection  When should I contact my healthcare provider? · You have a fever  · Your urine is thick, cloudy, or has mucus in it  · You have red specks in your urine or your urine looks pink or red  · Your urine has a strong smell  · You have pain or burning in your urethra, bladder, or abdomen  · It is too painful, difficult, or uncomfortable to insert your catheter far enough to start your urine flow  · You have questions or concerns about your condition or care  CARE AGREEMENT:   You have the right to help plan your care  Learn about your health condition and how it may be treated  Discuss treatment options with your caregivers to decide what care you want to receive  You always have the right to refuse treatment  The above information is an  only  It is not intended as medical advice for individual conditions or treatments  Talk to your doctor, nurse or pharmacist before following any medical regimen to see if it is safe and effective for you  © 2017 [employer] Information is for End User's use only and may not be sold, redistributed or otherwise used for commercial purposes  All illustrations and images included in CareNotes® are the copyrighted property of [employer]  or [employer]

## 2019-12-17 ENCOUNTER — TELEPHONE (OUTPATIENT)
Dept: UROLOGY | Facility: AMBULATORY SURGERY CENTER | Age: 44
End: 2019-12-17

## 2019-12-17 NOTE — TELEPHONE ENCOUNTER
Patient showed up in office today stating that he hadn't received his order from [facility] yet  Found samples of red rubber catheters that he likes to use and gave them to him  He states he gets spasms when he has to urinate and only goes about 60 cc and then he waits about 15 min and then in does CIC  He was instructed to to CIC every 4 hours  He is doing CIC every 2-3 hours   I explained that he should try and do what was instructed he has a follow up apointment for a Cysto 1/6/20

## 2020-01-09 ENCOUNTER — TELEPHONE (OUTPATIENT)
Dept: UROLOGY | Facility: AMBULATORY SURGERY CENTER | Age: 45
End: 2020-01-09

## 2020-01-09 DIAGNOSIS — N39.0 ACUTE UTI: Primary | ICD-10-CM

## 2020-01-09 DIAGNOSIS — N30.01 ACUTE CYSTITIS WITH HEMATURIA: Primary | ICD-10-CM

## 2020-01-09 DIAGNOSIS — N30.00 ACUTE CYSTITIS WITHOUT HEMATURIA: Primary | ICD-10-CM

## 2020-01-09 LAB
BACTERIA UR QL AUTO: ABNORMAL /HPF
BILIRUB UR QL STRIP: NEGATIVE
CLARITY UR: ABNORMAL
COLOR UR: YELLOW
GLUCOSE UR STRIP-MCNC: NEGATIVE MG/DL
HGB UR QL STRIP.AUTO: ABNORMAL
HYALINE CASTS #/AREA URNS LPF: ABNORMAL /LPF
KETONES UR STRIP-MCNC: NEGATIVE MG/DL
LEUKOCYTE ESTERASE UR QL STRIP: ABNORMAL
NITRITE UR QL STRIP: POSITIVE
NON-SQ EPI CELLS URNS QL MICRO: ABNORMAL /HPF
PH UR STRIP.AUTO: 6.5 [PH]
PROT UR STRIP-MCNC: ABNORMAL MG/DL
RBC #/AREA URNS AUTO: ABNORMAL /HPF
SP GR UR STRIP.AUTO: 1.02 (ref 1–1.03)
UROBILINOGEN UR QL STRIP.AUTO: 0.2 E.U./DL
WBC #/AREA URNS AUTO: ABNORMAL /HPF

## 2020-01-09 PROCEDURE — 87086 URINE CULTURE/COLONY COUNT: CPT | Performed by: UROLOGY

## 2020-01-09 PROCEDURE — 87077 CULTURE AEROBIC IDENTIFY: CPT | Performed by: UROLOGY

## 2020-01-09 PROCEDURE — 81001 URINALYSIS AUTO W/SCOPE: CPT | Performed by: UROLOGY

## 2020-01-09 PROCEDURE — 87186 SC STD MICRODIL/AGAR DIL: CPT | Performed by: UROLOGY

## 2020-01-11 LAB — BACTERIA UR CULT: ABNORMAL

## 2020-01-14 ENCOUNTER — TELEPHONE (OUTPATIENT)
Dept: UROLOGY | Facility: HOSPITAL | Age: 45
End: 2020-01-14

## 2020-01-14 RX ORDER — SULFAMETHOXAZOLE AND TRIMETHOPRIM 800; 160 MG/1; MG/1
1 TABLET ORAL EVERY 12 HOURS SCHEDULED
Qty: 10 TABLET | Refills: 0 | Status: SHIPPED | OUTPATIENT
Start: 2020-01-14 | End: 2020-01-19

## 2020-01-14 NOTE — TELEPHONE ENCOUNTER
Patient's urine culture results are back  Please contact him and have him switch antibiotics from Cipro to Bactrim  The new medication has been sent to his pharmacy

## 2020-01-14 NOTE — TELEPHONE ENCOUNTER
Spoke with patient, aware to switch antibiotics from Cipro to Bactrim  Will begin taking today  All questions answered

## 2020-02-23 ENCOUNTER — HOSPITAL ENCOUNTER (INPATIENT)
Facility: HOSPITAL | Age: 45
LOS: 2 days | Discharge: HOME | DRG: 463 | End: 2020-02-25
Attending: EMERGENCY MEDICINE | Admitting: HOSPITALIST
Payer: COMMERCIAL

## 2020-02-23 DIAGNOSIS — N12 PYELONEPHRITIS: Primary | ICD-10-CM

## 2020-02-23 PROBLEM — N39.0 UTI (URINARY TRACT INFECTION): Status: ACTIVE | Noted: 2020-02-23

## 2020-02-23 PROBLEM — R33.9 URINARY RETENTION: Status: ACTIVE | Noted: 2020-02-23

## 2020-02-23 PROBLEM — Z90.5 HISTORY OF LEFT NEPHRECTOMY: Status: ACTIVE | Noted: 2020-02-23

## 2020-02-23 PROBLEM — Z72.0 TOBACCO ABUSE: Status: ACTIVE | Noted: 2020-02-23

## 2020-02-23 LAB
ANION GAP SERPL CALCULATED.3IONS-SCNC: 4 MMOL/L (ref 4–13)
BACTERIA UR QL AUTO: ABNORMAL /HPF
BASOPHILS # BLD AUTO: 0.04 THOUSANDS/ΜL (ref 0–0.1)
BASOPHILS NFR BLD AUTO: 0 % (ref 0–1)
BILIRUB UR QL STRIP: NEGATIVE
BUN SERPL-MCNC: 17 MG/DL (ref 5–25)
CALCIUM SERPL-MCNC: 9.1 MG/DL (ref 8.3–10.1)
CHLORIDE SERPL-SCNC: 111 MMOL/L (ref 100–108)
CLARITY UR: CLEAR
CO2 SERPL-SCNC: 26 MMOL/L (ref 21–32)
COLOR UR: YELLOW
COLOR, POC: NORMAL
CREAT SERPL-MCNC: 1.35 MG/DL (ref 0.6–1.3)
EOSINOPHIL # BLD AUTO: 0.05 THOUSAND/ΜL (ref 0–0.61)
EOSINOPHIL NFR BLD AUTO: 0 % (ref 0–6)
ERYTHROCYTE [DISTWIDTH] IN BLOOD BY AUTOMATED COUNT: 13.2 % (ref 11.6–15.1)
GFR SERPL CREATININE-BSD FRML MDRD: 63 ML/MIN/1.73SQ M
GLUCOSE SERPL-MCNC: 116 MG/DL (ref 65–140)
GLUCOSE UR STRIP-MCNC: NEGATIVE MG/DL
HCT VFR BLD AUTO: 45.7 % (ref 36.5–49.3)
HGB BLD-MCNC: 15.3 G/DL (ref 12–17)
HGB UR QL STRIP.AUTO: ABNORMAL
IMM GRANULOCYTES # BLD AUTO: 0.09 THOUSAND/UL (ref 0–0.2)
IMM GRANULOCYTES NFR BLD AUTO: 1 % (ref 0–2)
KETONES UR STRIP-MCNC: NEGATIVE MG/DL
LACTATE SERPL-SCNC: 1.4 MMOL/L (ref 0.5–2)
LEUKOCYTE ESTERASE UR QL STRIP: ABNORMAL
LYMPHOCYTES # BLD AUTO: 1.31 THOUSANDS/ΜL (ref 0.6–4.47)
LYMPHOCYTES NFR BLD AUTO: 8 % (ref 14–44)
MCH RBC QN AUTO: 30.5 PG (ref 26.8–34.3)
MCHC RBC AUTO-ENTMCNC: 33.5 G/DL (ref 31.4–37.4)
MCV RBC AUTO: 91 FL (ref 82–98)
MONOCYTES # BLD AUTO: 1.55 THOUSAND/ΜL (ref 0.17–1.22)
MONOCYTES NFR BLD AUTO: 9 % (ref 4–12)
NEUTROPHILS # BLD AUTO: 14.5 THOUSANDS/ΜL (ref 1.85–7.62)
NEUTS SEG NFR BLD AUTO: 82 % (ref 43–75)
NITRITE UR QL STRIP: POSITIVE
NON-SQ EPI CELLS URNS QL MICRO: ABNORMAL /HPF
NRBC BLD AUTO-RTO: 0 /100 WBCS
PH UR STRIP.AUTO: 7 [PH] (ref 4.5–8)
PLATELET # BLD AUTO: 258 THOUSANDS/UL (ref 149–390)
PMV BLD AUTO: 8.8 FL (ref 8.9–12.7)
POTASSIUM SERPL-SCNC: 3.5 MMOL/L (ref 3.5–5.3)
PROT UR STRIP-MCNC: >=300 MG/DL
RBC # BLD AUTO: 5.02 MILLION/UL (ref 3.88–5.62)
RBC #/AREA URNS AUTO: ABNORMAL /HPF
SODIUM SERPL-SCNC: 141 MMOL/L (ref 136–145)
SP GR UR STRIP.AUTO: 1.01 (ref 1–1.03)
UROBILINOGEN UR QL STRIP.AUTO: 0.2 E.U./DL
WBC # BLD AUTO: 17.54 THOUSAND/UL (ref 4.31–10.16)
WBC #/AREA URNS AUTO: ABNORMAL /HPF

## 2020-02-23 PROCEDURE — 36415 COLL VENOUS BLD VENIPUNCTURE: CPT | Performed by: EMERGENCY MEDICINE

## 2020-02-23 PROCEDURE — 80048 BASIC METABOLIC PNL TOTAL CA: CPT | Performed by: EMERGENCY MEDICINE

## 2020-02-23 PROCEDURE — 99285 EMERGENCY DEPT VISIT HI MDM: CPT | Performed by: EMERGENCY MEDICINE

## 2020-02-23 PROCEDURE — 96375 TX/PRO/DX INJ NEW DRUG ADDON: CPT

## 2020-02-23 PROCEDURE — 87040 BLOOD CULTURE FOR BACTERIA: CPT | Performed by: EMERGENCY MEDICINE

## 2020-02-23 PROCEDURE — 83605 ASSAY OF LACTIC ACID: CPT | Performed by: EMERGENCY MEDICINE

## 2020-02-23 PROCEDURE — 87086 URINE CULTURE/COLONY COUNT: CPT

## 2020-02-23 PROCEDURE — 99223 1ST HOSP IP/OBS HIGH 75: CPT | Performed by: INTERNAL MEDICINE

## 2020-02-23 PROCEDURE — 85025 COMPLETE CBC W/AUTO DIFF WBC: CPT | Performed by: EMERGENCY MEDICINE

## 2020-02-23 PROCEDURE — 81001 URINALYSIS AUTO W/SCOPE: CPT

## 2020-02-23 PROCEDURE — ND001 PR NO DOCUMENTATION: Performed by: HOSPITALIST

## 2020-02-23 PROCEDURE — 96365 THER/PROPH/DIAG IV INF INIT: CPT

## 2020-02-23 PROCEDURE — 99285 EMERGENCY DEPT VISIT HI MDM: CPT

## 2020-02-23 RX ORDER — SODIUM CHLORIDE 9 MG/ML
100 INJECTION, SOLUTION INTRAVENOUS CONTINUOUS
Status: DISPENSED | OUTPATIENT
Start: 2020-02-23 | End: 2020-02-24

## 2020-02-23 RX ORDER — TAMSULOSIN HYDROCHLORIDE 0.4 MG/1
0.8 CAPSULE ORAL
Status: DISCONTINUED | OUTPATIENT
Start: 2020-02-23 | End: 2020-02-25 | Stop reason: HOSPADM

## 2020-02-23 RX ORDER — OXYBUTYNIN CHLORIDE 5 MG/1
5 TABLET ORAL DAILY
Status: DISCONTINUED | OUTPATIENT
Start: 2020-02-23 | End: 2020-02-23

## 2020-02-23 RX ORDER — OXYBUTYNIN CHLORIDE 5 MG/1
2.5 TABLET ORAL 2 TIMES DAILY
Status: DISCONTINUED | OUTPATIENT
Start: 2020-02-23 | End: 2020-02-25 | Stop reason: HOSPADM

## 2020-02-23 RX ORDER — VANCOMYCIN HYDROCHLORIDE 1 G/200ML
12.5 INJECTION, SOLUTION INTRAVENOUS EVERY 12 HOURS
Status: DISCONTINUED | OUTPATIENT
Start: 2020-02-24 | End: 2020-02-25 | Stop reason: HOSPADM

## 2020-02-23 RX ORDER — OXYCODONE HYDROCHLORIDE 5 MG/1
2.5 TABLET ORAL EVERY 4 HOURS PRN
Status: DISCONTINUED | OUTPATIENT
Start: 2020-02-23 | End: 2020-02-25 | Stop reason: HOSPADM

## 2020-02-23 RX ORDER — HYDROMORPHONE HCL/PF 1 MG/ML
0.5 SYRINGE (ML) INJECTION ONCE
Status: COMPLETED | OUTPATIENT
Start: 2020-02-23 | End: 2020-02-23

## 2020-02-23 RX ORDER — HEPARIN SODIUM 5000 [USP'U]/ML
5000 INJECTION, SOLUTION INTRAVENOUS; SUBCUTANEOUS EVERY 8 HOURS SCHEDULED
Status: DISCONTINUED | OUTPATIENT
Start: 2020-02-23 | End: 2020-02-25 | Stop reason: HOSPADM

## 2020-02-23 RX ORDER — HYDROMORPHONE HCL/PF 1 MG/ML
0.2 SYRINGE (ML) INJECTION
Status: DISCONTINUED | OUTPATIENT
Start: 2020-02-23 | End: 2020-02-24

## 2020-02-23 RX ORDER — OXYCODONE HYDROCHLORIDE 5 MG/1
5 TABLET ORAL EVERY 4 HOURS PRN
Status: DISCONTINUED | OUTPATIENT
Start: 2020-02-23 | End: 2020-02-25 | Stop reason: HOSPADM

## 2020-02-23 RX ORDER — ACETAMINOPHEN 325 MG/1
975 TABLET ORAL EVERY 6 HOURS PRN
Status: DISCONTINUED | OUTPATIENT
Start: 2020-02-23 | End: 2020-02-25 | Stop reason: HOSPADM

## 2020-02-23 RX ADMIN — CEFEPIME HYDROCHLORIDE 2000 MG: 2 INJECTION, POWDER, FOR SOLUTION INTRAVENOUS at 13:45

## 2020-02-23 RX ADMIN — VANCOMYCIN HYDROCHLORIDE 1750 MG: 10 INJECTION, POWDER, LYOPHILIZED, FOR SOLUTION INTRAVENOUS at 14:54

## 2020-02-23 RX ADMIN — TAMSULOSIN HYDROCHLORIDE 0.8 MG: 0.4 CAPSULE ORAL at 17:13

## 2020-02-23 RX ADMIN — CEFTRIAXONE SODIUM 1000 MG: 10 INJECTION, POWDER, FOR SOLUTION INTRAVENOUS at 22:31

## 2020-02-23 RX ADMIN — HEPARIN SODIUM 5000 UNITS: 5000 INJECTION INTRAVENOUS; SUBCUTANEOUS at 22:02

## 2020-02-23 RX ADMIN — OXYCODONE HYDROCHLORIDE 5 MG: 5 TABLET ORAL at 20:05

## 2020-02-23 RX ADMIN — OXYBUTYNIN CHLORIDE 2.5 MG: 5 TABLET ORAL at 17:13

## 2020-02-23 RX ADMIN — SODIUM CHLORIDE 1000 ML: 0.9 INJECTION, SOLUTION INTRAVENOUS at 13:40

## 2020-02-23 RX ADMIN — HYDROMORPHONE HYDROCHLORIDE 0.5 MG: 1 INJECTION, SOLUTION INTRAMUSCULAR; INTRAVENOUS; SUBCUTANEOUS at 13:42

## 2020-02-23 RX ADMIN — SODIUM CHLORIDE 100 ML/HR: 0.9 INJECTION, SOLUTION INTRAVENOUS at 17:13

## 2020-02-23 NOTE — UTILIZATION REVIEW
Notification of Inpatient Admission/Inpatient Authorization Request   This is a Notification of Inpatient Admission for [address] [facility]  Be advised that this patient was admitted to our facility under Inpatient Status  Contact Leyla Felix at [phone] for additional admission information  [facility]  DEDICATED [phone]  Patient Name:   Gracia Hummel   YOB: 1975       [address]   [address]:   Stacia Farris  Tax ID: [id number]  NPI: [id number] Attending Provider/NPI: Violet Jolley [[id number]]   Place of Service Code: 24     Place of Service Name:  [address] [place] [address]   Start Date: 2/23/20 1441     Discharge Date & Time: No discharge date for patient encounter  Type of Admission: Inpatient Status Discharge Disposition (if discharged): Home/Self Care   Patient Diagnoses: Urinary problem [R39 [id number]]     Orders: Admission Orders (From admission, onward)     Ordered        02/23/20 1441  Inpatient Admission (expected length of stay for this patient Order details is greater than two midnights)  Once                    Assigned Utilization Review Contact: Leyla Felix  Utilization   Network Utilization Review Department  Phone: [phone]; Fax [phone]  Email: [email or web address]   ATTENTION PAYERS: Please call the assigned Utilization  directly with any questions or concerns ALL voicemails in the department are confidential  Send all requests for admission clinical reviews, approved or denied determinations and any other requests to dedicated fax number belonging to the campus where the patient is receiving treatment

## 2020-02-23 NOTE — ED ATTENDING ATTESTATION
2/23/2020  IMyles MD, saw and evaluated the patient  I have discussed the patient with the resident/non-physician practitioner and agree with the resident's/non-physician practitioner's findings, Plan of Care, and MDM as documented in the resident's/non-physician practitioner's note, except where noted  All available labs and Radiology studies were reviewed  I was present for key portions of any procedure(s) performed by the resident/non-physician practitioner and I was immediately available to provide assistance  At this point I agree with the current assessment done in the Emergency Department  I have conducted an independent evaluation of this patient a history and physical is as follows:    ED Course         Critical Care Time  Procedures    39 yo male with left nephrectomy and bladder atony with increased pain for one day in abdomen with radiation to right flank  No fever  No systemic symptoms  Pt straight caths himself  Vss, afebrile, lungs cta, rrr, abdomen soft mild tenderness, right cva tenderness  Septic workup, ivf, urine source, abx, admit

## 2020-02-23 NOTE — ASSESSMENT & PLAN NOTE
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

## 2020-02-23 NOTE — H&P
H&P- Hernando Khan 1975, 40 y o  male MRN: [id number]    Unit/Bed#: ED 02 Encounter: [id number]    Primary Care Provider: No primary care provider on file  Date and time admitted to hospital: 2/23/2020 12:16 PM        * UTI (urinary tract infection)  Assessment & Plan  Has a past medical history of a spontaneous renal laceration, left nephrectomy was done in 2017, postop he developed urinary retention secondary to atonic bladder, initially he had intermittently a Thakur now he is doing self catheterization 3 times a day  He had acute cystitis in January, when the urine culture was growing MRSA  He was initially and Cipro and then transitioned to Bactrim    Prior to this admission, patient had chills, lower abdominal pressure and right flank pain for 2 a day  UTI, suspect pyelonephritis  Elevated white blood cell count 17 5, did not meet sirs/sepsis criteria  Urine analysis positive for blood, leukocytes and bacteria  Lactic acid 1 5  He has CKD stage 2, creatinine slightly above baseline but does not meet criteria for WOLF  Thakur was placed in the ED  IV fluid  Initially he received cefepime then later a dose of vancomycin  Urine culture and blood cultures pending  Infectious disease consult  Kidney ultrasound        Urinary retention  Assessment & Plan  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  Self cathing 3 times a day  Now Thakur placed in the ED  Continue tamsulosin      History of left nephrectomy  Assessment & Plan  Due to a spontaneous laceration in 2017    Tobacco abuse  Assessment & Plan  Smokes 2-3 cigarettes a day, refusing nicotine patch        VTE Prophylaxis: Heparin  / sequential compression device   Code Status:  Focal  POLST: There is no POLST form on file for this patient (pre-hospital)  Discussion with family:  Patient    Anticipated Length of Stay:  Patient will be admitted on an Inpatient basis with an anticipated length of stay of more than 2 midnights     Justification for Hospital Stay:  Pyelonephritis, needs IV antibiotics    Total Time for Visit, including Counseling / Coordination of Care: 45 minutes  Greater than 50% of this total time spent on direct patient counseling and coordination of care  Chief Complaint:   Right flank pain    History of Present Illness:    Jorje Conroy is a 40 y o  male who presents with right flank pain  Patient is a 17-year-old male with a past medical history of spontaneous renal laceration sustained in a [facility]  Status post left nephrectomy in 2017  Postop he developed right sided hydronephrosis he had a percutaneous nephrostomy  Which was eventually removed  Also postop he developed urinary retention secondary to atonic bladder he intermittently had of Thakur currently he is self cathing himself 3 times a day  In January he had acute cystitis, urine culture was growing MRSA  Initially he was on Cipro and then transitioned to Bactrim  Managed by urology  Patient comes in with 2 days of bladder pressure, right flank pain which is intermittent, sharp and radiates to the abdomen  Associated with chills  In the ED he received IV fluids, initially cefepime and then a dose of vancomycin  He denies headache, visual changes, chest pain, shortness of breath, palpitation, nausea vomiting, , constipation or diarrhea      Review of Systems:    Review of Systems assistant reviewed and negative except as above    Past Medical and Surgical History:     Past Medical History:   Diagnosis Date    Enlarged prostate     UTI (urinary tract infection)        Past Surgical History:   Procedure Laterality Date    CYSTOSCOPY CYSTOGRAM W/ INJECTION DEFLUX Left 11/18/2017    Procedure: CYSTOSCOPY Fulguration of ureteral orifice CYSTOGRAM W/ INJECTION DEFLUX, LEFT RETROGRADE, PYLOGRAM AND LEFT URETEROSCOPY;  Surgeon: Nathalie Hodgkins, MD;  Location: [facility];  Service: Urology    KIDNEY SURGERY      LAPAROTOMY N/A 11/2/2017    Procedure: Idell Filler EXPLORATORY 2nd LOOK, ligation of left ureter, I/D with closure;  Surgeon: Юлия Vieira MD;  Location: [facility];  Service: General       Meds/Allergies:    Prior to Admission medications    Medication Sig Start Date End Date Taking? Authorizing Provider   tamsulosin (FLOMAX) 0 4 mg Take 2 capsules (0 8 mg total) by mouth daily with dinner 3/1/18  Yes JANICE Armenta   tolterodine (DETROL) 1 mg tablet Take 1 mg by mouth 2 (two) times a day   Yes Historical Provider, MD     I have reviewed home medications with patient personally  Allergies: No Known Allergies    Social History:     Marital Status: Single   Substance Use History:   Social History     Substance and Sexual Activity   Alcohol Use Not Currently    Comment: "socially"     Social History     Tobacco Use   Smoking Status Current Some Day Smoker   Smokeless Tobacco Never Used     Social History     Substance and Sexual Activity   Drug Use No       Family History:    History reviewed  No pertinent family history  Physical Exam:     Vitals:   Blood Pressure: 157/88 (02/23/20 1445)  Pulse: 76 (02/23/20 1445)  Temperature: 98 6 °F (37 °C) (02/23/20 1225)  Temp Source: Oral (02/23/20 1225)  Respirations: 20 (02/23/20 1445)  Weight - Scale: 86 kg (189 lb 9 5 oz) (02/23/20 1225)  SpO2: 98 % (02/23/20 1445)    Physical Exam   Constitutional: He is oriented to person, place, and time  He appears well-developed and well-nourished  No distress  HENT:   Head: Normocephalic and atraumatic  Eyes: EOM are normal    Neck: Neck supple  Cardiovascular: Normal rate, regular rhythm and normal heart sounds  Exam reveals no gallop and no friction rub  No murmur heard  Pulmonary/Chest: Effort normal and breath sounds normal  No respiratory distress  He has no wheezes  Abdominal: Soft  Bowel sounds are normal  He exhibits no distension  There is no tenderness  Right CVA tenderness   Musculoskeletal: Normal range of motion  He exhibits no edema     Neurological: He is alert and oriented to person, place, and time  No cranial nerve deficit  Skin: Skin is warm and dry  Psychiatric: He has a normal mood and affect  Additional Data:     Lab Results: I have personally reviewed pertinent reports  Results from last 7 days   Lab Units 02/23/20  1306   WBC Thousand/uL 17 54*   HEMOGLOBIN g/dL 15 3   HEMATOCRIT % 45 7   PLATELETS Thousands/uL 258   NEUTROS PCT % 82*   LYMPHS PCT % 8*   MONOS PCT % 9   EOS PCT % 0     Results from last 7 days   Lab Units 02/23/20  1306   SODIUM mmol/L 141   POTASSIUM mmol/L 3 5   CHLORIDE mmol/L 111*   CO2 mmol/L 26   BUN mg/dL 17   CREATININE mg/dL 1 35*   ANION GAP mmol/L 4   CALCIUM mg/dL 9 1   GLUCOSE RANDOM mg/dL 116                 Results from last 7 days   Lab Units 02/23/20  1306   LACTIC ACID mmol/L 1 4       Imaging: I have personally reviewed pertinent reports  No orders to display       EKG, Pathology, and Other Studies Reviewed on Admission:   · EKG:     [facility] [place] [place] [facility]\"" / [facility] Records Reviewed: Yes     ** Please Note: This note has been constructed using a voice recognition system   **

## 2020-02-23 NOTE — PROGRESS NOTES
Vancomycin IV Pharmacy-to-Dose Consultation    Bon Berg is a 40 y o  male who is currently receiving Vancomycin IV with management by the Pharmacy Consult service  Relevant clinical data and objective history reviewed:  Temp Readings from Last 3 Encounters:   02/23/20 98 6 °F (37 °C) (Oral)   10/29/19 97 8 °F (36 6 °C) (Tympanic)   12/22/17 98 6 °F (37 °C) (Oral)     /86   Pulse 76   Temp 98 6 °F (37 °C) (Oral)   Resp 20   Wt 86 kg (189 lb 9 5 oz)   SpO2 98%   BMI 31 55 kg/m²     No intake/output data recorded  Lab Results   Component Value Date/Time    BUN 17 02/23/2020 01:06 PM    BUN 15 05/05/2015 10:52 AM    WBC 17 54 (H) 02/23/2020 01:06 PM    WBC 10 60 (H) 05/05/2015 10:52 AM    HGB 15 3 02/23/2020 01:06 PM    HGB 11 6 (L) 05/05/2015 10:52 AM    HCT 45 7 02/23/2020 01:06 PM    HCT 36 7 05/05/2015 10:52 AM    MCV 91 02/23/2020 01:06 PM    MCV 90 05/05/2015 10:52 AM     02/23/2020 01:06 PM     (H) 05/05/2015 10:52 AM     Creatinine   Date Value Ref Range Status   02/23/2020 1 35 (H) 0 60 - 1 30 mg/dL Final     Comment:     Standardized to IDMS reference method   12/18/2017 1 23 0 60 - 1 30 mg/dL Final     Comment:     Standardized to IDMS reference method   05/05/2015 1 27 0 60 - 1 30 mg/dL Final     Comment:     Standardized to IDMS reference method   04/29/2015 1 08 0 60 - 1 30 mg/dL Final     Comment:     Standardized to IDMS reference method     VANCOMYCIN TROUGH   Date Value Ref Range Status   03/10/2015 20 3 (HH) 10 0 - 20 0 mcg/mL Final     Comment:     Verify that this specimen was collected immediately prior to drug  administration  Reference range and result flagging reflect proper  specimen collection protocol  The above 1 analytes [facility] [facility] [id number]     03/03/2015 13 9 10 0 - 20 0 mcg/mL Final     Comment:     Verify that this specimen was collected immediately prior to drug  administration    Reference range and result flagging reflect proper  specimen collection protocol  The above 1 analytes were performed by Mary  [address] [place] [address]       Vancomycin Tr   Date Value Ref Range Status   12/04/2017 16 6 10 0 - 20 0 ug/mL Final     Vancomycin Assessment:  Indication: urinary tract infection - previous urine culture drawn 1/9/20 was MRSA (+)  Micro: 2/23 BC x2 and UC pending   Renal Function: SCr= 1 35; CKD stage 2 - per provider note "CKD stage 2, creatinine slightly above baseline but does not meet criteria for WOLF"  Potential Nephrotoxicity Factors:  Patient-Factors: renal dysfunction  Days of Therapy: 1  Current Dose: 1250 mg q12h (last dose: 1750 mg x1 loading dose administered 2/23 @ 1454)  Goal Trough: 15-20 (appropriate for most indications)   Goal AUC(24h): 400-600    Vancomycin Plan:  New Dosing: change to 1000 mg q12h (next dose: 2/24 @ 0300)  Predicted Trough / AUC: 18/617  Next Level: 2/25 @ 1430 unless kidney function continues to worsen    Pharmacy will continue to follow closely for s/sx of nephrotoxicity, infusion reactions and appropriateness of therapy  BMP and CBC will be ordered per protocol  We will continue to follow the patient's culture results and clinical progress daily       Elisabet Cullen, PharmD  Clinical Pharmacist, Emergency Medicine

## 2020-02-23 NOTE — PROGRESS NOTES
INTERNAL MEDICINE RESIDENCY PROGRESS NOTE     Name: Noel Hartmann   Age & Sex: 40 y o  male   MRN: [id number]  [facility]   Encounter: [id number]  Team: SOD Team A    PATIENT INFORMATION     Name: Noel Hartmann   Age & Sex: 40 y o  male   MRN: [id number]  Hospital Stay Days: 0    ASSESSMENT/PLAN     Principal Problem:    UTI (urinary tract infection)  Active Problems:    Urinary retention    Tobacco abuse    History of left nephrectomy    Please see H&P written by Nuha Kim MD, on 2020 for current assessment and plan:        Disposition:  Continue monitoring on the General Medicine service receiving IV antibiotics        SUBJECTIVE     Patient seen and evaluated this evening as part of a transition of care from [facility] to SOD A; please see H&P written by Nuha Kim MD, on 2020 for details of HPI:    Patient lying supine in bed upon entry; Thakur catheter in place draining clear yellow urine  States that he is feeling better  Endorses chills, lightheadedness, bladder irritation, and right flank pain  Denies headaches, vision changes, dizziness, chest pain, shortness of breath, nausea, vomiting, leg pain, and leg swelling  Patient expressed understanding of medical plan and had no questions at conclusion of encounter      OBJECTIVE     Vitals:    20 1500 20 1540 20 1556 20 1645   BP: 156/86 161/91  162/90   BP Location:    Right arm   Pulse: 76 75  89   Resp:   20   Temp:   99 2 °F (37 3 °C) (!) 100 8 °F (38 2 °C)   TempSrc:       SpO2: 98% 98%  97%   Weight:    85 3 kg (188 lb)   Height:    5' 6" (1 676 m)      Temperature:   Temp (24hrs), Av 5 °F (37 5 °C), Min:98 6 °F (37 °C), Max:100 8 °F (38 2 °C)    Temperature: (!) 100 8 °F (38 2 °C)  Intake & Output:  I/O        07 -  0700  07 0700    Urine (mL/kg/hr)   250    Total Output   250    Net   -250               Weights:   IBW: 63 8 kg Body mass index is 30 34 kg/m²  Weight (last 2 days)     Date/Time   Weight    02/23/20 16:45:51   85 3 (188)    02/23/20 1225   86 (189 6)            Physical Exam   Constitutional: He is oriented to person, place, and time  He appears well-developed  He appears distressed  HENT:   Head: Normocephalic and atraumatic  Mouth/Throat: Oropharynx is clear and moist  No oropharyngeal exudate  Eyes: Pupils are equal, round, and reactive to light  Conjunctivae and EOM are normal  No scleral icterus  Cardiovascular: Normal rate, regular rhythm, normal heart sounds and intact distal pulses  Exam reveals no gallop and no friction rub  No murmur heard  Pulmonary/Chest: Effort normal and breath sounds normal  No respiratory distress  Abdominal: Soft  Bowel sounds are normal  He exhibits no distension  There is tenderness  There is no guarding  Suprapubic and right lower quadrant tenderness to palpation; right-sided costovertebral angle tenderness   Genitourinary:   Genitourinary Comments: Thakur catheter in place draining clear yellow urine   Musculoskeletal: Normal range of motion  He exhibits no edema, tenderness or deformity  Neurological: He is alert and oriented to person, place, and time  No cranial nerve deficit  Coordination normal    Skin: Skin is warm and dry  Capillary refill takes 2 to 3 seconds  No rash noted  He is not diaphoretic  No erythema  No pallor  Psychiatric: He has a normal mood and affect  His behavior is normal    Vitals reviewed  LABORATORY DATA     Labs: I have personally reviewed pertinent reports    Results from last 7 days   Lab Units 02/23/20  1306   WBC Thousand/uL 17 54*   HEMOGLOBIN g/dL 15 3   HEMATOCRIT % 45 7   PLATELETS Thousands/uL 258   NEUTROS PCT % 82*   MONOS PCT % 9      Results from last 7 days   Lab Units 02/23/20  1306   POTASSIUM mmol/L 3 5   CHLORIDE mmol/L 111*   CO2 mmol/L 26   BUN mg/dL 17   CREATININE mg/dL 1 35*   CALCIUM mg/dL 9 1 Results from last 7 days   Lab Units 02/23/20  1306   LACTIC ACID mmol/L 1 4           IMAGING & DIAGNOSTIC TESTING     Radiology Results: I have personally reviewed pertinent reports  No results found  Other Diagnostic Testing: I have personally reviewed pertinent reports  ACTIVE MEDICATIONS     Current Facility-Administered Medications   Medication Dose Route Frequency    ceftriaxone (ROCEPHIN) 1 g/50 mL in dextrose IVPB  1,000 mg Intravenous Q24H    heparin (porcine) subcutaneous injection 5,000 Units  5,000 Units Subcutaneous Q8H [address]    oxybutynin (DITROPAN) tablet 2 5 mg  2 5 mg Oral BID    sodium chloride 0 9 % infusion  100 mL/hr Intravenous Continuous    tamsulosin (FLOMAX) capsule 0 8 mg  0 8 mg Oral Daily With Dinner    [START ON 2/24/2020] vancomycin (VANCOCIN) IVPB (premix) 1,000 mg  12 5 mg/kg Intravenous Q12H       VTE Pharmacologic Prophylaxis: Heparin  VTE Mechanical Prophylaxis: sequential compression device    Portions of the record may have been created with voice recognition software  Occasional wrong word or "sound a like" substitutions may have occurred due to the inherent limitations of voice recognition software    Read the chart carefully and recognize, using context, where substitutions have occurred   ==  Saleem Villalobos MD  [address]  Internal Medicine Residency PGY-1

## 2020-02-23 NOTE — ASSESSMENT & PLAN NOTE
Has a past medical history of a spontaneous renal laceration, left nephrectomy was done in 2017, postop he developed urinary retention secondary to atonic bladder, initially he had intermittently a Thakur now he is doing self catheterization 3 times a day  He had acute cystitis in January, when the urine culture was growing MRSA    He was initially and Cipro and then transitioned to Bactrim    Prior to this admission, patient had chills, lower abdominal pressure and right flank pain for 2 a day  UTI, suspect pyelonephritis  Elevated white blood cell count 17 5, did not meet sirs/sepsis criteria  Urine analysis positive for blood, leukocytes and bacteria  Lactic acid 1 5  He has CKD stage 2, creatinine slightly above baseline but does not meet criteria for WOLF  Thakur was placed in the ED  IV fluid  Initially he received cefepime then later a dose of vancomycin  Urine culture and blood cultures pending  Infectious disease consult  Kidney ultrasound

## 2020-02-23 NOTE — ED PROVIDER NOTES
History  Chief Complaint   Patient presents with    Flank Pain     Patient reports UTI symptoms for last several days and it feels like it is going up both sides  Patient is a 40year-old male with a complex ureteral history including L nephrectomy, atonic bladder w/ self-cath p/w abd/flank pain  Started with suprapubic pain that migrated to his R flank  Assc w/ cloudy urine, frequency  Pain is dull, constant, worsening with time  No assc fever, N/V/D, diarrhea, constipation, hematuria  Worse with sensation of full bladder and with sudden movements  Did not take anything at home  Sx are similar to as with prior UTIs  Had Indwelling cath but was removed approx 2 mo ago  Prior to Admission Medications   Prescriptions Last Dose Informant Patient Reported? Taking?   tamsulosin (FLOMAX) 0 4 mg 2/22/2020 at Unknown time  No Yes   Sig: Take 2 capsules (0 8 mg total) by mouth daily with dinner   tolterodine (DETROL) 1 mg tablet 2/22/2020 at Unknown time Outside Facility ([address] [place] [address]) Yes Yes   Sig: Take 1 mg by mouth 2 (two) times a day      Facility-Administered Medications: None       Past Medical History:   Diagnosis Date    Enlarged prostate     UTI (urinary tract infection)        Past Surgical History:   Procedure Laterality Date    CYSTOSCOPY CYSTOGRAM W/ INJECTION DEFLUX Left 11/18/2017    Procedure: CYSTOSCOPY Fulguration of ureteral orifice CYSTOGRAM W/ INJECTION DEFLUX, LEFT RETROGRADE, PYLOGRAM AND LEFT URETEROSCOPY;  Surgeon: Amy Angela MD;  Location: [facility];  Service: Urology    KIDNEY SURGERY      LAPAROTOMY N/A 11/2/2017    Procedure: LAPAROTOMY EXPLORATORY 2nd LOOK, ligation of left ureter, I/D with closure;  Surgeon: Aicha Dey MD;  Location: [facility];  Service: General       History reviewed  No pertinent family history  I have reviewed and agree with the history as documented      Social History     Tobacco Use    Smoking status: Current Some Day Smoker    Smokeless tobacco: Never Used   Substance Use Topics    Alcohol use: Not Currently     Comment: "socially"    Drug use: No        Review of Systems   Constitutional: Negative for chills, fatigue and fever  HENT: Negative for congestion and sore throat  Eyes: Negative for visual disturbance  Respiratory: Negative for cough and shortness of breath  Cardiovascular: Negative for chest pain  Gastrointestinal: Positive for abdominal pain  Negative for diarrhea, nausea and vomiting  Endocrine: Negative for polyuria  Genitourinary: Positive for flank pain, frequency and urgency  Negative for difficulty urinating, dysuria, hematuria, penile pain, penile swelling, scrotal swelling and testicular pain  Musculoskeletal: Negative for arthralgias  Skin: Negative for rash  Neurological: Negative for dizziness, light-headedness and headaches  All other systems reviewed and are negative  Physical Exam  ED Triage Vitals   Temperature Pulse Respirations Blood Pressure SpO2   02/23/20 1225 02/23/20 1225 02/23/20 1225 02/23/20 1225 02/23/20 1225   98 6 °F (37 °C) 88 17 169/91 95 %      Temp Source Heart Rate Source Patient Position - Orthostatic VS BP Location FiO2 (%)   02/23/20 1225 02/23/20 1225 02/23/20 1445 02/23/20 1445 --   Oral Monitor Sitting Right arm       Pain Score       02/23/20 1225       7             Orthostatic Vital Signs  Vitals:    02/23/20 1420 02/23/20 1445 02/23/20 1500 02/23/20 1540   BP: 151/85 157/88 156/86 161/91   Pulse: 87 76 76 75   Patient Position - Orthostatic VS:  Sitting         Physical Exam   Constitutional: He is oriented to person, place, and time  He appears well-developed and well-nourished  No distress  HENT:   Head: Normocephalic and atraumatic  Right Ear: External ear normal    Left Ear: External ear normal    Mouth/Throat: No oropharyngeal exudate  Eyes: Pupils are equal, round, and reactive to light  EOM are normal  No scleral icterus     Neck: Normal range of motion  Neck supple  Cardiovascular: Normal rate, regular rhythm and normal heart sounds  Pulmonary/Chest: Effort normal and breath sounds normal  No respiratory distress  Abdominal: Soft  Bowel sounds are normal  There is tenderness in the suprapubic area  There is CVA tenderness  There is no rebound and no guarding  Musculoskeletal: Normal range of motion  He exhibits no edema  Neurological: He is alert and oriented to person, place, and time  Skin: Skin is warm and dry  No rash noted  Psychiatric: He has a normal mood and affect  Nursing note and vitals reviewed  ED Medications  Medications   vancomycin (VANCOCIN) 1,750 mg in sodium chloride 0 9 % 500 mL IVPB (1,750 mg Intravenous New Bag 2/23/20 1454)   tamsulosin (FLOMAX) capsule 0 8 mg (has no administration in time range)   sodium chloride 0 9 % infusion (has no administration in time range)   heparin (porcine) subcutaneous injection 5,000 Units (has no administration in time range)   vancomycin (VANCOCIN) IVPB (premix) 1,000 mg (has no administration in time range)   ceftriaxone (ROCEPHIN) 1 g/50 mL in dextrose IVPB (has no administration in time range)   oxybutynin (DITROPAN) tablet 2 5 mg (has no administration in time range)   cefepime (MAXIPIME) 2 g/50 mL dextrose IVPB (0 mg Intravenous Stopped 2/23/20 1451)   HYDROmorphone (DILAUDID) injection 0 5 mg (0 5 mg Intravenous Given 2/23/20 1342)   sodium chloride 0 9 % bolus 1,000 mL (1,000 mL Intravenous New Bag 2/23/20 1340)       Diagnostic Studies  Results Reviewed     Procedure Component Value Units Date/Time    Urine Microscopic [[id number]]  (Abnormal) Collected:  02/23/20 1232    Lab Status:  Final result Specimen:  Urine, Clean Catch Updated:  02/23/20 1415     RBC, UA 2-4 /hpf      WBC, UA Innumerable /hpf      Epithelial Cells None Seen /hpf      Bacteria, UA Moderate /hpf     Urine culture [[id number]] Collected:  02/23/20 1232    Lab Status:   In process Specimen:  Urine, Clean Catch Updated:  02/23/20 1415    Lactic acid x2 [[id number]]  (Normal) Collected:  02/23/20 1306    Lab Status:  Final result Specimen:  Blood from Arm, Right Updated:  02/23/20 1348     LACTIC ACID 1 4 mmol/L     Narrative:       Result may be elevated if tourniquet was used during collection  Basic metabolic panel [[id number]]  (Abnormal) Collected:  02/23/20 1306    Lab Status:  Final result Specimen:  Blood from Arm, Right Updated:  02/23/20 1337     Sodium 141 mmol/L      Potassium 3 5 mmol/L      Chloride 111 mmol/L      CO2 26 mmol/L      ANION GAP 4 mmol/L      BUN 17 mg/dL      Creatinine 1 35 mg/dL      Glucose 116 mg/dL      Calcium 9 1 mg/dL      eGFR 63 ml/min/1 73sq m     Narrative:       [place] guidelines for Chronic Kidney Disease (CKD):     Stage 1 with normal or high GFR (GFR > 90 mL/min/1 73 square meters)    Stage 2 Mild CKD (GFR = 60-89 mL/min/1 73 square meters)    Stage 3A Moderate CKD (GFR = 45-59 mL/min/1 73 square meters)    Stage 3B Moderate CKD (GFR = 30-44 mL/min/1 73 square meters)    Stage 4 Severe CKD (GFR = 15-29 mL/min/1 73 square meters)    Stage 5 End Stage CKD (GFR <15 mL/min/1 73 square meters)  Note: GFR calculation is accurate only with a steady state creatinine    Blood culture #2 [[id number]] Collected:  02/23/20 1320    Lab Status:   In process Specimen:  Blood from Arm, Left Updated:  02/23/20 1324    CBC and differential [[id number]]  (Abnormal) Collected:  02/23/20 1306    Lab Status:  Final result Specimen:  Blood from Arm, Right Updated:  02/23/20 1314     WBC 17 54 Thousand/uL      RBC 5 02 Million/uL      Hemoglobin 15 3 g/dL      Hematocrit 45 7 %      MCV 91 fL      MCH 30 5 pg      MCHC 33 5 g/dL      RDW 13 2 %      MPV 8 8 fL      Platelets 695 Thousands/uL      nRBC 0 /100 WBCs      Neutrophils Relative 82 %      Immat GRANS % 1 %      Lymphocytes Relative 8 %      Monocytes Relative 9 %      Eosinophils Relative 0 % Basophils Relative 0 %      Neutrophils Absolute 14 50 Thousands/µL      Immature Grans Absolute 0 09 Thousand/uL      Lymphocytes Absolute 1 31 Thousands/µL      Monocytes Absolute 1 55 Thousand/µL      Eosinophils Absolute 0 05 Thousand/µL      Basophils Absolute 0 04 Thousands/µL     Blood culture #1 [[id number]] Collected:  02/23/20 1306    Lab Status:   In process Specimen:  Blood from Arm, Right Updated:  02/23/20 1311    Lactic acid x2 [[id number]]     Lab Status:  No result Specimen:  Blood     POCT urinalysis dipstick [[id number]]  (Normal) Resulted:  02/23/20 1228    Lab Status:  Final result Updated:  02/23/20 1228     Color, UA see chart    Urine Macroscopic, POC [[id number]]  (Abnormal) Collected:  02/23/20 1232    Lab Status:  Final result Specimen:  Urine Updated:  02/23/20 1228     Color, UA Yellow     Clarity, UA Clear     pH, UA 7 0     Leukocytes, UA Large     Nitrite, UA Positive     Protein, UA >=300 mg/dl      Glucose, UA Negative mg/dl      Ketones, UA Negative mg/dl      Urobilinogen, UA 0 2 E U /dl      Bilirubin, UA Negative     Blood, UA Moderate     Specific [place], UA 1 015    Narrative:       CLINITEK RESULT                 US kidney and bladder    (Results Pending)         Procedures  Procedures      ED Course                   Initial Sepsis Screening     Row Name 02/23/20 1405 02/23/20 1323             Is the patient's history suggestive of a new or worsening infection?  --  (!) Yes (Proceed)  -VH       Suspected source of infection  --  urinary tract infection  -VH       Are two or more of the following signs & symptoms of infection both present and new to the patient?  --  No  -VH       Indicate SIRS criteria  --  Leukocytosis (WBC > 22321 IJL)  -VH       If the answer is yes to both questions, suspicion of sepsis is present  --  --       If severe sepsis is present AND tissue hypoperfusion perists in the hour after fluid resuscitation or lactate > 4, the patient meets criteria for SEPTIC SHOCK  --  --       Are any of the following organ dysfunction criteria present within 6 hours of suspected infection and SIRS criteria that are NOT considered to be chronic conditions? No  -VH  --       Organ dysfunction  --  --       Date of presentation of severe sepsis  --  --       Time of presentation of severe sepsis  --  --       Tissue hypoperfusion persists in the hour after crystalloid fluid administration, evidenced, by either:  --  --       Was hypotension present within one hour of the conclusion of crystalloid fluid administration?  --  --       Date of presentation of septic shock  --  --       Time of presentation of septic shock  --  --         User Key  (r) = Recorded By, (t) = Taken By, (c) = Cosigned By    [address] Name Provider Type    Temi Alford MD Resident                  MDM  Number of Diagnoses or Management Options  Pyelonephritis:   Diagnosis management comments: Patient presenting with right flank pain, frequency suspicious for UTI  UA supportive of diagnosis of UTI  Lab significant for elevated WBC count, normal lactic  Treated with fluids, cefepime  Given complex urologic history, will admit for further care  Disposition  Final diagnoses:   Pyelonephritis     Time reflects when diagnosis was documented in both MDM as applicable and the Disposition within this note     Time User Action Codes Description Comment    2/23/2020  2:32 PM Lion Cadet Add [N12] Pyelonephritis       ED Disposition     ED Disposition Condition Date/Time Comment    Admit Stable Ronda Feb 23, 2020  2:41 PM Case was discussed with SHREYA and the patient's admission status was agreed to be Admission Status: inpatient status to the service of Dr Jarred Sanabria           Follow-up Information    None         Current Discharge Medication List      CONTINUE these medications which have NOT CHANGED    Details   tamsulosin (FLOMAX) 0 4 mg Take 2 capsules (0 8 mg total) by mouth daily with dinner  Qty: 60 capsule, Refills: 6    Associated Diagnoses: BPH with obstruction/lower urinary tract symptoms      tolterodine (DETROL) 1 mg tablet Take 1 mg by mouth 2 (two) times a day           No discharge procedures on file  ED Provider  Attending physically available and evaluated [place] I managed the patient along with the ED Attending      Electronically Signed by         Jonny James MD  02/23/20 1066

## 2020-02-24 ENCOUNTER — APPOINTMENT (INPATIENT)
Dept: RADIOLOGY | Facility: HOSPITAL | Age: 45
DRG: 463 | End: 2020-02-24
Payer: COMMERCIAL

## 2020-02-24 PROBLEM — N12 PYELONEPHRITIS: Status: ACTIVE | Noted: 2020-02-23

## 2020-02-24 LAB
ANION GAP SERPL CALCULATED.3IONS-SCNC: 7 MMOL/L (ref 4–13)
BACTERIA UR CULT: ABNORMAL
BACTERIA UR CULT: ABNORMAL
BASOPHILS # BLD AUTO: 0.03 THOUSANDS/ΜL (ref 0–0.1)
BASOPHILS NFR BLD AUTO: 0 % (ref 0–1)
BUN SERPL-MCNC: 11 MG/DL (ref 5–25)
CALCIUM SERPL-MCNC: 8 MG/DL (ref 8.3–10.1)
CHLORIDE SERPL-SCNC: 111 MMOL/L (ref 100–108)
CO2 SERPL-SCNC: 22 MMOL/L (ref 21–32)
CREAT SERPL-MCNC: 1.3 MG/DL (ref 0.6–1.3)
EOSINOPHIL # BLD AUTO: 0.05 THOUSAND/ΜL (ref 0–0.61)
EOSINOPHIL NFR BLD AUTO: 0 % (ref 0–6)
ERYTHROCYTE [DISTWIDTH] IN BLOOD BY AUTOMATED COUNT: 13.2 % (ref 11.6–15.1)
GFR SERPL CREATININE-BSD FRML MDRD: 66 ML/MIN/1.73SQ M
GLUCOSE SERPL-MCNC: 109 MG/DL (ref 65–140)
HCT VFR BLD AUTO: 41.5 % (ref 36.5–49.3)
HGB BLD-MCNC: 13.8 G/DL (ref 12–17)
IMM GRANULOCYTES # BLD AUTO: 0.04 THOUSAND/UL (ref 0–0.2)
IMM GRANULOCYTES NFR BLD AUTO: 0 % (ref 0–2)
LYMPHOCYTES # BLD AUTO: 1.44 THOUSANDS/ΜL (ref 0.6–4.47)
LYMPHOCYTES NFR BLD AUTO: 11 % (ref 14–44)
MCH RBC QN AUTO: 30.7 PG (ref 26.8–34.3)
MCHC RBC AUTO-ENTMCNC: 33.3 G/DL (ref 31.4–37.4)
MCV RBC AUTO: 92 FL (ref 82–98)
MONOCYTES # BLD AUTO: 1.22 THOUSAND/ΜL (ref 0.17–1.22)
MONOCYTES NFR BLD AUTO: 10 % (ref 4–12)
NEUTROPHILS # BLD AUTO: 9.88 THOUSANDS/ΜL (ref 1.85–7.62)
NEUTS SEG NFR BLD AUTO: 79 % (ref 43–75)
NRBC BLD AUTO-RTO: 0 /100 WBCS
PLATELET # BLD AUTO: 205 THOUSANDS/UL (ref 149–390)
PMV BLD AUTO: 9.4 FL (ref 8.9–12.7)
POTASSIUM SERPL-SCNC: 3.3 MMOL/L (ref 3.5–5.3)
RBC # BLD AUTO: 4.5 MILLION/UL (ref 3.88–5.62)
SODIUM SERPL-SCNC: 140 MMOL/L (ref 136–145)
WBC # BLD AUTO: 12.66 THOUSAND/UL (ref 4.31–10.16)

## 2020-02-24 PROCEDURE — 85025 COMPLETE CBC W/AUTO DIFF WBC: CPT | Performed by: INTERNAL MEDICINE

## 2020-02-24 PROCEDURE — 99232 SBSQ HOSP IP/OBS MODERATE 35: CPT | Performed by: HOSPITALIST

## 2020-02-24 PROCEDURE — 76770 US EXAM ABDO BACK WALL COMP: CPT

## 2020-02-24 PROCEDURE — 80048 BASIC METABOLIC PNL TOTAL CA: CPT | Performed by: INTERNAL MEDICINE

## 2020-02-24 PROCEDURE — 87040 BLOOD CULTURE FOR BACTERIA: CPT | Performed by: STUDENT IN AN ORGANIZED HEALTH CARE EDUCATION/TRAINING PROGRAM

## 2020-02-24 PROCEDURE — 87086 URINE CULTURE/COLONY COUNT: CPT | Performed by: STUDENT IN AN ORGANIZED HEALTH CARE EDUCATION/TRAINING PROGRAM

## 2020-02-24 RX ORDER — POTASSIUM CHLORIDE 20 MEQ/1
40 TABLET, EXTENDED RELEASE ORAL ONCE
Status: COMPLETED | OUTPATIENT
Start: 2020-02-24 | End: 2020-02-24

## 2020-02-24 RX ADMIN — SODIUM CHLORIDE 100 ML/HR: 0.9 INJECTION, SOLUTION INTRAVENOUS at 04:39

## 2020-02-24 RX ADMIN — POTASSIUM CHLORIDE 40 MEQ: 1500 TABLET, EXTENDED RELEASE ORAL at 08:04

## 2020-02-24 RX ADMIN — OXYBUTYNIN CHLORIDE 2.5 MG: 5 TABLET ORAL at 08:03

## 2020-02-24 RX ADMIN — VANCOMYCIN HYDROCHLORIDE 1000 MG: 1 INJECTION, SOLUTION INTRAVENOUS at 02:26

## 2020-02-24 RX ADMIN — ACETAMINOPHEN 975 MG: 325 TABLET ORAL at 02:30

## 2020-02-24 RX ADMIN — SODIUM CHLORIDE 100 ML/HR: 0.9 INJECTION, SOLUTION INTRAVENOUS at 14:09

## 2020-02-24 RX ADMIN — ACETAMINOPHEN 975 MG: 325 TABLET ORAL at 20:08

## 2020-02-24 RX ADMIN — CEFEPIME HYDROCHLORIDE 2000 MG: 2 INJECTION, POWDER, FOR SOLUTION INTRAVENOUS at 20:44

## 2020-02-24 RX ADMIN — OXYBUTYNIN CHLORIDE 2.5 MG: 5 TABLET ORAL at 18:00

## 2020-02-24 RX ADMIN — VANCOMYCIN HYDROCHLORIDE 1000 MG: 1 INJECTION, SOLUTION INTRAVENOUS at 14:09

## 2020-02-24 RX ADMIN — TAMSULOSIN HYDROCHLORIDE 0.8 MG: 0.4 CAPSULE ORAL at 17:59

## 2020-02-24 NOTE — SOCIAL WORK
CM met with patient at bedside to discuss CM role in dcp  Patient reports that he lives alone, no DME, and drives self  Patient reported independent with adl's/ambulation pta  Patient uses [facility]; preferred pharmacy  Patient does not have PCP; CM provided [employer] card at bedside and explained process  Patient denies IP STR, HHC, IPMH/MH, and drug/alcohol hx/tx  CM reviewed d/c planning process including the following: identifying help at home, patient preference for d/c planning needs, Discharge Lounge, [facility] Meds to Bed program, availability of treatment team to discuss questions or concerns patient and/or family may have regarding understanding medications and recognizing signs and symptoms once discharged  CM also encouraged patient to follow up with all recommended appointments after discharge  Patient advised of importance for patient and family to participate in managing patients medical well being  Patient reviewed in care coordination rounds  Patient remains on IV abx  Thakur catheter will be discontinued tomorrow  Likely d/c Wednesday

## 2020-02-24 NOTE — PLAN OF CARE
Problem: Potential for Falls  Goal: Patient will remain free of falls  Description  INTERVENTIONS:  - Assess patient frequently for physical needs  -  Identify cognitive and physical deficits and behaviors that affect risk of falls    -  [place] fall precautions as indicated by assessment   - Educate patient/family on patient safety including physical limitations  - Instruct patient to call for assistance with activity based on assessment  - Modify environment to reduce risk of injury  - Consider OT/PT consult to assist with strengthening/mobility  Outcome: Progressing     Problem: INFECTION - ADULT  Goal: Absence or prevention of progression during hospitalization  Description  INTERVENTIONS:  - Assess and monitor for signs and symptoms of infection  - Monitor lab/diagnostic results  - Monitor all insertion sites, i e  indwelling lines, tubes, and drains  - Monitor endotracheal if appropriate and nasal secretions for changes in amount and color  - [place] appropriate cooling/warming therapies per order  - Administer medications as ordered  - Instruct and encourage patient and family to use good hand hygiene technique  - Identify and instruct in appropriate isolation precautions for identified infection/condition  Outcome: Progressing  Goal: Absence of fever/infection during neutropenic period  Description  INTERVENTIONS:  - Monitor WBC    Outcome: Progressing     Problem: SAFETY ADULT  Goal: Maintain or return to baseline ADL function  Description  INTERVENTIONS:  -  Assess patient's ability to carry out ADLs; assess patient's baseline for ADL function and identify physical deficits which impact ability to perform ADLs (bathing, care of mouth/teeth, toileting, grooming, dressing, etc )  - Assess/evaluate cause of self-care deficits   - Assess range of motion  - Assess patient's mobility; develop plan if impaired  - Assess patient's need for assistive devices and provide as appropriate  - Encourage maximum independence but intervene and supervise when necessary  - Involve family in performance of ADLs  - Assess for home care needs following discharge   - Consider OT consult to assist with ADL evaluation and planning for discharge  - Provide patient education as appropriate  Outcome: Progressing  Goal: Maintain or return mobility status to optimal level  Description  INTERVENTIONS:  - Assess patient's baseline mobility status (ambulation, transfers, stairs, etc )    - Identify cognitive and physical deficits and behaviors that affect mobility  - Identify mobility aids required to assist with transfers and/or ambulation (gait belt, sit-to-stand, lift, walker, cane, etc )  - [place] fall precautions as indicated by assessment  - Record patient progress and toleration of activity level on Mobility SBAR; progress patient to next Phase/Stage  - Instruct patient to call for assistance with activity based on assessment  - Consider rehabilitation consult to assist with strengthening/weightbearing, etc   Outcome: Progressing     Problem: DISCHARGE PLANNING  Goal: Discharge to home or other facility with appropriate resources  Description  INTERVENTIONS:  - Identify barriers to discharge w/patient and caregiver  - Arrange for needed discharge resources and transportation as appropriate  - Identify discharge learning needs (meds, wound care, etc )  - Arrange for interpretive services to assist at discharge as needed  - Refer to Case Management Department for coordinating discharge planning if the patient needs post-hospital services based on physician/advanced practitioner order or complex needs related to functional status, cognitive ability, or social support system  Outcome: Progressing     Problem: Knowledge Deficit  Goal: Patient/family/caregiver demonstrates understanding of disease process, treatment plan, medications, and discharge instructions  Description  Complete learning assessment and assess knowledge base   Interventions:  - Provide teaching at level of understanding  - Provide teaching via preferred learning methods  Outcome: Progressing

## 2020-02-24 NOTE — ASSESSMENT & PLAN NOTE
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
## 2020-02-24 NOTE — PROGRESS NOTES
Vancomycin Brittany Hernandez is an 40 y o  male who is currently receiving IV vancomycin for UTI w/ h/o MRSA UTI  Vancomycin Assessment:  1  ID Consult: No  2  Cultures:    2/23 Urine: in process   2/23 Blood 2/2: in process  3  Procalcitonin:    n/a  4  Renal Function:    SCr: 1 3   CrCl: 67 mL/min   UOP: n/a  5  Days of Therapy: 2  6  Current Dose: 1000 mg IV q12h  7  Last Level: n/a  8  Goal AUC(24h): 400-600  9  Goal Random/Trough: 15-20    Vancomycin Plan:  1  Evaluation: continue current regimen  2  New Dosing: continue 1000 mg IV q12h   Predicted Trough / AUC(24h): 19 5 / 650  3  Next Level: trough 2/25 at 1630      Pharmacy will continue to follow closely for s/sx of nephrotoxicity, infusion reactions, and appropriateness of therapy  BMP and CBC will be ordered per protocol  We will continue to follow the patients culture results and clinical progress daily  Olya Perdomo, PharmD  Internal Medicine Clinical Pharmacist Specialist  [phone]  [facility]/Teams

## 2020-02-24 NOTE — PROGRESS NOTES
INTERNAL MEDICINE RESIDENCY PROGRESS NOTE     Name: Barbara Salmon   Age & Sex: 40 y o  male   MRN: [id number]  Unit/Bed#: [facility] 805-01   Encounter: [id number]  Team: SOD Team A    PATIENT INFORMATION     Name: Barbara Salmon   Age & Sex: 40 y o  male   MRN: [id number]  Hospital Stay Days: 1    ASSESSMENT/PLAN     Principal Problem:    Pyelonephritis  Active Problems:    Urinary retention    History of left nephrectomy    Tobacco abuse      * Pyelonephritis  Assessment & Plan  Patient has history of atonic bladder initially managed with a chronic indwelling Thakur catheter, more recently managed with as needed self straight catheterization  Patient has history of acute cystitis January, culture positive for MRSA - treated with Cipro and then Bactrim as an outpatient by Urology  Patient presents complaining of a few days of intermittent suprapubic pain and pressure with right-sided CVA tenderness and pain  White count 17 54 on arrival   Urinalysis positive for blood, nitrites, leukocytes, protein, RBCs, WBCs, and bacteria  Lactic acid 1 5  Serum creatinine 1 35  Treated initially with cefepime and vancomycin in the ED  Ultrasound obtained earlier today reveals left nephrectomy; right kidney contains few cysts without overt hydronephrosis or kidney stones, no obvious acute pathology however ultrasound is not very sensitive for detecting pyelonephritis  Plan  · Plan to discontinue Thakur catheter tomorrow  · Continue vancomycin and ceftriaxone  · Pharmacy consult, recommendations greatly appreciated  · Continue IV fluids  · A m  Serum creatinine 1 3, continue to trend BMP  · A m  WBC 12 66  · Follow up with urine and blood cultures drawn on arrival  · Trend fever curve and WBC  · Continue as needed analgesia, consider de-escalating  · Continue to monitor closely      Urinary retention  Assessment & Plan  History of atonic bladder and urethral stricture after left nephrectomy    Patient previously utilized indwelling Total Output  2325 800    Net  -3 3 +150               Weights:   IBW: 63 8 kg    Body mass index is 30 34 kg/m²  Weight (last 2 days)     Date/Time   Weight    02/23/20 16:45:51   85 3 (188)    02/23/20 1225   86 (189 6)            Physical Exam   Constitutional: He is oriented to person, place, and time  He appears well-developed and well-nourished  No distress  Patient is pleasant, calm, cooperative  HENT:   Head: Normocephalic and atraumatic  Eyes: Pupils are equal, round, and reactive to light  Conjunctivae are normal  No scleral icterus  Neck: Neck supple  Cardiovascular: Normal rate, regular rhythm, normal heart sounds and intact distal pulses  Exam reveals no gallop and no friction rub  No murmur heard  Pulmonary/Chest: Effort normal and breath sounds normal  No stridor  No respiratory distress  He has no wheezes  He has no rales  Abdominal: Soft  Bowel sounds are normal  He exhibits no distension and no mass  There is tenderness in the suprapubic area  There is CVA tenderness (Right-sided)  There is no rebound and no guarding  Musculoskeletal: He exhibits no edema, tenderness or deformity  Lymphadenopathy:     He has no cervical adenopathy  Neurological: He is alert and oriented to person, place, and time  Skin: Skin is warm and dry  Capillary refill takes less than 2 seconds  No rash noted  He is not diaphoretic  No erythema  No pallor  Psychiatric: He has a normal mood and affect  His behavior is normal  Judgment and thought content normal    Nursing note and vitals reviewed  LABORATORY DATA     Labs: I have personally reviewed pertinent reports    Results from last 7 days   Lab Units 02/24/20  0501 02/23/20  1306   WBC Thousand/uL 12 66* 17 54*   HEMOGLOBIN g/dL 13 8 15 3   HEMATOCRIT % 41 5 45 7   PLATELETS Thousands/uL 205 258   NEUTROS PCT % 79* 82*   MONOS PCT % 10 9      Results from last 7 days   Lab Units 02/24/20  0501 02/23/20  1306   POTASSIUM mmol/L 3 3* 3 5 CHLORIDE mmol/L 111* 111*   CO2 mmol/L 22 26   BUN mg/dL 11 17   CREATININE mg/dL 1 30 1 35*   CALCIUM mg/dL 8 0* 9 1                  Results from last 7 days   Lab Units 02/23/20  1306   LACTIC ACID mmol/L 1 4           IMAGING & DIAGNOSTIC TESTING     Radiology Results: I have personally reviewed pertinent reports  Us Kidney And Bladder    Result Date: 2/24/2020  Impression: Cannot assess bladder due to decompression by the Thakur catheter  Left nephrectomy  Fluid-filled structure in the left renal fossa is probably the distended proximal portion of the ureter as was demonstrated on the CT from 12/19/2017  The right kidney contains a few cysts  No hydronephrosis or kidney stones seen  While there is no obvious acute pathology based on ultrasound, ultrasound is not very sensitive for detecting pyelonephritis  Contrast-enhanced CT is a much better test for this  Workstation performed: [id number]     Other Diagnostic Testing: I have personally reviewed pertinent reports      ACTIVE MEDICATIONS     Current Facility-Administered Medications   Medication Dose Route Frequency    acetaminophen (TYLENOL) tablet 975 mg  975 mg Oral Q6H PRN    ceftriaxone (ROCEPHIN) 1 g/50 mL in dextrose IVPB  1,000 mg Intravenous Q24H    heparin (porcine) subcutaneous injection 5,000 Units  5,000 Units Subcutaneous Q8H [address]    HYDROmorphone (DILAUDID) injection 0 2 mg  0 2 mg Intravenous Q3H PRN    oxybutynin (DITROPAN) tablet 2 5 mg  2 5 mg Oral BID    oxyCODONE (ROXICODONE) IR tablet 2 5 mg  2 5 mg Oral Q4H PRN    oxyCODONE (ROXICODONE) IR tablet 5 mg  5 mg Oral Q4H PRN    sodium chloride 0 9 % infusion  100 mL/hr Intravenous Continuous    tamsulosin (FLOMAX) capsule 0 8 mg  0 8 mg Oral Daily With Dinner    vancomycin (VANCOCIN) IVPB (premix) 1,000 mg  12 5 mg/kg Intravenous Q12H       VTE Pharmacologic Prophylaxis: Heparin  VTE Mechanical Prophylaxis: sequential compression device    Portions of the record may have been created with voice recognition software  Occasional wrong word or "sound a like" substitutions may have occurred due to the inherent limitations of voice recognition software    Read the chart carefully and recognize, using context, where substitutions have occurred   ==  Michelle Tarango, [address] [facility]  Internal Medicine Residency PGY-1

## 2020-02-24 NOTE — ASSESSMENT & PLAN NOTE
Patient has history of atonic bladder initially managed with a chronic indwelling Thakur catheter, more recently managed with as needed self straight catheterization  Patient has history of acute cystitis January, culture positive for MRSA - treated with Cipro and then Bactrim as an outpatient by Urology  Patient presents complaining of a few days of intermittent suprapubic pain and pressure with right-sided CVA tenderness and pain  White count 17 54 on arrival   Urinalysis positive for blood, nitrites, leukocytes, protein, RBCs, WBCs, and bacteria  Lactic acid 1 5  Serum creatinine 1 35  Treated initially with cefepime and vancomycin in the ED  Ultrasound obtained earlier today reveals left nephrectomy; right kidney contains few cysts without overt hydronephrosis or kidney stones, no obvious acute pathology however ultrasound is not very sensitive for detecting pyelonephritis  Plan  · Plan to discontinue Thakur catheter tomorrow  · Continue vancomycin and ceftriaxone  · Pharmacy consult, recommendations greatly appreciated  · Continue IV fluids  · A m   Serum creatinine 1 3, continue to trend BMP  · A m  WBC 12 66  · Follow up with urine and blood cultures drawn on arrival  · Trend fever curve and WBC  · Continue as needed analgesia, consider de-escalating  · Continue to monitor closely

## 2020-02-24 NOTE — ASSESSMENT & PLAN NOTE
History of smoking 2-3 cigarettes per day  No interest in quitting at this time    Plan  · Nicotine patch refused  · Tobacco cessation strongly encouraged

## 2020-02-25 VITALS
HEART RATE: 70 BPM | DIASTOLIC BLOOD PRESSURE: 95 MMHG | RESPIRATION RATE: 16 BRPM | BODY MASS INDEX: 30.22 KG/M2 | OXYGEN SATURATION: 98 % | HEIGHT: 66 IN | WEIGHT: 188 LBS | SYSTOLIC BLOOD PRESSURE: 157 MMHG | TEMPERATURE: 98.3 F

## 2020-02-25 LAB
ANION GAP SERPL CALCULATED.3IONS-SCNC: 5 MMOL/L (ref 4–13)
BACTERIA UR CULT: NORMAL
BASOPHILS # BLD AUTO: 0.02 THOUSANDS/ΜL (ref 0–0.1)
BASOPHILS NFR BLD AUTO: 0 % (ref 0–1)
BUN SERPL-MCNC: 9 MG/DL (ref 5–25)
CALCIUM SERPL-MCNC: 8.6 MG/DL (ref 8.3–10.1)
CHLORIDE SERPL-SCNC: 108 MMOL/L (ref 100–108)
CO2 SERPL-SCNC: 25 MMOL/L (ref 21–32)
CREAT SERPL-MCNC: 1.27 MG/DL (ref 0.6–1.3)
EOSINOPHIL # BLD AUTO: 0.08 THOUSAND/ΜL (ref 0–0.61)
EOSINOPHIL NFR BLD AUTO: 1 % (ref 0–6)
ERYTHROCYTE [DISTWIDTH] IN BLOOD BY AUTOMATED COUNT: 13 % (ref 11.6–15.1)
GFR SERPL CREATININE-BSD FRML MDRD: 68 ML/MIN/1.73SQ M
GLUCOSE SERPL-MCNC: 125 MG/DL (ref 65–140)
HCT VFR BLD AUTO: 42.4 % (ref 36.5–49.3)
HGB BLD-MCNC: 14 G/DL (ref 12–17)
IMM GRANULOCYTES # BLD AUTO: 0.05 THOUSAND/UL (ref 0–0.2)
IMM GRANULOCYTES NFR BLD AUTO: 1 % (ref 0–2)
LYMPHOCYTES # BLD AUTO: 0.96 THOUSANDS/ΜL (ref 0.6–4.47)
LYMPHOCYTES NFR BLD AUTO: 10 % (ref 14–44)
MCH RBC QN AUTO: 30.6 PG (ref 26.8–34.3)
MCHC RBC AUTO-ENTMCNC: 33 G/DL (ref 31.4–37.4)
MCV RBC AUTO: 93 FL (ref 82–98)
MONOCYTES # BLD AUTO: 0.98 THOUSAND/ΜL (ref 0.17–1.22)
MONOCYTES NFR BLD AUTO: 10 % (ref 4–12)
NEUTROPHILS # BLD AUTO: 7.81 THOUSANDS/ΜL (ref 1.85–7.62)
NEUTS SEG NFR BLD AUTO: 78 % (ref 43–75)
NRBC BLD AUTO-RTO: 0 /100 WBCS
PLATELET # BLD AUTO: 180 THOUSANDS/UL (ref 149–390)
PMV BLD AUTO: 9.1 FL (ref 8.9–12.7)
POTASSIUM SERPL-SCNC: 3.4 MMOL/L (ref 3.5–5.3)
RBC # BLD AUTO: 4.57 MILLION/UL (ref 3.88–5.62)
SODIUM SERPL-SCNC: 138 MMOL/L (ref 136–145)
WBC # BLD AUTO: 9.9 THOUSAND/UL (ref 4.31–10.16)

## 2020-02-25 PROCEDURE — ND001 PR NO DOCUMENTATION: Performed by: HOSPITALIST

## 2020-02-25 PROCEDURE — 85025 COMPLETE CBC W/AUTO DIFF WBC: CPT | Performed by: STUDENT IN AN ORGANIZED HEALTH CARE EDUCATION/TRAINING PROGRAM

## 2020-02-25 PROCEDURE — 80048 BASIC METABOLIC PNL TOTAL CA: CPT | Performed by: STUDENT IN AN ORGANIZED HEALTH CARE EDUCATION/TRAINING PROGRAM

## 2020-02-25 RX ORDER — LEVOFLOXACIN 500 MG/1
500 TABLET, FILM COATED ORAL EVERY 24 HOURS
Qty: 10 TABLET | Refills: 0 | Status: SHIPPED | OUTPATIENT
Start: 2020-02-25 | End: 2020-03-06

## 2020-02-25 RX ORDER — POTASSIUM CHLORIDE 20 MEQ/1
40 TABLET, EXTENDED RELEASE ORAL ONCE
Status: COMPLETED | OUTPATIENT
Start: 2020-02-25 | End: 2020-02-25

## 2020-02-25 RX ORDER — DOXYCYCLINE 100 MG/1
100 TABLET ORAL 2 TIMES DAILY
Qty: 20 TABLET | Refills: 0 | Status: SHIPPED | OUTPATIENT
Start: 2020-02-25 | End: 2020-03-06

## 2020-02-25 RX ADMIN — POTASSIUM CHLORIDE 40 MEQ: 1500 TABLET, EXTENDED RELEASE ORAL at 14:51

## 2020-02-25 RX ADMIN — CEFEPIME HYDROCHLORIDE 2000 MG: 2 INJECTION, POWDER, FOR SOLUTION INTRAVENOUS at 05:57

## 2020-02-25 RX ADMIN — CEFEPIME HYDROCHLORIDE 2000 MG: 2 INJECTION, POWDER, FOR SOLUTION INTRAVENOUS at 14:51

## 2020-02-25 RX ADMIN — OXYBUTYNIN CHLORIDE 2.5 MG: 5 TABLET ORAL at 09:26

## 2020-02-25 RX ADMIN — VANCOMYCIN HYDROCHLORIDE 1000 MG: 1 INJECTION, SOLUTION INTRAVENOUS at 04:45

## 2020-02-25 NOTE — NURSING NOTE
Order placed to remove jiménez catheter now  Pt wants to have jiménez removed in the morning because he does not have enough self-catheterization kits with him and he also wants to be able to sleep and says he will up every other hour to self cath if we pull it now  Spoke w SOD  OK to leave to jiménez in over night  Will pull jiménez at 0600 tomorrow
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
## 2020-02-25 NOTE — PLAN OF CARE
Problem: Potential for Falls  Goal: Patient will remain free of falls  Description  INTERVENTIONS:  - Assess patient frequently for physical needs  -  Identify cognitive and physical deficits and behaviors that affect risk of falls    -  [place] fall precautions as indicated by assessment   - Educate patient/family on patient safety including physical limitations  - Instruct patient to call for assistance with activity based on assessment  - Modify environment to reduce risk of injury  - Consider OT/PT consult to assist with strengthening/mobility  Outcome: Progressing     Problem: INFECTION - ADULT  Goal: Absence or prevention of progression during hospitalization  Description  INTERVENTIONS:  - Assess and monitor for signs and symptoms of infection  - Monitor lab/diagnostic results  - Monitor all insertion sites, i e  indwelling lines, tubes, and drains  - Monitor endotracheal if appropriate and nasal secretions for changes in amount and color  - [place] appropriate cooling/warming therapies per order  - Administer medications as ordered  - Instruct and encourage patient and family to use good hand hygiene technique  - Identify and instruct in appropriate isolation precautions for identified infection/condition  Outcome: Progressing  Goal: Absence of fever/infection during neutropenic period  Description  INTERVENTIONS:  - Monitor WBC    Outcome: Progressing     Problem: SAFETY ADULT  Goal: Maintain or return to baseline ADL function  Description  INTERVENTIONS:  -  Assess patient's ability to carry out ADLs; assess patient's baseline for ADL function and identify physical deficits which impact ability to perform ADLs (bathing, care of mouth/teeth, toileting, grooming, dressing, etc )  - Assess/evaluate cause of self-care deficits   - Assess range of motion  - Assess patient's mobility; develop plan if impaired  - Assess patient's need for assistive devices and provide as appropriate  - Encourage maximum independence but intervene and supervise when necessary  - Involve family in performance of ADLs  - Assess for home care needs following discharge   - Consider OT consult to assist with ADL evaluation and planning for discharge  - Provide patient education as appropriate  Outcome: Progressing  Goal: Maintain or return mobility status to optimal level  Description  INTERVENTIONS:  - Assess patient's baseline mobility status (ambulation, transfers, stairs, etc )    - Identify cognitive and physical deficits and behaviors that affect mobility  - Identify mobility aids required to assist with transfers and/or ambulation (gait belt, sit-to-stand, lift, walker, cane, etc )  - [place] fall precautions as indicated by assessment  - Record patient progress and toleration of activity level on Mobility SBAR; progress patient to next Phase/Stage  - Instruct patient to call for assistance with activity based on assessment  - Consider rehabilitation consult to assist with strengthening/weightbearing, etc   Outcome: Progressing     Problem: DISCHARGE PLANNING  Goal: Discharge to home or other facility with appropriate resources  Description  INTERVENTIONS:  - Identify barriers to discharge w/patient and caregiver  - Arrange for needed discharge resources and transportation as appropriate  - Identify discharge learning needs (meds, wound care, etc )  - Arrange for interpretive services to assist at discharge as needed  - Refer to Case Management Department for coordinating discharge planning if the patient needs post-hospital services based on physician/advanced practitioner order or complex needs related to functional status, cognitive ability, or social support system  Outcome: Progressing     Problem: Knowledge Deficit  Goal: Patient/family/caregiver demonstrates understanding of disease process, treatment plan, medications, and discharge instructions  Description  Complete learning assessment and assess knowledge base   Interventions:  - Provide teaching at level of understanding  - Provide teaching via preferred learning methods  Outcome: Progressing

## 2020-02-25 NOTE — PLAN OF CARE
Problem: Potential for Falls  Goal: Patient will remain free of falls  Description  INTERVENTIONS:  - Assess patient frequently for physical needs  -  Identify cognitive and physical deficits and behaviors that affect risk of falls    -  [place] fall precautions as indicated by assessment   - Educate patient/family on patient safety including physical limitations  - Instruct patient to call for assistance with activity based on assessment  - Modify environment to reduce risk of injury  - Consider OT/PT consult to assist with strengthening/mobility  Outcome: Adequate for Discharge     Problem: INFECTION - ADULT  Goal: Absence or prevention of progression during hospitalization  Description  INTERVENTIONS:  - Assess and monitor for signs and symptoms of infection  - Monitor lab/diagnostic results  - Monitor all insertion sites, i e  indwelling lines, tubes, and drains  - Monitor endotracheal if appropriate and nasal secretions for changes in amount and color  - [place] appropriate cooling/warming therapies per order  - Administer medications as ordered  - Instruct and encourage patient and family to use good hand hygiene technique  - Identify and instruct in appropriate isolation precautions for identified infection/condition  Outcome: Adequate for Discharge  Goal: Absence of fever/infection during neutropenic period  Description  INTERVENTIONS:  - Monitor WBC    Outcome: Adequate for Discharge     Problem: SAFETY ADULT  Goal: Maintain or return to baseline ADL function  Description  INTERVENTIONS:  -  Assess patient's ability to carry out ADLs; assess patient's baseline for ADL function and identify physical deficits which impact ability to perform ADLs (bathing, care of mouth/teeth, toileting, grooming, dressing, etc )  - Assess/evaluate cause of self-care deficits   - Assess range of motion  - Assess patient's mobility; develop plan if impaired  - Assess patient's need for assistive devices and provide as appropriate  - Encourage maximum independence but intervene and supervise when necessary  - Involve family in performance of ADLs  - Assess for home care needs following discharge   - Consider OT consult to assist with ADL evaluation and planning for discharge  - Provide patient education as appropriate  Outcome: Adequate for Discharge  Goal: Maintain or return mobility status to optimal level  Description  INTERVENTIONS:  - Assess patient's baseline mobility status (ambulation, transfers, stairs, etc )    - Identify cognitive and physical deficits and behaviors that affect mobility  - Identify mobility aids required to assist with transfers and/or ambulation (gait belt, sit-to-stand, lift, walker, cane, etc )  - [place] fall precautions as indicated by assessment  - Record patient progress and toleration of activity level on Mobility SBAR; progress patient to next Phase/Stage  - Instruct patient to call for assistance with activity based on assessment  - Consider rehabilitation consult to assist with strengthening/weightbearing, etc   Outcome: Adequate for Discharge     Problem: DISCHARGE PLANNING  Goal: Discharge to home or other facility with appropriate resources  Description  INTERVENTIONS:  - Identify barriers to discharge w/patient and caregiver  - Arrange for needed discharge resources and transportation as appropriate  - Identify discharge learning needs (meds, wound care, etc )  - Arrange for interpretive services to assist at discharge as needed  - Refer to Case Management Department for coordinating discharge planning if the patient needs post-hospital services based on physician/advanced practitioner order or complex needs related to functional status, cognitive ability, or social support system  Outcome: Adequate for Discharge     Problem: Knowledge Deficit  Goal: Patient/family/caregiver demonstrates understanding of disease process, treatment plan, medications, and discharge instructions  Description  Complete learning assessment and assess knowledge base    Interventions:  - Provide teaching at level of understanding  - Provide teaching via preferred learning methods  Outcome: Adequate for Discharge     Problem: GENITOURINARY - ADULT  Goal: Maintains or returns to baseline urinary function  Description  INTERVENTIONS:  - Assess urinary function  - Encourage oral fluids to ensure adequate hydration if ordered  - Administer IV fluids as ordered to ensure adequate hydration  - Administer ordered medications as needed  - Offer frequent toileting  - Follow urinary retention protocol if ordered  Outcome: Adequate for Discharge  Goal: Absence of urinary retention  Description  INTERVENTIONS:  - Assess patients ability to void and empty bladder  - Monitor I/O  - Bladder scan as needed  - Discuss with physician/AP medications to alleviate retention as needed  - Discuss catheterization for long term situations as appropriate  Outcome: Adequate for Discharge  Goal: Urinary catheter remains patent  Description  INTERVENTIONS:  - Assess patency of urinary catheter  - If patient has a chronic jiménez, consider changing catheter if non-functioning  - Follow guidelines for intermittent irrigation of non-functioning urinary catheter  Outcome: Adequate for Discharge     Problem: COPING  Goal: Pt/Family able to verbalize concerns and demonstrate effective coping strategies  Description  INTERVENTIONS:  - Assist patient/family to identify coping skills, available support systems and cultural and spiritual values  - Provide emotional support, including active listening and acknowledgement of concerns of patient and caregivers  - Reduce environmental stimuli, as able  - Provide patient education  - Assess for spiritual pain/suffering and initiate spiritual care, including notification of Pastoral Care or [place] based community as needed  - Assess effectiveness of coping strategies  Outcome: Adequate for Discharge

## 2020-02-25 NOTE — DISCHARGE INSTRUCTIONS
Please obtain your antibiotics today and take them as prescribed for the next 10 days  Please call our office to schedule an appointment to be seen within the next 1 week  We will follow up your recent blood and urine cultures and further advise you when they have resulted  Please return to the ED immediately if you start to develop worsening fevers, chills, night sweats, pain, or difficulty urinating

## 2020-02-28 LAB
BACTERIA BLD CULT: NORMAL
BACTERIA BLD CULT: NORMAL

## 2020-02-29 LAB
BACTERIA BLD CULT: NORMAL
BACTERIA BLD CULT: NORMAL

## 2020-03-02 ENCOUNTER — TRANSITIONAL CARE MANAGEMENT (OUTPATIENT)
Dept: INTERNAL MEDICINE CLINIC | Facility: CLINIC | Age: 45
End: 2020-03-02

## 2020-03-05 ENCOUNTER — TELEPHONE (OUTPATIENT)
Dept: UROLOGY | Facility: AMBULATORY SURGERY CENTER | Age: 45
End: 2020-03-05

## 2020-03-05 ENCOUNTER — PROCEDURE VISIT (OUTPATIENT)
Dept: UROLOGY | Facility: AMBULATORY SURGERY CENTER | Age: 45
End: 2020-03-05
Payer: COMMERCIAL

## 2020-03-05 ENCOUNTER — OFFICE VISIT (OUTPATIENT)
Dept: INTERNAL MEDICINE CLINIC | Facility: CLINIC | Age: 45
End: 2020-03-05

## 2020-03-05 VITALS
HEART RATE: 82 BPM | BODY MASS INDEX: 30.22 KG/M2 | DIASTOLIC BLOOD PRESSURE: 98 MMHG | SYSTOLIC BLOOD PRESSURE: 134 MMHG | HEIGHT: 66 IN | WEIGHT: 188 LBS

## 2020-03-05 VITALS
WEIGHT: 189.6 LBS | HEART RATE: 74 BPM | HEIGHT: 66 IN | BODY MASS INDEX: 30.47 KG/M2 | SYSTOLIC BLOOD PRESSURE: 152 MMHG | DIASTOLIC BLOOD PRESSURE: 100 MMHG

## 2020-03-05 DIAGNOSIS — R33.9 URINARY RETENTION: Primary | ICD-10-CM

## 2020-03-05 DIAGNOSIS — Z00.00 ANNUAL PHYSICAL EXAM: Primary | ICD-10-CM

## 2020-03-05 DIAGNOSIS — Z53.20 HIV SCREENING DECLINED: ICD-10-CM

## 2020-03-05 DIAGNOSIS — R33.9 URINARY RETENTION: ICD-10-CM

## 2020-03-05 DIAGNOSIS — Z11.4 SCREENING FOR HIV (HUMAN IMMUNODEFICIENCY VIRUS): ICD-10-CM

## 2020-03-05 DIAGNOSIS — Z11.59 ENCOUNTER FOR HEPATITIS C VIRUS SCREENING TEST FOR HIGH RISK PATIENT: ICD-10-CM

## 2020-03-05 DIAGNOSIS — Z91.89 ENCOUNTER FOR HEPATITIS C VIRUS SCREENING TEST FOR HIGH RISK PATIENT: ICD-10-CM

## 2020-03-05 DIAGNOSIS — N31.2 ATONIC BLADDER: ICD-10-CM

## 2020-03-05 DIAGNOSIS — I10 HYPERTENSION: ICD-10-CM

## 2020-03-05 PROBLEM — Z78.9 HISTORY OF INCARCERATION: Status: ACTIVE | Noted: 2020-03-05

## 2020-03-05 LAB
SL AMB  POCT GLUCOSE, UA: NORMAL
SL AMB LEUKOCYTE ESTERASE,UA: NORMAL
SL AMB POCT BILIRUBIN,UA: NORMAL
SL AMB POCT BLOOD,UA: NORMAL
SL AMB POCT CLARITY,UA: CLEAR
SL AMB POCT COLOR,UA: YELLOW
SL AMB POCT KETONES,UA: NORMAL
SL AMB POCT NITRITE,UA: NORMAL
SL AMB POCT PH,UA: 5
SL AMB POCT SPECIFIC GRAVITY,UA: 1.01
SL AMB POCT URINE PROTEIN: NORMAL
SL AMB POCT UROBILINOGEN: 0.2

## 2020-03-05 PROCEDURE — 3008F BODY MASS INDEX DOCD: CPT | Performed by: UROLOGY

## 2020-03-05 PROCEDURE — 81002 URINALYSIS NONAUTO W/O SCOPE: CPT | Performed by: UROLOGY

## 2020-03-05 PROCEDURE — 1111F DSCHRG MED/CURRENT MED MERGE: CPT | Performed by: UROLOGY

## 2020-03-05 PROCEDURE — 3075F SYST BP GE 130 - 139MM HG: CPT | Performed by: UROLOGY

## 2020-03-05 PROCEDURE — 99214 OFFICE O/P EST MOD 30 MIN: CPT | Performed by: HOSPITALIST

## 2020-03-05 PROCEDURE — 3080F DIAST BP >= 90 MM HG: CPT | Performed by: UROLOGY

## 2020-03-05 PROCEDURE — 1111F DSCHRG MED/CURRENT MED MERGE: CPT | Performed by: HOSPITALIST

## 2020-03-05 PROCEDURE — 3080F DIAST BP >= 90 MM HG: CPT | Performed by: HOSPITALIST

## 2020-03-05 PROCEDURE — 99213 OFFICE O/P EST LOW 20 MIN: CPT | Performed by: UROLOGY

## 2020-03-05 PROCEDURE — 3077F SYST BP >= 140 MM HG: CPT | Performed by: HOSPITALIST

## 2020-03-05 PROCEDURE — 3008F BODY MASS INDEX DOCD: CPT | Performed by: HOSPITALIST

## 2020-03-05 RX ORDER — LISINOPRIL 2.5 MG/1
5 TABLET ORAL DAILY
Qty: 60 TABLET | Refills: 2 | Status: SHIPPED | OUTPATIENT
Start: 2020-03-05 | End: 2020-07-28 | Stop reason: SDUPTHER

## 2020-03-05 RX ORDER — BLOOD PRESSURE TEST KIT
KIT MISCELLANEOUS DAILY
Qty: 1 EACH | Refills: 0 | Status: SHIPPED | OUTPATIENT
Start: 2020-03-05 | End: 2020-04-04

## 2020-03-05 RX ORDER — DOXYCYCLINE HYCLATE 100 MG
100 TABLET ORAL 2 TIMES DAILY
COMMUNITY
Start: 2020-02-26

## 2020-03-05 NOTE — PROGRESS NOTES
[facility]  INTERNAL MEDICINE ACUTE VISIT     PATIENT INFORMATION     Marylin Van   40 y o  male   MRN: [id number]    ASSESSMENT/PLAN     Diagnoses and all orders for this visit:    Right sided Pyelonephritis    TCM visit S/p hospitalization 7/37/19-1/30/62 for complicated UTI  - urine culture with mixed contaminants  Prior urine culture in January 2020 positive for MRSA  -currently completing 10day course of Doxycycline 100mg BID and Levaquin 500mg daily 2 more days left including today  -today denies flank pain, fevers, tolerating diet well    Atonic bladder s/p Left nephrectomy 2/2 to Left renal laceration   -follows with urology, apt later today   -pt has some ability to urinate on his own and straight catheterizes 3-4x/day  -reviewed sanitary techniques to avoid infections   -pt uses tolterodine for bladder spasm which aids with suprapubic pain  Reviewed minimizing its use as it relaxes the bladder  -follow up PSA     Essential Hypertension   /100 today, prior readings elevated  -     Starting lisinopril (ZESTRIL) 2 5 mg tablet; Take 2 tablets (5 mg total) by mouth daily  -     Blood Pressure KIT; by Does not apply route daily Upper Arm cuff only  - instructed to obtain BMP 1 week after starting medications  And to monitor BP at home  Baseline Cr of 1 2  - <2g/salt daily, avoid processed foods  - weight loss 7% encouraged  - exercise as tolerated    Urinary retention  -     PSA, total and free; Future    Tobacco Use  -Smokes 1-2 cig/ daily  -states he will quit today as he is not dependent on it  -educated on smoking cessation in the setting of 1 Kidney and HTN, Obesity    Obesity  -BMI 30 6  - weight loss 7% encouraged  - exercise as tolerated  -reviewed importance of dietary and lifestyle modifications   -follow up A1c, lipids     HCM:     Immunizations:     Pt declines Influenza vaccine and Pneumococcal Vaccine today  Encouraged smoking cessation      Screening for HIV (human immunodeficiency virus)  -pt has been screened during incarceration period- negative at time  -     Rapid HIV 1/2 AB-AG Combo; Future    Encounter for hepatitis C virus screening test for high risk patient  -     Hepatitis B surface antibody; Future  -     Hepatitis A antibody, total; Future      Colon CA screening- denies fam hx of colon Ca, GI cancers  Begin at 54yo      Follow up in 2 weeks regarding BP    CHIEF COMPLAINT     Chief Complaint   Patient presents with    Follow-up     Transition of care     HISTORY OF PRESENT ILLNESS     Pt is a 39yo  Male with a sig PMH of atonic bladder 2/2 to L nephrectomy in 2017 s/p renal laceration (follows Urology), Hx of MRSA UTI, essential HTN, hx of incarceration currently on parol, tobacco use (few cigarettes daily) and enlarged prostate who presents for TCM s/p hospitalization 3/13-1/24/31 for complicated UTI  Pt self straight catheterizes 3-4x/day  During hospitalization pt had R flank pain and elevated WBC  Pt had slight elevated in Cr (baseline Cr of 1 2) and therefore U/S kidney was obtained to eval for pyelo  U/S was negative  Pt was d/c with 10 day course of doxy and levaquin  Today pt has no complaints  Almost done with abx course  Denies fevers, chills, N/V, abd pain, flank pain  Tolerating diet well  Pt's BP is elevated today 152/100  Prior readings also elevated  BP cuff monitoring provided along with starting Ace I  Will check BMP in 1 week to monitor electrolytes and Cr  Pt has Urology apt today  Smoking cessation, dietary and lifestyle changes highly recommended and reviewed  Pt states he will quit cigarettes today  Weight loss encouraged  Pt understands and agrees  REVIEW OF SYSTEMS     Review of Systems   Constitutional: Negative  HENT: Negative  Eyes: Negative  Respiratory: Negative  Cardiovascular: Negative  Gastrointestinal: Negative  Negative for abdominal distention, abdominal pain, nausea and vomiting  Endocrine: Negative  Genitourinary: Positive for difficulty urinating  Negative for discharge, dysuria, flank pain, hematuria, penile pain and urgency  Musculoskeletal: Negative  Skin: Negative  Allergic/Immunologic: Negative  Neurological: Negative  Hematological: Negative  Psychiatric/Behavioral: Negative  OBJECTIVE     Vitals:    03/05/20 1024   BP: 152/100   Pulse: 74   Weight: 86 kg (189 lb 9 5 oz)   Height: 5' 6" (1 676 m)     Physical Exam   Constitutional: He is oriented to person, place, and time  He appears well-developed and well-nourished  No distress  HENT:   Head: Normocephalic and atraumatic  Right Ear: External ear normal    Left Ear: External ear normal    Nose: Nose normal    Mouth/Throat: Oropharynx is clear and moist  No oropharyngeal exudate  Eyes: Pupils are equal, round, and reactive to light  Conjunctivae and EOM are normal    Neck: Normal range of motion  Neck supple  Cardiovascular: Normal rate, regular rhythm, normal heart sounds and intact distal pulses  Pulmonary/Chest: Effort normal and breath sounds normal  No respiratory distress  Abdominal: Soft  Bowel sounds are normal  He exhibits no distension  There is no tenderness  Musculoskeletal: Normal range of motion  He exhibits no edema, tenderness or deformity  Neurological: He is alert and oriented to person, place, and time  No cranial nerve deficit  Skin: Skin is warm and dry  Capillary refill takes less than 2 seconds  No rash noted  He is not diaphoretic  No erythema  No pallor  Psychiatric: He has a normal mood and affect  His behavior is normal  Judgment and thought content normal    Nursing note and vitals reviewed      CURRENT MEDICATIONS     Current Outpatient Medications:     doxycycline (ADOXA) 100 MG tablet, Take 1 tablet (100 mg total) by mouth 2 (two) times a day for 10 days, Disp: 20 tablet, Rfl: 0    levofloxacin (LEVAQUIN) 500 mg tablet, Take 1 tablet (500 mg total) by mouth every 24 hours for 10 days, Disp: 10 tablet, Rfl: 0    tamsulosin (FLOMAX) 0 4 mg, Take 2 capsules (0 8 mg total) by mouth daily with dinner, Disp: 60 capsule, Rfl: 6    Blood Pressure KIT, by Does not apply route daily Upper Arm cuff only, Disp: 1 each, Rfl: 0    doxycycline hyclate (VIBRA-TABS) 100 mg tablet, Take 100 mg by mouth 2 (two) times a day, Disp: , Rfl:     lisinopril (ZESTRIL) 2 5 mg tablet, Take 2 tablets (5 mg total) by mouth daily, Disp: 60 tablet, Rfl: 2    tolterodine (DETROL) 1 mg tablet, Take 1 mg by mouth 2 (two) times a day, Disp: , Rfl:     PAST MEDICAL & SURGICAL HISTORY     Past Medical History:   Diagnosis Date    Enlarged prostate     UTI (urinary tract infection)      Past Surgical History:   Procedure Laterality Date    CYSTOSCOPY CYSTOGRAM W/ INJECTION DEFLUX Left 11/18/2017    Procedure: CYSTOSCOPY Fulguration of ureteral orifice CYSTOGRAM W/ INJECTION DEFLUX, LEFT RETROGRADE, PYLOGRAM AND LEFT URETEROSCOPY;  Surgeon: Monserrat Smith MD;  [facility];  Service: Urology    KIDNEY SURGERY      LAPAROTOMY N/A 11/2/2017    Procedure: LAPAROTOMY EXPLORATORY 2nd LOOK, ligation of left ureter, I/D with closure;  Surgeon: Konrad Hillman MD;  Location: [facility];  Service: General     SOCIAL & FAMILY HISTORY     Social History     Socioeconomic History    Marital status: Single     Spouse name: Not on file    Number of children: Not on file    Years of education: Not on file    Highest education level: Not on file   Occupational History    Not on file   Social Needs    Financial resource strain: Not on file    Food insecurity:     Worry: Not on file     Inability: Not on file    Transportation needs:     Medical: Not on file     Non-medical: Not on file   Tobacco Use    Smoking status: Current Some Day Smoker    Smokeless tobacco: Never Used   Substance and Sexual Activity    Alcohol use: Not Currently     Comment: "socially"    Drug use: No    Sexual activity: Not on file   Lifestyle    Physical activity:     Days per week: Not on file     Minutes per session: Not on file    Stress: Not on file   Relationships    Social connections:     Talks on phone: Not on file     Gets together: Not on file     Attends Cheondoism service: Not on file     Active member of club or organization: Not on file     Attends meetings of clubs or organizations: Not on file     Relationship status: Not on file    Intimate partner violence:     Fear of current or ex partner: Not on file     Emotionally abused: Not on file     Physically abused: Not on file     Forced sexual activity: Not on file   Other Topics Concern    Not on file   Social History Narrative    Not on file     Social History     Substance and Sexual Activity   Alcohol Use Not Currently    Comment: "socially"     Social History     Substance and Sexual Activity   Drug Use No     Social History     Tobacco Use   Smoking Status Current Some Day Smoker   Smokeless Tobacco Never Used     Family History   Problem Relation Age of Onset    No Known Problems Mother     No Known Problems Father      ==  Ginger Franklin MD  Resident, PGY-1  [facility] [address]  [address]   [facility] , [facility] [address] [facility] [address] [facility]  Office: [phone]  Fax: [phone]

## 2020-03-06 ENCOUNTER — TELEPHONE (OUTPATIENT)
Dept: UROLOGY | Facility: HOSPITAL | Age: 45
End: 2020-03-06

## 2020-03-06 PROCEDURE — 52000 CYSTOURETHROSCOPY: CPT | Performed by: UROLOGY

## 2020-03-06 NOTE — TELEPHONE ENCOUNTER
Patient with history of urinary retention  He is s/p cystoscopy which demonstrated obstructing prostate  However given longstanding retention it was recommended that he proceed with urodynamics to determine detrusor function prior to proceeding with bladder outlet procedures  He currently maintains with [facility] 5 x daily  Called and spoke with patient  Scheduled urodynamics 3/25 at the [facility] office  Reviewed what to expect with testing  Patient verbalized understanding  FU scheduled with Dr Kishore Kenny 4/29/2020  Patient confirmed appt details

## 2020-03-06 NOTE — PROGRESS NOTES
Cystoscopy  Date/Time: 3/6/2020 7:45 AM  Performed by: Phylliss Halsted, MD  Authorized by: Phylliss Halsted, MD     Procedure details: cystoscopy        Written Consent Obtained      Indications for Procedure:   urinary retention     Physical Exam     Constitutional   General appearance: No acute distress, well appearing and well nourished  Pulmonary   Respiratory effort: No increased work of breathing or signs of respiratory distress  Cardiovascular   Examination of extremities for edema and/or varicosities: Normal     Abdomen   Abdomen: Non-tender, no masses  Liver and spleen: No hepatomegaly or splenomegaly  Genitourinary   Normal phallus and meatus   Musculoskeletal   Gait and station: Normal     Skin   Skin and subcutaneous tissue: Normal without rashes or lesions  Lymphatic   Palpation of lymph nodes in groin: No lymphadenopathy  Additional Exam: Neuro exam nonfocal   The flexible cystoscope was introduced into the urethra and advanced into the bladder  URETHRA:  Normal,  without strictures or lesions  PROSTATE:  Severe trilobar obstruction with median lobe component  TRIGONE & UOs:   Normal anatomy with efflux of clear urine  No mucosal lesions or subtrigonal masses  BLADDER MUCOSA:  Normal, without neoplasms or other lesions  DETRUSOR: Normal capacity, without flaccidity, without excessive compliance, a moderate trabeculation with cellules but no large diverticula, without uninhibited bladder contractions on fillings  RETROFLEXED SCOPE VIEW: Normal bladder neck    Plan:  I had a lengthy discussion with the patient  He does have an obstructing prostate with a median lobe component  This may be the cause of his prolonged retention  Given his unusual history and long history of retention I would like for him to proceed with urodynamics to make sure he does not have an atonic bladder    We discussed that if he has function of his bladder muscle we could proceed with transurethral resection of the prostate  We discussed that his ejaculations would be dry after this procedure  The patient may still want to have children and we discussed sperm banking  Information was given to the patient and he will look into this  In the meantime he will follow up with me after urodynamics so that we may finalize a plan  Until then he is to continue catheterization 5 times daily  We will make sure he has supplies for this  In addition to the cystoscopy today I spent over 15 minutes discussing treatment options and coming up with a plan for his ongoing retention

## 2020-04-14 ENCOUNTER — TELEPHONE (OUTPATIENT)
Dept: UROLOGY | Facility: AMBULATORY SURGERY CENTER | Age: 45
End: 2020-04-14

## 2020-04-17 ENCOUNTER — TELEMEDICINE (OUTPATIENT)
Dept: INTERNAL MEDICINE CLINIC | Facility: CLINIC | Age: 45
End: 2020-04-17

## 2020-04-17 DIAGNOSIS — Z90.5 HISTORY OF LEFT NEPHRECTOMY: ICD-10-CM

## 2020-04-17 DIAGNOSIS — I10 HYPERTENSION, UNSPECIFIED TYPE: Primary | Chronic | ICD-10-CM

## 2020-04-17 DIAGNOSIS — N31.2 ATONIC BLADDER: Chronic | ICD-10-CM

## 2020-04-17 DIAGNOSIS — N40.0 ENLARGED PROSTATE: Chronic | ICD-10-CM

## 2020-04-17 DIAGNOSIS — R30.0 DYSURIA: ICD-10-CM

## 2020-04-17 DIAGNOSIS — R33.9 URINARY RETENTION: ICD-10-CM

## 2020-04-17 PROBLEM — E66.9 CLASS 1 OBESITY IN ADULT: Chronic | Status: ACTIVE | Noted: 2020-04-17

## 2020-04-17 PROCEDURE — 99213 OFFICE O/P EST LOW 20 MIN: CPT | Performed by: INTERNAL MEDICINE

## 2020-04-17 RX ORDER — ADHESIVE BANDAGE 3/4"
BANDAGE TOPICAL 3 TIMES WEEKLY
Qty: 1 EACH | Refills: 0 | Status: SHIPPED | OUTPATIENT
Start: 2020-04-17 | End: 2020-07-28 | Stop reason: SDUPTHER

## 2020-05-05 NOTE — TELEPHONE ENCOUNTER
Called and spoke with patient this morning  Reviewed that we are starting to schedule routine procedures for patients and offered to reschedule his urodynamic testing  Patient agreeable  Urodynamics scheduled 5/15/2020 at 11 am   Will schedule FU with Dr Marisa Pinto, likely virtual at that visit  Reviewed what to expect with testing again and patient verbalized understanding

## 2020-05-15 NOTE — TELEPHONE ENCOUNTER
Called and left a message for patient as he has yet to arrive for 11 am appt  Reviewed that if he forgot about the appt and still wants to get in today I can see him at 1 pm instead  Awaiting a call back

## 2020-05-21 NOTE — TELEPHONE ENCOUNTER
Called and spoke with patient  He forgot about the appt last week and did not get a reminder call  He is interested in rescheduling  Scheduled for 5/27/2020  Patient set appt reminder  FU appt scheduled with Dr Marisa Pinto as well

## 2020-05-27 ENCOUNTER — TELEPHONE (OUTPATIENT)
Dept: UROLOGY | Facility: AMBULATORY SURGERY CENTER | Age: 45
End: 2020-05-27

## 2020-05-27 ENCOUNTER — PROCEDURE VISIT (OUTPATIENT)
Dept: UROLOGY | Facility: AMBULATORY SURGERY CENTER | Age: 45
End: 2020-05-27
Payer: COMMERCIAL

## 2020-05-27 VITALS
DIASTOLIC BLOOD PRESSURE: 82 MMHG | TEMPERATURE: 98.6 F | HEIGHT: 66 IN | SYSTOLIC BLOOD PRESSURE: 148 MMHG | WEIGHT: 186 LBS | BODY MASS INDEX: 29.89 KG/M2 | HEART RATE: 68 BPM

## 2020-05-27 DIAGNOSIS — N31.2 ATONIC BLADDER: ICD-10-CM

## 2020-05-27 DIAGNOSIS — N39.0 URINARY TRACT INFECTION WITHOUT HEMATURIA, SITE UNSPECIFIED: Primary | ICD-10-CM

## 2020-05-27 DIAGNOSIS — R33.9 URINARY RETENTION: Primary | ICD-10-CM

## 2020-05-27 LAB
BACTERIA UR QL AUTO: ABNORMAL /HPF
BILIRUB UR QL STRIP: NEGATIVE
CLARITY UR: ABNORMAL
COLOR UR: YELLOW
GLUCOSE UR STRIP-MCNC: NEGATIVE MG/DL
HGB UR QL STRIP.AUTO: NEGATIVE
KETONES UR STRIP-MCNC: NEGATIVE MG/DL
LEUKOCYTE ESTERASE UR QL STRIP: ABNORMAL
NITRITE UR QL STRIP: POSITIVE
NON-SQ EPI CELLS URNS QL MICRO: ABNORMAL /HPF
PH UR STRIP.AUTO: 7 [PH]
PROT UR STRIP-MCNC: ABNORMAL MG/DL
RBC #/AREA URNS AUTO: ABNORMAL /HPF
SL AMB  POCT GLUCOSE, UA: NORMAL
SL AMB LEUKOCYTE ESTERASE,UA: NORMAL
SL AMB POCT BILIRUBIN,UA: NORMAL
SL AMB POCT BLOOD,UA: NORMAL
SL AMB POCT CLARITY,UA: CLEAR
SL AMB POCT COLOR,UA: YELLOW
SL AMB POCT KETONES,UA: NORMAL
SL AMB POCT NITRITE,UA: NORMAL
SL AMB POCT PH,UA: 6
SL AMB POCT SPECIFIC GRAVITY,UA: 1.01
SL AMB POCT URINE PROTEIN: NORMAL
SL AMB POCT UROBILINOGEN: NORMAL
SP GR UR STRIP.AUTO: 1.02 (ref 1–1.03)
UROBILINOGEN UR QL STRIP.AUTO: 0.2 E.U./DL
WBC #/AREA URNS AUTO: ABNORMAL /HPF

## 2020-05-27 PROCEDURE — 87086 URINE CULTURE/COLONY COUNT: CPT | Performed by: UROLOGY

## 2020-05-27 PROCEDURE — 87077 CULTURE AEROBIC IDENTIFY: CPT | Performed by: UROLOGY

## 2020-05-27 PROCEDURE — 81002 URINALYSIS NONAUTO W/O SCOPE: CPT

## 2020-05-27 PROCEDURE — P9612 CATHETERIZE FOR URINE SPEC: HCPCS

## 2020-05-27 PROCEDURE — 99211 OFF/OP EST MAY X REQ PHY/QHP: CPT

## 2020-05-27 PROCEDURE — 81001 URINALYSIS AUTO W/SCOPE: CPT | Performed by: UROLOGY

## 2020-05-31 LAB — BACTERIA UR CULT: ABNORMAL

## 2020-06-01 RX ORDER — CEPHALEXIN 500 MG/1
500 CAPSULE ORAL EVERY 12 HOURS SCHEDULED
Qty: 10 CAPSULE | Refills: 0 | Status: SHIPPED | OUTPATIENT
Start: 2020-06-01 | End: 2020-06-06

## 2020-06-09 ENCOUNTER — TELEPHONE (OUTPATIENT)
Dept: UROLOGY | Facility: MEDICAL CENTER | Age: 45
End: 2020-06-09

## 2020-06-09 DIAGNOSIS — R33.9 URINARY RETENTION: ICD-10-CM

## 2020-06-09 DIAGNOSIS — N31.2 ATONIC BLADDER: ICD-10-CM

## 2020-06-09 DIAGNOSIS — N39.0 URINARY TRACT INFECTION WITHOUT HEMATURIA, SITE UNSPECIFIED: Primary | ICD-10-CM

## 2020-06-24 ENCOUNTER — TELEPHONE (OUTPATIENT)
Dept: UROLOGY | Facility: AMBULATORY SURGERY CENTER | Age: 45
End: 2020-06-24

## 2020-06-29 ENCOUNTER — TRANSCRIBE ORDERS (OUTPATIENT)
Dept: LAB | Facility: HOSPITAL | Age: 45
End: 2020-06-29

## 2020-06-29 ENCOUNTER — APPOINTMENT (OUTPATIENT)
Dept: LAB | Facility: HOSPITAL | Age: 45
End: 2020-06-29
Payer: COMMERCIAL

## 2020-06-29 DIAGNOSIS — Z91.89 ENCOUNTER FOR HEPATITIS C VIRUS SCREENING TEST FOR HIGH RISK PATIENT: ICD-10-CM

## 2020-06-29 DIAGNOSIS — N31.2 ATONIC BLADDER: ICD-10-CM

## 2020-06-29 DIAGNOSIS — Z00.00 ANNUAL PHYSICAL EXAM: ICD-10-CM

## 2020-06-29 DIAGNOSIS — R33.9 URINARY RETENTION: ICD-10-CM

## 2020-06-29 DIAGNOSIS — Z00.00 ROUTINE GENERAL MEDICAL EXAMINATION AT A HEALTH CARE FACILITY: ICD-10-CM

## 2020-06-29 DIAGNOSIS — Z00.00 ROUTINE GENERAL MEDICAL EXAMINATION AT A HEALTH CARE FACILITY: Primary | ICD-10-CM

## 2020-06-29 DIAGNOSIS — N39.0 URINARY TRACT INFECTION WITHOUT HEMATURIA, SITE UNSPECIFIED: ICD-10-CM

## 2020-06-29 DIAGNOSIS — Z11.4 SCREENING FOR HIV (HUMAN IMMUNODEFICIENCY VIRUS): ICD-10-CM

## 2020-06-29 DIAGNOSIS — Z11.59 ENCOUNTER FOR HEPATITIS C VIRUS SCREENING TEST FOR HIGH RISK PATIENT: ICD-10-CM

## 2020-06-29 DIAGNOSIS — Z12.5 PROSTATE CANCER SCREENING: ICD-10-CM

## 2020-06-29 LAB
ANION GAP SERPL CALCULATED.3IONS-SCNC: 6 MMOL/L (ref 4–13)
BACTERIA UR QL AUTO: ABNORMAL /HPF
BILIRUB UR QL STRIP: NEGATIVE
BUN SERPL-MCNC: 14 MG/DL (ref 5–25)
CALCIUM SERPL-MCNC: 9.5 MG/DL (ref 8.3–10.1)
CHLORIDE SERPL-SCNC: 108 MMOL/L (ref 100–108)
CHOLEST SERPL-MCNC: 145 MG/DL (ref 50–200)
CLARITY UR: ABNORMAL
CO2 SERPL-SCNC: 27 MMOL/L (ref 21–32)
COLOR UR: YELLOW
CREAT SERPL-MCNC: 1.19 MG/DL (ref 0.6–1.3)
EST. AVERAGE GLUCOSE BLD GHB EST-MCNC: 111 MG/DL
GFR SERPL CREATININE-BSD FRML MDRD: 74 ML/MIN/1.73SQ M
GLUCOSE P FAST SERPL-MCNC: 98 MG/DL (ref 65–99)
GLUCOSE UR STRIP-MCNC: NEGATIVE MG/DL
HBA1C MFR BLD: 5.5 %
HDLC SERPL-MCNC: 44 MG/DL
HGB UR QL STRIP.AUTO: ABNORMAL
HYALINE CASTS #/AREA URNS LPF: ABNORMAL /LPF
KETONES UR STRIP-MCNC: NEGATIVE MG/DL
LDLC SERPL CALC-MCNC: 85 MG/DL (ref 0–100)
LEUKOCYTE ESTERASE UR QL STRIP: ABNORMAL
NITRITE UR QL STRIP: POSITIVE
NON-SQ EPI CELLS URNS QL MICRO: ABNORMAL /HPF
NONHDLC SERPL-MCNC: 101 MG/DL
PH UR STRIP.AUTO: 7.5 [PH]
POTASSIUM SERPL-SCNC: 4.2 MMOL/L (ref 3.5–5.3)
PROT UR STRIP-MCNC: ABNORMAL MG/DL
RBC #/AREA URNS AUTO: ABNORMAL /HPF
SODIUM SERPL-SCNC: 141 MMOL/L (ref 136–145)
SP GR UR STRIP.AUTO: 1.02 (ref 1–1.03)
TRIGL SERPL-MCNC: 80 MG/DL
UROBILINOGEN UR QL STRIP.AUTO: 0.2 E.U./DL
WBC #/AREA URNS AUTO: ABNORMAL /HPF

## 2020-06-29 PROCEDURE — 36415 COLL VENOUS BLD VENIPUNCTURE: CPT

## 2020-06-29 PROCEDURE — 87086 URINE CULTURE/COLONY COUNT: CPT

## 2020-06-29 PROCEDURE — 81001 URINALYSIS AUTO W/SCOPE: CPT | Performed by: STUDENT IN AN ORGANIZED HEALTH CARE EDUCATION/TRAINING PROGRAM

## 2020-06-29 PROCEDURE — 84154 ASSAY OF PSA FREE: CPT

## 2020-06-29 PROCEDURE — 80048 BASIC METABOLIC PNL TOTAL CA: CPT

## 2020-06-29 PROCEDURE — 87077 CULTURE AEROBIC IDENTIFY: CPT

## 2020-06-29 PROCEDURE — 84153 ASSAY OF PSA TOTAL: CPT

## 2020-06-29 PROCEDURE — 87086 URINE CULTURE/COLONY COUNT: CPT | Performed by: STUDENT IN AN ORGANIZED HEALTH CARE EDUCATION/TRAINING PROGRAM

## 2020-06-29 PROCEDURE — 80061 LIPID PANEL: CPT

## 2020-06-29 PROCEDURE — 86708 HEPATITIS A ANTIBODY: CPT

## 2020-06-29 PROCEDURE — 87389 HIV-1 AG W/HIV-1&-2 AB AG IA: CPT

## 2020-06-29 PROCEDURE — 86706 HEP B SURFACE ANTIBODY: CPT

## 2020-06-29 PROCEDURE — 83036 HEMOGLOBIN GLYCOSYLATED A1C: CPT

## 2020-06-30 LAB
BACTERIA UR CULT: NORMAL
HAV AB SER QL IA: REACTIVE
HBV SURFACE AB SER-ACNC: <3.1 MIU/ML
HIV 1+2 AB+HIV1 P24 AG SERPL QL IA: NORMAL

## 2020-07-01 LAB
PSA FREE MFR SERPL: 15.7 %
PSA FREE SERPL-MCNC: 0.22 NG/ML
PSA SERPL-MCNC: 1.4 NG/ML (ref 0–4)

## 2020-07-02 LAB — BACTERIA UR CULT: ABNORMAL

## 2020-07-07 ENCOUNTER — TELEPHONE (OUTPATIENT)
Dept: UROLOGY | Facility: AMBULATORY SURGERY CENTER | Age: 45
End: 2020-07-07

## 2020-07-07 DIAGNOSIS — N39.0 ACUTE UTI: Primary | ICD-10-CM

## 2020-07-07 RX ORDER — NITROFURANTOIN 25; 75 MG/1; MG/1
100 CAPSULE ORAL 2 TIMES DAILY
Qty: 14 CAPSULE | Refills: 0 | Status: SHIPPED | OUTPATIENT
Start: 2020-07-07 | End: 2020-07-14

## 2020-07-07 NOTE — TELEPHONE ENCOUNTER
Patient is positive urine culture for upcoming urodynamics  Antibiotic sent to pharmacy  Please let him know

## 2020-07-07 NOTE — TELEPHONE ENCOUNTER
Call placed to patient, advised of positive urine culture and order for antibiotics  Patient verbalized understanding and agrees with plan

## 2020-07-07 NOTE — TELEPHONE ENCOUNTER
Tried calling pt again was unable to speak with him  He had urodynamics tomorrow and needs to start his antibiotics asap

## 2020-07-08 ENCOUNTER — PROCEDURE VISIT (OUTPATIENT)
Dept: UROLOGY | Facility: MEDICAL CENTER | Age: 45
End: 2020-07-08
Payer: COMMERCIAL

## 2020-07-08 DIAGNOSIS — N31.8 LOW BLADDER COMPLIANCE: ICD-10-CM

## 2020-07-08 DIAGNOSIS — N32.81 DETRUSOR INSTABILITY: ICD-10-CM

## 2020-07-08 DIAGNOSIS — R33.9 INCOMPLETE EMPTYING OF BLADDER: Primary | ICD-10-CM

## 2020-07-08 LAB
SL AMB  POCT GLUCOSE, UA: NEGATIVE
SL AMB LEUKOCYTE ESTERASE,UA: NEGATIVE
SL AMB POCT BILIRUBIN,UA: NEGATIVE
SL AMB POCT BLOOD,UA: ABNORMAL
SL AMB POCT CLARITY,UA: CLEAR
SL AMB POCT COLOR,UA: YELLOW
SL AMB POCT KETONES,UA: NEGATIVE
SL AMB POCT NITRITE,UA: NEGATIVE
SL AMB POCT PH,UA: 5
SL AMB POCT SPECIFIC GRAVITY,UA: 1.02
SL AMB POCT URINE PROTEIN: NEGATIVE
SL AMB POCT UROBILINOGEN: NEGATIVE

## 2020-07-08 PROCEDURE — 51728 CYSTOMETROGRAM W/VP: CPT | Performed by: UROLOGY

## 2020-07-08 PROCEDURE — 81002 URINALYSIS NONAUTO W/O SCOPE: CPT

## 2020-07-08 PROCEDURE — 51784 ANAL/URINARY MUSCLE STUDY: CPT | Performed by: UROLOGY

## 2020-07-08 NOTE — PROGRESS NOTES
CC: "I don't empty my bladder all the way"     Voided 25 ml prior to test with PVR 25 ml     CMG:       Position:  sitting         Fill sensation     30 ml,       pdet 2 cm of H2O         First urge:        136 ml,      pdet 17  cm of H2O         Normal urge:    162ml,      pdet 30  cm of H2O           Must urge:        175 ml,     pdet 27  cm of H2O           Capacity:          292 ml,    pdet 48  cm of H2O               Max pdet during void   185 cm H2O          Voided volume:     96 4ml (self cath for PVR of 150 ml at end of test)         Bladder stability:     unstable at 154, 175, 198 and 292 ml  without leakage         Compliance:     Abnormal, rise in pdet with fill even with decreased rate of 30 ml / min      EMG activity:       Normal during filling,        normal during voiding    Comments:   Sensory urgency, + DI, decreased compliance, incomplete emptying    Voids with elevated pressure and slow flow (obstructive void pattern)   Patient did self cath post test for 150 ml   Void attempt at 200 ml successful for 57 ml with pdet of 253 cm of H2O   Fill restarted and stopped fill at 292 ml due to rise in pdet with fill and change in pressure of greater than 40 cm of H2O  Patient then voided 96 ml with pdet of 185 cm of H2O      Urodynamics  Date/Time: 7/8/2020 2:04 PM  Performed by: Josephine Bartlett RN  Authorized by: William Méndez MD   Procedure - Urodynamics:  Procedure details: CMG      Voiding Pressure Study: Yes      Post-procedure:     Patient tolerance: Patient tolerated procedure well with no immediate complications

## 2020-07-13 ENCOUNTER — TELEPHONE (OUTPATIENT)
Dept: UROLOGY | Facility: AMBULATORY SURGERY CENTER | Age: 45
End: 2020-07-13

## 2020-07-13 NOTE — TELEPHONE ENCOUNTER
Spoke to patient and he agreed to an in office visit  He was negative to all of the COVID-19 questions

## 2020-07-15 ENCOUNTER — OFFICE VISIT (OUTPATIENT)
Dept: UROLOGY | Facility: AMBULATORY SURGERY CENTER | Age: 45
End: 2020-07-15
Payer: COMMERCIAL

## 2020-07-15 VITALS
HEART RATE: 62 BPM | HEIGHT: 66 IN | TEMPERATURE: 98.2 F | DIASTOLIC BLOOD PRESSURE: 72 MMHG | BODY MASS INDEX: 29.89 KG/M2 | WEIGHT: 186 LBS | SYSTOLIC BLOOD PRESSURE: 122 MMHG

## 2020-07-15 DIAGNOSIS — N31.9 NEUROGENIC BLADDER: Primary | ICD-10-CM

## 2020-07-15 PROCEDURE — 3074F SYST BP LT 130 MM HG: CPT | Performed by: UROLOGY

## 2020-07-15 PROCEDURE — 3008F BODY MASS INDEX DOCD: CPT | Performed by: UROLOGY

## 2020-07-15 PROCEDURE — 99214 OFFICE O/P EST MOD 30 MIN: CPT | Performed by: UROLOGY

## 2020-07-15 PROCEDURE — 3078F DIAST BP <80 MM HG: CPT | Performed by: UROLOGY

## 2020-07-15 RX ORDER — OXYBUTYNIN CHLORIDE 10 MG/1
10 TABLET, EXTENDED RELEASE ORAL
Qty: 90 TABLET | Refills: 3 | Status: SHIPPED | OUTPATIENT
Start: 2020-07-15

## 2020-07-15 RX ORDER — OXYBUTYNIN CHLORIDE 10 MG/1
10 TABLET, EXTENDED RELEASE ORAL
Qty: 30 TABLET | Refills: 1 | Status: SHIPPED | OUTPATIENT
Start: 2020-07-15 | End: 2020-07-15

## 2020-07-15 NOTE — PROGRESS NOTES
Assessment/Plan:    Neurogenic bladder  I reviewed the urodynamic studies with him  It did show poor compliance as well as detrusor overactivity  Patient is currently not taking any anticholinergic medication  I will start him on oxybutynin  We discussed that this will help protect his solitary kidney  We also discussed that he had good detrusor pressure with voiding and therefore transurethral resection of the prostate with removal of that median lobe may be of benefit  We discussed the risks and benefits and the fact that he would have dry ejaculation afterwards  The patient would like to hold off on this and continue with catheterization for now  He will start the medication  He is also going to retry tamsulosin which he has at home  He will contact us if he wants a refill on this medication and will follow up with me in 3 months  Diagnoses and all orders for this visit:    Neurogenic bladder  -     Discontinue: oxybutynin (DITROPAN-XL) 10 MG 24 hr tablet; Take 1 tablet (10 mg total) by mouth daily at bedtime  -     oxybutynin (DITROPAN-XL) 10 MG 24 hr tablet; Take 1 tablet (10 mg total) by mouth daily at bedtime          Total visit time was 40 minutes of which over 50% was spent on counseling  Subjective:     Patient ID: Nenita Rodríguez is a 40 y o  male    44-year-old male presents for follow-up to discuss recent urodynamic studies  The patient has a history of neurogenic bladder, urinary retention, and BPH with a significant median lobe on cystoscopy  He continues to catheterize approximately 5 times in a 24 hour period  He denies any significant problems with catheterization  He has no other complaints  The following portions of the patient's history were reviewed and updated as appropriate: allergies, current medications, past family history, past medical history, past social history, past surgical history and problem list     Review of Systems   Constitutional: Negative      HENT: Negative  Eyes: Negative  Respiratory: Negative  Cardiovascular: Negative  Gastrointestinal: Negative  Endocrine: Negative  Genitourinary:        As noted per HPI   Musculoskeletal: Negative  Skin: Negative  Allergic/Immunologic: Negative  Neurological: Negative  Hematological: Negative  Psychiatric/Behavioral: Negative  Objective:    Physical Exam   Constitutional: He is oriented to person, place, and time  He appears well-developed and well-nourished  Neck: Normal range of motion  Cardiovascular: Intact distal pulses  Pulmonary/Chest: Effort normal    Abdominal: Soft  Bowel sounds are normal  He exhibits no distension and no mass  There is no tenderness  There is no rebound and no guarding  Musculoskeletal: Normal range of motion  Neurological: He is alert and oriented to person, place, and time  Skin: Skin is warm and dry  Psychiatric: He has a normal mood and affect  Vitals reviewed          Results  Lab Results   Component Value Date    PSA 1 4 06/29/2020     Lab Results   Component Value Date    GLUCOSE 184 (H) 11/02/2017    CALCIUM 9 5 06/29/2020     05/05/2015    K 4 2 06/29/2020    CO2 27 06/29/2020     06/29/2020    BUN 14 06/29/2020    CREATININE 1 19 06/29/2020     Lab Results   Component Value Date    WBC 9 90 02/25/2020    HGB 14 0 02/25/2020    HCT 42 4 02/25/2020    MCV 93 02/25/2020     02/25/2020       No results found for this or any previous visit (from the past 1 hour(s)) ]

## 2020-07-15 NOTE — ASSESSMENT & PLAN NOTE
I reviewed the urodynamic studies with him  It did show poor compliance as well as detrusor overactivity  Patient is currently not taking any anticholinergic medication  I will start him on oxybutynin  We discussed that this will help protect his solitary kidney  We also discussed that he had good detrusor pressure with voiding and therefore transurethral resection of the prostate with removal of that median lobe may be of benefit  We discussed the risks and benefits and the fact that he would have dry ejaculation afterwards  The patient would like to hold off on this and continue with catheterization for now  He will start the medication  He is also going to retry tamsulosin which he has at home  He will contact us if he wants a refill on this medication and will follow up with me in 3 months

## 2020-07-28 ENCOUNTER — OFFICE VISIT (OUTPATIENT)
Dept: INTERNAL MEDICINE CLINIC | Facility: CLINIC | Age: 45
End: 2020-07-28

## 2020-07-28 VITALS
SYSTOLIC BLOOD PRESSURE: 132 MMHG | WEIGHT: 183.42 LBS | DIASTOLIC BLOOD PRESSURE: 70 MMHG | HEIGHT: 66 IN | HEART RATE: 70 BPM | BODY MASS INDEX: 29.48 KG/M2 | TEMPERATURE: 99.2 F

## 2020-07-28 DIAGNOSIS — I10 HYPERTENSION, UNSPECIFIED TYPE: Primary | Chronic | ICD-10-CM

## 2020-07-28 DIAGNOSIS — N31.2 ATONIC BLADDER: Chronic | ICD-10-CM

## 2020-07-28 DIAGNOSIS — I10 HYPERTENSION: ICD-10-CM

## 2020-07-28 PROCEDURE — 3075F SYST BP GE 130 - 139MM HG: CPT | Performed by: INTERNAL MEDICINE

## 2020-07-28 PROCEDURE — 4004F PT TOBACCO SCREEN RCVD TLK: CPT | Performed by: INTERNAL MEDICINE

## 2020-07-28 PROCEDURE — 3078F DIAST BP <80 MM HG: CPT | Performed by: INTERNAL MEDICINE

## 2020-07-28 PROCEDURE — 99213 OFFICE O/P EST LOW 20 MIN: CPT | Performed by: INTERNAL MEDICINE

## 2020-07-28 PROCEDURE — 3008F BODY MASS INDEX DOCD: CPT | Performed by: INTERNAL MEDICINE

## 2020-07-28 PROCEDURE — 3008F BODY MASS INDEX DOCD: CPT | Performed by: UROLOGY

## 2020-07-28 RX ORDER — LISINOPRIL 5 MG/1
5 TABLET ORAL DAILY
Qty: 90 TABLET | Refills: 1 | Status: SHIPPED | OUTPATIENT
Start: 2020-07-28 | End: 2020-10-26

## 2020-07-28 RX ORDER — ADHESIVE BANDAGE 3/4"
BANDAGE TOPICAL 3 TIMES WEEKLY
Qty: 1 EACH | Refills: 0 | Status: SHIPPED | OUTPATIENT
Start: 2020-07-29

## 2020-07-28 NOTE — PROGRESS NOTES
Bailee  [address] [facility] [address] [place] [address]    NAME: Yola Calzada  AGE: 40 y o  SEX: male    DATE OF ENCOUNTER: 7/28/2020    Assessment and Plan     1  Hypertension  Patient reported noncompliance with prescribed lisinopril  Explained to the patient the benefits of blood pressure control and compliance was strongly encouraged  - lisinopril (ZESTRIL) 5 mg tablet; Take 1 tablet (5 mg total) by mouth daily  Dispense: 90 tablet; Refill: 1  - Blood Pressure Monitoring (BLOOD PRESSURE CUFF) MISC; by Does not apply route 3 (three) times a week  Dispense: 1 each; Refill: 0      2  Atonic bladder  Patient has history of spontaneous renal laceration, left nephrectomy 2017, postoperatively he developed urinary retention secondary to atonic bladder  Follow-up with urology, recently started on oxybutynin  Self catheterization, 5-6 times/24 hours  Sterile techniques reviewed with the patient  3   Tobacco abuse  Smokes 2-3 cigarettes per day  Discussed smoking cessation  Health maintenance:  1  Screening for diabetes:   Hemoglobin A1c 5 5 on 06/2020  Repeat in 3 years  2    Vaccinations:  Declined vaccines today  Information about Tdap vaccine given in the after visit summary  3   Screening for HIV:   Negative on 06/2020  No orders of the defined types were placed in this encounter       - Counseling Documentation: patient was counseled regarding: instructions for management     BMI Counseling: Body mass index is 29 61 kg/m²  The BMI is above normal  Nutrition recommendations include decreasing portion sizes, encouraging healthy choices of fruits and vegetables, decreasing fast food intake, consuming healthier snacks, limiting drinks that contain sugar, moderation in carbohydrate intake, increasing intake of lean protein, reducing intake of saturated and trans fat and reducing intake of cholesterol   Exercise recommendations include moderate physical activity 150 minutes/week  Tobacco Cessation Counseling: Tobacco cessation counseling was provided  The patient is sincerely urged to quit consumption of tobacco  He is ready to quit tobacco  Medication options not discussed  Chief Complaint     Chief Complaint   Patient presents with    Follow-up     as per patient no concerns  History of Present Illness     HANK Fontaine is a 40years old patient with past medical history of traumatic renal laceration status post left nephrectomy in 2017, postoperative course complicated by atonic bladder requiring intermittent self-catheterization, frequent UTIs, enlarged prostate, hypertension and tobacco abuse presented to the clinic today for follow-up  Patient follows closely with Urology, he recently had a urodynamic study and was started on oxybutynin which relaxes his bladder and helps with catheterization and urge sensation  He currently self catheterize 5-6 times in 24 hours  Reviewed hygiene measures with the patient, he reported using fresh catheter every time, using a lubricant and hand   He tries to avoid precatheterization in public bathrooms  Denies dysuria, hematuria, fever, suprapubic pain or flank pain  Regarding hypertension, he was started on lisinopril however is not compliant with prescribed medication as he does not like to take pills  He did not  his blood pressure cuff from his pharmacy  We discussed the benefits blood pressure control, patient understands and agrees  Patient recently started smoking secondary to life stressors and recent break-up, about 2-3 cigarettes per day  He denies alcohol or illicit drug use  Patient was laid off from his job with the COVID-19 epidemic  He used to work in fragrance industry      The following portions of the patient's history were reviewed and updated as appropriate: allergies, current medications, past family history, past medical history, past social history, past surgical history and problem list     Review of Systems     Review of Systems   Constitutional: Negative for chills, fatigue and fever  Respiratory: Negative for cough, shortness of breath and wheezing  Cardiovascular: Negative for chest pain, palpitations and leg swelling  Gastrointestinal: Negative for abdominal pain, diarrhea, nausea and vomiting  Genitourinary: Negative for dysuria, flank pain and hematuria  Musculoskeletal: Negative for arthralgias, back pain, myalgias and neck pain  Neurological: Negative for dizziness, seizures, numbness and headaches  Active Problem List     Patient Active Problem List   Diagnosis    Kidney laceration, left    Acute blood loss anemia    Pleural effusion on left    Renal abscess, left    Spleen hematoma without rupture of capsule    Pyelonephritis    Urinary retention    History of left nephrectomy    Tobacco abuse    History of incarceration    Atonic bladder    Hypertension    Class 1 obesity in adult    Enlarged prostate    Neurogenic bladder       Objective     /70   Pulse 70   Temp 99 2 °F (37 3 °C)   Ht 5' 6" (1 676 m)   Wt 83 2 kg (183 lb 6 8 oz)   BMI 29 61 kg/m²     Physical Exam   Constitutional: He is oriented to person, place, and time  He appears well-developed and well-nourished  HENT:   Head: Normocephalic and atraumatic  Eyes: Conjunctivae are normal    Neck: Normal range of motion  Neck supple  Cardiovascular: Normal rate, regular rhythm and normal heart sounds  Exam reveals no gallop and no friction rub  No murmur heard  Pulmonary/Chest: Effort normal  No respiratory distress  He has no wheezes  He has no rales  Abdominal: Soft  He exhibits no distension  There is no tenderness  Musculoskeletal: Normal range of motion  He exhibits no edema  Neurological: He is alert and oriented to person, place, and time  Skin: Skin is warm     Psychiatric: He has a normal mood and affect   His behavior is normal        Pertinent Laboratory/Diagnostic Studies:  CBC:   Lab Results   Component Value Date/Time    WBC 9 90 02/25/2020 05:41 AM    WBC 10 60 (H) 05/05/2015 10:52 AM    RBC 4 57 02/25/2020 05:41 AM    RBC 4 07 05/05/2015 10:52 AM    HGB 14 0 02/25/2020 05:41 AM    HGB 11 6 (L) 05/05/2015 10:52 AM    HCT 42 4 02/25/2020 05:41 AM    HCT 36 7 05/05/2015 10:52 AM    MCV 93 02/25/2020 05:41 AM    MCV 90 05/05/2015 10:52 AM    MCH 30 6 02/25/2020 05:41 AM    MCH 28 5 05/05/2015 10:52 AM    MCHC 33 0 02/25/2020 05:41 AM    MCHC 31 6 05/05/2015 10:52 AM    RDW 13 0 02/25/2020 05:41 AM    RDW 14 7 05/05/2015 10:52 AM    MPV 9 1 02/25/2020 05:41 AM    MPV 8 4 (L) 05/05/2015 10:52 AM     02/25/2020 05:41 AM     (H) 05/05/2015 10:52 AM    NRBC 0 02/25/2020 05:41 AM    NEUTOPHILPCT 78 (H) 02/25/2020 05:41 AM    NEUTOPHILPCT 68 05/05/2015 10:52 AM    LYMPHOPCT 10 (L) 02/25/2020 05:41 AM    LYMPHOPCT 21 05/05/2015 10:52 AM    MONOPCT 10 02/25/2020 05:41 AM    MONOPCT 7 05/05/2015 10:52 AM    EOSPCT 1 02/25/2020 05:41 AM    EOSPCT 4 05/05/2015 10:52 AM    BASOPCT 0 02/25/2020 05:41 AM    NEUTROABS 7 81 (H) 02/25/2020 05:41 AM    NEUTROABS 7 21 05/05/2015 10:52 AM    LYMPHSABS 0 96 02/25/2020 05:41 AM    LYMPHSABS 2 23 05/05/2015 10:52 AM    MONOSABS 0 98 02/25/2020 05:41 AM    MONOSABS 0 74 05/05/2015 10:52 AM    EOSABS 0 08 02/25/2020 05:41 AM    EOSABS 0 42 05/05/2015 10:52 AM     Chemistry Profile:   Lab Results   Component Value Date/Time     05/05/2015 10:52 AM    K 4 2 06/29/2020 02:29 PM    K 4 2 05/05/2015 10:52 AM     06/29/2020 02:29 PM     05/05/2015 10:52 AM    CO2 27 06/29/2020 02:29 PM    CO2 18 (L) 11/02/2017 03:00 AM    ANIONGAP 6 05/05/2015 10:52 AM    BUN 14 06/29/2020 02:29 PM    BUN 15 05/05/2015 10:52 AM    CREATININE 1 19 06/29/2020 02:29 PM    CREATININE 1 27 05/05/2015 10:52 AM    GLUC 125 02/25/2020 05:41 AM    GLUF 98 06/29/2020 02:29 PM    GLUCOSE 184 (H) 11/02/2017 03:00 AM    GLUCOSE 92 05/05/2015 10:52 AM    CALCIUM 9 5 06/29/2020 02:29 PM    CALCIUM 9 7 05/05/2015 10:52 AM    MG 2 2 12/06/2017 08:27 AM    MG 2 1 04/24/2015 06:30 AM    PHOS 3 5 12/04/2017 04:01 AM    AST 6 12/01/2017 05:44 AM    AST 7 (L) 04/23/2015 11:25 PM    ALT 13 12/01/2017 05:44 AM    ALT 14 04/23/2015 11:25 PM    ALKPHOS 55 12/01/2017 05:44 AM    ALKPHOS 81 04/23/2015 11:25 PM    PROT 7 7 04/23/2015 11:25 PM    BILITOT 0 6 04/23/2015 11:25 PM    EGFR 74 06/29/2020 02:29 PM    EGFR 27 11/02/2017 12:54 AM     CBC:   Results from Last 12 Months   Lab Units 02/25/20  0541   WBC Thousand/uL 9 90   RBC Million/uL 4 57   HEMOGLOBIN g/dL 14 0   HEMATOCRIT % 42 4   MCV fL 93   MCH pg 30 6   MCHC g/dL 33 0   RDW % 13 0   MPV fL 9 1   PLATELETS Thousands/uL 180   NRBC AUTO /100 WBCs 0   NEUTROS PCT % 78*   LYMPHS PCT % 10*   MONOS PCT % 10   EOS PCT % 1   BASOS PCT % 0   NEUTROS ABS Thousands/µL 7 81*   LYMPHS ABS Thousands/µL 0 96   MONOS ABS Thousand/µL 0 98   EOS ABS Thousand/µL 0 08     Chemistry Profile:   Results from Last 12 Months   Lab Units 06/29/20  1429 02/25/20  0541   POTASSIUM mmol/L 4 2 3 4*   CHLORIDE mmol/L 108 108   CO2 mmol/L 27 25   BUN mg/dL 14 9   CREATININE mg/dL 1 19 1 27   GLUCOSE FASTING mg/dL 98  --    GLUCOSE RANDOM mg/dL  --  125   CALCIUM mg/dL 9 5 8 6   EGFR ml/min/1 73sq m 74 68       Current Medications     Current Outpatient Medications:     oxybutynin (DITROPAN-XL) 10 MG 24 hr tablet, Take 1 tablet (10 mg total) by mouth daily at bedtime, Disp: 90 tablet, Rfl: 3    Blood Pressure Monitoring (BLOOD PRESSURE CUFF) MISC, by Does not apply route 3 (three) times a week (Patient not taking: Reported on 7/15/2020), Disp: 1 each, Rfl: 0    doxycycline hyclate (VIBRA-TABS) 100 mg tablet, Take 100 mg by mouth 2 (two) times a day, Disp: , Rfl:     lisinopril (ZESTRIL) 2 5 mg tablet, Take 2 tablets (5 mg total) by mouth daily (Patient not taking: Reported on 4/17/2020), Disp: 60 tablet, Rfl: 2    tamsulosin (FLOMAX) 0 4 mg, Take 2 capsules (0 8 mg total) by mouth daily with dinner (Patient not taking: Reported on 5/27/2020), Disp: 60 capsule, Rfl: 6    tolterodine (DETROL) 1 mg tablet, Take 1 mg by mouth 2 (two) times a day, Disp: , Rfl:     Health Maintenance     Health Maintenance   Topic Date Due    Pneumococcal Vaccine: Pediatrics (0 to 5 Years) and At-Risk Patients (6 to 59 Years) (1 of 1 - PPSV23) 09/12/1981    DTaP,Tdap,and Td Vaccines (1 - Tdap) 09/12/1986    BMI: Followup Plan  09/12/1993    Annual Physical  09/12/1993    Influenza Vaccine  09/12/2020 (Originally 7/1/2020)    Depression Screening PHQ  03/05/2021    BMI: Adult  07/28/2021    Pneumococcal Vaccine: 65+ Years (1 of 2 - PCV13) 09/12/2040    HIV Screening  Completed    Hepatitis C Screening  Completed    HIB Vaccine  Aged Out    Hepatitis B Vaccine  Aged Out    IPV Vaccine  Aged Out    Hepatitis A Vaccine  Aged Out    Meningococcal ACWY Vaccine  Aged Out    HPV Vaccine  Aged [employer] History   Administered Date(s) Administered    Hib (PRP-T) 12/16/2017    Meningococcal MCV4P 12/16/2017       Noam Hernadez MD  PGY II  7/28/2020 8:26 AM  Portions of the record may have been created with voice recognition software  Occasional wrong word or "sound a like" substitutions may have occurred due to the inherent limitations of voice recognition software  Read the chart carefully and recognize, using context, where substitutions have occurred

## 2020-07-28 NOTE — PATIENT INSTRUCTIONS
Take lisinopril 5 mg daily   Measure you blood pressure at least 3 time/ week and keep blood pressure log     Return to clinic 6 months       Diphtheria/Acellular Pertussis/Tetanus Booster Vaccine (Tdap) (By injection)   Pertussis Vaccine, Acellular (per-TUS-iss VAX-een, l-EQYH-jzx-lar), Reduced Diphtheria Toxoid (ree-DOOST dif-THEER-ee-a TOX-oyd), Tetanus Toxoid (TET-a-nus TOX-oyd)  Protects against infections caused by tetanus (lockjaw), diphtheria, or pertussis (whooping cough)  This is a booster vaccine  Brand Name(s): Adacel, Boostrix   There may be other brand names for this medicine  When This Medicine Should Not Be Used: You should not receive this vaccine if you have had an allergic reaction to the separate or combined tetanus, diphtheria, or pertussis vaccine  You should not receive this vaccine if you have had seizures, mental changes, or any other serious reaction within 7 days after you received a pertussis vaccine  How to Use This Medicine:   Injectable  · A nurse or other health provider will give you this medicine  · Your doctor will prescribe your exact dose and tell you how often it should be given  This medicine is given as a shot into one of your muscles  · You may receive other vaccines at the same time as this one, but in a different body area  You should receive patient instructions for all of the vaccines  Talk to your doctor or nurse if you have questions  · Read and follow the patient instructions that come with this medicine  Talk to your doctor or pharmacist if you have any questions  Drugs and Foods to Avoid:   Ask your doctor or pharmacist before using any other medicine, including over-the-counter medicines, vitamins, and herbal products  · Make sure the doctor knows if you are receiving a treatment or medicine that weakens your immune system   This includes radiation treatment, steroid medicines (such as dexamethasone, hydrocortisone, methylprednisolone, prednisolone, prednisone, Medrol®), or cancer medicines  Warnings While Using This Medicine:   · Make sure your doctor knows if you are pregnant or breastfeeding or have epilepsy, a weak immune system, or a history of a stroke  Tell your doctor if you are sick or have a fever  · Tell your doctor about any reaction you had after you received a vaccine  This includes fainting, seizures, a fever over 105 degrees F, or severe redness or swelling where the shot was given  Tell your doctor if you have a history of Guillain-Barré syndrome after you received a vaccine with tetanus  · Call your doctor right away if you faint or have vision changes, numbness or tingling in your arms, hands, or feet, or a seizure after you receive this vaccine  · Tell your doctor if you have an allergy to latex  The syringes may contain dry natural latex rubber  · This vaccine will not treat an active infection  If you have a diphtheria, tetanus, or pertussis infection, you will need medicine to treat the infection  Possible Side Effects While Using This Medicine:   Call your doctor right away if you notice any of these side effects:  · Allergic reaction: Itching or hives, swelling in your face or hands, swelling or tingling in your mouth or throat, chest tightness, trouble breathing  · Changes in vision  · Fever over 105 degrees F  · Lightheadedness or fainting  · Numbness, tingling, or burning pain in your hands, arms, legs, or feet  · Seizures  · Sudden numbness or weakness in your arms or legs  · Severe pain, redness, or swelling where the shot was given  If you notice these less serious side effects, talk with your doctor:   · Headache  · Mild pain, redness, or swelling where the shot was given  · Nausea, vomiting, diarrhea, or stomach pain  · Tiredness  If you notice other side effects that you think are caused by this medicine, tell your doctor  Call your doctor for medical advice about side effects   You may report side effects to FDA at 1-800-FDA-1088  © 2017 [employer] Information is for End User's use only and may not be sold, redistributed or otherwise used for commercial purposes  The above information is an  only  It is not intended as medical advice for individual conditions or treatments  Talk to your doctor, nurse or pharmacist before following any medical regimen to see if it is safe and effective for you

## 2020-08-04 NOTE — PROGRESS NOTES
Progress Note - Angelito Salinas 43 y o  male MRN: [id number]    Unit/Bed#: [facility] 916-01 Encounter: [id number]      Assessment:  Mr Sherlyn Uribe is a 27-year-old incarcerated male known to our service of bilateral hydronephrosis and intermittent chronic urinary retention transferred from present to [facility] and then [place] secondary to left flank pain  CT revealed severe left hydronephrosis and large 17 cm collection of perinephric fluid/blood extending to left pelvis and anterior to left psoas  Patient became hemodynamically unstable required ICU admission  Patient was taken to the admitting trauma team now status post exploratory laparotomy left nephro ureterectomy and subsequent second-look exploratory laparotomy, ureteral ligation and closure  Mr Sherlyn Uribe  is now been transferred to the medical surgical unit  Plan:  Maintain Thakur catheter to straight drainage  Await renal ultrasound results to re-evaluate upper tract obstruction of solitary kidney  Creatinine slightly trending downward  Recommend consultation with Nephrology if resolution of hydronephrosis is seen without dramatic improvement of creatinine  Will follow  Subjective:   Denies fever or chills  Objective:     Vitals: Blood pressure 131/68, pulse 78, temperature 98 2 °F (36 8 °C), temperature source Oral, resp  rate 18, height 5' 6" (1 676 m), weight 89 2 kg (196 lb 10 4 oz), SpO2 96 %  ,Body mass index is 31 74 kg/m²        Intake/Output Summary (Last 24 hours) at 11/06/17 1421  Last data filed at 11/06/17 0900   Gross per 24 hour   Intake             1380 ml   Output             2650 ml   Net            -1270 ml       Physical Exam: General appearance: alert, appears stated age, cooperative and no distress  Head: Normocephalic, without obvious abnormality, atraumatic  Neck: no adenopathy, no carotid bruit, no JVD, supple, symmetrical, trachea midline and thyroid not enlarged, symmetric, no tenderness/mass/nodules  Lungs: clear to auscultation bilaterally  Heart: regular rate and rhythm, S1, S2 normal, no murmur, click, rub or gallop  Abdomen: abnormal findings:  mild and Incisional tenderness appropriate tenderness in the lower abdomen  Extremities: extremities normal, atraumatic, no cyanosis or edema  Pulses: 2+ and symmetric  Neurologic: Grossly normal  Thakur secure and patent for clear yellow urine     Invasive Devices     Peripheral Intravenous Line            Peripheral IV 11/06/17 Right Forearm less than 1 day          Drain            Urethral Catheter Coude 16 Fr  4 days                Lab, Imaging and other studies: I have personally reviewed pertinent reports  Griseofulvin Pregnancy And Lactation Text: This medication is Pregnancy Category X and is known to cause serious birth defects. It is unknown if this medication is excreted in breast milk but breast feeding should be avoided.

## 2020-08-25 ENCOUNTER — HOSPITAL ENCOUNTER (EMERGENCY)
Facility: HOSPITAL | Age: 45
Discharge: HOME | End: 2020-08-25
Attending: EMERGENCY MEDICINE | Admitting: EMERGENCY MEDICINE
Payer: COMMERCIAL

## 2020-08-25 VITALS
HEIGHT: 66 IN | HEART RATE: 104 BPM | SYSTOLIC BLOOD PRESSURE: 162 MMHG | TEMPERATURE: 98.5 F | WEIGHT: 180 LBS | DIASTOLIC BLOOD PRESSURE: 108 MMHG | BODY MASS INDEX: 28.93 KG/M2 | OXYGEN SATURATION: 97 % | RESPIRATION RATE: 20 BRPM

## 2020-08-25 DIAGNOSIS — N39.0 URINARY TRACT INFECTION: Primary | ICD-10-CM

## 2020-08-25 LAB
ANION GAP SERPL CALCULATED.3IONS-SCNC: 6 MMOL/L (ref 4–13)
BACTERIA UR QL AUTO: ABNORMAL /HPF
BILIRUB UR QL STRIP: NEGATIVE
BUN SERPL-MCNC: 17 MG/DL (ref 5–25)
CALCIUM SERPL-MCNC: 9.4 MG/DL (ref 8.3–10.1)
CHLORIDE SERPL-SCNC: 108 MMOL/L (ref 100–108)
CLARITY UR: ABNORMAL
CO2 SERPL-SCNC: 26 MMOL/L (ref 21–32)
COLOR UR: YELLOW
CREAT SERPL-MCNC: 1.12 MG/DL (ref 0.6–1.3)
GFR SERPL CREATININE-BSD FRML MDRD: 79 ML/MIN/1.73SQ M
GLUCOSE SERPL-MCNC: 111 MG/DL (ref 65–140)
GLUCOSE UR STRIP-MCNC: NEGATIVE MG/DL
HGB UR QL STRIP.AUTO: ABNORMAL
KETONES UR STRIP-MCNC: NEGATIVE MG/DL
LEUKOCYTE ESTERASE UR QL STRIP: ABNORMAL
MUCOUS THREADS UR QL AUTO: ABNORMAL
NITRITE UR QL STRIP: NEGATIVE
NON-SQ EPI CELLS URNS QL MICRO: ABNORMAL /HPF
PH UR STRIP.AUTO: 7 [PH] (ref 4.5–8)
POTASSIUM SERPL-SCNC: 3.6 MMOL/L (ref 3.5–5.3)
PROT UR STRIP-MCNC: ABNORMAL MG/DL
RBC #/AREA URNS AUTO: ABNORMAL /HPF
SODIUM SERPL-SCNC: 140 MMOL/L (ref 136–145)
SP GR UR STRIP.AUTO: 1.02 (ref 1–1.03)
UROBILINOGEN UR QL STRIP.AUTO: 0.2 E.U./DL
WBC #/AREA URNS AUTO: ABNORMAL /HPF

## 2020-08-25 PROCEDURE — 81001 URINALYSIS AUTO W/SCOPE: CPT

## 2020-08-25 PROCEDURE — 80048 BASIC METABOLIC PNL TOTAL CA: CPT | Performed by: EMERGENCY MEDICINE

## 2020-08-25 PROCEDURE — 36415 COLL VENOUS BLD VENIPUNCTURE: CPT | Performed by: EMERGENCY MEDICINE

## 2020-08-25 PROCEDURE — 99284 EMERGENCY DEPT VISIT MOD MDM: CPT | Performed by: EMERGENCY MEDICINE

## 2020-08-25 PROCEDURE — 99283 EMERGENCY DEPT VISIT LOW MDM: CPT

## 2020-08-25 RX ORDER — AMOXICILLIN 500 MG/1
500 CAPSULE ORAL EVERY 8 HOURS SCHEDULED
COMMUNITY

## 2020-08-25 RX ORDER — CEPHALEXIN 250 MG/1
500 CAPSULE ORAL EVERY 8 HOURS SCHEDULED
Qty: 42 CAPSULE | Refills: 0 | Status: SHIPPED | OUTPATIENT
Start: 2020-08-25 | End: 2020-09-01

## 2020-08-25 NOTE — ED PROVIDER NOTES
History  Chief Complaint   Patient presents with    Possible UTI     Pt reports possible UTI  Pt self caths  Pt denies frequency, burning, blood in urine, report foul smell     44-year-old male with previous medical history of atonic bladder, kidney disease presenting emergency department with foul-smelling urine, blood in urine for 1 week  Patient also notes tingling in his right flank that typically is associated with urinary tract infections  Patient denies fevers, nausea, vomiting  Patient self catheterizes  Patient started on a blood pressure medication (lisinopril) a couple weeks ago, which the patient feels makes him need to self-catheterize more frequently than normal   Patient stopped taking the medication 1 week ago  No alleviating or exacerbating symptoms  Patient denies pain  Has taken amoxicillin for the last 3 days for dental prophylaxis  Urinary Frequency   Severity:  Moderate  Onset quality:  Gradual  Timing:  Constant  Progression:  Worsening  Chronicity:  New  Associated symptoms: no abdominal pain, no fever, no myalgias, no nausea, no rash, no rhinorrhea and no shortness of breath        Prior to Admission Medications   Prescriptions Last Dose Informant Patient Reported? Taking?    Blood Pressure Monitoring (BLOOD PRESSURE CUFF) MISC   No No   Sig: by Does not apply route 3 (three) times a week   amoxicillin (AMOXIL) 500 mg capsule 8/24/2020 at Unknown time  Yes Yes   Sig: Take 500 mg by mouth every 8 (eight) hours   doxycycline hyclate (VIBRA-TABS) 100 mg tablet  Self Yes No   Sig: Take 100 mg by mouth 2 (two) times a day   lisinopril (ZESTRIL) 5 mg tablet Past Week at Unknown time  No Yes   Sig: Take 1 tablet (5 mg total) by mouth daily   oxybutynin (DITROPAN-XL) 10 MG 24 hr tablet   No No   Sig: Take 1 tablet (10 mg total) by mouth daily at bedtime   tamsulosin (FLOMAX) 0 4 mg Past Week at Unknown time Self No Yes   Sig: Take 2 capsules (0 8 mg total) by mouth daily with dinner   tolterodine (DETROL) 1 mg tablet Not Taking at Unknown time Self Yes No   Sig: Take 1 mg by mouth 2 (two) times a day      Facility-Administered Medications: None       Past Medical History:   Diagnosis Date    Enlarged prostate     UTI (urinary tract infection)        Past Surgical History:   Procedure Laterality Date    CYSTOSCOPY CYSTOGRAM W/ INJECTION DEFLUX Left 11/18/2017    Procedure: CYSTOSCOPY Fulguration of ureteral orifice CYSTOGRAM W/ INJECTION DEFLUX, LEFT RETROGRADE, PYLOGRAM AND LEFT URETEROSCOPY;  Surgeon: Yves Barros MD;  Location: [facility];  Service: Urology    KIDNEY SURGERY      LAPAROTOMY N/A 11/2/2017    Procedure: LAPAROTOMY EXPLORATORY 2nd LOOK, ligation of left ureter, I/D with closure;  Surgeon: Tanner Branch MD;  Location: [facility];  Service: General       Family History   Problem Relation Age of Onset    No Known Problems Mother     No Known Problems Father      I have reviewed and agree with the history as documented  E-Cigarette/Vaping    E-Cigarette Use Never User      E-Cigarette/Vaping Substances    Nicotine No     THC No     CBD No     Flavoring No     Other No     Unknown No      Social History     Tobacco Use    Smoking status: Current Some Day Smoker     Types: Cigarettes    Smokeless tobacco: Never Used   Substance Use Topics    Alcohol use: Not Currently     Comment: "socially"    Drug use: No        Review of Systems   Constitutional: Negative for fever  HENT: Negative for rhinorrhea  Respiratory: Negative for shortness of breath  Gastrointestinal: Negative for abdominal pain and nausea  Genitourinary: Positive for frequency and hematuria  Foul-smelling urine  Musculoskeletal: Negative for myalgias  Skin: Negative for rash  All other systems reviewed and are negative        Physical Exam  ED Triage Vitals [08/25/20 1118]   Temperature Pulse Respirations Blood Pressure SpO2   98 5 °F (36 9 °C) 104 20 (!) 162/108 97 % Temp Source Heart Rate Source Patient Position - Orthostatic VS BP Location FiO2 (%)   Oral Monitor Sitting Left arm --      Pain Score       No Pain             Orthostatic Vital Signs  Vitals:    08/25/20 1118   BP: (!) 162/108   Pulse: 104   Patient Position - Orthostatic VS: Sitting       Physical Exam  Vitals signs and nursing note reviewed  Constitutional:       General: He is not in acute distress  Appearance: He is well-developed  He is not diaphoretic  HENT:      Head: Normocephalic and atraumatic  Right Ear: External ear normal       Left Ear: External ear normal    Eyes:      Conjunctiva/sclera: Conjunctivae normal    Neck:      Vascular: No JVD  Trachea: No tracheal deviation  Cardiovascular:      Rate and Rhythm: Normal rate and regular rhythm  Heart sounds: Normal heart sounds  No murmur  Pulmonary:      Effort: No respiratory distress  Breath sounds: Normal breath sounds  No stridor  No wheezing or rales  Abdominal:      General: Bowel sounds are normal  There is no distension  Palpations: Abdomen is soft  There is no mass  Tenderness: There is no abdominal tenderness  There is no right CVA tenderness, left CVA tenderness, guarding or rebound  Genitourinary:     Comments: Deferred  Musculoskeletal:         General: No tenderness or deformity  Skin:     General: Skin is warm and dry  Capillary Refill: Capillary refill takes less than 2 seconds  Coloration: Skin is not pale  Findings: No erythema or rash  Neurological:      Motor: No abnormal muscle tone  Coordination: Coordination normal    Psychiatric:         Behavior: Behavior normal          Thought Content:  Thought content normal          Judgment: Judgment normal          ED Medications  Medications - No data to display    Diagnostic Studies  Results Reviewed     Procedure Component Value Units Date/Time    Basic metabolic panel [[id number]] Collected:  08/25/20 1247    Lab Status:  Final result Specimen:  Blood from Arm, Right Updated:  08/25/20 1322     Sodium 140 mmol/L      Potassium 3 6 mmol/L      Chloride 108 mmol/L      CO2 26 mmol/L      ANION GAP 6 mmol/L      BUN 17 mg/dL      Creatinine 1 12 mg/dL      Glucose 111 mg/dL      Calcium 9 4 mg/dL      eGFR 79 ml/min/1 73sq m     Narrative:       National Kidney Disease Foundation guidelines for Chronic Kidney Disease (CKD):     Stage 1 with normal or high GFR (GFR > 90 mL/min/1 73 square meters)    Stage 2 Mild CKD (GFR = 60-89 mL/min/1 73 square meters)    Stage 3A Moderate CKD (GFR = 45-59 mL/min/1 73 square meters)    Stage 3B Moderate CKD (GFR = 30-44 mL/min/1 73 square meters)    Stage 4 Severe CKD (GFR = 15-29 mL/min/1 73 square meters)    Stage 5 End Stage CKD (GFR <15 mL/min/1 73 square meters)  Note: GFR calculation is accurate only with a steady state creatinine    Urine Microscopic [[id number]]  (Abnormal) Collected:  08/25/20 1144    Lab Status:  Final result Specimen:  Urine, Other Updated:  08/25/20 1313     RBC, UA 20-30 /hpf      WBC, UA 4-10 /hpf      Epithelial Cells Occasional /hpf      Bacteria, UA Occasional /hpf      MUCUS THREADS Occasional    Urine Macroscopic, POC [[id number]]  (Abnormal) Collected:  08/25/20 1144    Lab Status:  Final result Specimen:  Urine Updated:  08/25/20 1146     Color, UA Yellow     Clarity, UA Slightly Cloudy     pH, UA 7 0     Leukocytes, UA Small     Nitrite, UA Negative     Protein,  (2+) mg/dl      Glucose, UA Negative mg/dl      Ketones, UA Negative mg/dl      Urobilinogen, UA 0 2 E U /dl      Bilirubin, UA Negative     Blood, UA Large     Specific Milnor, UA 1 020    Narrative:       CLINITEK RESULT                 No orders to display         Procedures  Procedures      ED Course                                           MDM  Number of Diagnoses or Management Options  Urinary tract infection: new and requires workup  Diagnosis management comments: 51-year-old male presenting emergency department foul-smelling urine, tingling feeling in the right flank  Patient's concerning might have urinary tract infection  Differential diagnosis:  Urinary tract infection, hematuria, pyelonephritis (less likely)  Workup will include urinalysis, BMP to check for renal function  Renal function normal     Urinalysis has hematuria pyuria, occasional bacteria, occasional epithelial cells  Likely urinary tract infection  Will discharge on cephalexin  Will recommend follow-up with Urology  Return for fevers  Amount and/or Complexity of Data Reviewed  Clinical lab tests: ordered and reviewed  Decide to obtain previous medical records or to obtain history from someone other than the patient: yes    Risk of Complications, Morbidity, and/or Mortality  Presenting problems: low  Diagnostic procedures: low  Management options: low    Patient Progress  Patient progress: stable        Disposition  Final diagnoses:   Urinary tract infection     Time reflects when diagnosis was documented in both MDM as applicable and the Disposition within this note     Time User Action Codes Description Comment    8/25/2020  2:04 PM Sergio Schwab Add [N39 0] Urinary tract infection       ED Disposition     ED Disposition Condition Date/Time Comment    Discharge Stable Tue Aug 25, 2020  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              Follow-up Information     Follow up With Specialties Details Why Contact Info Additional [address] Emergency Department Emergency Medicine  If symptoms worsen [address]  [phone]  ED, [address] [place] [address], [address]   [phone]          Discharge Medication List as of 8/25/2020  2:04 PM      START taking these medications    Details   cephalexin (KEFLEX) 250 mg capsule Take 2 capsules (500 mg total) by mouth every 8 (eight) hours for 7 days, Starting Tue 8/25/2020, Until Tue 9/1/2020, Print         CONTINUE these medications which have NOT CHANGED    Details   amoxicillin (AMOXIL) 500 mg capsule Take 500 mg by mouth every 8 (eight) hours, Historical Med      Blood Pressure Monitoring (BLOOD PRESSURE CUFF) MISC by Does not apply route 3 (three) times a week, Starting Wed 7/29/2020, Normal      doxycycline hyclate (VIBRA-TABS) 100 mg tablet Take 100 mg by mouth 2 (two) times a day, Starting Wed 2/26/2020, Historical Med      lisinopril (ZESTRIL) 5 mg tablet Take 1 tablet (5 mg total) by mouth daily, Starting Tue 7/28/2020, Until Mon 10/26/2020, Normal      oxybutynin (DITROPAN-XL) 10 MG 24 hr tablet Take 1 tablet (10 mg total) by mouth daily at bedtime, Starting Wed 7/15/2020, Normal      tamsulosin (FLOMAX) 0 4 mg Take 2 capsules (0 8 mg total) by mouth daily with dinner, Starting Thu 3/1/2018, Print      tolterodine (DETROL) 1 mg tablet Take 1 mg by mouth 2 (two) times a day, Historical Med           No discharge procedures on file  PDMP Review     None           ED Provider  Attending physically available and evaluated Josy Rodriguez I managed the patient along with the ED Attending      Electronically Signed by         Mary Chaudhari DO  08/25/20 1919

## 2020-08-25 NOTE — DISCHARGE INSTRUCTIONS
Follow-up with urologist     Come back to the emergency department if you have fevers, worsening symptoms

## 2020-08-25 NOTE — ED ATTENDING ATTESTATION
8/25/2020  INoble MD, saw and evaluated the patient  I have discussed the patient with the resident/non-physician practitioner and agree with the resident's/non-physician practitioner's findings, Plan of Care, and MDM as documented in the resident's/non-physician practitioner's note, except where noted  All available labs and Radiology studies were reviewed  I was present for key portions of any procedure(s) performed by the resident/non-physician practitioner and I was immediately available to provide assistance  At this point I agree with the current assessment done in the Emergency Department    I have conducted an independent evaluation of this patient a history and physical is as follows:  Here with urine infection symptoms   Self caths for neurogenic bladder    Dysuria     Color change foul smelling urine     Lungs clear heart rrr no m abd soft nt nd    Imp UTI   ED Course         Critical Care Time  Procedures

## 2021-07-11 NOTE — CASE MANAGEMENT
Continued Stay Review    Date: 12-22-17      Vital Signs: /86   Pulse 82   Temp 98 3 °F (36 8 °C) (Oral)   Resp 18   Ht 5' 6" (1 676 m)   Wt 61 3 kg (135 lb 2 3 oz)   SpO2 98%   BMI 21 81 kg/m²     Medications:   Scheduled Meds:   acetaminophen 975 mg Oral Q8H   heparin (porcine) 5,000 Units Subcutaneous Q8H [facility]   iron polysaccharides 150 mg Oral Daily   phenazopyridine 100 mg Oral TID With Meals   polyethylene glycol 17 g Oral Daily   senna-docusate sodium 1 tablet Oral HS   tamsulosin 0 8 mg Oral Daily With Dinner   tobramycin-dexamethasone 1 drop Ophthalmic Q6H [facility]     Continuous Infusions:    PRN Meds: HYDROmorphone    influenza vaccine    oxyCODONE    oxyCODONE    pneumococcal 23-valent polysaccharide vaccine    polyvinyl alcohol       Age/Sex: 43 y o  male     Assessment/Plan:    Assessment:  43y o -year-old male with spontaneous left kidney rupture     - s/p ex lap, left nephrectomy, abthera 11/1 [facility])  - s/p ex lap, removal of packs, ligation of left ureter, abdominal closure 11/2 (Doyal Parody)  - s/p right PCN w/ intraabdominal ascites samples 11/9 (IR)  - s/p 11/18 cystoscopy w/ fulguration of ureteral orifice, cystogram with injection of deflux, left retrograde pyelograam 11/18 Tasha Allegra)  - s/p nephrostogram, PCN capping 11/21 (IR)  - s/p IR tube check 11/27 (IR)  - s/p drainage of left upper quadrant fluid collection 11/30 (IR)  -s/p Left CT insertion 12/3 (Doyal Parody)   -s/p Splenic agram and embolization (IR)  - s/p CT removal 12/14  - s/p IR drain x 2 removal      Plan:  - regular diet  - plan to d/c R nephrostomy tube per urology, will f/u IR today  - prn pain control  - dispo: needs placement at shelter       Discharge Plan: [facility] upon discharge Yes

## 2021-11-29 ENCOUNTER — TELEPHONE (OUTPATIENT)
Dept: OTHER | Facility: OTHER | Age: 46
End: 2021-11-29

## 2022-12-17 NOTE — PROGRESS NOTES
Progress Note - Nephrology   Lenora Rios 43 y o  male MRN: [id number]  Unit/Bed#: [facility] 916-01 Encounter: [id number]    ASSESSMENT AND PLAN:  31-year-old male with a history of chronic bilateral hydronephrosis who presented with left flank pain  He was found to have left renal injury and had an ex lap with a left nephrectomy due to left kidney rupture; also found to have right-sided hydronephrosis being managed with a Thakur catheter  We are seeing for acute kidney injury on top of chronic kidney disease:     1  CKD stage 3:  Baseline creatinine ranges 1 1-1 8 mg/dL  2   WOLF:  Peak creatinine was 3 4 mg/dL but improving nicely  creatinine continues to improve and is probably close to baseline 1 26 mg/dL  Outstanding urine output  Watch for postobstructive diuresis  Patient currently with a Thakur catheter as well as right PCN  3   Electrolytes are acceptable  Borderline hyponatremia  Therefore, I will switch to D5 normal saline at 100 mL/hour  No Potassium  4   Mineral bone disorder:  Acceptable  5   Anemia:  Secondary blood loss  Hemoglobin stable at 8 6 per  Check iron studies  Transfuse as needed for hemoglobin less than 7 per primary team   6   Hypertension:  Blood pressure doing well today overall  No changes per        Subjective: The patient is more comfortable today  Still very weak  No chest pain or shortness of breath  No nausea vomiting or diarrhea unless he gets bladder spasms  Good urine output  Objective:     Vitals: Blood pressure 124/79, pulse 77, temperature 98 8 °F (37 1 °C), temperature source Oral, resp  rate 18, height 5' 6" (1 676 m), weight 89 2 kg (196 lb 10 4 oz), SpO2 96 %  ,Body mass index is 31 74 kg/m²      Weight (last 2 days)     None            Intake/Output Summary (Last 24 hours) at 11/14/17 1352  Last data filed at 11/14/17 1346   Gross per 24 hour   Intake             1370 ml   Output             3415 ml   Net            -2045 ml       Urethral Catheter Coude 16 Fr  (Active)   Reasons to continue Urinary Catheter  Post-operative urological requirements 11/13/2017 11:19 AM   Uretheral Catheter No longer needed- Will place order to discontinue 11/3/2017  6:39 AM   Site Assessment Clean;Skin intact 11/13/2017 11:30 PM   Collection Container Standard drainage bag 11/14/2017  4:05 AM   Securement Method Securing device (Describe) 11/14/2017  4:05 AM   Output (mL) 200 mL 11/14/2017  1:46 PM       Physical Exam: General:  Weak appearing but sitting out of bed and in no acute distress  Skin:  No acute rash  Eyes:  No scleral icterus  ENT:  Moist mucous membranes  Neck:  Supple, no jugular venous distention  Chest:  Clear to auscultation  CVS:  No rubs or gallops appreciated  Abdomen:  Normal bowel sounds, slightly distended, minimal tenderness at most to deep palpation  Extremities:  No edema  Neuro:  Grossly intact  Psych:  Alert and oriented                Medications:    Scheduled Meds:  acetaminophen 975 mg Oral Q8H   amLODIPine 5 mg Oral Daily   heparin (porcine) 5,000 Units Subcutaneous Q8H [facility]   oxybutynin 5 mg Oral TID   piperacillin-tazobactam 3 375 g Intravenous Q6H   senna-docusate sodium 1 tablet Oral BID   tamsulosin 0 4 mg Oral Daily With Dinner       PRN Meds: belladonna-opium    calcium carbonate    hydrALAZINE    HYDROmorphone    influenza vaccine    oxyCODONE    oxyCODONE    Continuous Infusions:  dextrose 5 % and sodium chloride 0 45 % with KCl 20 mEq/L 100 mL/hr Last Rate: 100 mL/hr (11/14/17 0325)       Lab, Imaging and other studies: I have personally reviewed pertinent labs    Laboratory Results:    Results from last 7 days  Lab Units 11/14/17  0442 11/13/17  0506 11/12/17  0536 11/12/17  0535 11/11/17  0557 11/11/17  0546 11/10/17  0529 11/09/17  0937 11/09/17  0503 11/08/17  1438 11/08/17  0509   WBC Thousand/uL 14 05* 16 14*  --  13 07*  --  13 41* 12 09* 15 12*  --   --  14 03*   HEMOGLOBIN g/dL 8 6* 8 4*  --  8 8*  --  9 1* 8 5* 9 7*  -- --  9 5*   HEMATOCRIT % 26 0* 24 9*  --  26 1*  --  27 2* 26 3* 29 1*  --   --  28 6*   PLATELETS Thousands/uL 1,087* 1,084*  --  970*  --  908* 824* 663*  --   --  504*   SODIUM mmol/L 135* 137 138  --  133*  --  141  --  136 136 136   POTASSIUM mmol/L 4 4 4 0 3 9  --  4 3  --  4 5  --  4 7 4 9 4 7   CHLORIDE mmol/L 103 102 104  --  102  --  107  --  104 104 102   CO2 mmol/L 27 28 27  --  24  --  26  --  23 25 26   BUN mg/dL 12 14 16  --  17  --  22  --  39* 34* 30*   CREATININE mg/dL 1 26 1 34* 1 27  --  1 27  --  1 53*  --  3 40* 3 02* 2 71*   CALCIUM mg/dL 8 9 8 6 8 7  --  8 1*  --  8 4  --  8 6 8 8 8 5   MAGNESIUM mg/dL 2 2  --  2 1  --   --   --  2 3  --  2 4  --   --    PHOSPHORUS mg/dL 3 6  --   --   --   --   --   --   --   --   --   --    GLUCOSE RANDOM mg/dL 117 148* 106  --  115  --  94  --  120 108 111     Urinalysis: Lab Results   Component Value Date    COLORU Yellow 11/11/2017    COLORU Jennifer 04/23/2015    CLARITYU Turbid 11/11/2017    CLARITYU Clear 04/23/2015    SPECGRAV 1 018 11/11/2017    SPECGRAV 1 010 04/23/2015    PHUR 6 0 11/11/2017    PHUR 7 0 04/23/2015    LEUKOCYTESUR Small (A) 11/11/2017    LEUKOCYTESUR Large (A) 04/23/2015    NITRITE Negative 11/11/2017    NITRITE Negative 04/23/2015    PROTEINUA 30 (1+) (A) 11/11/2017    PROTEINUA 30 (1+) (A) 04/23/2015    GLUCOSEU Negative 11/11/2017    GLUCOSEU Negative 04/23/2015    KETONESU Negative 11/11/2017    KETONESU Negative 04/23/2015    BILIRUBINUR Negative 11/11/2017    BILIRUBINUR Negative 04/23/2015    BLOODU Large (A) 11/11/2017    BLOODU Large (A) 04/23/2015     ABGs: No results found for: [facility]  Radiology review:     Portions of the record may have been created with voice recognition software   Occasional wrong word or "sound a like" substitutions may have occurred due to the inherent limitations of voice recognition software   Read the chart carefully and recognize, using context, where substitutions have occurred  5

## 2024-04-14 NOTE — PHYSICAL THERAPY NOTE
Physical Therapy Re-Evaluation    Patient's Name: Gisella You    Admitting Diagnosis  Chest pain [R07 9]  Laceration of left kidney, initial encounter [U72 556L]  Severe sepsis ([address] ) [A41 9, R65 20]    Problem List  Patient Active Problem List   Diagnosis    Kidney laceration, left    Acute blood loss anemia    Pleural effusion on left    Renal abscess, left       Past Medical History  Past Medical History:   Diagnosis Date    Enlarged prostate     UTI (urinary tract infection)        Past Surgical History  Past Surgical History:   Procedure Laterality Date    CYSTOSCOPY CYSTOGRAM W/ INJECTION DEFLUX Left 11/18/2017    Procedure: CYSTOSCOPY Fulguration of ureteral orifice CYSTOGRAM W/ INJECTION DEFLUX, LEFT RETROGRADE, PYLOGRAM AND LEFT URETEROSCOPY;  Surgeon: Calvin Erwin MD;  Location: [facility];  Service: Urology    LAPAROTOMY N/A 11/2/2017    Procedure: LAPAROTOMY EXPLORATORY 2nd LOOK, ligation of left ureter, I/D with closure;  Surgeon: Chris Villeda MD;  Location: [facility];  Service: General      12/04/17 1240   Note Type   Note type Re-eval   Pain Assessment   Pain Assessment 0-10   Pain Score 8   Pain Type Acute pain;Surgical pain   Pain Location Abdomen   Effect of Pain on Daily Activities impaired quality of mobility    Patient's Stated Pain Goal No pain   Hospital Pain Intervention(s) Repositioned; Ambulation/increased activity   [address]    Prior Function   Level of [place] Independent with ADLs and functional mobility   Lives With Facility staff   ADL Assistance Independent   IADLs Independent   Falls in the last 6 months 0   Restrictions/Precautions   Weight Bearing Precautions Per Order No   Other Precautions 1:1;Pain; Fall Risk;Multiple lines;Telemetry  (Chest tube L; 2 drains L 1 drain R )   General   Additional Pertinent History 11/02/17 LAPAROTOMY EXPLORATORY ;LEFT NEPHRECTOMY;  11/18/17 CYSTOSCOPY Fulguration of ureteral orifice CYSTOGRAM W/ INJECTION DEFLUX, LEFT RETROGRADE, PYLOGRAM AND LEFT URETEROSCOPY    Family/Caregiver Present No   Cognition   Overall Cognitive Status WFL   Arousal/Participation Cooperative   Attention Attends with cues to redirect   Orientation Level Oriented X4   Memory Within functional limits   Following Commands Follows one step commands without difficulty   Comments Pt educated on importance of mobility and upright positioning  Educated on relationship to bowel function, skin integrity and reducation of orthostasis  Pt agreeable to particiapte in sit OOB at the end of session    RUE Assessment   RUE Assessment WFL   LUE Assessment   LUE Assessment WFL   RLE Assessment   RLE Assessment WFL   Strength RLE   RLE Overall Strength 4-/5   LLE Assessment   LLE Assessment WFL   Strength LLE   LLE Overall Strength 4-/5   Coordination   Movements are Fluid and Coordinated 0   Coordination and Movement Description antalgic    Sensation WFL   Light Touch   RLE Light Touch Grossly intact   LLE Light Touch Grossly intact   Proprioception   RLE Proprioception Grossly intact   LLE Proprioception Grossly Intact   Bed Mobility   Supine to Sit 5  Supervision   Additional items HOB elevated; Increased time required;Verbal cues;LE management   Additional Comments Pt required increased time for EOB transfer  Sitting EOB, pt with minor complaints of light headedness that subsided with pursed lip breathing  Transfers   Sit to Stand 5  Supervision   Additional items Increased time required   Stand to Sit 5  Supervision   Additional items Increased time required   Stand pivot 5  Supervision   Additional items Increased time required   Additional Comments Pt able to perform transfers with S and increased time  no LOB noted    Ambulation/Elevation   Gait pattern Decreased foot clearance; Antalgic; Short stride; Step to;Excessively slow   Gait Assistance 4  Minimal assist   Additional items Assist x 1   Assistive Device Rolling walker Distance 60'    Balance   Static Sitting Good   Dynamic Sitting Fair   Static Standing Fair   Dynamic Standing Fair -   Ambulatory Poor +   Endurance Deficit   Endurance Deficit Yes   Endurance Deficit Description Pt on 4L O2 during session  S/p 10' ambualtion, pt reading 100% SpO2 and HR recorded at 102bpm    Activity Tolerance   Activity Tolerance Patient limited by fatigue;Patient limited by pain   Nurse [address]    Assessment   Prognosis Good   Problem List Decreased strength;Decreased range of motion;Decreased endurance; Impaired balance;Decreased mobility; Decreased coordination;Decreased skin integrity;Orthopedic restrictions;Pain   Assessment PT re-eval performed 12/4/17 following a complicated 32 day stay at [facility] he was admitted for kidney laceration  Pt initially evalauted 11/4/17 where he required assistance for mobility and was limited by pain and weakness and presented as a fall risk and with poor tolerance to activity  Pt making progress thusfar with PT intervention but has been limited by multiple procedures, pain, and a transfer to ICU  During this session, pt required little assistance to perform tasks but did require increased time and assistance for multiple lines  PT to continue to follow pt during stay to progress functional mobility (I) and safety  Pt will return to care home at d/c  Goals   Patient Goals to have less pain    LTG Expiration Date 12/18/17   Long Term Goal #1 Pt will be mod(I) with bed mobility and transfers  Pt will be mod(I) with ambulaiton of community distances using least restrictive AD  Pt will particiapte in HEP  Pt will ambualte household distances with no AD vs least restrictive AD  Treatment Day 7   Plan   Treatment/Interventions Functional transfer training;LE strengthening/ROM; Therapeutic exercise; Endurance training;Patient/family training;Bed mobility; Equipment eval/education;Gait training;Spoke to nursing;OT   PT Frequency Other (Comment)  (6x/wk) Recommendation   Recommendation Other (Comment)  (pt to d/c to prision)   Equipment Recommended Walker   Additional Comments OOB in chair with  at bedside  All needs within reach      Barthel Index   Feeding 10   Bathing 0   Grooming Score 5   Dressing Score 5   Bladder Score 10   Bowels Score 10   Toilet Use Score 5   Transfers (Bed/Chair) Score 10   Mobility (Level Surface) Score 0   Stairs Score 0   Barthel Index Score 55           Coretta Kitchen, PT yes

## 2025-06-05 NOTE — PLAN OF CARE
Problem: PHYSICAL THERAPY ADULT  Goal: Performs mobility at highest level of function for planned discharge setting  See evaluation for individualized goals  Treatment/Interventions: Functional transfer training, LE strengthening/ROM, Therapeutic exercise, Endurance training, Equipment eval/education, Bed mobility, Gait training, Spoke to nursing, OT  Equipment Recommended: Eden Chaves (at this time)       See flowsheet documentation for full assessment, interventions and recommendations  Outcome: Progressing  Prognosis: Good  Problem List: Decreased strength, Decreased range of motion, Decreased endurance, Impaired balance, Decreased mobility, Decreased coordination, Decreased skin integrity, Orthopedic restrictions, Pain  Assessment: The pt  was able to ambulate community distances, but he required extended time due to pain and his fear of increased pain  He did require two standing rests during walking due to his pain  He did have a much smoother gait quality today than in prior sessions  He does continue to remain limited which continued physical therapy will assist in his return to independence  Barriers to Discharge: None     Recommendation: Other (Comment) (Return to facility )     PT - OK to Discharge: Yes    See flowsheet documentation for full assessment  Detail Level: Detailed Quality 226: Preventive Care And Screening: Tobacco Use: Screening And Cessation Intervention: Patient screened for tobacco use and is an ex/non-smoker Quality 130: Documentation Of Current Medications In The Medical Record: Current Medications Documented

## (undated) DEVICE — SUT VICRYL 2-0 REEL 54 IN J286G

## (undated) DEVICE — PROXIMATE PLUS MD MULTI-DIRECTIONAL RELEASE SKIN STAPLERS CONTAINS 35 STAINLESS STEEL STAPLES APPROXIMATE CLOSED DIMENSIONS: 6.9MM X 3.9MM WIDE: Brand: PROXIMATE

## (undated) DEVICE — SURGICEL SNOW 1 X 2 IN

## (undated) DEVICE — INTENDED FOR TISSUE SEPARATION, AND OTHER PROCEDURES THAT REQUIRE A SHARP SURGICAL BLADE TO PUNCTURE OR CUT.: Brand: BARD-PARKER SAFETY BLADES SIZE 15, STERILE

## (undated) DEVICE — TOWEL SET X-RAY

## (undated) DEVICE — SURGIFOAM 8.5 X 12.5

## (undated) DEVICE — POOLE SUCTION HANDLE: Brand: CARDINAL HEALTH

## (undated) DEVICE — 3M™ TEGADERM™ TRANSPARENT FILM DRESSING FRAME STYLE, 1628, 6 IN X 8 IN (15 CM X 20 CM), 10/CT 8CT/CASE: Brand: 3M™ TEGADERM™

## (undated) DEVICE — PLUMEPEN PRO 10FT

## (undated) DEVICE — STERILE CYSTO PACK: Brand: CARDINAL HEALTH

## (undated) DEVICE — GLOVE SRG BIOGEL ORTHOPEDIC 7.5

## (undated) DEVICE — Device

## (undated) DEVICE — CATH FOLEY COUDE 16FR 5ML 2 WAY TIEMANN LUBRICATH

## (undated) DEVICE — GAUZE SPONGES,16 PLY: Brand: CURITY

## (undated) DEVICE — SUT PDS II 1 CTX 36 IN Z371T

## (undated) DEVICE — GUIDEWIRE STRGHT TIP 0.035 IN  SOLO PLUS

## (undated) DEVICE — CATH URETERAL 5FR X 70 CM FLEX TIP POLYUR BARD

## (undated) DEVICE — INTENDED FOR TISSUE SEPARATION, AND OTHER PROCEDURES THAT REQUIRE A SHARP SURGICAL BLADE TO PUNCTURE OR CUT.: Brand: BARD-PARKER SAFETY BLADES SIZE 10, STERILE

## (undated) DEVICE — SPONGE STICK WITH PVP-I: Brand: KENDALL

## (undated) DEVICE — SPONGE LAP 18 X 18 IN STRL RFD

## (undated) DEVICE — CATH URET .038 10FR 50CM DUAL LUMEN

## (undated) DEVICE — 3000CC GUARDIAN II: Brand: GUARDIAN

## (undated) DEVICE — SUT VICRYL 3-0 SH 27 IN J416H

## (undated) DEVICE — STERILE MAJOR GENERAL PACK: Brand: CARDINAL HEALTH

## (undated) DEVICE — SYRINGE 3ML LL